# Patient Record
Sex: FEMALE | Race: BLACK OR AFRICAN AMERICAN | Employment: UNEMPLOYED | URBAN - METROPOLITAN AREA
[De-identification: names, ages, dates, MRNs, and addresses within clinical notes are randomized per-mention and may not be internally consistent; named-entity substitution may affect disease eponyms.]

---

## 2020-01-06 LAB
ALBUMIN SERPL-MCNC: 4.2 G/DL
ALP BLD-CCNC: 66 U/L
ALT SERPL-CCNC: 10 U/L
ANION GAP SERPL CALCULATED.3IONS-SCNC: NORMAL MMOL/L
AST SERPL-CCNC: 12 U/L
BASOPHILS ABSOLUTE: 58 /ΜL
BASOPHILS RELATIVE PERCENT: 0.8 %
BILIRUB SERPL-MCNC: 0.4 MG/DL (ref 0.1–1.4)
BUN BLDV-MCNC: 10 MG/DL
CALCIUM SERPL-MCNC: 9.2 MG/DL
CHLORIDE BLD-SCNC: 102 MMOL/L
CHOLESTEROL, TOTAL: 162 MG/DL
CHOLESTEROL/HDL RATIO: 3.5
CO2: 18 MMOL/L
CREAT SERPL-MCNC: 0.79 MG/DL
EOSINOPHILS ABSOLUTE: 146 /ΜL
EOSINOPHILS RELATIVE PERCENT: 2 %
GFR CALCULATED: 92
GLUCOSE BLD-MCNC: 224 MG/DL
HCT VFR BLD CALC: 38 % (ref 36–46)
HDLC SERPL-MCNC: 46 MG/DL (ref 35–70)
HEMOGLOBIN: 12.5 G/DL (ref 12–16)
HIV AG/AB: NON REACTIVE
LDL CHOLESTEROL CALCULATED: 99 MG/DL (ref 0–160)
LYMPHOCYTES ABSOLUTE: 2314 /ΜL
LYMPHOCYTES RELATIVE PERCENT: 31.7 %
MCH RBC QN AUTO: 29.1 PG
MCHC RBC AUTO-ENTMCNC: 32.9 G/DL
MCV RBC AUTO: 88.4 FL
MONOCYTES ABSOLUTE: 482 /ΜL
MONOCYTES RELATIVE PERCENT: 6.6 %
NEUTROPHILS ABSOLUTE: 4300 /ΜL
NEUTROPHILS RELATIVE PERCENT: 58.9 %
PDW BLD-RTO: 12.1 %
PLATELET # BLD: 261 K/ΜL
PMV BLD AUTO: 11 FL
POTASSIUM SERPL-SCNC: 4.5 MMOL/L
RBC # BLD: 4.3 10^6/ΜL
SODIUM BLD-SCNC: 139 MMOL/L
T3 TOTAL: 88
TOTAL PROTEIN: 6.7
TRIGL SERPL-MCNC: 78 MG/DL
TSH SERPL DL<=0.05 MIU/L-ACNC: 3.16 UIU/ML
VLDLC SERPL CALC-MCNC: NORMAL MG/DL
WBC # BLD: 7.3 10^3/ML

## 2020-02-07 ENCOUNTER — APPOINTMENT (OUTPATIENT)
Dept: GENERAL RADIOLOGY | Age: 65
End: 2020-02-07
Payer: COMMERCIAL

## 2020-02-07 ENCOUNTER — HOSPITAL ENCOUNTER (EMERGENCY)
Age: 65
Discharge: HOME | End: 2020-02-07
Payer: COMMERCIAL

## 2020-02-07 VITALS
OXYGEN SATURATION: 99 % | SYSTOLIC BLOOD PRESSURE: 161 MMHG | TEMPERATURE: 98 F | DIASTOLIC BLOOD PRESSURE: 93 MMHG | HEART RATE: 70 BPM | WEIGHT: 190 LBS | BODY MASS INDEX: 31.65 KG/M2 | HEIGHT: 65 IN | RESPIRATION RATE: 14 BRPM

## 2020-02-07 LAB
A/G RATIO: 1.3 (ref 1.1–2.2)
ALBUMIN SERPL-MCNC: 4 G/DL (ref 3.4–5)
ALP BLD-CCNC: 54 U/L (ref 40–129)
ALT SERPL-CCNC: 15 U/L (ref 10–40)
ANION GAP SERPL CALCULATED.3IONS-SCNC: 13 MMOL/L (ref 3–16)
AST SERPL-CCNC: 27 U/L (ref 15–37)
BASOPHILS ABSOLUTE: 0.1 K/UL (ref 0–0.2)
BASOPHILS RELATIVE PERCENT: 0.9 %
BILIRUB SERPL-MCNC: 0.3 MG/DL (ref 0–1)
BILIRUBIN URINE: NEGATIVE
BLOOD, URINE: NEGATIVE
BUN BLDV-MCNC: 7 MG/DL (ref 7–20)
CALCIUM SERPL-MCNC: 9.1 MG/DL (ref 8.3–10.6)
CHLORIDE BLD-SCNC: 98 MMOL/L (ref 99–110)
CLARITY: CLEAR
CO2: 23 MMOL/L (ref 21–32)
COLOR: YELLOW
CREAT SERPL-MCNC: <0.5 MG/DL (ref 0.6–1.2)
EOSINOPHILS ABSOLUTE: 0.1 K/UL (ref 0–0.6)
EOSINOPHILS RELATIVE PERCENT: 2.2 %
GFR AFRICAN AMERICAN: >60
GFR NON-AFRICAN AMERICAN: >60
GLOBULIN: 3.2 G/DL
GLUCOSE BLD-MCNC: 252 MG/DL (ref 70–99)
GLUCOSE URINE: 250 MG/DL
HCT VFR BLD CALC: 37.1 % (ref 36–48)
HEMOGLOBIN: 12.4 G/DL (ref 12–16)
KETONES, URINE: NEGATIVE MG/DL
LEUKOCYTE ESTERASE, URINE: NEGATIVE
LYMPHOCYTES ABSOLUTE: 2 K/UL (ref 1–5.1)
LYMPHOCYTES RELATIVE PERCENT: 32 %
MCH RBC QN AUTO: 29.4 PG (ref 26–34)
MCHC RBC AUTO-ENTMCNC: 33.3 G/DL (ref 31–36)
MCV RBC AUTO: 88.5 FL (ref 80–100)
MICROSCOPIC EXAMINATION: ABNORMAL
MONOCYTES ABSOLUTE: 0.4 K/UL (ref 0–1.3)
MONOCYTES RELATIVE PERCENT: 6.8 %
NEUTROPHILS ABSOLUTE: 3.6 K/UL (ref 1.7–7.7)
NEUTROPHILS RELATIVE PERCENT: 58.1 %
NITRITE, URINE: NEGATIVE
PDW BLD-RTO: 13.3 % (ref 12.4–15.4)
PH UA: 5.5 (ref 5–8)
PLATELET # BLD: 232 K/UL (ref 135–450)
PMV BLD AUTO: 8.6 FL (ref 5–10.5)
POTASSIUM SERPL-SCNC: 4.2 MMOL/L (ref 3.5–5.1)
POTASSIUM SERPL-SCNC: 5.8 MMOL/L (ref 3.5–5.1)
PROTEIN UA: NEGATIVE MG/DL
RBC # BLD: 4.2 M/UL (ref 4–5.2)
REASON FOR REJECTION: NORMAL
REJECTED TEST: NORMAL
SODIUM BLD-SCNC: 134 MMOL/L (ref 136–145)
SPECIFIC GRAVITY UA: >=1.03 (ref 1–1.03)
TOTAL PROTEIN: 7.2 G/DL (ref 6.4–8.2)
URINE REFLEX TO CULTURE: ABNORMAL
URINE TYPE: ABNORMAL
UROBILINOGEN, URINE: 0.2 E.U./DL
WBC # BLD: 6.2 K/UL (ref 4–11)

## 2020-02-07 PROCEDURE — 80053 COMPREHEN METABOLIC PANEL: CPT

## 2020-02-07 PROCEDURE — 71101 X-RAY EXAM UNILAT RIBS/CHEST: CPT

## 2020-02-07 PROCEDURE — 84132 ASSAY OF SERUM POTASSIUM: CPT

## 2020-02-07 PROCEDURE — 36415 COLL VENOUS BLD VENIPUNCTURE: CPT

## 2020-02-07 PROCEDURE — 99284 EMERGENCY DEPT VISIT MOD MDM: CPT

## 2020-02-07 PROCEDURE — 81003 URINALYSIS AUTO W/O SCOPE: CPT

## 2020-02-07 PROCEDURE — 85025 COMPLETE CBC W/AUTO DIFF WBC: CPT

## 2020-02-07 PROCEDURE — 6370000000 HC RX 637 (ALT 250 FOR IP): Performed by: NURSE PRACTITIONER

## 2020-02-07 RX ORDER — HYDROCODONE BITARTRATE AND ACETAMINOPHEN 5; 325 MG/1; MG/1
1 TABLET ORAL EVERY 6 HOURS PRN
Qty: 15 TABLET | Refills: 0 | Status: SHIPPED | OUTPATIENT
Start: 2020-02-07 | End: 2020-02-10

## 2020-02-07 RX ORDER — HYDROCODONE BITARTRATE AND ACETAMINOPHEN 5; 325 MG/1; MG/1
1 TABLET ORAL ONCE
Status: COMPLETED | OUTPATIENT
Start: 2020-02-07 | End: 2020-02-07

## 2020-02-07 RX ADMIN — HYDROCODONE BITARTRATE AND ACETAMINOPHEN 1 TABLET: 5; 325 TABLET ORAL at 10:42

## 2020-02-07 SDOH — HEALTH STABILITY: MENTAL HEALTH: HOW OFTEN DO YOU HAVE A DRINK CONTAINING ALCOHOL?: NEVER

## 2020-02-07 ASSESSMENT — PAIN DESCRIPTION - ORIENTATION
ORIENTATION: RIGHT
ORIENTATION: RIGHT

## 2020-02-07 ASSESSMENT — PAIN DESCRIPTION - LOCATION
LOCATION: FLANK
LOCATION: FLANK

## 2020-02-07 ASSESSMENT — PAIN SCALES - GENERAL
PAINLEVEL_OUTOF10: 10
PAINLEVEL_OUTOF10: 3
PAINLEVEL_OUTOF10: 10
PAINLEVEL_OUTOF10: 3

## 2020-02-10 NOTE — ED PROVIDER NOTES
[address] [place] [address]  ED  EMERGENCY DEPARTMENT ENCOUNTER      This patient was not seen and evaluated by the attending physician. Pt Name: Adela Abarca  MRN: [id number]  [place] 1955  Date of evaluation: 2/7/2020  Provider: MARK Duncan - CNP-C  PCP: No primary care provider on file. History provided by the patient. CHIEFCOMPLAINT:     Chief Complaint   Patient presents with    Flank Pain     Right sided flank pain       HISTORY OF PRESENT ILLNESS:      Adela Abarca is a 62 y.o. female who presents to [address] [place] [address]  ED with complaints of side pain. Patient states that she is actually had right side pain for several months. Patient states that the pain is worse when she pushes on it or moves a certain way, she states that specifically rolling over in bed makes it hurt. She denies any nausea or vomiting with it. She denies dysuria. Denies any gross hematuria. Denies trauma. Denies any chest pain or difficulty breathing. She is here for further evaluation. Patient states that she just moved up here from [place] and does not have a primary care physician at this time. LOCATION: Right flank/side  QUALITY: Ache  SEVERITY: 10 out of 10  DURATION: Several months  MODIFYING FACTORS: Worse with palpation or movement    Nursing Notes were reviewed     REVIEW OF SYSTEMS:     Review of Systems  All systems, a total of 10, are reviewed and negative except for those that were just noted in history present illness.         PAST MEDICAL HISTORY:     Past Medical History:   Diagnosis Date    Diabetes mellitus ([address]     Hyperlipidemia     Hypertension     Thyroid disease          SURGICAL HISTORY:      Past Surgical History:   Procedure Laterality Date    HYSTERECTOMY           CURRENT MEDICATIONS:       Discharge Medication List as of 2/7/2020 12:07 PM      CONTINUE these medications which have NOT CHANGED    Details   metFORMIN (GLUCOPHAGE) 500 MG tablet Take 500 mg by mouth 2 times daily (with meals)Historical Med      atorvastatin (LIPITOR) 20 MG tablet Take 20 mg by mouth dailyHistorical Med      lisinopril (PRINIVIL;ZESTRIL) 40 MG tablet Take 40 mg by mouth dailyHistorical Med      SITagliptin (JANUVIA) 100 MG tablet Take 100 mg by mouth dailyHistorical Med      repaglinide (PRANDIN) 1 MG tablet Take 1 mg by mouth 3 times daily (before meals)Historical Med      levothyroxine (SYNTHROID) 50 MCG tablet Take 50 mcg by mouth DailyHistorical Med      glimepiride (AMARYL) 4 MG tablet Take 4 mg by mouth every morning (before breakfast)Historical Med      Vaginal Lubricant (REPLENS) GEL Place 1 Dose vaginally daily, Disp-35 g, R-1Print               ALLERGIES:    Patient has no known allergies. FAMILY HISTORY:     History reviewed. No pertinent family history. SOCIAL HISTORY:     Social History     Socioeconomic History    Marital status:       Spouse name: None    Number of children: None    Years of education: None    Highest education level: None   Occupational History    None   Social Needs    Financial resource strain: None    Food insecurity:     Worry: None     Inability: None    Transportation needs:     Medical: None     Non-medical: None   Tobacco Use    Smoking status: Former Smoker    Smokeless tobacco: Never Used   Substance and Sexual Activity    Alcohol use: Never     Frequency: Never    Drug use: None    Sexual activity: None   Lifestyle    Physical activity:     Days per week: None     Minutes per session: None    Stress: None   Relationships    Social connections:     Talks on phone: None     Gets together: None     Attends Bahai service: None     Active member of club or organization: None     Attends meetings of clubs or organizations: None     Relationship status: None    Intimate partner violence:     Fear of current or ex partner: None     Emotionally abused: None     Physically abused: None     Forced sexual activity: None all extremities and sensation is intact. PSYCHIATRIC: Normal affect, normal insight and judgement. Alert and oriented x 3.         DIAGNOSTIC RESULTS:     LABS:    Results for orders placed or performed during the hospital encounter of 02/07/20   Comprehensive Metabolic Panel   Result Value Ref Range    Sodium 134 (L) 136 - 145 mmol/L    Potassium 5.8 (H) 3.5 - 5.1 mmol/L    Chloride 98 (L) 99 - 110 mmol/L    CO2 23 21 - 32 mmol/L    Anion Gap 13 3 - 16    Glucose 252 (H) 70 - 99 mg/dL    BUN 7 7 - 20 mg/dL    CREATININE <0.5 (L) 0.6 - 1.2 mg/dL    GFR Non-African American >60 >60    GFR African American >60 >60    Calcium 9.1 8.3 - 10.6 mg/dL    Total Protein 7.2 6.4 - 8.2 g/dL    Alb 4.0 3.4 - 5.0 g/dL    Albumin/Globulin Ratio 1.3 1.1 - 2.2    Total Bilirubin 0.3 0.0 - 1.0 mg/dL    Alkaline Phosphatase 54 40 - 129 U/L    ALT 15 10 - 40 U/L    AST 27 15 - 37 U/L    Globulin 3.2 g/dL   Urinalysis Reflex to Culture   Result Value Ref Range    Color, UA Yellow Straw/Yellow    Clarity, UA Clear Clear    Glucose, Ur 250 (A) Negative mg/dL    Bilirubin Urine Negative Negative    Ketones, Urine Negative Negative mg/dL    Specific Gravity, UA >=1.030 1.005 - 1.030    Blood, Urine Negative Negative    pH, UA 5.5 5.0 - 8.0    Protein, UA Negative Negative mg/dL    Urobilinogen, Urine 0.2 <2.0 E.U./dL    Nitrite, Urine Negative Negative    Leukocyte Esterase, Urine Negative Negative    Microscopic Examination Not Indicated     Urine Type NotGiven     Urine Reflex to Culture Not Indicated    SPECIMEN REJECTION   Result Value Ref Range    Rejected Test cbc     Reason for Rejection see below    CBC Auto Differential   Result Value Ref Range    WBC 6.2 4.0 - 11.0 K/uL    RBC 4.20 4.00 - 5.20 M/uL    Hemoglobin 12.4 12.0 - 16.0 g/dL    Hematocrit 37.1 36.0 - 48.0 %    MCV 88.5 80.0 - 100.0 fL    MCH 29.4 26.0 - 34.0 pg    MCHC 33.3 31.0 - 36.0 g/dL    RDW 13.3 12.4 - 15.4 %    Platelets 861 554 - 762 K/uL    MPV 8.6 5.0 - 10.5 fL    Neutrophils % 58.1 %    Lymphocytes % 32.0 %    Monocytes % 6.8 %    Eosinophils % 2.2 %    Basophils % 0.9 %    Neutrophils Absolute 3.6 1.7 - 7.7 K/uL    Lymphocytes Absolute 2.0 1.0 - 5.1 K/uL    Monocytes Absolute 0.4 0.0 - 1.3 K/uL    Eosinophils Absolute 0.1 0.0 - 0.6 K/uL    Basophils Absolute 0.1 0.0 - 0.2 K/uL   Potassium   Result Value Ref Range    Potassium 4.2 3.5 - 5.1 mmol/L         RADIOLOGY:  All x-ray studies are viewed/reviewed by me. Formal interpretations per the radiologist are as follows:      XR RIBS RIGHT INCLUDE CHEST (MIN 3 VIEWS)   Final Result   No rib fracture or acute process in the chest.                 EKG:  See EKG interpretation by an attending physician. PROCEDURES:   N/A    CRITICAL CARE TIME:   N/A    CONSULTS:  None      EMERGENCY DEPARTMENT COURSE andDIFFERENTIAL DIAGNOSIS/MDM:   Vitals:    Vitals:    02/07/20 0939 02/07/20 0948 02/07/20 1205 02/07/20 1233   BP:  (!) 161/93  (!) 161/93   Pulse:  72 70 70   Resp:  16 14 14   Temp:  97.9 °F (36.6 °C) 97.9 °F (36.6 °C) 98 °F (36.7 °C)   TempSrc: Oral Oral Oral    SpO2:  99% 99% 99%   Weight:  190 lb (86.2 kg)     Height:  5' 5\" (1.651 m)         Patient wasgiven the following medications:  Medications   HYDROcodone-acetaminophen (NORCO) 5-325 MG per tablet 1 tablet (1 tablet Oral Given 2/7/20 1042)         Patient was evaluated independently by myself with the attending physician available for consultation. Patient presented to the emergency room today with complaints of right side pain is been ongoing for several months. Patient did have some tenderness on exam noted to the right inferior lateral rib cage without crepitus. She denies trauma in that area. I did do an x-ray of the area which showed no acute fracture, no pneumothorax. Patient was given a dose of pain medicine here in the ED and she noted improvement of her pain from a 10 to a 3. Patient however told me that the pain medicine \"did nothing\". Patient was upset that I was not giving her stronger pain medicine for this. I explained her that her symptoms have been ongoing for several months and I saw no indications of an acute emergent medical condition. I did get her a referral to see a primary care physician. I did do laboratory studies on the patient which showed no leukocytosis, no significant electrolyte abnormality. Patient does have history of diabetes and was hyperglycemic with a glucose of 252 however CO2 and anion gap were normal so no evidence of DKA. Patient discharged home in good condition, she can return the ED for any worsening symptoms. Patient laboratory studies, radiographic imaging, and assessment were all discussed with the patient and/orpatient family. There was shared decision-making between myself as well as the patient and/or their surrogate and we are all in agreement with discharge home. There was an opportunity for questions and all questions were answered tothe best of my ability and to the satisfaction of the patient and/or patient family. I estimate there is LOW risk for PULMONARY EMBOLISM, ACUTE CORONARY SYNDROME, OR THORACIC AORTIC DISSECTION, thus I consider the discharge disposition reasonable. Alisson Garcia and I have discussed the diagnosis and risks, and we agree with discharging home to follow-up with their primary doctor. We also discussed returning to the Emergency Department immediately if new or worsening symptoms occur. We have discussed the symptoms which are most concerning (e.g., bloody sputum, fever, worsening pain or shortness of breath, vomiting) that necessitate immediate return.     FINAL IMPRESSION:      1. Rib pain on right side          DISPOSITION/PLAN:   DISPOSITION Decision To Discharge      PATIENT REFERRED TO:  [facility]  ED  [address]  Go to   If symptoms worsen    [facility]) Pre-Services  [phone]  Call   For follow

## 2020-02-23 ENCOUNTER — HOSPITAL ENCOUNTER (EMERGENCY)
Age: 65
Discharge: HOME | End: 2020-02-23
Payer: COMMERCIAL

## 2020-02-23 ENCOUNTER — APPOINTMENT (OUTPATIENT)
Dept: CT IMAGING | Age: 65
End: 2020-02-23
Payer: COMMERCIAL

## 2020-02-23 VITALS
WEIGHT: 200 LBS | BODY MASS INDEX: 33.32 KG/M2 | HEART RATE: 62 BPM | OXYGEN SATURATION: 97 % | TEMPERATURE: 98.4 F | SYSTOLIC BLOOD PRESSURE: 136 MMHG | RESPIRATION RATE: 16 BRPM | DIASTOLIC BLOOD PRESSURE: 68 MMHG | HEIGHT: 65 IN

## 2020-02-23 LAB
A/G RATIO: 1.4 (ref 1.1–2.2)
ALBUMIN SERPL-MCNC: 4.3 G/DL (ref 3.4–5)
ALP BLD-CCNC: 66 U/L (ref 40–129)
ALT SERPL-CCNC: 12 U/L (ref 10–40)
ANION GAP SERPL CALCULATED.3IONS-SCNC: 14 MMOL/L (ref 3–16)
AST SERPL-CCNC: 17 U/L (ref 15–37)
BASOPHILS ABSOLUTE: 0 K/UL (ref 0–0.2)
BASOPHILS RELATIVE PERCENT: 0.6 %
BILIRUB SERPL-MCNC: <0.2 MG/DL (ref 0–1)
BILIRUBIN URINE: NEGATIVE
BLOOD, URINE: NEGATIVE
BUN BLDV-MCNC: 9 MG/DL (ref 7–20)
CALCIUM SERPL-MCNC: 9.2 MG/DL (ref 8.3–10.6)
CHLORIDE BLD-SCNC: 101 MMOL/L (ref 99–110)
CLARITY: CLEAR
CO2: 24 MMOL/L (ref 21–32)
COLOR: YELLOW
CREAT SERPL-MCNC: 0.6 MG/DL (ref 0.6–1.2)
EOSINOPHILS ABSOLUTE: 0.2 K/UL (ref 0–0.6)
EOSINOPHILS RELATIVE PERCENT: 2.3 %
GFR AFRICAN AMERICAN: >60
GFR NON-AFRICAN AMERICAN: >60
GLOBULIN: 3 G/DL
GLUCOSE BLD-MCNC: 185 MG/DL (ref 70–99)
GLUCOSE URINE: 250 MG/DL
HCT VFR BLD CALC: 38.9 % (ref 36–48)
HEMOGLOBIN: 13.2 G/DL (ref 12–16)
KETONES, URINE: ABNORMAL MG/DL
LEUKOCYTE ESTERASE, URINE: NEGATIVE
LIPASE: 40 U/L (ref 13–60)
LYMPHOCYTES ABSOLUTE: 2.5 K/UL (ref 1–5.1)
LYMPHOCYTES RELATIVE PERCENT: 30.8 %
MCH RBC QN AUTO: 30.1 PG (ref 26–34)
MCHC RBC AUTO-ENTMCNC: 33.9 G/DL (ref 31–36)
MCV RBC AUTO: 88.7 FL (ref 80–100)
MICROSCOPIC EXAMINATION: ABNORMAL
MONOCYTES ABSOLUTE: 0.5 K/UL (ref 0–1.3)
MONOCYTES RELATIVE PERCENT: 6.2 %
NEUTROPHILS ABSOLUTE: 4.8 K/UL (ref 1.7–7.7)
NEUTROPHILS RELATIVE PERCENT: 60.1 %
NITRITE, URINE: NEGATIVE
PDW BLD-RTO: 13.5 % (ref 12.4–15.4)
PH UA: 5.5 (ref 5–8)
PLATELET # BLD: 265 K/UL (ref 135–450)
PMV BLD AUTO: 8.4 FL (ref 5–10.5)
POTASSIUM SERPL-SCNC: 4 MMOL/L (ref 3.5–5.1)
PRO-BNP: 41 PG/ML (ref 0–124)
PROTEIN UA: NEGATIVE MG/DL
RBC # BLD: 4.39 M/UL (ref 4–5.2)
SODIUM BLD-SCNC: 139 MMOL/L (ref 136–145)
SPECIFIC GRAVITY UA: >=1.03 (ref 1–1.03)
TOTAL PROTEIN: 7.3 G/DL (ref 6.4–8.2)
URINE REFLEX TO CULTURE: ABNORMAL
URINE TYPE: ABNORMAL
UROBILINOGEN, URINE: 0.2 E.U./DL
WBC # BLD: 8 K/UL (ref 4–11)

## 2020-02-23 PROCEDURE — 83880 ASSAY OF NATRIURETIC PEPTIDE: CPT

## 2020-02-23 PROCEDURE — 83690 ASSAY OF LIPASE: CPT

## 2020-02-23 PROCEDURE — 81003 URINALYSIS AUTO W/O SCOPE: CPT

## 2020-02-23 PROCEDURE — 99284 EMERGENCY DEPT VISIT MOD MDM: CPT

## 2020-02-23 PROCEDURE — 85025 COMPLETE CBC W/AUTO DIFF WBC: CPT

## 2020-02-23 PROCEDURE — 80053 COMPREHEN METABOLIC PANEL: CPT

## 2020-02-23 PROCEDURE — 96375 TX/PRO/DX INJ NEW DRUG ADDON: CPT

## 2020-02-23 PROCEDURE — 93005 ELECTROCARDIOGRAM TRACING: CPT | Performed by: PHYSICIAN ASSISTANT

## 2020-02-23 PROCEDURE — 6370000000 HC RX 637 (ALT 250 FOR IP): Performed by: PHYSICIAN ASSISTANT

## 2020-02-23 PROCEDURE — 96374 THER/PROPH/DIAG INJ IV PUSH: CPT

## 2020-02-23 PROCEDURE — 6360000004 HC RX CONTRAST MEDICATION: Performed by: PHYSICIAN ASSISTANT

## 2020-02-23 PROCEDURE — 74177 CT ABD & PELVIS W/CONTRAST: CPT

## 2020-02-23 PROCEDURE — 6360000002 HC RX W HCPCS: Performed by: PHYSICIAN ASSISTANT

## 2020-02-23 PROCEDURE — 2580000003 HC RX 258: Performed by: PHYSICIAN ASSISTANT

## 2020-02-23 RX ORDER — 0.9 % SODIUM CHLORIDE 0.9 %
1000 INTRAVENOUS SOLUTION INTRAVENOUS ONCE
Status: COMPLETED | OUTPATIENT
Start: 2020-02-23 | End: 2020-02-23

## 2020-02-23 RX ORDER — ONDANSETRON 2 MG/ML
4 INJECTION INTRAMUSCULAR; INTRAVENOUS ONCE
Status: COMPLETED | OUTPATIENT
Start: 2020-02-23 | End: 2020-02-23

## 2020-02-23 RX ORDER — METHOCARBAMOL 750 MG/1
750 TABLET, FILM COATED ORAL 4 TIMES DAILY
Qty: 40 TABLET | Refills: 0 | Status: SHIPPED | OUTPATIENT
Start: 2020-02-23 | End: 2020-03-04

## 2020-02-23 RX ORDER — OXYCODONE HYDROCHLORIDE AND ACETAMINOPHEN 5; 325 MG/1; MG/1
1 TABLET ORAL ONCE
Status: COMPLETED | OUTPATIENT
Start: 2020-02-23 | End: 2020-02-23

## 2020-02-23 RX ORDER — OXYCODONE HYDROCHLORIDE AND ACETAMINOPHEN 5; 325 MG/1; MG/1
1 TABLET ORAL EVERY 6 HOURS PRN
Qty: 12 TABLET | Refills: 0 | Status: SHIPPED | OUTPATIENT
Start: 2020-02-23 | End: 2020-02-26

## 2020-02-23 RX ORDER — MORPHINE SULFATE 4 MG/ML
4 INJECTION, SOLUTION INTRAMUSCULAR; INTRAVENOUS ONCE
Status: COMPLETED | OUTPATIENT
Start: 2020-02-23 | End: 2020-02-23

## 2020-02-23 RX ADMIN — IOPAMIDOL 75 ML: 755 INJECTION, SOLUTION INTRAVENOUS at 19:29

## 2020-02-23 RX ADMIN — SODIUM CHLORIDE 1000 ML: 9 INJECTION, SOLUTION INTRAVENOUS at 19:14

## 2020-02-23 RX ADMIN — OXYCODONE HYDROCHLORIDE AND ACETAMINOPHEN 1 TABLET: 5; 325 TABLET ORAL at 20:59

## 2020-02-23 RX ADMIN — ONDANSETRON 4 MG: 2 INJECTION INTRAMUSCULAR; INTRAVENOUS at 19:14

## 2020-02-23 RX ADMIN — MORPHINE SULFATE 4 MG: 4 INJECTION, SOLUTION INTRAMUSCULAR; INTRAVENOUS at 19:14

## 2020-02-23 ASSESSMENT — PAIN DESCRIPTION - PROGRESSION: CLINICAL_PROGRESSION: GRADUALLY WORSENING

## 2020-02-23 ASSESSMENT — PAIN DESCRIPTION - ORIENTATION
ORIENTATION: RIGHT

## 2020-02-23 ASSESSMENT — PAIN SCALES - GENERAL
PAINLEVEL_OUTOF10: 9
PAINLEVEL_OUTOF10: 10
PAINLEVEL_OUTOF10: 6
PAINLEVEL_OUTOF10: 9
PAINLEVEL_OUTOF10: 10

## 2020-02-23 ASSESSMENT — PAIN DESCRIPTION - FREQUENCY
FREQUENCY: CONTINUOUS
FREQUENCY: CONTINUOUS

## 2020-02-23 ASSESSMENT — PAIN DESCRIPTION - PAIN TYPE
TYPE: ACUTE PAIN
TYPE: ACUTE PAIN

## 2020-02-23 ASSESSMENT — PAIN DESCRIPTION - DESCRIPTORS
DESCRIPTORS: STABBING;SHARP
DESCRIPTORS: CONSTANT;STABBING

## 2020-02-23 ASSESSMENT — PAIN DESCRIPTION - LOCATION
LOCATION: ABDOMEN;BACK
LOCATION: FLANK
LOCATION: FLANK

## 2020-02-23 ASSESSMENT — PAIN DESCRIPTION - ONSET: ONSET: ON-GOING

## 2020-02-23 NOTE — ED TRIAGE NOTES
Patient reports that has had this pain to the lower right abdomin for a month. Patient reports that she has been seen previously and was told it was a strained muscle.  Patient reports that having painful urination, patient reports that pain is in flank, last time they focused on ribs and pain is lower and radiates to her back

## 2020-02-24 LAB
EKG ATRIAL RATE: 75 BPM
EKG DIAGNOSIS: NORMAL
EKG P AXIS: 41 DEGREES
EKG P-R INTERVAL: 164 MS
EKG Q-T INTERVAL: 406 MS
EKG QRS DURATION: 80 MS
EKG QTC CALCULATION (BAZETT): 453 MS
EKG R AXIS: 25 DEGREES
EKG T AXIS: 37 DEGREES
EKG VENTRICULAR RATE: 75 BPM

## 2020-02-24 PROCEDURE — 93010 ELECTROCARDIOGRAM REPORT: CPT | Performed by: INTERNAL MEDICINE

## 2020-02-24 NOTE — ED PROVIDER NOTES
Frequency: Never    Drug use: Never    Sexual activity: Not on file   Lifestyle    Physical activity:     Days per week: Not on file     Minutes per session: Not on file    Stress: Not on file   Relationships    Social connections:     Talks on phone: Not on file     Gets together: Not on file     Attends Mormon service: Not on file     Active member of club or organization: Not on file     Attends meetings of clubs or organizations: Not on file     Relationship status: Not on file    Intimate partner violence:     Fear of current or ex partner: Not on file     Emotionally abused: Not on file     Physically abused: Not on file     Forced sexual activity: Not on file   Other Topics Concern    Not on file   Social History Narrative    Not on file     Current Facility-Administered Medications   Medication Dose Route Frequency Provider Last Rate Last Dose    0.9 % sodium chloride bolus  1,000 mL Intravenous Once TACOS Armas 1,000 mL/hr at 02/23/20 1914 1,000 mL at 02/23/20 1914    iopamidol (ISOVUE-370) 76 % injection 75 mL  75 mL Intravenous ONCE PRN TACOS Armas         Current Outpatient Medications   Medication Sig Dispense Refill    metFORMIN (GLUCOPHAGE) 500 MG tablet Take 500 mg by mouth 2 times daily (with meals)      atorvastatin (LIPITOR) 20 MG tablet Take 20 mg by mouth daily      lisinopril (PRINIVIL;ZESTRIL) 40 MG tablet Take 40 mg by mouth daily      SITagliptin (JANUVIA) 100 MG tablet Take 100 mg by mouth daily      repaglinide (PRANDIN) 1 MG tablet Take 1 mg by mouth 3 times daily (before meals)      levothyroxine (SYNTHROID) 50 MCG tablet Take 50 mcg by mouth Daily      glimepiride (AMARYL) 4 MG tablet Take 4 mg by mouth every morning (before breakfast)      Vaginal Lubricant (REPLENS) GEL Place 1 Dose vaginally daily 35 g 1     No Known Allergies    REVIEW OF SYSTEMS  10 systems reviewed, pertinent positives per HPI otherwise noted to be negative    PHYSICAL EXAM  BP (!) 143/75   Pulse 92   Temp 98.2 °F (36.8 °C)   Resp 16   Ht 5' 5\" (1.651 m)   Wt 200 lb (90.7 kg)   SpO2 98%   BMI 33.28 kg/m²   GENERAL APPEARANCE: Awake and alert. Cooperative. No acute distress. HEAD: Normocephalic. Atraumatic. EYES: PERRL. EOM's grossly intact. ENT: Mucous membranes are moist no chest wall tenderness. NECK: Supple. HEART: RRR. No murmurs. No chest wall tenderness. LUNGS: Respirations unlabored. CTAB. Good air exchange. Speaking comfortably in full sentences. ABDOMEN: Soft. Non-distended. Right-sided abdominal tenderness without rigidity,. guarding or rebound. mildly positive Chavez's. Negative McBurney's and Rovsing's. No fluids or ascites. No hernias or masses. Bowel sounds normal in all quadrants. No CVA tenderness. EXTREMITIES: No peripheral edema. Moves all extremities equally. All extremities neurovascularly intact. SKIN: Warm and dry. No acute rashes. NEUROLOGICAL: Alert and oriented. CN's 2-12 intact. No gross facial drooping. Strength 5/5, sensation intact. PSYCHIATRIC: Normal mood and affect. RADIOLOGY  Xr Ribs Right Include Chest (min 3 Views)    Result Date: 2/7/2020  EXAMINATION: XRAY VIEWS OF THE RIGHT RIBS WITH FRONTAL XRAY VIEW OF THE CHEST 2/7/2020 10:31 am COMPARISON: None. HISTORY: ORDERING SYSTEM PROVIDED HISTORY: right rib pain TECHNOLOGIST PROVIDED HISTORY: Reason for exam:->right rib pain Reason for Exam: Flank Pain (Right sided flank pain) - no known injury - pain for the past few weeks - right lower rib pain Acuity: Acute Type of Exam: Initial FINDINGS: The lungs are without acute process. No pneumothorax or pleural effusion identified. Cardiac and mediastinal contours are without acute process. No acute osseous abnormality identified. Specifically, no rib fracture identified on dedicated right rib radiographs.      No rib fracture or acute process in the chest.     Ct Abdomen Pelvis W Iv Contrast Additional Contrast? None    Result U/L    ALT 12 10 - 40 U/L    AST 17 15 - 37 U/L    Globulin 3.0 g/dL   Urinalysis Reflex to Culture   Result Value Ref Range    Color, UA Yellow Straw/Yellow    Clarity, UA Clear Clear    Glucose, Ur 250 (A) Negative mg/dL    Bilirubin Urine Negative Negative    Ketones, Urine TRACE (A) Negative mg/dL    Specific Gravity, UA >=1.030 1.005 - 1.030    Blood, Urine Negative Negative    pH, UA 5.5 5.0 - 8.0    Protein, UA Negative Negative mg/dL    Urobilinogen, Urine 0.2 <2.0 E.U./dL    Nitrite, Urine Negative Negative    Leukocyte Esterase, Urine Negative Negative    Microscopic Examination Not Indicated     Urine Type NotGiven     Urine Reflex to Culture Not Indicated    Lipase   Result Value Ref Range    Lipase 40.0 13.0 - 60.0 U/L   Brain Natriuretic Peptide   Result Value Ref Range    Pro-BNP 41 0 - 124 pg/mL   EKG 12 Lead   Result Value Ref Range    Ventricular Rate 75 BPM    Atrial Rate 75 BPM    P-R Interval 164 ms    QRS Duration 80 ms    Q-T Interval 406 ms    QTc Calculation (Bazett) 453 ms    P Axis 41 degrees    R Axis 25 degrees    T Axis 37 degrees    Diagnosis       Normal sinus rhythmNormal ECGNo previous ECGs available     I estimate there is LOW risk for ACUTE APPENDICITIS, BOWEL OBSTRUCTION, CHOLECYSTITIS, DIVERTICULITIS, INCARCERATED HERNIA, PANCREATITIS, or PERFORATED BOWEL or ULCER, thus I consider the discharge disposition reasonable. Also, there is no evidence or peritonitis, sepsis, or toxicity. Layman Felix and I have discussed the diagnosis and risks, and we agree with discharging home to follow-up with their primary doctor. We also discussed returning to the Emergency Department immediately if new or worsening symptoms occur. We have discussed the symptoms which are most concerning (e.g., bloody stool, fever, changing or worsening pain, vomiting) that necessitate immediate return. FINAL Impression  1.  Right flank pain      Blood pressure (!) 144/80, pulse 72, temperature 97.4 °F (36.3

## 2020-02-26 ENCOUNTER — OFFICE VISIT (OUTPATIENT)
Dept: INTERNAL MEDICINE CLINIC | Age: 65
End: 2020-02-26
Payer: COMMERCIAL

## 2020-02-26 ENCOUNTER — HOSPITAL ENCOUNTER (OUTPATIENT)
Dept: WOMENS IMAGING | Age: 65
Discharge: HOME | End: 2020-02-26
Payer: COMMERCIAL

## 2020-02-26 VITALS
BODY MASS INDEX: 32.82 KG/M2 | WEIGHT: 197 LBS | OXYGEN SATURATION: 98 % | HEIGHT: 65 IN | SYSTOLIC BLOOD PRESSURE: 138 MMHG | HEART RATE: 67 BPM | DIASTOLIC BLOOD PRESSURE: 86 MMHG

## 2020-02-26 PROBLEM — I10 ESSENTIAL HYPERTENSION: Status: ACTIVE | Noted: 2020-02-26

## 2020-02-26 PROBLEM — E78.2 MIXED HYPERLIPIDEMIA: Status: ACTIVE | Noted: 2020-02-26

## 2020-02-26 PROCEDURE — G8427 DOCREV CUR MEDS BY ELIG CLIN: HCPCS | Performed by: INTERNAL MEDICINE

## 2020-02-26 PROCEDURE — 2022F DILAT RTA XM EVC RTNOPTHY: CPT | Performed by: INTERNAL MEDICINE

## 2020-02-26 PROCEDURE — 99386 PREV VISIT NEW AGE 40-64: CPT | Performed by: INTERNAL MEDICINE

## 2020-02-26 PROCEDURE — 1036F TOBACCO NON-USER: CPT | Performed by: INTERNAL MEDICINE

## 2020-02-26 PROCEDURE — 3017F COLORECTAL CA SCREEN DOC REV: CPT | Performed by: INTERNAL MEDICINE

## 2020-02-26 PROCEDURE — 77067 SCR MAMMO BI INCL CAD: CPT

## 2020-02-26 PROCEDURE — G8417 CALC BMI ABV UP PARAM F/U: HCPCS | Performed by: INTERNAL MEDICINE

## 2020-02-26 PROCEDURE — 3046F HEMOGLOBIN A1C LEVEL >9.0%: CPT | Performed by: INTERNAL MEDICINE

## 2020-02-26 PROCEDURE — G8484 FLU IMMUNIZE NO ADMIN: HCPCS | Performed by: INTERNAL MEDICINE

## 2020-02-26 PROCEDURE — 99214 OFFICE O/P EST MOD 30 MIN: CPT | Performed by: INTERNAL MEDICINE

## 2020-02-26 RX ORDER — PREDNISONE 20 MG/1
TABLET ORAL
Qty: 18 TABLET | Refills: 0 | Status: SHIPPED | OUTPATIENT
Start: 2020-02-26 | End: 2020-03-07

## 2020-02-26 RX ORDER — ATORVASTATIN CALCIUM 20 MG/1
20 TABLET, FILM COATED ORAL DAILY
Qty: 90 TABLET | Refills: 3 | Status: SHIPPED | OUTPATIENT
Start: 2020-02-26 | End: 2021-02-24 | Stop reason: SDUPTHER

## 2020-02-26 RX ORDER — MELOXICAM 15 MG/1
15 TABLET ORAL DAILY
Qty: 30 TABLET | Refills: 0 | Status: SHIPPED | OUTPATIENT
Start: 2020-02-26 | End: 2020-08-10

## 2020-02-26 RX ORDER — LISINOPRIL 40 MG/1
40 TABLET ORAL DAILY
Qty: 90 TABLET | Refills: 3 | Status: SHIPPED | OUTPATIENT
Start: 2020-02-26 | End: 2021-02-24 | Stop reason: SDUPTHER

## 2020-02-26 SDOH — HEALTH STABILITY: MENTAL HEALTH: HOW OFTEN DO YOU HAVE A DRINK CONTAINING ALCOHOL?: NOT ASKED

## 2020-02-26 ASSESSMENT — PATIENT HEALTH QUESTIONNAIRE - PHQ9
SUM OF ALL RESPONSES TO PHQ QUESTIONS 1-9: 1
SUM OF ALL RESPONSES TO PHQ QUESTIONS 1-9: 1
1. LITTLE INTEREST OR PLEASURE IN DOING THINGS: 0
2. FEELING DOWN, DEPRESSED OR HOPELESS: 1
SUM OF ALL RESPONSES TO PHQ9 QUESTIONS 1 & 2: 1

## 2020-02-26 NOTE — PROGRESS NOTES
[facility]  History and Physical  Kasia Aviles M.D. Aelxis Washington  YOB: 1955    Date of Service:  2/26/2020    Chief Complaint:   Alexis [place] is a 62 y.o. female who presents for   Chief Complaint   Patient presents with    Establish Care    Flank Pain     right side     Back Pain    Nausea     oxycodone prescribed from ER is making her nauseous        HPI: Here for Annual Physical and Follow up. She complain of a slight right side pain about a month ago while she was in [place] and then progressively got worse where she end up in the ER twice this past week with pain 10/10 in her right low back shooting down right leg. No bowel or bladder dysfunction. Pain is better when she's bend over and worse with standing straight. Treatment Adherence:   Medication compliance:  compliant most of the time  Diet compliance:  compliant most of the time  Weight trend: decreasing  Current exercise: walks 5 time(s) per week  Barriers: none    Diabetes Mellitus Type 2: Current symptoms/problems include neuropathy. Stable Metformin 500 mg bid, Januvia 100 mg qd, prandin 1 mg qd, glimepiride 4 mg bid    Home blood sugar records: fasting range: 160 AM and 150 PM  Any episodes of hypoglycemia? no  Eye exam current (within one year): yes  Tobacco history: She  reports that she quit smoking about 10 years ago. Her smoking use included cigarettes. She has a 10.00 pack-year smoking history. She has never used smokeless tobacco.   Daily Aspirin? No: will start  Known diabetic complications: none    Hypertension:  Home blood pressure monitoring: Yes 120/60-70. Stable on Lisinopril 40 mg qd. She is adherent to a low sodium diet. Patient denies chest pain, shortness of breath, headache, lightheadedness, blurred vision, peripheral edema, palpitations, dry cough and fatigue. Antihypertensive medication side effects: no medication side effects noted.   Use of agents associated with hypertension: for Pain for up to 3 days. 12 tablet 0    methocarbamol (ROBAXIN-750) 750 MG tablet Take 1 tablet by mouth 4 times daily for 10 days 40 tablet 0    metFORMIN (GLUCOPHAGE) 500 MG tablet Take 500 mg by mouth 2 times daily (with meals)      atorvastatin (LIPITOR) 20 MG tablet Take 20 mg by mouth daily      lisinopril (PRINIVIL;ZESTRIL) 40 MG tablet Take 40 mg by mouth daily      SITagliptin (JANUVIA) 100 MG tablet Take 100 mg by mouth daily      levothyroxine (SYNTHROID) 50 MCG tablet Take 50 mcg by mouth Daily      glimepiride (AMARYL) 4 MG tablet Take 4 mg by mouth 2 times daily          Past Medical History:   Diagnosis Date    Chronic back pain     Diabetes mellitus ([facility] [address]     Hyperlipidemia     Hypothyroidism     Thyroid disease      Past Surgical History:   Procedure Laterality Date    APPENDECTOMY  1974    BREAST SURGERY Right 2013    drainage    CHOLECYSTECTOMY  1974    HYSTERECTOMY, VAGINAL  1996    TUBAL LIGATION  1981     Family History   Problem Relation Age of Onset    Dementia Mother     Breast Cancer Sister     Heart Disease Sister     Diabetes Sister      Social History     Socioeconomic History    Marital status:       Spouse name: Not on file    Number of children: Not on file    Years of education: Not on file    Highest education level: Not on file   Occupational History    Not on file   Social Needs    Financial resource strain: Not on file    Food insecurity:     Worry: Not on file     Inability: Not on file    Transportation needs:     Medical: Not on file     Non-medical: Not on file   Tobacco Use    Smoking status: Former Smoker     Packs/day: 0.50     Years: 20.00     Pack years: 10.00     Types: Cigarettes     Last attempt to quit: 2/26/2010     Years since quitting: 10.0    Smokeless tobacco: Never Used   Substance and Sexual Activity    Alcohol use: Never    Drug use: Never    Sexual activity: Not on file   Lifestyle    Physical activity:     Days per Musculoskeletal: Normal range of motion, no synovitis. She exhibits no edema. Neurological: She is alert and oriented to person, place, and time. She has normal reflexes. No cranial nerve deficit. Coordination normal.   Skin: Skin is warm and dry. There is no rash or erythema. No suspicious lesions noted. Psychiatric: She has a normal mood and affect. Her speech is normal and behavior is normal. Judgment, cognition and memory are normal.       Preventive Care:  Health Maintenance   Topic Date Due    Hepatitis C screen  1955    Lipid screen  05/24/1966    DTaP/Tdap/Td vaccine (1 - Tdap) 05/24/1967    HIV screen  05/24/1971    Cervical cancer screen  05/24/1977    Breast cancer screen  05/24/1996    Diabetes screen  05/24/1996    Shingles Vaccine (1 of 2) 05/24/2006    Colon cancer screen colonoscopy  05/24/2006    Flu vaccine (1) 09/01/2019    Potassium monitoring  02/23/2021    Creatinine monitoring  02/23/2021    Hepatitis A vaccine  Aged Out    Hepatitis B vaccine  Aged Out    Hib vaccine  Aged Out    Meningococcal (ACWY) vaccine  Aged Out    Pneumococcal 0-64 years Vaccine  Aged Out      Hx abnormal PAP: no  Sexual activity: none   Last eye exam: 2019, normal  Exercise: walks 5 time(s) per week       Preventive plan of care for Lance Amezcua        2/26/2020           Preventive Measures Status       Recommendations for screening                   Diabetes Screen  Glucose (mg/dL)   Date Value   02/23/2020 185 (H)    Repeat yearly   Cholesterol Screen  Lab Results   Component Value Date    CHOL 162 01/06/2020    TRIG 78 01/06/2020    HDL 46 01/06/2020    LDLCALC 99 01/06/2020    Repeat yearly       Weight: Body mass index is 32.78 kg/m². 5' 5\" (1.651 m)197 lb (89.4 kg)    Your BMI is 25 or greater, which indicates that you are overweight        Recommended Immunizations      There is no immunization history on file for this patient.      Influenza vaccine:  recommended every fall Other Recommendations ·   See a dentist every 6 months  · Try to get at least 30 minutes of exercise 3-5 days per week  · Always wear a seat belt when traveling in a car  · Always wear a helmet when riding a bicycle or motorcycle  · When exposed to the sun, use a sunscreen that protects against both UVA and UVB radiation with an SPF of 30 or greater- reapply every 2 to 3 hours or after sweating, drying off with a towel, or swimming  · You need 0509-0443 mg of calcium and 9917-9185 IU of vitamin D per day- it is possible to meet your calcium requirement with diet alone, but a vitamin D supplement is usually necessary                 Assessment/Plan:    Christie Whitaker was seen today for establish care, flank pain, back pain and nausea. Diagnoses and all orders for this visit:    Annual physical exam    Screening for breast cancer  -     [facility] DIGITAL SCREENING AUGMENTED BILATERAL; Future    Uncontrolled type 2 diabetes mellitus without complication, without long-term current use of insulin (HCC)  Start empagliflozin (JARDIANCE) 25 MG tablet; Take 1 tablet by mouth daily  Increase metFORMIN (GLUCOPHAGE) 850 MG tablet; Take 1 tablet by mouth 2 times daily (with meals)  Start Exenatide ER (BYDUREON BCISE) 2 MG/0.85ML AUIJ; Inject 2 mg into the skin every 7 days and off Januvia  -     [facility]FL - Amairani Valencia MD, Ophthalmology, [facility]    Mixed hyperlipidemia  -     atorvastatin (LIPITOR) 20 MG tablet; Take 1 tablet by mouth daily    Essential hypertension  -     lisinopril (PRINIVIL;ZESTRIL) 40 MG tablet; Take 1 tablet by mouth daily    Acute right-sided low back pain with right-sided sciatica  -     [address]  Start  predniSONE (DELTASONE) 20 MG tablet; Take 3 pills in the AM for 2 day, 2 pills in AM for 5 days, then 1 pill in AM for 2 days  Start  meloxicam (MOBIC) 15 MG tablet;  Take 1 tablet by mouth daily Start when done with prednisone        Return after 10 days on pain and diabetes.

## 2020-02-27 ENCOUNTER — TELEPHONE (OUTPATIENT)
Dept: INTERNAL MEDICINE CLINIC | Age: 65
End: 2020-02-27

## 2020-02-27 NOTE — TELEPHONE ENCOUNTER
Received fax stating that Renny Winnie was rejected by insurance. PA is in process via cover my meds. Awaiting response.

## 2020-02-28 ENCOUNTER — NURSE TRIAGE (OUTPATIENT)
Dept: OTHER | Facility: CLINIC | Age: 65
End: 2020-02-28

## 2020-02-28 NOTE — TELEPHONE ENCOUNTER
Reason for Disposition   Weakness of a leg or foot (e.g., unable to bear weight, dragging foot)    Protocols used: BACK PAIN-ADULT-[place]    Received call from [facility]. Pt calling c/o severe lower back pain that has been ongoing since the beginning of the month. Has been seen in the ED and by PCP. Yesterday felt fine and then the pain woke her up in the middle of the night. Rates pain a 10/10. Pain is radiating down her right leg causing her to not be able to bear weight. She is not able to get comfortable. No abdominal pain, no urinary issues. Call soft transferred to Dr. Gerson Rivas, office is closed. Unable to speak with provider. Spoke with pt, informed office closed and to go to ED for further evaluation. Please do not respond to the triage nurse through this encounter. Any subsequent communication should be directly with the patient.

## 2020-02-29 ENCOUNTER — HOSPITAL ENCOUNTER (EMERGENCY)
Age: 65
Discharge: HOME | End: 2020-02-29
Attending: EMERGENCY MEDICINE
Payer: COMMERCIAL

## 2020-02-29 VITALS
SYSTOLIC BLOOD PRESSURE: 168 MMHG | DIASTOLIC BLOOD PRESSURE: 92 MMHG | HEIGHT: 65 IN | HEART RATE: 63 BPM | WEIGHT: 197 LBS | RESPIRATION RATE: 17 BRPM | BODY MASS INDEX: 32.82 KG/M2 | OXYGEN SATURATION: 97 % | TEMPERATURE: 97.9 F

## 2020-02-29 LAB
BILIRUBIN URINE: NEGATIVE
BLOOD, URINE: NEGATIVE
CLARITY: CLEAR
COLOR: YELLOW
GLUCOSE URINE: 250 MG/DL
KETONES, URINE: NEGATIVE MG/DL
LEUKOCYTE ESTERASE, URINE: NEGATIVE
MICROSCOPIC EXAMINATION: ABNORMAL
NITRITE, URINE: NEGATIVE
PH UA: 6 (ref 5–8)
PROTEIN UA: NEGATIVE MG/DL
SPECIFIC GRAVITY UA: 1.02 (ref 1–1.03)
URINE REFLEX TO CULTURE: ABNORMAL
URINE TYPE: ABNORMAL
UROBILINOGEN, URINE: 1 E.U./DL

## 2020-02-29 PROCEDURE — 96372 THER/PROPH/DIAG INJ SC/IM: CPT

## 2020-02-29 PROCEDURE — 6370000000 HC RX 637 (ALT 250 FOR IP): Performed by: EMERGENCY MEDICINE

## 2020-02-29 PROCEDURE — 6360000002 HC RX W HCPCS: Performed by: EMERGENCY MEDICINE

## 2020-02-29 PROCEDURE — 81003 URINALYSIS AUTO W/O SCOPE: CPT

## 2020-02-29 PROCEDURE — 99283 EMERGENCY DEPT VISIT LOW MDM: CPT

## 2020-02-29 RX ORDER — KETOROLAC TROMETHAMINE 30 MG/ML
30 INJECTION, SOLUTION INTRAMUSCULAR; INTRAVENOUS ONCE
Status: COMPLETED | OUTPATIENT
Start: 2020-02-29 | End: 2020-02-29

## 2020-02-29 RX ORDER — HYDROCODONE BITARTRATE AND ACETAMINOPHEN 5; 325 MG/1; MG/1
1 TABLET ORAL ONCE
Status: COMPLETED | OUTPATIENT
Start: 2020-02-29 | End: 2020-02-29

## 2020-02-29 RX ORDER — IBUPROFEN 800 MG/1
800 TABLET ORAL EVERY 8 HOURS PRN
Qty: 15 TABLET | Refills: 0 | Status: SHIPPED | OUTPATIENT
Start: 2020-02-29 | End: 2020-05-19

## 2020-02-29 RX ORDER — METHOCARBAMOL 500 MG/1
1000 TABLET, FILM COATED ORAL ONCE
Status: COMPLETED | OUTPATIENT
Start: 2020-02-29 | End: 2020-02-29

## 2020-02-29 RX ADMIN — KETOROLAC TROMETHAMINE 30 MG: 30 INJECTION, SOLUTION INTRAMUSCULAR at 09:32

## 2020-02-29 RX ADMIN — HYDROCODONE BITARTRATE AND ACETAMINOPHEN 1 TABLET: 5; 325 TABLET ORAL at 09:32

## 2020-02-29 RX ADMIN — METHOCARBAMOL TABLETS 1000 MG: 500 TABLET, COATED ORAL at 09:32

## 2020-02-29 ASSESSMENT — PAIN DESCRIPTION - ORIENTATION
ORIENTATION: RIGHT;UPPER;LOWER
ORIENTATION: RIGHT;UPPER;LOWER

## 2020-02-29 ASSESSMENT — ENCOUNTER SYMPTOMS
STRIDOR: 0
VOMITING: 0
ABDOMINAL PAIN: 0
WHEEZING: 0
VOICE CHANGE: 0
TROUBLE SWALLOWING: 0
SHORTNESS OF BREATH: 0
COLOR CHANGE: 0
NAUSEA: 0
BACK PAIN: 1
FACIAL SWELLING: 0

## 2020-02-29 ASSESSMENT — PAIN SCALES - GENERAL
PAINLEVEL_OUTOF10: 10

## 2020-02-29 ASSESSMENT — PAIN DESCRIPTION - LOCATION
LOCATION: LEG
LOCATION: LEG

## 2020-02-29 ASSESSMENT — PAIN DESCRIPTION - FREQUENCY
FREQUENCY: CONTINUOUS
FREQUENCY: CONTINUOUS

## 2020-02-29 ASSESSMENT — PAIN DESCRIPTION - PAIN TYPE
TYPE: ACUTE PAIN
TYPE: ACUTE PAIN

## 2020-02-29 ASSESSMENT — PAIN DESCRIPTION - PROGRESSION: CLINICAL_PROGRESSION: NOT CHANGED

## 2020-02-29 ASSESSMENT — PAIN DESCRIPTION - ONSET
ONSET: ON-GOING
ONSET: ON-GOING

## 2020-02-29 NOTE — ED PROVIDER NOTES
mg by mouth dailyHistorical Med      levothyroxine (SYNTHROID) 50 MCG tablet Take 50 mcg by mouth DailyHistorical Med      glimepiride (AMARYL) 4 MG tablet Take 4 mg by mouth 2 times daily Historical Med      Vaginal Lubricant (REPLENS) GEL Place 1 Dose vaginally daily, Disp-35 g, R-1Print             ALLERGIES     Patient has no known allergies. FAMILY HISTORY       Family History   Problem Relation Age of Onset    Dementia Mother     Breast Cancer Sister     Atrial Fibrillation Sister     High Blood Pressure Maternal Grandmother     Diabetes Sister           SOCIAL HISTORY       Social History     Socioeconomic History    Marital status:       Spouse name: None    Number of children: None    Years of education: None    Highest education level: None   Occupational History    None   Social Needs    Financial resource strain: None    Food insecurity:     Worry: None     Inability: None    Transportation needs:     Medical: None     Non-medical: None   Tobacco Use    Smoking status: Former Smoker     Packs/day: 0.50     Years: 20.00     Pack years: 10.00     Types: Cigarettes     Last attempt to quit: 2/26/2010     Years since quitting: 10.0    Smokeless tobacco: Never Used   Substance and Sexual Activity    Alcohol use: Never    Drug use: Never    Sexual activity: None   Lifestyle    Physical activity:     Days per week: None     Minutes per session: None    Stress: None   Relationships    Social connections:     Talks on phone: None     Gets together: None     Attends Mandaeism service: None     Active member of club or organization: None     Attends meetings of clubs or organizations: None     Relationship status: None    Intimate partner violence:     Fear of current or ex partner: None     Emotionally abused: None     Physically abused: None     Forced sexual activity: None   Other Topics Concern    None   Social History Narrative    None         PHYSICAL EXAM    (up to 7 for numbness, urinary or bowel incontinence, or other concerns. The patient expresses understanding and agreement with this plan and is discharged home. I estimate there is low risk for Abdominal aortic aneurysm, cauda equina syndrome, epidural mass lesion, thus I consider the discharge disposition reasonable. We have discussed the diagnosis and risks, and we agree with discharging home to follow-up with their primary doctor. We also discussed returning to the Emergency Department immediately if new or worsening symptoms occur. We have discussed the symptoms which are most concerning (e.g., saddle anesthesia, urinary or bowel incontinence or retention, changing or worsening pain) that necessitate immediate return. Procedures    FINAL IMPRESSION      1. Chronic right-sided low back pain with right-sided sciatica          DISPOSITION/PLAN   DISPOSITION Decision To Discharge 02/29/2020 10:44:07 AM      PATIENT REFERRED TO:  Kerwin Freeman [facility] [address] [facility]  [address]    In 2 days      [facility]  ED  [address] [facility] [address] [phone]    If symptoms worsen      DISCHARGE MEDICATIONS:  Discharge Medication List as of 2/29/2020 10:45 AM      START taking these medications    Details   ibuprofen (ADVIL;MOTRIN) 800 MG tablet Take 1 tablet by mouth every 8 hours as needed for Pain, Disp-15 tablet, R-0Print                (Please note that portions of this note were completed with a voice recognition program.  Efforts were made to edit the dictations but occasionally words aremis-transcribed. )    Elpidio Torres MD (electronically signed)  Attending Emergency Physician           Elpidio Torres MD  02/29/20 [id number]

## 2020-03-09 ENCOUNTER — OFFICE VISIT (OUTPATIENT)
Dept: INTERNAL MEDICINE CLINIC | Age: 65
End: 2020-03-09
Payer: COMMERCIAL

## 2020-03-09 ENCOUNTER — TELEPHONE (OUTPATIENT)
Dept: INTERNAL MEDICINE CLINIC | Age: 65
End: 2020-03-09

## 2020-03-09 ENCOUNTER — OFFICE VISIT (OUTPATIENT)
Dept: ORTHOPEDIC SURGERY | Age: 65
End: 2020-03-09
Payer: COMMERCIAL

## 2020-03-09 VITALS
SYSTOLIC BLOOD PRESSURE: 132 MMHG | WEIGHT: 194 LBS | HEART RATE: 101 BPM | HEIGHT: 65 IN | DIASTOLIC BLOOD PRESSURE: 74 MMHG | OXYGEN SATURATION: 99 % | BODY MASS INDEX: 32.32 KG/M2

## 2020-03-09 VITALS
BODY MASS INDEX: 32.84 KG/M2 | DIASTOLIC BLOOD PRESSURE: 78 MMHG | HEIGHT: 65 IN | WEIGHT: 197.09 LBS | HEART RATE: 78 BPM | SYSTOLIC BLOOD PRESSURE: 141 MMHG

## 2020-03-09 PROBLEM — E03.9 ACQUIRED HYPOTHYROIDISM: Status: ACTIVE | Noted: 2020-03-09

## 2020-03-09 LAB
CREATININE URINE: 183.7 MG/DL (ref 28–259)
MICROALBUMIN UR-MCNC: 2.2 MG/DL
MICROALBUMIN/CREAT UR-RTO: 12 MG/G (ref 0–30)

## 2020-03-09 PROCEDURE — G8427 DOCREV CUR MEDS BY ELIG CLIN: HCPCS | Performed by: PHYSICIAN ASSISTANT

## 2020-03-09 PROCEDURE — G8484 FLU IMMUNIZE NO ADMIN: HCPCS | Performed by: PHYSICIAN ASSISTANT

## 2020-03-09 PROCEDURE — 1036F TOBACCO NON-USER: CPT | Performed by: PHYSICIAN ASSISTANT

## 2020-03-09 PROCEDURE — 3046F HEMOGLOBIN A1C LEVEL >9.0%: CPT | Performed by: INTERNAL MEDICINE

## 2020-03-09 PROCEDURE — 2022F DILAT RTA XM EVC RTNOPTHY: CPT | Performed by: INTERNAL MEDICINE

## 2020-03-09 PROCEDURE — G8417 CALC BMI ABV UP PARAM F/U: HCPCS | Performed by: INTERNAL MEDICINE

## 2020-03-09 PROCEDURE — G8417 CALC BMI ABV UP PARAM F/U: HCPCS | Performed by: PHYSICIAN ASSISTANT

## 2020-03-09 PROCEDURE — 90471 IMMUNIZATION ADMIN: CPT | Performed by: INTERNAL MEDICINE

## 2020-03-09 PROCEDURE — 3017F COLORECTAL CA SCREEN DOC REV: CPT | Performed by: PHYSICIAN ASSISTANT

## 2020-03-09 PROCEDURE — 99214 OFFICE O/P EST MOD 30 MIN: CPT | Performed by: INTERNAL MEDICINE

## 2020-03-09 PROCEDURE — G8484 FLU IMMUNIZE NO ADMIN: HCPCS | Performed by: INTERNAL MEDICINE

## 2020-03-09 PROCEDURE — 99203 OFFICE O/P NEW LOW 30 MIN: CPT | Performed by: PHYSICIAN ASSISTANT

## 2020-03-09 PROCEDURE — 90732 PPSV23 VACC 2 YRS+ SUBQ/IM: CPT | Performed by: INTERNAL MEDICINE

## 2020-03-09 PROCEDURE — G8427 DOCREV CUR MEDS BY ELIG CLIN: HCPCS | Performed by: INTERNAL MEDICINE

## 2020-03-09 PROCEDURE — 1036F TOBACCO NON-USER: CPT | Performed by: INTERNAL MEDICINE

## 2020-03-09 PROCEDURE — 3017F COLORECTAL CA SCREEN DOC REV: CPT | Performed by: INTERNAL MEDICINE

## 2020-03-09 RX ORDER — LEVOTHYROXINE SODIUM 0.07 MG/1
75 TABLET ORAL DAILY
Qty: 90 TABLET | Refills: 0 | Status: SHIPPED | OUTPATIENT
Start: 2020-03-09 | End: 2020-05-20 | Stop reason: SDUPTHER

## 2020-03-09 RX ORDER — GLIMEPIRIDE 4 MG/1
4 TABLET ORAL 2 TIMES DAILY
Qty: 180 TABLET | Refills: 3 | Status: SHIPPED | OUTPATIENT
Start: 2020-03-09 | End: 2021-02-24

## 2020-03-09 NOTE — PROGRESS NOTES
Family History   Problem Relation Age of Onset    Dementia Mother     Breast Cancer Sister     Atrial Fibrillation Sister     High Blood Pressure Maternal Grandmother     Diabetes Sister        REVIEW OF SYSTEMS: Full ROS noted & scanned   CONSTITUTIONAL: Denies unexplained weight loss, fevers, chills or fatigue  NEUROLOGICAL: Denies unsteady gait or progressive weakness  MUSCULOSKELETAL: Denies joint swelling or redness  PSYCHOLOGICAL: Denies anxiety, depression   SKIN: Denies skin changes, delayed healing, rash, itching   HEMATOLOGIC: Denies easy bleeding or bruising  ENDOCRINE: Denies excessive thirst, urination, heat/cold  RESPIRATORY: Denies current dyspnea, cough  GI: Denies nausea, vomiting, diarrhea   : Denies bowel or bladder issues       PHYSICAL EXAM:    Vitals: Blood pressure (!) 141/78, pulse 78, height 5' 5\" (1.651 m), weight 197 lb 1.5 oz (89.4 kg), not currently breastfeeding. GENERAL EXAM:  · General Apparence: Patient is adequately groomed with no evidence of malnutrition. · Orientation: The patient is oriented to time, place and person. · Mood & Affect:The patient's mood and affect are appropriate   · Lymphatic: The lymphatic examination bilaterally reveals all areas to be without enlargement or induration  · Sensation: Sensation is intact without deficit  · Coordination/Balance: Good coordination     LUMBAR/SACRAL EXAMINATION:  · Inspection: Local inspection shows no step-off or bruising. Lumbar alignment is normal.  Sagittal and Coronal balance is neutral.      · Palpation:   No evidence of tenderness at the midline. No tenderness bilaterally at the paraspinal or trochanters. There is no step-off or paraspinal spasm. · Range of Motion: Intact flexion, 15 degrees extension  · Strength:   Strength testing is 5/5 in all muscle groups tested. · Special Tests:   Straight leg raise and crossed SLR negative. Leg length and pelvis level.  0 out of 5 Jarrell's signs.         · Skin: There are no rashes, ulcerations or lesions. · Reflexes: Reflexes are symmetrically 2+ at the patellar and ankle tendons. Clonus absent bilaterally at the feet. · Gait & station: Normal unassisted  · Additional Examinations:   · RIGHT LOWER EXTREMITY: Inspection/examination of the right lower extremity does not show any tenderness, deformity or injury. Range of motion is full. There is no gross instability. There are no rashes, ulcerations or lesions. Strength and tone are normal.  ·   · LEFT LOWER EXTREMITY:  Inspection/examination of the left lower extremity does not show any tenderness, deformity or injury. Range of motion is full. There is no gross instability. There are no rashes, ulcerations or lesions. Strength and tone are normal.    Diagnostic Testin views lumbar spine 3/9/2020 mild multilevel spondylosis with anterior spurring and facet arthropathy. No significant DDD or malalignment    CT abdomen and pelvis 2020: Bones/Soft Tissues: Degenerative changes involve the thoracolumbar spine and   bilateral hips.            Impression:  1) 1mo right sciatica, chronic LBP  2) H/o DM      Plan:   1) PT for above  2) Cont IBU PRN  3) F/u 1mo, L MRI WO if no improvement         [facility]

## 2020-03-09 NOTE — PROGRESS NOTES
Patient is  taking her medication consistently on an empty stomach.     Lab Results   Component Value Date    LABMICR Not Indicated 02/29/2020     Lab Results   Component Value Date     02/23/2020    K 4.0 02/23/2020     02/23/2020    CO2 24 02/23/2020    BUN 9 02/23/2020    CREATININE 0.6 02/23/2020    GLUCOSE 185 (H) 02/23/2020    CALCIUM 9.2 02/23/2020     Lab Results   Component Value Date    CHOL 162 01/06/2020    TRIG 78 01/06/2020    HDL 46 01/06/2020    LDLCALC 99 01/06/2020     Lab Results   Component Value Date    ALT 12 02/23/2020    AST 17 02/23/2020     Lab Results   Component Value Date    TSH 3.16 01/06/2020     Lab Results   Component Value Date    WBC 8.0 02/23/2020    HGB 13.2 02/23/2020    HCT 38.9 02/23/2020    MCV 88.7 02/23/2020     02/23/2020     No results found for: INR   No results found for: PSA   No results found for: [address]     Patient Active Problem List   Diagnosis    Uncontrolled type 2 diabetes mellitus without complication, without long-term current use of insulin (HCC)    Mixed hyperlipidemia    Essential hypertension       No Known Allergies  Outpatient Medications Marked as Taking for the 3/9/20 encounter (Office Visit) with Logan Santos MD   Medication Sig Dispense Refill    empagliflozin (JARDIANCE) 25 MG tablet Take 1 tablet by mouth daily 30 tablet 0    metFORMIN (GLUCOPHAGE) 850 MG tablet Take 1 tablet by mouth 2 times daily (with meals) 60 tablet 0    atorvastatin (LIPITOR) 20 MG tablet Take 1 tablet by mouth daily 90 tablet 3    lisinopril (PRINIVIL;ZESTRIL) 40 MG tablet Take 1 tablet by mouth daily 90 tablet 3    Exenatide ER (BYDUREON BCISE) 2 MG/0.85ML AUIJ Inject 2 mg into the skin every 7 days 4 pen 0    meloxicam (MOBIC) 15 MG tablet Take 1 tablet by mouth daily Start when done with prednisone 30 tablet 0    SITagliptin (JANUVIA) 100 MG tablet Take 100 mg by mouth daily      levothyroxine (SYNTHROID) 50 MCG tablet Take 50 mcg by mouth Daily      glimepiride (AMARYL) 4 MG tablet Take 4 mg by mouth 2 times daily       Vaginal Lubricant (REPLENS) GEL Place 1 Dose vaginally daily 35 g 1         Review of Systems: 14 systems were negative except of what was stated on HPI    Nursing note and vitals reviewed. Vitals:    03/09/20 1059   BP: 132/74   Pulse: 101   SpO2: 99%   Weight: 194 lb (88 kg)   Height: 5' 5\" (1.651 m)     Wt Readings from Last 3 Encounters:   03/09/20 194 lb (88 kg)   03/09/20 197 lb 1.5 oz (89.4 kg)   02/29/20 197 lb (89.4 kg)     BP Readings from Last 3 Encounters:   03/09/20 132/74   03/09/20 (!) 141/78   02/29/20 (!) 168/92     Body mass index is 32.28 kg/m². Constitutional: Patient appears well-developed and well-nourished. No distress. Head: Normocephalic and atraumatic. Neck: Normal range of motion. Neck supple. No thyroidmegaly. Cardiovascular: Normal rate, regular rhythm, normal heart sounds and intact distal pulses. Pulmonary/Chest: Effort normal and breath sounds normal. No stridor. No respiratory distress. No wheezes and no rales. Abdominal: Soft. Bowel sounds are normal. No distension and no mass. No tenderness. No rebound and no guarding. Musculoskeletal: No edema and no tenderness. Skin: No rash or erythema. Psychiatric: Normal mood and affect. Behavior is normal.     Diabetic Foot exam: Normal pedal pulses and bilateral sensation with 10 gm filament pin prick testing. No lesions, sores/ulcerations, toe nail fungus or deformities. Assessment/Plan:  Meseret Barreto was seen today for leg pain and diabetes. Diagnoses and all orders for this visit:    Uncontrolled type 2 diabetes mellitus without complication, without long-term current use of insulin (HCC)  -     glimepiride (AMARYL) 4 MG tablet;  Take 1 tablet by mouth 2 times daily  -     Diabetic Foot Exam  -     Microalbumin / Creatinine Urine Ratio  Start Jardiance and Byduriuan instead of Januvia    Acquired hypothyroidism  increase levothyroxine (SYNTHROID) 75 MCG tablet; Take 1 tablet by mouth daily    Mixed hyperlipidemia    Essential hypertension    Need for Streptococcus pneumoniae vaccination  -     Pneumococcal polysaccharide vaccine 23-valent greater than or equal to 1yo subcutaneous/IM        Return < 1 month on diabetes.

## 2020-03-09 NOTE — TELEPHONE ENCOUNTER
Pt. Called and said when she went to get the prescription they told her no it still wasnt covered by her insurance and Dr. Alycia Naqvi said call back and she will give her a substitute.  Please advise

## 2020-03-11 ENCOUNTER — HOSPITAL ENCOUNTER (OUTPATIENT)
Dept: PHYSICAL THERAPY | Age: 65
Setting detail: THERAPIES SERIES
Discharge: HOME | End: 2020-03-11
Payer: COMMERCIAL

## 2020-03-11 ENCOUNTER — TELEPHONE (OUTPATIENT)
Dept: ADMINISTRATIVE | Age: 65
End: 2020-03-11

## 2020-03-11 PROCEDURE — 97161 PT EVAL LOW COMPLEX 20 MIN: CPT

## 2020-03-11 PROCEDURE — 97140 MANUAL THERAPY 1/> REGIONS: CPT

## 2020-03-11 PROCEDURE — 97110 THERAPEUTIC EXERCISES: CPT

## 2020-03-11 NOTE — FLOWSHEET NOTE
[facility],  [address]  Phone: [phone]  Fax [phone]    Physical Therapy Treatment Note/ Progress Report:           Date:  3/11/2020    Patient Name:  Alisson Garcia    :  1955  MRN: [id number]  Restrictions/Precautions:    Medical/Treatment Diagnosis Information:  Diagnosis: M54.5 (ICD-10-CM) - Low back pain, unspecified back pain laterality, unspecified chronicity, unspecified whether sciatica present; M54.16 (ICD-10-CM) - Lumbar radiculitis  Treatment Diagnosis: M54.5 - Lumbar pain; M54.41 - LBP w/ sciatica  Insurance/Certification information:  PT Insurance Information: [employer]  Physician Information:  Referring Practitioner: Dr. Mg Cassidy  Has the plan of care been signed (Y/N):        []  Yes  [x]  No     Date of Patient follow up with Physician: PRN      Is this a Progress Report:     []  Yes  [x]  No        If Yes:  Date Range for reporting period:  Beginning 3/11/2020  Ending     Progress report will be due (10 Rx or 30 days whichever is less):        Recertification will be due (POC Duration  / 90 days whichever is less): 2020         Visit # Insurance Allowable Auth Required   [address] [place] []  Yes []  No        Functional Scale: Modified Oswestry score 23, 46% disability  Date assessed:  3/11/2020      Therapy Diagnosis/Practice Pattern: M54.5 - Lumbar pain; M54.41 - LBP w/ sciatica      Number of Comorbidities:  []0     [x]1-2    []3+    Latex Allergy:  [x]NO      []YES  Preferred Language for Healthcare:   [x]English       []other:      Pain level:  4/10 beginning of session; 1/10 at end of session    SUBJECTIVE:  See eval    OBJECTIVE: See eval   Observation:    Test measurements:      RESTRICTIONS/PRECAUTIONS: none    Exercises/Interventions:     Therapeutic Ex (24437) Sets/sec Notes/CUES   Seated HS stretch 3x30\"    Seated sciatic nerve glide 20x    Seated piriformis stretch 3x30\"                                       Patient Education: eval, prognosis, anatomy, HEP, therapy progression, activity modification, biking at the gym, sleep positions 15 min         Manual Intervention (29157) 10 min    STM R lumbar paraspinals  Side lying   R lumbar gapping mobs grades 2-3                              NMR re-education (51586)  CUES NEEDED                                                Therapeutic Activity (49104)                                       Therapeutic Exercise and NMR EXR  [x] (21364) Provided verbal/tactile cueing for activities related to strengthening, flexibility, endurance, ROM  for improvements in proximal hip and core control with self care, mobility, lifting and ambulation.  [] (57573) Provided verbal/tactile cueing for activities related to improving balance, coordination, kinesthetic sense, posture, motor skill, proprioception  to assist with core control in self care, mobility, lifting, and ambulation.      Therapeutic Activities:    [] (78984 or 44518) Provided verbal/tactile cueing for activities related to improving balance, coordination, kinesthetic sense, posture, motor skill, proprioception and motor activation to allow for proper function  with self care and ADLs  [] (05981) Provided training and instruction to the patient for proper core and proximal hip recruitment and positioning with ambulation re-education     Home Exercise Program:    [x] (61024) Reviewed/Progressed HEP activities related to strengthening, flexibility, endurance, ROM of core, proximal hip and LE for functional self-care, mobility, lifting and ambulation   [] (67411) Reviewed/Progressed HEP activities related to improving balance, coordination, kinesthetic sense, posture, motor skill, proprioception of core, proximal hip and LE for self care, mobility, lifting, and ambulation      Manual Treatments:  PROM / STM / Oscillations-Mobs:  G-I, II, III, IV (PA's, Inf., Post.)  [x] (67634) Provided manual therapy to mobilize proximal hip and LS spine soft tissue/joints for the purpose of modulating pain, promoting relaxation,  increasing ROM, reducing/eliminating soft tissue swelling/inflammation/restriction, improving soft tissue extensibility and allowing for proper ROM for normal function with self care, mobility, lifting and ambulation. Modalities:       Charges:  Timed Code Treatment Minutes: 40   Total Treatment Minutes: 60     BWC time in/time out:   (will also require time and out per CPT code below)    [x] EVAL (LOW) 00407 (typically 20 minutes face-to-face)  [] EVAL (MOD) 71548 (typically 30 minutes face-to-face)  [] EVAL (HIGH) 60658 (typically 45 minutes face-to-face)  [] RE-EVAL     [x] KX(29606) x 2    [] IONTO  [] NMR (22171) x     [] VASO  [x] Manual (21154) x 1    [] Other:  [] TA x      [] Mech Traction (33791)  [] ES(attended) (75249)     [] ES (un) (24519):     Goals: Patient stated goal: to get rid of pain; to get a good night's sleep again; to be able to return to work  [] Progressing: [] Met: [] Not Met: [] Adjusted    Therapist goals for Patient:   Short Term Goals: To be achieved in: 2 weeks  1. Independent in HEP and progression per patient tolerance, in order to prevent re-injury. [] Progressing: [] Met: [] Not Met: [] Adjusted  2. Patient will have a decrease in pain to facilitate improvement in movement, function, and ADLs as indicated by Functional Deficits. [] Progressing: [] Met: [] Not Met: [] Adjusted      Long Term Goals: To be achieved in: 12 weeks  1. Disability index score of 23% or less for the Modified Oswestry  to assist with reaching prior level of function. [] Progressing: [] Met: [] Not Met: [] Adjusted  2. Patient will demonstrate increased AROM to WNL, good LS mobility, good hip ROM to allow for proper joint functioning as indicated by patients Functional Deficits. [] Progressing: [] Met: [] Not Met: [] Adjusted  3.  Patient will demonstrate an increase in Strength to good proximal hip and core activation to allow for proper functional mobility as indicated by patients Functional Deficits. [] Progressing: [] Met: [] Not Met: [] Adjusted  4. Patient will return to sitting and standing functional activities without increased symptoms or restriction. [] Progressing: [] Met: [] Not Met: [] Adjusted  5. Patient will return to full LE ROM and strength to allow for decrease in lumbar and R leg pain, to allow for return to ADLs, functional tasks, and recreational activities including gym workouts, without pain or restriction. (patient specific functional goal)    [] Progressing: [] Met: [] Not Met: [] Adjusted    Progression Towards Functional goals:  [] Patient is progressing as expected towards functional goals listed. [] Progression is slowed due to complexities listed. [] Progression has been slowed due to co-morbidities. [x] Plan just implemented, too soon to assess goals progression  [] Other:     Overall Progression Towards Functional goals/ Treatment Progress Update:  [] Patient is progressing as expected towards functional goals listed. [] Progression is slowed due to complexities/Impairments listed. [] Progression has been slowed due to co-morbidities.   [x] Plan just implemented, too soon to assess goals progression <30days   [] Goals require adjustment due to lack of progress  [] Patient is not progressing as expected and requires additional follow up with physician  [] Other    Prognosis for POC: [x] Good [] Fair  [] Poor      Patient requires continued skilled intervention: [x] Yes  [] No    Treatment/Activity Tolerance:  [x] Patient able to complete treatment  [] Patient limited by fatigue  [] Patient limited by pain    [] Patient limited by other medical complications  [] Other:     ASSESSMENT: see igor    Return to Play: (if applicable)   []  Stage 1: Intro to Strength   []  Stage 2: Return to Run and Strength   []  Stage 3: Return to Jump and Strength   []  Stage 4: Dynamic Strength and Agility   []  Stage 5: Sport Specific Training     []  Ready to Return to Play, Meets All Above Stages   []  Not Ready for Return to Sports   Comments:                               PLAN: 2 days a week for 12 weeks (3/11/2020-6/3/2020)  [] Continue per plan of care [] Tyrae Go current plan (see comments above)  [x] Plan of care initiated [] Hold pending MD visit [] Discharge      Electronically signed by:  Alcon Jerry PT, DPT    Note: If patient does not return for scheduled/ recommended follow up visits, this note will serve as a discharge from care along with most recent update on progress.

## 2020-03-11 NOTE — TELEPHONE ENCOUNTER
Received fax from [employer] stating that after further case review bydureon is not medically necessary. I spoke with patient and she states that you told her she could just go back to [address] [facility]. She is wanting to know if you could just send that in or if you want to try something else? Please advise. Fas scanned to encounter.

## 2020-03-11 NOTE — PLAN OF CARE
[facility], [address]  Phone: [phone]  Fax [phone]    Physical Therapy Certification    Dear Referring Practitioner: Dr. Jewel Terry,    We had the pleasure of evaluating the following patient for physical therapy services at [address] [place] [address]. A summary of our findings can be found in the initial assessment below. This includes our plan of care. If you have any questions or concerns regarding these findings, please do not hesitate to contact me at the office phone number checked above. Thank you for the referral.       Physician Signature:_______________________________Date:__________________  By signing above (or electronic signature), therapists plan is approved by physician    Patient: Jimbo Higgins   : 1955   MRN: [id number]  Referring Physician: Referring Practitioner: Dr. Jewel Terry      Evaluation Date: 3/11/2020      Medical Diagnosis Information:  Diagnosis: M54.5 (ICD-10-CM) - Low back pain, unspecified back pain laterality, unspecified chronicity, unspecified whether sciatica present; M54.16 (ICD-10-CM) - Lumbar radiculitis   Treatment Diagnosis: M54.5 - Lumbar pain; M54.41 - LBP w/ sciatica                                         Insurance information: PT Insurance Information: [place]       Precautions/ Contra-indications: none  Latex Allergy:  [x]NO      []YES  Preferred Language for Healthcare:   [x]English       []other:    SUBJECTIVE: Patient stated complaint:low back pain and pain down the R leg. Pain started about a month ago and gradually got worse. Pain has limited her ability to function. Went to the ER 3 times due to pain. Difficulty with sleeping and falling asleep. Does not want to keep taking medication.  Reports leg sometimes gives out on her when walking    Relevant Medical History: Diabetes  Functional Disability Index/G-Codes:  3/11/2020: Modified Oswestry score 23, 46% disability      Pain Scale: 4/10  Easing factors: pain meds, but they are starting to not work, ice/heat  Provocative factors: prolonged sitting/standing/walking     Type: [x]Constant   []Intermittent  []Radiating []Localized []other:     Numbness/Tingling: none noted    Occupation/School: Unemployed    Living Status/Prior Level of Function: Independent with ADLs and IADLs, walking, sitting, standing for longer than 5 minutes     OBJECTIVE:     ROM     LUMBAR FLEX Limited due to pain    LUMBAR EXT Limited due to pain    Sidebend Limited bilaterally due to pain    Rotation Limited due to pain     LEFT RIGHT   HIP Flex     HIP Abd     Knee Flex WNL Limited due to pain   Knee ext WNL Limited due to pain   Hamstring FLEX WNL Painful SLR   Piriformis  WNL Limited motion available due to tightness and pain             Strength  LEFT RIGHT   MfA     TrA     HIP Flexors 4 4   HIP Abductors 4 3   Knee EXT (quad) 5 3   Knee Flex (HS) 5 3     Reflexes/Sensation: Intact to light touch   [x]Dermatomes/Myotomes intact    []UE Reflexes     []Normal []Hypo      []Hyper   []LE Reflexes     []Normal []Hypo      []Hyper   []Babinski/Clonus/Hoffmans:    []Other:    Joint mobility: Lumbar   []Normal    [x]Hypo   []Hyper    Palpation: TTP over lateral R thigh and glutes    Functional Mobility/Transfers: Independent but slow and cautious due to pain    Posture: weight shifted to L side,     Gait: (include devices/WB status) Antalgic gait on R side, decreased R knee flexion    Orthopedic Special Tests: Slump test - positive for reproduction of pain on R side; SLR - positive for reproduction of symptoms on R side                       [x] Patient history, allergies, meds reviewed. Medical chart reviewed. See intake form. Review Of Systems (ROS):  [x]Performed Review of systems (Integumentary, CardioPulmonary, Neurological) by intake and observation. Intake form has been scanned into medical record.  Patient has and down R leg. Patient has decreased lumbar and R hip ROM due to pain and also has decreased R knee flexion during ambulation. Patient has an antalgic gait on the R side due to pain in her lumbar spine and down R leg. Patient requires skilled physical therapy intervention to address pain level, deficits in lumbar ROM and R LE ROM, and gait impairments, to allow for return to ADLs, functional tasks, and recreational activities including going to the gym, without pain or restriction. Functional Impairments:     [x]Noted lumbar/proximal hip hypomobility   []Noted lumbosacral and/or generalized hypermobility   [x]Decreased Lumbosacral/hip/LE functional ROM   [x]Decreased core/proximal hip strength and neuromuscular control    [x]Decreased LE functional strength    []Abnormal reflexes/sensation/myotomal/dermatomal deficits  [x]Reduced balance/proprioceptive control    []other:      Functional Activity Limitations (from functional questionnaire and intake)   [x]Reduced ability to tolerate prolonged functional positions   [x]Reduced ability or difficulty with changes of positions or transfers between positions   [x]Reduced ability to maintain good posture and demonstrate good body mechanics with sitting, bending, and lifting   [x]Reduced ability to sleep   [x] Reduced ability or tolerance with driving and/or computer work   [x]Reduced ability to perform lifting, reaching, carrying tasks   [x]Reduced ability to squat   [x]Reduced ability to forward bend   [x]Reduced ability to ambulate prolonged functional periods/distances/surfaces   [x]Reduced ability to ascend/descend stairs   []other:       Participation Restrictions   [x]Reduced participation in self care activities   [x]Reduced participation in home management activities   [x]Reduced participation in work activities   [x]Reduced participation in social activities. [x]Reduced participation in sport/recreation activities.     Classification:   []Signs/symptoms and/or measurable assessment of functional outcome. [x] EVAL (LOW) 99553 (typically 20 minutes face-to-face)  [] EVAL (MOD) 28474 (typically 30 minutes face-to-face)  [] EVAL (HIGH) 35264 (typically 45 minutes face-to-face)  [] RE-EVAL         PLAN: Begin PT focusing on: proximal hip mobilizations, LB mobs, LB core activation, proximal hip activation, and HEP    Frequency/Duration:  2 days per week for 12 Weeks:  Interventions:  [x]  Therapeutic exercise including: strength training, ROM, for LE, Glutes and core   [x]  NMR activation and proprioception for glutes , LE and Core   [x]  Manual therapy as indicated for Hip complex, LE and spine to include: Dry Needling/IASTM, STM, PROM, Gr I-IV mobilizations, manipulation. [x]  Modalities as needed that may include: thermal agents, E-stim, Biofeedback, US, iontophoresis as indicated  [x]  Patient education on joint protection, postural re-education, activity modification, progression of HEP. HEP instruction: (see scanned forms)    GOALS:  Patient stated goal: to get rid of pain; to get a good night's sleep again; to be able to return to work  [] Progressing: [] Met: [] Not Met: [] Adjusted    Therapist goals for Patient:   Short Term Goals: To be achieved in: 2 weeks  1. Independent in HEP and progression per patient tolerance, in order to prevent re-injury. [] Progressing: [] Met: [] Not Met: [] Adjusted  2. Patient will have a decrease in pain to facilitate improvement in movement, function, and ADLs as indicated by Functional Deficits. [] Progressing: [] Met: [] Not Met: [] Adjusted      Long Term Goals: To be achieved in: 12 weeks  1. Disability index score of 23% or less for the Modified Oswestry  to assist with reaching prior level of function. [] Progressing: [] Met: [] Not Met: [] Adjusted  2.  Patient will demonstrate increased AROM to WNL, good LS mobility, good hip ROM to allow for proper joint functioning as indicated by patients Functional Deficits. [] Progressing: [] Met: [] Not Met: [] Adjusted  3. Patient will demonstrate an increase in Strength to good proximal hip and core activation to allow for proper functional mobility as indicated by patients Functional Deficits. [] Progressing: [] Met: [] Not Met: [] Adjusted  4. Patient will return to sitting and standing functional activities without increased symptoms or restriction. [] Progressing: [] Met: [] Not Met: [] Adjusted  5.  Patient will return to full LE ROM and strength to allow for decrease in lumbar and R leg pain, to allow for return to ADLs, functional tasks, and recreational activities including gym workouts, without pain or restriction. (patient specific functional goal)    [] Progressing: [] Met: [] Not Met: [] Adjusted       Electronically signed by:  Gladys Bain PT, DPT

## 2020-03-16 ENCOUNTER — HOSPITAL ENCOUNTER (OUTPATIENT)
Dept: PHYSICAL THERAPY | Age: 65
Setting detail: THERAPIES SERIES
Discharge: HOME | End: 2020-03-16
Payer: COMMERCIAL

## 2020-03-16 PROCEDURE — 97140 MANUAL THERAPY 1/> REGIONS: CPT

## 2020-03-16 PROCEDURE — 97110 THERAPEUTIC EXERCISES: CPT

## 2020-03-16 NOTE — FLOWSHEET NOTE
[facility],  [address] [place]  Phone: [phone]  Fax [phone]    Physical Therapy Treatment Note/ Progress Report:           Date:  3/16/2020    Patient Name:  Lance Amezcua    :  1955  MRN: [id number]  Restrictions/Precautions:    Medical/Treatment Diagnosis Information:  Diagnosis: M54.5 (ICD-10-CM) - Low back pain, unspecified back pain laterality, unspecified chronicity, unspecified whether sciatica present; M54.16 (ICD-10-CM) - Lumbar radiculitis  Treatment Diagnosis: M54.5 - Lumbar pain; M54.41 - LBP w/ sciatica  Insurance/Certification information:  PT Insurance Information: [place]  Physician Information:  Referring Practitioner: Dr. Nicolas Cain  Has the plan of care been signed (Y/N):        []  Yes  [x]  No     Date of Patient follow up with Physician: PRN      Is this a Progress Report:     []  Yes  [x]  No        If Yes:  Date Range for reporting period:  Beginning 3/11/2020  Ending     Progress report will be due (10 Rx or 30 days whichever is less): 3/72/6423       Recertification will be due (POC Duration  / 90 days whichever is less): 2020         Visit # Insurance Allowable Auth Required   [address] [place] []  Yes [x]  No        Functional Scale: Modified Oswestry score 23, 46% disability  Date assessed:  3/11/2020      Therapy Diagnosis/Practice Pattern: M54.5 - Lumbar pain; M54.41 - LBP w/ sciatica      Number of Comorbidities:  []0     [x]1-2    []3+    Latex Allergy:  [x]NO      []YES  Preferred Language for Healthcare:   [x]English       []other:      Pain level:  1/10 beginning of session; 0/10 at end of session    SUBJECTIVE:  Patient states that she has minimal pain today, and still has soreness but her R leg is still bothering her. Reports that her HEP caused her pain over the weekend, and thinks that she was doing them wrong.      OBJECTIVE: See eval   Observation:    Test measurements:      RESTRICTIONS/PRECAUTIONS: none    Exercises/Interventions:     Therapeutic Ex (63922) Sets/sec Notes/CUES   TA Iso 3 min    Bridges 2x10    Seated HS stretch 3x30\"    Seated sciatic nerve glide 20x    Standing March 2x12                                  Patient Education: eval, prognosis, anatomy, HEP, therapy progression, activity modification, biking at the gym, sleep positions 15 min         Manual Intervention (63137) 25 min    STM R lumbar paraspinals  Side lying   R lumbar gapping mobs grades 2-3     Hooklying belt manual traction                         NMR re-education (22262)  CUES NEEDED                                                Therapeutic Activity (16715)                                       Therapeutic Exercise and NMR EXR  [x] (04596) Provided verbal/tactile cueing for activities related to strengthening, flexibility, endurance, ROM  for improvements in proximal hip and core control with self care, mobility, lifting and ambulation.  [] (58934) Provided verbal/tactile cueing for activities related to improving balance, coordination, kinesthetic sense, posture, motor skill, proprioception  to assist with core control in self care, mobility, lifting, and ambulation.      Therapeutic Activities:    [] (54922 or 69909) Provided verbal/tactile cueing for activities related to improving balance, coordination, kinesthetic sense, posture, motor skill, proprioception and motor activation to allow for proper function  with self care and ADLs  [] (26554) Provided training and instruction to the patient for proper core and proximal hip recruitment and positioning with ambulation re-education     Home Exercise Program:    [x] (76958) Reviewed/Progressed HEP activities related to strengthening, flexibility, endurance, ROM of core, proximal hip and LE for functional self-care, mobility, lifting and ambulation   [] (28051) Reviewed/Progressed HEP activities related to improving balance, coordination, kinesthetic sense, posture, motor skill, proprioception of core, proximal hip and LE for self care, mobility, lifting, and ambulation      Manual Treatments:  PROM / STM / Oscillations-Mobs:  G-I, II, III, IV (PA's, Inf., Post.)  [x] (44537) Provided manual therapy to mobilize proximal hip and LS spine soft tissue/joints for the purpose of modulating pain, promoting relaxation,  increasing ROM, reducing/eliminating soft tissue swelling/inflammation/restriction, improving soft tissue extensibility and allowing for proper ROM for normal function with self care, mobility, lifting and ambulation. Modalities:       Charges:  Timed Code Treatment Minutes: 55   Total Treatment Minutes: 54     [facility] time in/time out:   (will also require time and out per CPT code below)    [] EVAL (LOW) 455 1011 (typically 20 minutes face-to-face)  [] EVAL (MOD) 16828 (typically 30 minutes face-to-face)  [] EVAL (HIGH) 43211 (typically 45 minutes face-to-face)  [] RE-EVAL     [x] CE(75219) x 2    [] IONTO  [] NMR (97196) x     [] VASO  [x] Manual (60789) x 2    [] Other:  [] TA x      [] Mech Traction (93565)  [] ES(attended) (27761)     [] ES (un) (24708):     Goals: Patient stated goal: to get rid of pain; to get a good night's sleep again; to be able to return to work  [] Progressing: [] Met: [] Not Met: [] Adjusted    Therapist goals for Patient:   Short Term Goals: To be achieved in: 2 weeks  1. Independent in HEP and progression per patient tolerance, in order to prevent re-injury. [] Progressing: [] Met: [] Not Met: [] Adjusted  2. Patient will have a decrease in pain to facilitate improvement in movement, function, and ADLs as indicated by Functional Deficits. [] Progressing: [] Met: [] Not Met: [] Adjusted      Long Term Goals: To be achieved in: 12 weeks  1. Disability index score of 23% or less for the Modified Oswestry  to assist with reaching prior level of function.    [] Progressing: [] Met: [] Not Met: [] Adjusted  2. Patient will demonstrate increased AROM to WNL, good LS mobility, good hip ROM to allow for proper joint functioning as indicated by patients Functional Deficits. [] Progressing: [] Met: [] Not Met: [] Adjusted  3. Patient will demonstrate an increase in Strength to good proximal hip and core activation to allow for proper functional mobility as indicated by patients Functional Deficits. [] Progressing: [] Met: [] Not Met: [] Adjusted  4. Patient will return to sitting and standing functional activities without increased symptoms or restriction. [] Progressing: [] Met: [] Not Met: [] Adjusted  5. Patient will return to full LE ROM and strength to allow for decrease in lumbar and R leg pain, to allow for return to ADLs, functional tasks, and recreational activities including gym workouts, without pain or restriction. (patient specific functional goal)    [] Progressing: [] Met: [] Not Met: [] Adjusted    Progression Towards Functional goals:  [] Patient is progressing as expected towards functional goals listed. [] Progression is slowed due to complexities listed. [] Progression has been slowed due to co-morbidities. [x] Plan just implemented, too soon to assess goals progression  [] Other:     Overall Progression Towards Functional goals/ Treatment Progress Update:  [] Patient is progressing as expected towards functional goals listed. [] Progression is slowed due to complexities/Impairments listed. [] Progression has been slowed due to co-morbidities.   [x] Plan just implemented, too soon to assess goals progression <30days   [] Goals require adjustment due to lack of progress  [] Patient is not progressing as expected and requires additional follow up with physician  [] Other    Prognosis for POC: [x] Good [] Fair  [] Poor      Patient requires continued skilled intervention: [x] Yes  [] No    Treatment/Activity Tolerance:  [x] Patient able to complete treatment  [] Patient limited by

## 2020-03-19 ENCOUNTER — HOSPITAL ENCOUNTER (OUTPATIENT)
Dept: PHYSICAL THERAPY | Age: 65
Setting detail: THERAPIES SERIES
Discharge: HOME | End: 2020-03-19
Payer: COMMERCIAL

## 2020-03-19 PROCEDURE — 97140 MANUAL THERAPY 1/> REGIONS: CPT

## 2020-03-19 PROCEDURE — 97110 THERAPEUTIC EXERCISES: CPT

## 2020-03-19 NOTE — FLOWSHEET NOTE
[facility],  [address]  Phone: [phone]  Fax [phone]    Physical Therapy Treatment Note/ Progress Report:           Date:  3/19/2020    Patient Name:  Estelle Crigler    :  1955  MRN: [id number]  Restrictions/Precautions:    Medical/Treatment Diagnosis Information:  Diagnosis: M54.5 (ICD-10-CM) - Low back pain, unspecified back pain laterality, unspecified chronicity, unspecified whether sciatica present; M54.16 (ICD-10-CM) - Lumbar radiculitis  Treatment Diagnosis: M54.5 - Lumbar pain; M54.41 - LBP w/ sciatica  Insurance/Certification information:  PT Insurance Information: [place]  Physician Information:  Referring Practitioner: Dr. Payal Guthrie  Has the plan of care been signed (Y/N):        []  Yes  [x]  No     Date of Patient follow up with Physician: PRN      Is this a Progress Report:     []  Yes  [x]  No        If Yes:  Date Range for reporting period:  Beginning 3/11/2020  Ending     Progress report will be due (10 Rx or 30 days whichever is less):        Recertification will be due (POC Duration  / 90 days whichever is less): 2020         Visit # Insurance Allowable Auth Required   [address] [place] []  Yes [x]  No        Functional Scale: Modified Oswestry score 23, 46% disability  Date assessed:  3/11/2020      Therapy Diagnosis/Practice Pattern: M54.5 - Lumbar pain; M54.41 - LBP w/ sciatica      Number of Comorbidities:  []0     [x]1-2    []3+    Latex Allergy:  [x]NO      []YES  Preferred Language for Healthcare:   [x]English       []other:      Pain level:  /10 beginning of session; 10 at end of session    SUBJECTIVE:  Patient states that she is having low back and knee pain today. States that she is having a hard time sitting down for long periods of time. Reports that she is taking a 10 hour car ride this weekend.       OBJECTIVE: See eval   Observation:    Test measurements: RESTRICTIONS/PRECAUTIONS: none    Exercises/Interventions:     Therapeutic Ex (56055) Sets/sec Notes/CUES   TA Iso 3 min    Bridges 2x10    Clamshells 2x15    Seated HS stretch 3x30\"    Seated sciatic nerve glide 20x    Standing March 2x12                                  Patient Education: car rides, prolonged sitting relief, use of tennis ball and pillow for positioning and pain relief during car ride  7 min         Manual Intervention (70554) 25 min    STM R lumbar paraspinals  Side lying   R lumbar gapping mobs grades 2-3     Hooklying belt manual traction                         NMR re-education (74558)  CUES NEEDED                                                Therapeutic Activity (83536)                                       Therapeutic Exercise and NMR EXR  [x] (69045) Provided verbal/tactile cueing for activities related to strengthening, flexibility, endurance, ROM  for improvements in proximal hip and core control with self care, mobility, lifting and ambulation.  [] (94901) Provided verbal/tactile cueing for activities related to improving balance, coordination, kinesthetic sense, posture, motor skill, proprioception  to assist with core control in self care, mobility, lifting, and ambulation.      Therapeutic Activities:    [] (98168 or 89527) Provided verbal/tactile cueing for activities related to improving balance, coordination, kinesthetic sense, posture, motor skill, proprioception and motor activation to allow for proper function  with self care and ADLs  [] (13956) Provided training and instruction to the patient for proper core and proximal hip recruitment and positioning with ambulation re-education     Home Exercise Program:    [x] (13962) Reviewed/Progressed HEP activities related to strengthening, flexibility, endurance, ROM of core, proximal hip and LE for functional self-care, mobility, lifting and ambulation   [] (16162) Reviewed/Progressed HEP activities related to improving balance, Adjusted  2. Patient will demonstrate increased AROM to WNL, good LS mobility, good hip ROM to allow for proper joint functioning as indicated by patients Functional Deficits. [] Progressing: [] Met: [] Not Met: [] Adjusted  3. Patient will demonstrate an increase in Strength to good proximal hip and core activation to allow for proper functional mobility as indicated by patients Functional Deficits. [] Progressing: [] Met: [] Not Met: [] Adjusted  4. Patient will return to sitting and standing functional activities without increased symptoms or restriction. [] Progressing: [] Met: [] Not Met: [] Adjusted  5. Patient will return to full LE ROM and strength to allow for decrease in lumbar and R leg pain, to allow for return to ADLs, functional tasks, and recreational activities including gym workouts, without pain or restriction. (patient specific functional goal)    [] Progressing: [] Met: [] Not Met: [] Adjusted    Progression Towards Functional goals:  [] Patient is progressing as expected towards functional goals listed. [] Progression is slowed due to complexities listed. [] Progression has been slowed due to co-morbidities. [x] Plan just implemented, too soon to assess goals progression  [] Other:     Overall Progression Towards Functional goals/ Treatment Progress Update:  [] Patient is progressing as expected towards functional goals listed. [] Progression is slowed due to complexities/Impairments listed. [] Progression has been slowed due to co-morbidities.   [x] Plan just implemented, too soon to assess goals progression <30days   [] Goals require adjustment due to lack of progress  [] Patient is not progressing as expected and requires additional follow up with physician  [] Other    Prognosis for POC: [x] Good [] Fair  [] Poor      Patient requires continued skilled intervention: [x] Yes  [] No    Treatment/Activity Tolerance:  [x] Patient able to complete treatment  [] Patient limited by fatigue  [] Patient limited by pain    [] Patient limited by other medical complications  [] Other:     ASSESSMENT: Patient arrived with moderate pain in R LE and low back. Patient able to tolerate all manual treatment without issue, and felt better afterwards. Patient able to perform all exercises without pain in her R LE. PT discussed with patient options to help decrease pain during her 10 hour car ride this weekend. Patient requires skilled physical therapy intervention to address pain level, deficits in lumbar ROM and R LE ROM, and gait impairments, to allow for return to ADLs, functional tasks, and recreational activities including going to the gym, without pain or restriction. Return to Play: (if applicable)   []  Stage 1: Intro to Strength   []  Stage 2: Return to Run and Strength   []  Stage 3: Return to Jump and Strength   []  Stage 4: Dynamic Strength and Agility   []  Stage 5: Sport Specific Training     []  Ready to Return to Play, Meets All Above Stages   []  Not Ready for Return to Sports   Comments:                               PLAN: 2 days a week for 12 weeks (3/11/2020-6/3/2020)  [x] Continue per plan of care [] Alter current plan (see comments above)  [] Plan of care initiated [] Hold pending MD visit [] Discharge      Electronically signed by:  Andrew Marroquin, PT, DPT    Note: If patient does not return for scheduled/ recommended follow up visits, this note will serve as a discharge from care along with most recent update on progress.

## 2020-03-23 ENCOUNTER — TELEPHONE (OUTPATIENT)
Dept: PHYSICAL THERAPY | Age: 65
End: 2020-03-23

## 2020-03-23 ENCOUNTER — APPOINTMENT (OUTPATIENT)
Dept: PHYSICAL THERAPY | Age: 65
End: 2020-03-23
Payer: COMMERCIAL

## 2020-03-23 NOTE — TELEPHONE ENCOUNTER
PT called patient to inform her of new clinic policies and procedures about seeing patients amid COVID-19. Patient was deemed an essential patient and will continue coming in once a week. Patient agreed to this plan. PT also discussed with patient her using Tianna Edithalexis at home for the days that she is not in the clinic to continue with her HEP. Patient also agreed to this. PT told patient that she would also get a phone call once a week for follow-up.

## 2020-03-25 ENCOUNTER — APPOINTMENT (OUTPATIENT)
Dept: PHYSICAL THERAPY | Age: 65
End: 2020-03-25
Payer: COMMERCIAL

## 2020-03-27 ENCOUNTER — APPOINTMENT (OUTPATIENT)
Dept: PHYSICAL THERAPY | Age: 65
End: 2020-03-27
Payer: COMMERCIAL

## 2020-03-27 ENCOUNTER — HOSPITAL ENCOUNTER (OUTPATIENT)
Dept: PHYSICAL THERAPY | Age: 65
Setting detail: THERAPIES SERIES
Discharge: HOME | End: 2020-03-27
Payer: COMMERCIAL

## 2020-03-27 PROCEDURE — 97110 THERAPEUTIC EXERCISES: CPT

## 2020-03-27 PROCEDURE — 97140 MANUAL THERAPY 1/> REGIONS: CPT

## 2020-03-27 NOTE — FLOWSHEET NOTE
and LE for functional self-care, mobility, lifting and ambulation   [] (85555) Reviewed/Progressed HEP activities related to improving balance, coordination, kinesthetic sense, posture, motor skill, proprioception of core, proximal hip and LE for self care, mobility, lifting, and ambulation      Manual Treatments:  PROM / STM / Oscillations-Mobs:  G-I, II, III, IV (PA's, Inf., Post.)  [x] (51496) Provided manual therapy to mobilize proximal hip and LS spine soft tissue/joints for the purpose of modulating pain, promoting relaxation,  increasing ROM, reducing/eliminating soft tissue swelling/inflammation/restriction, improving soft tissue extensibility and allowing for proper ROM for normal function with self care, mobility, lifting and ambulation. Modalities:       Charges:  Timed Code Treatment Minutes: 50   Total Treatment Minutes: 48     BWC time in/time out:   (will also require time and out per CPT code below)    [] EVAL (LOW) 455 1011 (typically 20 minutes face-to-face)  [] EVAL (MOD) 67588 (typically 30 minutes face-to-face)  [] EVAL (HIGH) 04892 (typically 45 minutes face-to-face)  [] RE-EVAL     [x] TC(91697) x 2    [] IONTO  [] NMR (55125) x     [] VASO  [x] Manual (23094) x 1    [] Other:  [] TA x      [] Mech Traction (47299)  [] ES(attended) (19113)     [] ES (un) (06368):     Goals: Patient stated goal: to get rid of pain; to get a good night's sleep again; to be able to return to work  [x] Progressing: [] Met: [] Not Met: [] Adjusted    Therapist goals for Patient:   Short Term Goals: To be achieved in: 2 weeks  1. Independent in HEP and progression per patient tolerance, in order to prevent re-injury. [x] Progressing: [] Met: [] Not Met: [] Adjusted  2. Patient will have a decrease in pain to facilitate improvement in movement, function, and ADLs as indicated by Functional Deficits. [x] Progressing: [] Met: [] Not Met: [] Adjusted      Long Term Goals: To be achieved in: 12 weeks  1.  Disability requires continued skilled intervention: [x] Yes  [] No    Treatment/Activity Tolerance:  [x] Patient able to complete treatment  [] Patient limited by fatigue  [x] Patient limited by pain    [] Patient limited by other medical complications  [] Other:     ASSESSMENT: Patient arrived to therapy today with 5/10 pain in her R low back and down her R leg. Patient able to tolerate most treatment exercises without issue, but had pain and difficulty with  Seated leg extensions and clamshells. Patient responds well to manual treatment and standing exercises more than any other treatment. Patient had 1/10 pain when leaving therapy and also had a decreased antalgic gait on R side. Patient requires skilled physical therapy intervention to address pain level, deficits in lumbar ROM and R LE ROM, and gait impairments, to allow for return to ADLs, functional tasks, and recreational activities including going to the gym, without pain or restriction. Return to Play: (if applicable)   []  Stage 1: Intro to Strength   []  Stage 2: Return to Run and Strength   []  Stage 3: Return to Jump and Strength   []  Stage 4: Dynamic Strength and Agility   []  Stage 5: Sport Specific Training     []  Ready to Return to Play, Meets All Above Stages   []  Not Ready for Return to Sports   Comments:                               PLAN: 2 days a week for 12 weeks (3/11/2020-6/3/2020)  [x] Continue per plan of care [] Alter current plan (see comments above)  [] Plan of care initiated [] Hold pending MD visit [] Discharge      Electronically signed by:  Mary Jo Ziegler, PT, DPT    Note: If patient does not return for scheduled/ recommended follow up visits, this note will serve as a discharge from care along with most recent update on progress.

## 2020-03-31 ENCOUNTER — HOSPITAL ENCOUNTER (OUTPATIENT)
Dept: PHYSICAL THERAPY | Age: 65
Setting detail: THERAPIES SERIES
Discharge: HOME | End: 2020-03-31
Payer: COMMERCIAL

## 2020-03-31 PROCEDURE — 97140 MANUAL THERAPY 1/> REGIONS: CPT

## 2020-03-31 PROCEDURE — 97110 THERAPEUTIC EXERCISES: CPT

## 2020-03-31 NOTE — FLOWSHEET NOTE
[facility],  [address]  Phone: [phone]  Fax [phone]    Physical Therapy Treatment Note/ Progress Report:           Date:  3/31/2020    Patient Name:  Washington Gardner    :  1955  MRN: [id number]  Restrictions/Precautions:    Medical/Treatment Diagnosis Information:  Diagnosis: M54.5 (ICD-10-CM) - Low back pain, unspecified back pain laterality, unspecified chronicity, unspecified whether sciatica present; M54.16 (ICD-10-CM) - Lumbar radiculitis  Treatment Diagnosis: M54.5 - Lumbar pain; M54.41 - LBP w/ sciatica  Insurance/Certification information:  PT Insurance Information: [address] [place] [address] [facility]  Physician Information:  Referring Practitioner: Dr. Mejia  Has the plan of care been signed (Y/N):        []  Yes  [x]  No     Date of Patient follow up with Physician: PRN      Is this a Progress Report:     []  Yes  [x]  No        If Yes:  Date Range for reporting period:  Beginning 3/11/2020  Ending     Progress report will be due (10 Rx or 30 days whichever is less):        Recertification will be due (POC Duration  / 90 days whichever is less): 2020         Visit # Insurance Allowable Auth Required   [address] []  Yes [x]  No        Functional Scale: Modified Oswestry score 23, 46% disability  Date assessed:  3/11/2020      Therapy Diagnosis/Practice Pattern: M54.5 - Lumbar pain; M54.41 - LBP w/ sciatica      Number of Comorbidities:  []0     [x]1-2    []3+    Latex Allergy:  [x]NO      []YES  Preferred Language for Healthcare:   [x]English       []other:      Pain level:  3/10 beginning of session; 2/10 at end of session    SUBJECTIVE:  Patient states that her R LE does not hurt as much today. Reports that Friday, 3/27/2020, was the last day that she took medication. States that the updated HEP helps decrease her pain. Reports that she has been sleeping on her side instead of her stomach.  Reports that walking helps decrease her pain. OBJECTIVE: See eval   Observation:    Test measurements:      RESTRICTIONS/PRECAUTIONS: none    Exercises/Interventions:     Therapeutic Ex (07204) Sets/sec Notes/CUES   Prone press up hold on elbows 3 min    Bridges 2x10    SB DKTC  1x10    SB LTRs 1x10    Self gapping mob w/ pillow under L hip 3x30\"    Seated sciatic nerve glide 20x    Standing March 2x12    Seated 3D Child's pose 30\" each                             Patient Education: car rides, prolonged sitting relief, use of tennis ball and pillow for positioning and pain relief during car ride  5 min         Manual Intervention (94648) 15 min    STM R lumbar paraspinals  Side lying   R lumbar gapping mobs grades 2-3                         NMR re-education (55176)  CUES NEEDED                                                Therapeutic Activity (03967)                                       Therapeutic Exercise and NMR EXR  [x] (81751) Provided verbal/tactile cueing for activities related to strengthening, flexibility, endurance, ROM  for improvements in proximal hip and core control with self care, mobility, lifting and ambulation.  [] (15533) Provided verbal/tactile cueing for activities related to improving balance, coordination, kinesthetic sense, posture, motor skill, proprioception  to assist with core control in self care, mobility, lifting, and ambulation.      Therapeutic Activities:    [] (27023 or 49510) Provided verbal/tactile cueing for activities related to improving balance, coordination, kinesthetic sense, posture, motor skill, proprioception and motor activation to allow for proper function  with self care and ADLs  [] (51855) Provided training and instruction to the patient for proper core and proximal hip recruitment and positioning with ambulation re-education     Home Exercise Program:    [x] (63043) Reviewed/Progressed HEP activities related to strengthening, flexibility, endurance, ROM of core,

## 2020-04-02 ENCOUNTER — TELEPHONE (OUTPATIENT)
Dept: INTERNAL MEDICINE CLINIC | Age: 65
End: 2020-04-02

## 2020-04-02 ENCOUNTER — APPOINTMENT (OUTPATIENT)
Dept: PHYSICAL THERAPY | Age: 65
End: 2020-04-02
Payer: COMMERCIAL

## 2020-04-02 NOTE — TELEPHONE ENCOUNTER
[email or web address]    John Jennings (Kari Mix) - [id number]  Status: PA Response - Denied    DrugBydureon BCise 2MG/0.85ML auto-injectors    Unable to approve Bydureon BCise 2 mg/0.85 mL due to unmet criteria 4 as outlined in the plan guideline below.  Member must try drug alternatives for a specific timeframe per criteria number 4; metformin If you are looking for additional alternati

## 2020-04-03 ENCOUNTER — TELEPHONE (OUTPATIENT)
Dept: INTERNAL MEDICINE CLINIC | Age: 65
End: 2020-04-03

## 2020-04-07 ENCOUNTER — HOSPITAL ENCOUNTER (OUTPATIENT)
Dept: PHYSICAL THERAPY | Age: 65
Setting detail: THERAPIES SERIES
Discharge: HOME | End: 2020-04-07
Payer: COMMERCIAL

## 2020-04-07 PROCEDURE — 97140 MANUAL THERAPY 1/> REGIONS: CPT

## 2020-04-07 PROCEDURE — 97110 THERAPEUTIC EXERCISES: CPT

## 2020-04-07 NOTE — FLOWSHEET NOTE
periods of time are bothersome. OBJECTIVE: See eval   Observation:    Test measurements:      RESTRICTIONS/PRECAUTIONS: none    Exercises/Interventions:     Therapeutic Ex (19103) Sets/sec Notes/CUES   Prone press up hold on elbows 3 min    Bridges 2x10    LTRs 1x10    SB DKTC  1x10    Self trigger point release: piriformis 2x1 min Standing, ball on wall   SB anterior pelvic tilts 3 min    SB marches w/ TA Iso 2x10 bilaterally    Seated AP tilts on wedge 2 min              Patient Education: use of tennis ball for self trigger point release, pillow for positioning and pain relief  5 min         Manual Intervention (98406) 15 min    STM R lumbar paraspinals  Side lying   Trigger point release piriformis     R lumbar gapping mobs grades 2-3     R hip long axis distraction                    NMR re-education (62634)  CUES NEEDED                                                Therapeutic Activity (71552)                                       Therapeutic Exercise and NMR EXR  [x] (72879) Provided verbal/tactile cueing for activities related to strengthening, flexibility, endurance, ROM  for improvements in proximal hip and core control with self care, mobility, lifting and ambulation.  [] (25286) Provided verbal/tactile cueing for activities related to improving balance, coordination, kinesthetic sense, posture, motor skill, proprioception  to assist with core control in self care, mobility, lifting, and ambulation.      Therapeutic Activities:    [] (87045 or 14115) Provided verbal/tactile cueing for activities related to improving balance, coordination, kinesthetic sense, posture, motor skill, proprioception and motor activation to allow for proper function  with self care and ADLs  [] (74326) Provided training and instruction to the patient for proper core and proximal hip recruitment and positioning with ambulation re-education     Home Exercise Program:    [x] (31395) Reviewed/Progressed HEP activities related to strengthening, flexibility, endurance, ROM of core, proximal hip and LE for functional self-care, mobility, lifting and ambulation   [] (77233) Reviewed/Progressed HEP activities related to improving balance, coordination, kinesthetic sense, posture, motor skill, proprioception of core, proximal hip and LE for self care, mobility, lifting, and ambulation      Manual Treatments:  PROM / STM / Oscillations-Mobs:  G-I, II, III, IV (PA's, Inf., Post.)  [x] (70518) Provided manual therapy to mobilize proximal hip and LS spine soft tissue/joints for the purpose of modulating pain, promoting relaxation,  increasing ROM, reducing/eliminating soft tissue swelling/inflammation/restriction, improving soft tissue extensibility and allowing for proper ROM for normal function with self care, mobility, lifting and ambulation. Modalities:       Charges:  Timed Code Treatment Minutes: 50   Total Treatment Minutes: 48     BWC time in/time out:   (will also require time and out per CPT code below)    [] EVAL (LOW) [id number] (typically 20 minutes face-to-face)  [] EVAL (MOD) 07840 (typically 30 minutes face-to-face)  [] EVAL (HIGH) 63916 (typically 45 minutes face-to-face)  [] RE-EVAL     [x] FG(54157) x 2    [] IONTO  [] NMR (94001) x     [] VASO  [x] Manual (20863) x 1    [] Other:  [] TA x      [] Mech Traction (17846)  [] ES(attended) (04592)     [] ES (un) (41093):     Goals: Patient stated goal: to get rid of pain; to get a good night's sleep again; to be able to return to work  [x] Progressing: [] Met: [] Not Met: [] Adjusted    Therapist goals for Patient:   Short Term Goals: To be achieved in: 2 weeks  1. Independent in HEP and progression per patient tolerance, in order to prevent re-injury. [x] Progressing: [] Met: [] Not Met: [] Adjusted  2. Patient will have a decrease in pain to facilitate improvement in movement, function, and ADLs as indicated by Functional Deficits.   [x] Progressing: [] Met: [] Not Met: [] physician  [] Other    Prognosis for POC: [x] Good [] Fair  [] Poor      Patient requires continued skilled intervention: [x] Yes  [] No    Treatment/Activity Tolerance:  [x] Patient able to complete treatment  [] Patient limited by fatigue  [x] Patient limited by pain    [] Patient limited by other medical complications  [] Other:     ASSESSMENT: Patient arrived to therapy with mild R posterior thigh pain and knee pain. Patient stated that her low back pain and radiculopathy have decreased significantly, but her R knee is what is now bothering her. Patient tolerated increased manual therapy without any issues and felt a decrease in her pain afterwards. Patient able to tolerate new swiss ball exercises without any issues. PT discussed with patient sitting posture and using her prone on elbow positions while at home to help alleviate some pressure on her sciatic nerve and piriformis. PT discussed with patient using a tennis ball at home on the wall and doing a self trigger point release on her R piriformis to help decrease her pain that goes down her R LE. Patient requires skilled physical therapy intervention to address pain level, deficits in lumbar ROM and R LE ROM, and gait impairments, to allow for return to ADLs, functional tasks, and recreational activities including going to the gym, without pain or restriction.      Return to Play: (if applicable)   []  Stage 1: Intro to Strength   []  Stage 2: Return to Run and Strength   []  Stage 3: Return to Jump and Strength   []  Stage 4: Dynamic Strength and Agility   []  Stage 5: Sport Specific Training     []  Ready to Return to Play, Meets All Above Stages   []  Not Ready for Return to Sports   Comments:                               PLAN: 2 days a week for 12 weeks (3/11/2020-6/3/2020)  [x] Continue per plan of care [] Alter current plan (see comments above)  [] Plan of care initiated [] Hold pending MD visit [] Discharge      Electronically signed by:  Diane Nava

## 2020-04-09 ENCOUNTER — TELEMEDICINE (OUTPATIENT)
Dept: INTERNAL MEDICINE CLINIC | Age: 65
End: 2020-04-09

## 2020-04-09 VITALS — HEIGHT: 65 IN | BODY MASS INDEX: 32.32 KG/M2 | WEIGHT: 194 LBS

## 2020-04-09 PROCEDURE — 2022F DILAT RTA XM EVC RTNOPTHY: CPT | Performed by: INTERNAL MEDICINE

## 2020-04-09 PROCEDURE — 99214 OFFICE O/P EST MOD 30 MIN: CPT | Performed by: INTERNAL MEDICINE

## 2020-04-09 PROCEDURE — G8427 DOCREV CUR MEDS BY ELIG CLIN: HCPCS | Performed by: INTERNAL MEDICINE

## 2020-04-09 PROCEDURE — 3017F COLORECTAL CA SCREEN DOC REV: CPT | Performed by: INTERNAL MEDICINE

## 2020-04-09 PROCEDURE — 3046F HEMOGLOBIN A1C LEVEL >9.0%: CPT | Performed by: INTERNAL MEDICINE

## 2020-04-09 RX ORDER — FLUCONAZOLE 150 MG/1
TABLET ORAL
Qty: 3 TABLET | Refills: 0 | Status: SHIPPED | OUTPATIENT
Start: 2020-04-09 | End: 2020-08-10

## 2020-04-09 RX ORDER — GLUCOSAMINE HCL/CHONDROITIN SU 500-400 MG
CAPSULE ORAL
Qty: 100 STRIP | Refills: 3 | Status: SHIPPED | OUTPATIENT
Start: 2020-04-09 | End: 2020-05-27 | Stop reason: SDUPTHER

## 2020-04-09 NOTE — PROGRESS NOTES
vaginally daily 35 g 1         Review of Systems: 14 systems were negative except of what was stated on HPI    Nursing note and vitals reviewed. Vitals:    04/09/20 1015   Weight: 194 lb (88 kg)   Height: 5' 5\" (1.651 m)     Wt Readings from Last 3 Encounters:   04/09/20 194 lb (88 kg)   03/09/20 194 lb (88 kg)   03/09/20 197 lb 1.5 oz (89.4 kg)     BP Readings from Last 3 Encounters:   03/09/20 132/74   03/09/20 (!) 141/78   02/29/20 (!) 168/92     Body mass index is 32.28 kg/m². Constitutional: Patient appears well-developed and well-nourished. No distress. Head: Normocephalic and atraumatic. Skin: No rash or erythema. Psychiatric: Normal mood and affect. Behavior is normal.       Assessment/Plan:  Bailee was seen today for diabetes. Diagnoses and all orders for this visit:    Uncontrolled type 2 diabetes mellitus without complication, without long-term current use of insulin (HCC)  -     metFORMIN (GLUCOPHAGE) 850 MG tablet; Take 1 tablet by mouth 2 times daily (with meals)  -     empagliflozin (JARDIANCE) 25 MG tablet; Take 1 tablet by mouth daily  -     blood glucose monitor strips; Test bid  Start SITagliptin (JANUVIA) 100 MG tablet; Take 1 tablet by mouth daily  Increase walking to 3 times a day    Vaginal yeast infection  -     fluconazole (DIFLUCAN) 150 MG tablet; 1 dose and if persistent symptoms, repeat every 3-4 days if persistent yeast infection    Acquired hypothyroidism    Mixed hyperlipidemia    Essential hypertension        Return 1 month on VV diabetes.

## 2020-04-10 ENCOUNTER — TELEPHONE (OUTPATIENT)
Dept: ORTHOPEDIC SURGERY | Age: 65
End: 2020-04-10

## 2020-04-14 ENCOUNTER — HOSPITAL ENCOUNTER (OUTPATIENT)
Dept: PHYSICAL THERAPY | Age: 65
Setting detail: THERAPIES SERIES
End: 2020-04-14
Payer: COMMERCIAL

## 2020-04-15 ENCOUNTER — HOSPITAL ENCOUNTER (OUTPATIENT)
Dept: PHYSICAL THERAPY | Age: 65
Setting detail: THERAPIES SERIES
Discharge: HOME | End: 2020-04-15
Payer: COMMERCIAL

## 2020-04-15 PROCEDURE — 97110 THERAPEUTIC EXERCISES: CPT

## 2020-04-15 PROCEDURE — 97140 MANUAL THERAPY 1/> REGIONS: CPT

## 2020-04-15 NOTE — FLOWSHEET NOTE
endurance, ROM of core, proximal hip and LE for functional self-care, mobility, lifting and ambulation   [] (53836) Reviewed/Progressed HEP activities related to improving balance, coordination, kinesthetic sense, posture, motor skill, proprioception of core, proximal hip and LE for self care, mobility, lifting, and ambulation      Manual Treatments:  PROM / STM / Oscillations-Mobs:  G-I, II, III, IV (PA's, Inf., Post.)  [x] (36737) Provided manual therapy to mobilize proximal hip and LS spine soft tissue/joints for the purpose of modulating pain, promoting relaxation,  increasing ROM, reducing/eliminating soft tissue swelling/inflammation/restriction, improving soft tissue extensibility and allowing for proper ROM for normal function with self care, mobility, lifting and ambulation. Modalities:       Charges:  Timed Code Treatment Minutes: 50   Total Treatment Minutes: 48     BWC time in/time out:   (will also require time and out per CPT code below)    [] EVAL (LOW) 455 1011 (typically 20 minutes face-to-face)  [] EVAL (MOD) 70612 (typically 30 minutes face-to-face)  [] EVAL (HIGH) 21726 (typically 45 minutes face-to-face)  [] RE-EVAL     [x] GR(04916) x 2    [] IONTO  [] NMR (24594) x     [] VASO  [x] Manual (69141) x 1    [] Other:  [] TA x      [] Mech Traction (04012)  [] ES(attended) (30607)     [] ES (un) (85861):     Goals: Patient stated goal: to get rid of pain; to get a good night's sleep again; to be able to return to work  [x] Progressing: [] Met: [] Not Met: [] Adjusted    Therapist goals for Patient:   Short Term Goals: To be achieved in: 2 weeks  1. Independent in HEP and progression per patient tolerance, in order to prevent re-injury. [x] Progressing: [] Met: [] Not Met: [] Adjusted  2. Patient will have a decrease in pain to facilitate improvement in movement, function, and ADLs as indicated by Functional Deficits. [x] Progressing: [] Met: [] Not Met: [] Adjusted      Long Term Goals:  To be

## 2020-04-21 ENCOUNTER — HOSPITAL ENCOUNTER (OUTPATIENT)
Dept: PHYSICAL THERAPY | Age: 65
Setting detail: THERAPIES SERIES
Discharge: HOME | End: 2020-04-21
Payer: COMMERCIAL

## 2020-04-21 PROCEDURE — 97140 MANUAL THERAPY 1/> REGIONS: CPT

## 2020-04-21 PROCEDURE — 97110 THERAPEUTIC EXERCISES: CPT

## 2020-04-21 NOTE — FLOWSHEET NOTE
[facility],  [address] [place]  Phone: [phone]  Fax [phone]    Physical Therapy Treatment Note/ Progress Report:           Date:  2020    Patient Name:  Martita Guzman    :  1955  MRN: [id number]  Restrictions/Precautions:    Medical/Treatment Diagnosis Information:  Diagnosis: M54.5 (ICD-10-CM) - Low back pain, unspecified back pain laterality, unspecified chronicity, unspecified whether sciatica present; M54.16 (ICD-10-CM) - Lumbar radiculitis  Treatment Diagnosis: M54.5 - Lumbar pain; M54.41 - LBP w/ sciatica  Insurance/Certification information:  PT Insurance Information: [address] [place] [address]  Physician Information:  Referring Practitioner: Dr. Cris Esteban  Has the plan of care been signed (Y/N):        [x]  Yes  []  No     Date of Patient follow up with Physician: PRN      Is this a Progress Report:     [x]  Yes  []  No        If Yes:  Date Range for reporting period:  Beginning 3/11/2020  Ending 4/15/2020    Progress report will be due (10 Rx or 30 days whichever is less):        Recertification will be due (POC Duration  / 90 days whichever is less): 2020         Visit # Insurance Allowable Auth Required   [address] [place] []  Yes [x]  No        Functional Scale: Modified Oswestry score 23, 46% disability  Date assessed:  3/11/2020           Modified Oswestry score 18, 36% disability  Date assessed:  4/15/2020      Therapy Diagnosis/Practice Pattern: M54.5 - Lumbar pain; M54.41 - LBP w/ sciatica      Number of Comorbidities:  []0     [x]1-2    []3+    Latex Allergy:  [x]NO      []YES  Preferred Language for Healthcare:   [x]English       []other:      Pain level:  0/10 beginning of session; 0/10 at end of session    SUBJECTIVE:  Patient states that her low back and R LE have not been bothering her at all. States that she has not been dong her HEP consistently, but has been dong a lot of walking.  Reports recruitment and positioning with ambulation re-education     Home Exercise Program:    [x] (79121) Reviewed/Progressed HEP activities related to strengthening, flexibility, endurance, ROM of core, proximal hip and LE for functional self-care, mobility, lifting and ambulation   [] (85085) Reviewed/Progressed HEP activities related to improving balance, coordination, kinesthetic sense, posture, motor skill, proprioception of core, proximal hip and LE for self care, mobility, lifting, and ambulation      Manual Treatments:  PROM / STM / Oscillations-Mobs:  G-I, II, III, IV (PA's, Inf., Post.)  [x] (73252) Provided manual therapy to mobilize proximal hip and LS spine soft tissue/joints for the purpose of modulating pain, promoting relaxation,  increasing ROM, reducing/eliminating soft tissue swelling/inflammation/restriction, improving soft tissue extensibility and allowing for proper ROM for normal function with self care, mobility, lifting and ambulation. Modalities:       Charges:  Timed Code Treatment Minutes: 45   Total Treatment Minutes: 39     [facility] time in/time out:   (will also require time and out per CPT code below)    [] EVAL (LOW) 455 1011 (typically 20 minutes face-to-face)  [] EVAL (MOD) 47842 (typically 30 minutes face-to-face)  [] EVAL (HIGH) 26061 (typically 45 minutes face-to-face)  [] RE-EVAL     [x] VE(82364) x 2    [] IONTO  [] NMR (81122) x     [] VASO  [x] Manual (34854) x 1    [] Other:  [] TA x      [] Mech Traction (39092)  [] ES(attended) (71611)     [] ES (un) (83425):     Goals: Patient stated goal: to get rid of pain; to get a good night's sleep again; to be able to return to work  [x] Progressing: [] Met: [] Not Met: [] Adjusted    Therapist goals for Patient:   Short Term Goals: To be achieved in: 2 weeks  1. Independent in HEP and progression per patient tolerance, in order to prevent re-injury. [x] Progressing: [] Met: [] Not Met: [] Adjusted  2.  Patient will have a decrease in pain require adjustment due to lack of progress  [] Patient is not progressing as expected and requires additional follow up with physician  [] Other    Prognosis for POC: [x] Good [] Fair  [] Poor      Patient requires continued skilled intervention: [x] Yes  [] No    Treatment/Activity Tolerance:  [x] Patient able to complete treatment  [] Patient limited by fatigue  [] Patient limited by pain    [] Patient limited by other medical complications  [] Other:     ASSESSMENT: Patient arrived to therapy with no pain in her low back or R LE. Patient reported that she has been trying to walk an hour every day. Patient able to tolerate leg press exercise without any issue. Patient able to tolerate addition of weight bearing exercises without any adverse effects. Patient has not had pain in her R LE or low back since her last visit on 4/15/2020. Patient's antalgic gait on the right side is gone. Patient requires skilled physical therapy intervention to address pain level, deficits in lumbar ROM and R LE ROM, and gait impairments, to allow for return to ADLs, functional tasks, and recreational activities including going to the gym, without pain or restriction.      Return to Play: (if applicable)   []  Stage 1: Intro to Strength   []  Stage 2: Return to Run and Strength   []  Stage 3: Return to Jump and Strength   []  Stage 4: Dynamic Strength and Agility   []  Stage 5: Sport Specific Training     []  Ready to Return to Play, Meets All Above Stages   []  Not Ready for Return to Sports   Comments:                               PLAN: 2 days a week for 12 weeks (3/11/2020-6/3/2020)  [x] Continue per plan of care [] Alter current plan (see comments above)  [] Plan of care initiated [] Hold pending MD visit [] Discharge      Electronically signed by:  Rosey Dominguez, PT, DPT    Note: If patient does not return for scheduled/ recommended follow up visits, this note will serve as a discharge from care along with most recent update on progress.

## 2020-05-01 ENCOUNTER — HOSPITAL ENCOUNTER (OUTPATIENT)
Dept: PHYSICAL THERAPY | Age: 65
Setting detail: THERAPIES SERIES
Discharge: HOME | End: 2020-05-01
Payer: COMMERCIAL

## 2020-05-01 PROCEDURE — 97110 THERAPEUTIC EXERCISES: CPT

## 2020-05-01 PROCEDURE — 97140 MANUAL THERAPY 1/> REGIONS: CPT

## 2020-05-04 ENCOUNTER — TELEMEDICINE (OUTPATIENT)
Dept: INTERNAL MEDICINE CLINIC | Age: 65
End: 2020-05-04
Payer: COMMERCIAL

## 2020-05-04 VITALS — BODY MASS INDEX: 32.32 KG/M2 | WEIGHT: 194 LBS | HEIGHT: 65 IN

## 2020-05-04 PROBLEM — E11.9 CONTROLLED TYPE 2 DIABETES MELLITUS WITHOUT COMPLICATION, WITHOUT LONG-TERM CURRENT USE OF INSULIN (HCC): Status: ACTIVE | Noted: 2020-02-26

## 2020-05-04 PROCEDURE — 3017F COLORECTAL CA SCREEN DOC REV: CPT | Performed by: INTERNAL MEDICINE

## 2020-05-04 PROCEDURE — 99214 OFFICE O/P EST MOD 30 MIN: CPT | Performed by: INTERNAL MEDICINE

## 2020-05-04 PROCEDURE — 3046F HEMOGLOBIN A1C LEVEL >9.0%: CPT | Performed by: INTERNAL MEDICINE

## 2020-05-04 PROCEDURE — G8427 DOCREV CUR MEDS BY ELIG CLIN: HCPCS | Performed by: INTERNAL MEDICINE

## 2020-05-04 PROCEDURE — 2022F DILAT RTA XM EVC RTNOPTHY: CPT | Performed by: INTERNAL MEDICINE

## 2020-05-04 NOTE — PROGRESS NOTES
by mouth 2 times daily 180 tablet 3    levothyroxine (SYNTHROID) 75 MCG tablet Take 1 tablet by mouth daily 90 tablet 0    atorvastatin (LIPITOR) 20 MG tablet Take 1 tablet by mouth daily 90 tablet 3    lisinopril (PRINIVIL;ZESTRIL) 40 MG tablet Take 1 tablet by mouth daily 90 tablet 3    Vaginal Lubricant (REPLENS) GEL Place 1 Dose vaginally daily 35 g 1         Review of Systems: 14 systems were negative except of what was stated on HPI    Nursing note and vitals reviewed. Vitals:    05/04/20 0826   Weight: 194 lb (88 kg)   Height: 5' 5\" (1.651 m)     Wt Readings from Last 3 Encounters:   05/04/20 194 lb (88 kg)   04/09/20 194 lb (88 kg)   03/09/20 194 lb (88 kg)     BP Readings from Last 3 Encounters:   03/09/20 132/74   03/09/20 (!) 141/78   02/29/20 (!) 168/92     Body mass index is 32.28 kg/m². Constitutional: Patient appears well-developed and well-nourished. No distress. Head: Normocephalic and atraumatic. Skin: No rash or erythema. Psychiatric: Normal mood and affect. Behavior is normal.       Assessment/Plan:    Bailee was seen today for diabetes and abdominal pain. Diagnoses and all orders for this visit:    Controlled type 2 diabetes mellitus without complication, without long-term current use of insulin (HCC)  -     SITagliptin (JANUVIA) 100 MG tablet; Take 1 tablet by mouth daily    Right upper quadrant abdominal pain  stop metformin to see if pain resolves and if worse, pt to go to ER if unable to get a hold of office    Acquired hypothyroidism    Essential hypertension    Mixed hyperlipidemia          Return VV 3 weeks on DM and abd pain.

## 2020-05-07 ENCOUNTER — HOSPITAL ENCOUNTER (OUTPATIENT)
Dept: PHYSICAL THERAPY | Age: 65
Setting detail: THERAPIES SERIES
Discharge: HOME | End: 2020-05-07
Payer: COMMERCIAL

## 2020-05-07 PROCEDURE — 97110 THERAPEUTIC EXERCISES: CPT

## 2020-05-07 PROCEDURE — 97140 MANUAL THERAPY 1/> REGIONS: CPT

## 2020-05-07 NOTE — FLOWSHEET NOTE
HEP activities related to strengthening, flexibility, endurance, ROM of core, proximal hip and LE for functional self-care, mobility, lifting and ambulation   [] (34276) Reviewed/Progressed HEP activities related to improving balance, coordination, kinesthetic sense, posture, motor skill, proprioception of core, proximal hip and LE for self care, mobility, lifting, and ambulation      Manual Treatments:  PROM / STM / Oscillations-Mobs:  G-I, II, III, IV (PA's, Inf., Post.)  [x] (96871) Provided manual therapy to mobilize proximal hip and LS spine soft tissue/joints for the purpose of modulating pain, promoting relaxation,  increasing ROM, reducing/eliminating soft tissue swelling/inflammation/restriction, improving soft tissue extensibility and allowing for proper ROM for normal function with self care, mobility, lifting and ambulation. Modalities:       Charges:  Timed Code Treatment Minutes: 55   Total Treatment Minutes: 54     [facility] time in/time out:   (will also require time and out per CPT code below)    [] EVAL (LOW) 455 1011 (typically 20 minutes face-to-face)  [] EVAL (MOD) 46942 (typically 30 minutes face-to-face)  [] EVAL (HIGH) 29687 (typically 45 minutes face-to-face)  [] RE-EVAL     [x] DX(24000) x 2  [] IONTO  [] NMR (18421) x     [] VASO  [x] Manual (68168) x 2    [] Other:  [] TA x      [] Mech Traction (18806)  [] ES(attended) (37025)     [] ES (un) (81739):     Goals: Patient stated goal: to get rid of pain; to get a good night's sleep again; to be able to return to work  [x] Progressing: [] Met: [] Not Met: [] Adjusted    Therapist goals for Patient:   Short Term Goals: To be achieved in: 2 weeks  1. Independent in HEP and progression per patient tolerance, in order to prevent re-injury. [] Progressing: [x] Met: [] Not Met: [] Adjusted  2. Patient will have a decrease in pain to facilitate improvement in movement, function, and ADLs as indicated by Functional Deficits.   [x] Progressing: [] Met:

## 2020-05-14 ENCOUNTER — HOSPITAL ENCOUNTER (OUTPATIENT)
Dept: PHYSICAL THERAPY | Age: 65
Setting detail: THERAPIES SERIES
Discharge: HOME | End: 2020-05-14
Payer: COMMERCIAL

## 2020-05-19 ENCOUNTER — TELEMEDICINE (OUTPATIENT)
Dept: INTERNAL MEDICINE CLINIC | Age: 65
End: 2020-05-19
Payer: COMMERCIAL

## 2020-05-19 ENCOUNTER — HOSPITAL ENCOUNTER (OUTPATIENT)
Dept: PHYSICAL THERAPY | Age: 65
Setting detail: THERAPIES SERIES
Discharge: HOME | End: 2020-05-19
Payer: COMMERCIAL

## 2020-05-19 ENCOUNTER — HOSPITAL ENCOUNTER (OUTPATIENT)
Dept: GENERAL RADIOLOGY | Age: 65
Discharge: HOME | End: 2020-05-19
Payer: COMMERCIAL

## 2020-05-19 ENCOUNTER — HOSPITAL ENCOUNTER (OUTPATIENT)
Age: 65
Discharge: HOME | End: 2020-05-19
Payer: COMMERCIAL

## 2020-05-19 VITALS — HEIGHT: 65 IN | BODY MASS INDEX: 32.28 KG/M2

## 2020-05-19 LAB
T4 FREE: 1.6 NG/DL (ref 0.9–1.8)
TSH SERPL DL<=0.05 MIU/L-ACNC: 0.07 UIU/ML (ref 0.27–4.2)

## 2020-05-19 PROCEDURE — 84439 ASSAY OF FREE THYROXINE: CPT

## 2020-05-19 PROCEDURE — 84443 ASSAY THYROID STIM HORMONE: CPT

## 2020-05-19 PROCEDURE — 99214 OFFICE O/P EST MOD 30 MIN: CPT | Performed by: INTERNAL MEDICINE

## 2020-05-19 PROCEDURE — 36415 COLL VENOUS BLD VENIPUNCTURE: CPT

## 2020-05-19 PROCEDURE — 97110 THERAPEUTIC EXERCISES: CPT

## 2020-05-19 PROCEDURE — 97140 MANUAL THERAPY 1/> REGIONS: CPT

## 2020-05-19 PROCEDURE — G8427 DOCREV CUR MEDS BY ELIG CLIN: HCPCS | Performed by: INTERNAL MEDICINE

## 2020-05-19 PROCEDURE — 83036 HEMOGLOBIN GLYCOSYLATED A1C: CPT

## 2020-05-19 PROCEDURE — 3046F HEMOGLOBIN A1C LEVEL >9.0%: CPT | Performed by: INTERNAL MEDICINE

## 2020-05-19 PROCEDURE — 74019 RADEX ABDOMEN 2 VIEWS: CPT

## 2020-05-19 PROCEDURE — 2022F DILAT RTA XM EVC RTNOPTHY: CPT | Performed by: INTERNAL MEDICINE

## 2020-05-19 PROCEDURE — 3017F COLORECTAL CA SCREEN DOC REV: CPT | Performed by: INTERNAL MEDICINE

## 2020-05-19 RX ORDER — IBUPROFEN 800 MG/1
800 TABLET ORAL EVERY 8 HOURS PRN
Qty: 30 TABLET | Refills: 0 | Status: SHIPPED | OUTPATIENT
Start: 2020-05-19 | End: 2020-08-10

## 2020-05-19 NOTE — PROGRESS NOTES
gain, weight loss, cold intolerance, heat intolerance, hair loss, dry skin, constipation, diarrhea, edema, anxiety, tremor, palpitations and dysphagia.  Patient is  taking her medication consistently on an empty stomach.       Lab Results   Component Value Date    LABMICR 2.20 (H) 03/09/2020     Lab Results   Component Value Date     02/23/2020    K 4.0 02/23/2020     02/23/2020    CO2 24 02/23/2020    BUN 9 02/23/2020    CREATININE 0.6 02/23/2020    GLUCOSE 185 (H) 02/23/2020    CALCIUM 9.2 02/23/2020     Lab Results   Component Value Date    CHOL 162 01/06/2020    TRIG 78 01/06/2020    HDL 46 01/06/2020    LDLCALC 99 01/06/2020     Lab Results   Component Value Date    ALT 12 02/23/2020    AST 17 02/23/2020     Lab Results   Component Value Date    TSH 3.16 01/06/2020     Lab Results   Component Value Date    WBC 8.0 02/23/2020    HGB 13.2 02/23/2020    HCT 38.9 02/23/2020    MCV 88.7 02/23/2020     02/23/2020     No results found for: INR   No results found for: PSA   No results found for: [facility]     Patient Active Problem List   Diagnosis    Controlled type 2 diabetes mellitus without complication, without long-term current use of insulin (HCC)    Mixed hyperlipidemia    Essential hypertension    Acquired hypothyroidism       No Known Allergies  Outpatient Medications Marked as Taking for the 5/19/20 encounter (Telemedicine) with Javi Santos MD   Medication Sig Dispense Refill    SITagliptin (JANUVIA) 100 MG tablet Take 1 tablet by mouth daily 30 tablet 1    metFORMIN (GLUCOPHAGE) 850 MG tablet Take 1 tablet by mouth 2 times daily (with meals) (Patient taking differently: Take 850 mg by mouth daily (with breakfast) ) 180 tablet 0    empagliflozin (JARDIANCE) 25 MG tablet Take 1 tablet by mouth daily 90 tablet 0    fluconazole (DIFLUCAN) 150 MG tablet 1 dose and if persistent symptoms, repeat every 3-4 days if persistent yeast infection 3 tablet 0    blood glucose monitor strips Test

## 2020-05-19 NOTE — FLOWSHEET NOTE
lifting, and ambulation      Manual Treatments:  PROM / STM / Oscillations-Mobs:  G-I, II, III, IV (PA's, Inf., Post.)  [x] (39628) Provided manual therapy to mobilize proximal hip and LS spine soft tissue/joints for the purpose of modulating pain, promoting relaxation,  increasing ROM, reducing/eliminating soft tissue swelling/inflammation/restriction, improving soft tissue extensibility and allowing for proper ROM for normal function with self care, mobility, lifting and ambulation. Modalities:       Charges:  Timed Code Treatment Minutes: 45   Total Treatment Minutes: 39     [facility] time in/time out:   (will also require time and out per CPT code below)    [] EVAL (LOW) 455 1011 (typically 20 minutes face-to-face)  [] EVAL (MOD) 90040 (typically 30 minutes face-to-face)  [] EVAL (HIGH) 58572 (typically 45 minutes face-to-face)  [] RE-EVAL     [x] HO(56719) x 2  [] IONTO  [] NMR (36564) x     [] VASO  [x] Manual (09350) x 2    [] Other:  [] TA x      [] Mech Traction (13904)  [] ES(attended) (06381)     [] ES (un) (10061):     Goals: Patient stated goal: to get rid of pain; to get a good night's sleep again; to be able to return to work  [x] Progressing: [] Met: [] Not Met: [] Adjusted    Therapist goals for Patient:   Short Term Goals: To be achieved in: 2 weeks  1. Independent in HEP and progression per patient tolerance, in order to prevent re-injury. [] Progressing: [x] Met: [] Not Met: [] Adjusted  2. Patient will have a decrease in pain to facilitate improvement in movement, function, and ADLs as indicated by Functional Deficits. [x] Progressing: [] Met: [] Not Met: [] Adjusted      Long Term Goals: To be achieved in: 12 weeks  1. Disability index score of 23% or less for the Modified Oswestry  to assist with reaching prior level of function. [x] Progressing: [] Met: [] Not Met: [] Adjusted  2.  Patient will demonstrate increased AROM to WNL, good LS mobility, good hip ROM to allow for proper joint Patient with increased Right Upper quadrant pain this session. Held some manual therapy, therex progression and initiation of needling secondary to RUQ pain. Patient requires skilled physical therapy intervention to address pain level, deficits in lumbar ROM and R LE ROM, and gait impairments, to allow for return to ADLs, functional tasks, and recreational activities including going to the gym, without pain or restriction. PLAN: 2 days a week for 12 weeks (3/11/2020-6/3/2020)  [x] Continue per plan of care [] Alter current plan (see comments above)  [] Plan of care initiated [] Hold pending MD visit [] Discharge      Electronically signed by:  Emilia Engle, PT, DPT    Note: If patient does not return for scheduled/ recommended follow up visits, this note will serve as a discharge from care along with most recent update on progress.

## 2020-05-20 ENCOUNTER — VIRTUAL VISIT (OUTPATIENT)
Dept: PULMONOLOGY | Age: 65
End: 2020-05-20
Payer: COMMERCIAL

## 2020-05-20 ENCOUNTER — TELEPHONE (OUTPATIENT)
Dept: PULMONOLOGY | Age: 65
End: 2020-05-20

## 2020-05-20 LAB
ESTIMATED AVERAGE GLUCOSE: 165.7 MG/DL
HBA1C MFR BLD: 7.4 %

## 2020-05-20 PROCEDURE — G8427 DOCREV CUR MEDS BY ELIG CLIN: HCPCS | Performed by: INTERNAL MEDICINE

## 2020-05-20 PROCEDURE — 3017F COLORECTAL CA SCREEN DOC REV: CPT | Performed by: INTERNAL MEDICINE

## 2020-05-20 PROCEDURE — 99204 OFFICE O/P NEW MOD 45 MIN: CPT | Performed by: INTERNAL MEDICINE

## 2020-05-20 RX ORDER — LEVOTHYROXINE SODIUM 0.07 MG/1
75 TABLET ORAL DAILY
Qty: 90 TABLET | Refills: 2 | Status: SHIPPED | OUTPATIENT
Start: 2020-05-20 | End: 2021-02-24 | Stop reason: SDUPTHER

## 2020-05-20 ASSESSMENT — ENCOUNTER SYMPTOMS
DIARRHEA: 0
CHOKING: 0
CHEST TIGHTNESS: 0
EYE PAIN: 0
STRIDOR: 0
CONSTIPATION: 0
EYE ITCHING: 0
SORE THROAT: 0
ABDOMINAL PAIN: 1
EYE DISCHARGE: 0
VOICE CHANGE: 0

## 2020-05-20 NOTE — Clinical Note
CT chest Thoracentesis by radiology (not me).  Please ensure this is done AFTER CT chest Follow up after above

## 2020-05-20 NOTE — TELEPHONE ENCOUNTER
limited to the following:    Your Provider(s) may not able to provide medical treatment for your particular condition and you may be required to seek alternative healthcare or emergency care services.  The electronic systems or other security protocols or safeguards used in the Service could fail, causing a breach of privacy of your medical or other information.  Given regulatory requirements in certain jurisdictions, your Provider(s) diagnosis and/or treatment options, especially pertaining to certain prescriptions, may be limited. Acceptance   1. You understand that Services will be provided via Telehealth. This process involves the use of HIPAA compliant and secure, real-time audio-visual interfacing with a qualified and appropriately trained provider located at [facility]. 2. You understand that, under no circumstances, will this session be recorded. 3. You understand that the Provider(s) at [facility] and other clinical participants will be party to the information obtained during the Telehealth session in accordance with best medical practices. 4. You understand that the information obtained during the Telehealth session will be used to help determine the most appropriate treatment options. 5. You understand that You have the right to revoke this consent at any point in time. 6. You understand that Telehealth is voluntary, and that continued treatment is not dependent upon consent. 7. You understand that, in the event of non-consent to Telehealth services and/or technical difficulties, you will obtain services as typically provided in the absence of Telehealth technology. 8. You understand that this consent will be kept in Your medical record. 9. No potential benefits from the use of Telehealth or specific results can be guaranteed. Your condition may not be cured or improved and, in some cases, may get worse.    10. There are limitations in the provision of medical care and treatment via Telehealth and the Service and you may not be able to receive diagnosis and/or treatment through the Service for every condition for which you seek diagnosis and/or treatment. 11. There are potential risks to the use of Telehealth, including but not limited to the risks described in this Telehealth Consent. 12. Your Provider(s) have discussed the use of Telehealth and the Service with you, including the benefits and risks of such and you have provided oral consent to your Provider(s) for the use of Telehealth and the Service. 15. You understand that it is your duty to provide your Provider(s) truthful, accurate and complete information, including all relevant information regarding care that you may have received or may be receiving from other healthcare providers outside of the Service. 14. You understand that each of your Provider(s) may determine in his or sole discretion that your condition is not suitable for diagnosis and/or treatment using the Service, and that you may need to seek medical care and treatment a specialist or other healthcare provider, outside of the Service. 15. You understand that you are fully responsible for payment for all services provided by Provider(s) or through use of the Service and that you may not be able to use third-party insurance. 16. You represent that (a) you have read this Telehealth Consent carefully, (b) you understand the risks and benefits of the Service and the use of Telehealth in the medical care and treatment provided to you by Provider(s) using the Service, and (c) you have the legal capacity and authority to provide this consent for yourself and/or the minor for which you are consenting under applicable federal and state laws, including laws relating to the age of [de-identified] and/or parental/guardian consent.    17. You give your informed consent to the use of Telehealth by Provider(s) using the Service under the terms described in the Terms of Service and this Telehealth Consent. The patient was read the following statement and has consented to the visit as of 5/20/20. The patient has been scheduled for their first telehealth visit on 5/20/20 with  .

## 2020-05-21 ENCOUNTER — TELEPHONE (OUTPATIENT)
Dept: ADMINISTRATIVE | Age: 65
End: 2020-05-21

## 2020-05-26 ENCOUNTER — HOSPITAL ENCOUNTER (OUTPATIENT)
Dept: PHYSICAL THERAPY | Age: 65
Setting detail: THERAPIES SERIES
Discharge: HOME | End: 2020-05-26
Payer: COMMERCIAL

## 2020-05-26 PROCEDURE — 97110 THERAPEUTIC EXERCISES: CPT

## 2020-05-26 PROCEDURE — 97140 MANUAL THERAPY 1/> REGIONS: CPT

## 2020-05-26 NOTE — FLOWSHEET NOTE
adjustment due to lack of progress  [] Patient is not progressing as expected and requires additional follow up with physician  [] Other    Prognosis for POC: [x] Good [] Fair  [] Poor      Patient requires continued skilled intervention: [x] Yes  [] No    Treatment/Activity Tolerance:  [x] Patient able to complete treatment  [] Patient limited by fatigue  [] Patient limited by pain    [] Patient limited by other medical complications  [] Other:     ASSESSMENT: Patient arrived to therapy today with moderate R sided abdominal pain, R low back pain, and R LE pain. Patient tolerated being prone for manual treatment today, but was only able to tolerate STM and trigger point release to lumbar paraspinals. Patient able to tolerate table exercises well, with only mild discomfort in R LE at the end of bridges. Patient able to complete all standing exercises, but need frequent rest breaks to finish all reps. Patient requires skilled physical therapy intervention to address pain level, deficits in lumbar ROM and R LE ROM, and gait impairments, to allow for return to ADLs, functional tasks, and recreational activities including going to the gym, without pain or restriction. PLAN: 2 days a week for 12 weeks (3/11/2020-6/3/2020)  [x] Continue per plan of care [] Alter current plan (see comments above)  [] Plan of care initiated [] Hold pending MD visit [] Discharge      Electronically signed by:  Donavan House PT, DPT    Note: If patient does not return for scheduled/ recommended follow up visits, this note will serve as a discharge from care along with most recent update on progress. none

## 2020-05-27 ENCOUNTER — HOSPITAL ENCOUNTER (OUTPATIENT)
Dept: CT IMAGING | Age: 65
Discharge: HOME | End: 2020-05-27
Payer: COMMERCIAL

## 2020-05-27 ENCOUNTER — HOSPITAL ENCOUNTER (OUTPATIENT)
Dept: ULTRASOUND IMAGING | Age: 65
Discharge: HOME | End: 2020-05-27
Payer: COMMERCIAL

## 2020-05-27 PROCEDURE — 71250 CT THORAX DX C-: CPT

## 2020-05-27 PROCEDURE — 76604 US EXAM CHEST: CPT

## 2020-05-27 RX ORDER — GLUCOSAMINE HCL/CHONDROITIN SU 500-400 MG
CAPSULE ORAL
Qty: 100 STRIP | Refills: 3 | Status: SHIPPED | OUTPATIENT
Start: 2020-05-27 | End: 2021-06-23

## 2020-05-28 ENCOUNTER — TELEPHONE (OUTPATIENT)
Dept: PULMONOLOGY | Age: 65
End: 2020-05-28

## 2020-06-01 ENCOUNTER — OFFICE VISIT (OUTPATIENT)
Dept: INTERNAL MEDICINE CLINIC | Age: 65
End: 2020-06-01
Payer: COMMERCIAL

## 2020-06-01 VITALS
HEART RATE: 83 BPM | DIASTOLIC BLOOD PRESSURE: 78 MMHG | SYSTOLIC BLOOD PRESSURE: 128 MMHG | OXYGEN SATURATION: 99 % | HEIGHT: 65 IN | BODY MASS INDEX: 31.16 KG/M2 | TEMPERATURE: 98.3 F | WEIGHT: 187 LBS

## 2020-06-01 PROCEDURE — 2022F DILAT RTA XM EVC RTNOPTHY: CPT | Performed by: INTERNAL MEDICINE

## 2020-06-01 PROCEDURE — 3017F COLORECTAL CA SCREEN DOC REV: CPT | Performed by: INTERNAL MEDICINE

## 2020-06-01 PROCEDURE — G8427 DOCREV CUR MEDS BY ELIG CLIN: HCPCS | Performed by: INTERNAL MEDICINE

## 2020-06-01 PROCEDURE — 99213 OFFICE O/P EST LOW 20 MIN: CPT | Performed by: INTERNAL MEDICINE

## 2020-06-01 PROCEDURE — G8417 CALC BMI ABV UP PARAM F/U: HCPCS | Performed by: INTERNAL MEDICINE

## 2020-06-01 PROCEDURE — 1036F TOBACCO NON-USER: CPT | Performed by: INTERNAL MEDICINE

## 2020-06-01 PROCEDURE — 3051F HG A1C>EQUAL 7.0%<8.0%: CPT | Performed by: INTERNAL MEDICINE

## 2020-06-01 NOTE — PROGRESS NOTES
Anjum Hager  YOB: 1955    Date of Service:  6/1/2020    Chief Complaint:      Chief Complaint   Patient presents with    Chest Pain     pain in upper right side x 3 months        HPI:  Anjum Hager is a 60 y.o. She complains of RUQ pain daily for the past 3 months. Taking Tylenol helps with the pain but it's still there, pain is 6/10 and can go up to 10/10 when she was just sitting in front of a computer 8:30 PM.  She's been off Metformin without improvement in her abd pain. CT chest and abd without significant findings. Treatment Adherence:   Medication compliance:  compliant most of the time  Diet compliance:  compliant most of the time  Weight trend: decreasing  Current exercise: walks 5 time(s) per week  Barriers: none     Diabetes Mellitus Type 2: Current symptoms/problems include neuropathy.  Stable on Januvia 25 mg qd, Jardiance 25 mg qd, off Metformin 850 mg qhs and no more diarrhea,  glimepiride 4 mg bid.       Home blood sugar records: fasting range: 158, 85 103 131 130 99 104  AM and 141 160 77 93 106 120 141 PM  Any episodes of hypoglycemia? no  Eye exam current (within one year): yes  Tobacco history: She  reports that she quit smoking about 10 years ago. Her smoking use included cigarettes. She has a 10.00 pack-year smoking history. She has never used smokeless tobacco.   Daily Aspirin? No: will start  Known diabetic complications: none  Hypertension:  Home blood pressure monitoring: Yes 120/60-70.  Stable on Lisinopril 40 mg qd.   She is adherent to a low sodium diet. Patient denies chest pain, shortness of breath, headache, lightheadedness, blurred vision, peripheral edema, palpitations, dry cough and fatigue.  Antihypertensive medication side effects: no medication side effects noted.  Use of agents associated with hypertension: thyroid hormones. Hyperlipidemia:  No new myalgias or GI upset on atorvastatin (Lipitor) 20 mg qd.      Hypothyroidism: Recent symptoms: stable with increase Levothyroxine 75 mcg qd. She denies fatigue, weight gain, weight loss, cold intolerance, heat intolerance, hair loss, dry skin, constipation, diarrhea, edema, anxiety, tremor, palpitations and dysphagia. Patient is  taking her medication consistently on an empty stomach.     Lab Results   Component Value Date    LABA1C 7.4 05/19/2020    LABMICR 2.20 (H) 03/09/2020     Lab Results   Component Value Date     02/23/2020    K 4.0 02/23/2020     02/23/2020    CO2 24 02/23/2020    BUN 9 02/23/2020    CREATININE 0.6 02/23/2020    GLUCOSE 185 (H) 02/23/2020    CALCIUM 9.2 02/23/2020     Lab Results   Component Value Date    CHOL 162 01/06/2020    TRIG 78 01/06/2020    HDL 46 01/06/2020    LDLCALC 99 01/06/2020     Lab Results   Component Value Date    ALT 12 02/23/2020    AST 17 02/23/2020     Lab Results   Component Value Date    TSH 0.07 (L) 05/19/2020    TSH 3.16 01/06/2020    T4FREE 1.6 05/19/2020     Lab Results   Component Value Date    WBC 8.0 02/23/2020    HGB 13.2 02/23/2020    HCT 38.9 02/23/2020    MCV 88.7 02/23/2020     02/23/2020     No results found for: INR   No results found for: PSA   No results found for: [facility]     Patient Active Problem List   Diagnosis    Controlled type 2 diabetes mellitus without complication, without long-term current use of insulin (HCC)    Mixed hyperlipidemia    Essential hypertension    Acquired hypothyroidism       No Known Allergies  Outpatient Medications Marked as Taking for the 6/1/20 encounter (Office Visit) with Kirsty Santos MD   Medication Sig Dispense Refill    blood glucose monitor strips Test qd 100 strip 3    levothyroxine (SYNTHROID) 75 MCG tablet Take 1 tablet by mouth daily 90 tablet 2    SITagliptin (JANUVIA) 100 MG tablet Take 1 tablet by mouth daily 90 tablet 0    ibuprofen (ADVIL;MOTRIN) 800 MG tablet Take 1 tablet by mouth every 8 hours as needed for Pain (abd pain) 30 tablet 0    metFORMIN

## 2020-06-03 ENCOUNTER — VIRTUAL VISIT (OUTPATIENT)
Dept: PULMONOLOGY | Age: 65
End: 2020-06-03
Payer: COMMERCIAL

## 2020-06-03 LAB — DIABETIC RETINOPATHY: NEGATIVE

## 2020-06-03 PROCEDURE — 99214 OFFICE O/P EST MOD 30 MIN: CPT | Performed by: INTERNAL MEDICINE

## 2020-06-03 PROCEDURE — 3017F COLORECTAL CA SCREEN DOC REV: CPT | Performed by: INTERNAL MEDICINE

## 2020-06-03 PROCEDURE — G8427 DOCREV CUR MEDS BY ELIG CLIN: HCPCS | Performed by: INTERNAL MEDICINE

## 2020-06-03 RX ORDER — PREDNISONE 20 MG/1
20 TABLET ORAL DAILY
Qty: 7 TABLET | Refills: 0 | Status: SHIPPED | OUTPATIENT
Start: 2020-06-03 | End: 2020-06-10

## 2020-06-03 ASSESSMENT — ENCOUNTER SYMPTOMS
EYE ITCHING: 0
EYE PAIN: 0
CONSTIPATION: 0
CHEST TIGHTNESS: 0
CHOKING: 0
SORE THROAT: 0
STRIDOR: 0
ABDOMINAL PAIN: 0
DIARRHEA: 0
VOICE CHANGE: 0
EYE DISCHARGE: 0

## 2020-06-03 NOTE — PROGRESS NOTES
Pulmonary Outpatient Note   Tigre Roy MD       6/3/2020    Chief Complaint:  Follow-up and Results (Ct)     HPI:   60y.o. year old female here for follow up of RUQ pain. Virtual visit through [employer] [facility]    Had bilateral effusions, underwent CT chest I personally reviewed showing very small effusions, improving from prior study in February. She was referred for thoracentesis but radiology refused to perform because the effusions were too small. She remains with RUQ pain, anterior under her breast   Is going to see a GI specialist for further evaluation of this.    Denies other respiratory symptoms or pleuritic characteristic to the pain     Past Medical History:   Diagnosis Date    Chronic back pain     Diabetes mellitus ([address]     Essential hypertension 2/26/2020    Hyperlipidemia     Hypothyroidism     Thyroid disease        Past Surgical History:   Procedure Laterality Date    APPENDECTOMY  1974    BREAST SURGERY Right 2013    drainage    CHOLECYSTECTOMY  1974    HYSTERECTOMY, VAGINAL  1996    TUBAL LIGATION  1981       Social History     Tobacco Use    Smoking status: Former Smoker     Packs/day: 0.50     Years: 20.00     Pack years: 10.00     Types: Cigarettes     Start date: 5/24/1992     Last attempt to quit: 2/26/2010     Years since quitting: 10.2    Smokeless tobacco: Never Used   Substance Use Topics    Alcohol use: Never       Family History   Problem Relation Age of Onset    Dementia Mother     Breast Cancer Sister     Atrial Fibrillation Sister     High Blood Pressure Maternal Grandmother     Diabetes Sister          Current Outpatient Medications:     predniSONE (DELTASONE) 20 MG tablet, Take 1 tablet by mouth daily for 7 days, Disp: 7 tablet, Rfl: 0    levothyroxine (SYNTHROID) 75 MCG tablet, Take 1 tablet by mouth daily, Disp: 90 tablet, Rfl: 2    SITagliptin (JANUVIA) 100 MG tablet, Take 1 tablet by mouth daily, Disp: 90 tablet, Rfl: 0    ibuprofen (ADVIL;MOTRIN) 800 MG tablet, Take 1 tablet by mouth every 8 hours as needed for Pain (abd pain), Disp: 30 tablet, Rfl: 0    metFORMIN (GLUCOPHAGE) 850 MG tablet, Take 1 tablet by mouth 2 times daily (with meals) (Patient taking differently: Take 850 mg by mouth daily (with breakfast) ), Disp: 180 tablet, Rfl: 0    empagliflozin (JARDIANCE) 25 MG tablet, Take 1 tablet by mouth daily, Disp: 90 tablet, Rfl: 0    glimepiride (AMARYL) 4 MG tablet, Take 1 tablet by mouth 2 times daily, Disp: 180 tablet, Rfl: 3    atorvastatin (LIPITOR) 20 MG tablet, Take 1 tablet by mouth daily, Disp: 90 tablet, Rfl: 3    lisinopril (PRINIVIL;ZESTRIL) 40 MG tablet, Take 1 tablet by mouth daily, Disp: 90 tablet, Rfl: 3    blood glucose monitor strips, Test qd, Disp: 100 strip, Rfl: 3    fluconazole (DIFLUCAN) 150 MG tablet, 1 dose and if persistent symptoms, repeat every 3-4 days if persistent yeast infection (Patient not taking: Reported on 6/3/2020), Disp: 3 tablet, Rfl: 0    meloxicam (MOBIC) 15 MG tablet, Take 1 tablet by mouth daily Start when done with prednisone, Disp: 30 tablet, Rfl: 0    Vaginal Lubricant (REPLENS) GEL, Place 1 Dose vaginally daily (Patient not taking: Reported on 6/3/2020), Disp: 35 g, Rfl: 1    Patient has no known allergies. There were no vitals filed for this visit. Review of Systems   Constitutional: Negative for chills, fever and unexpected weight change. HENT: Negative for mouth sores, sore throat and voice change. Eyes: Negative for pain, discharge and itching. Respiratory: Negative for choking, chest tightness and stridor. Cardiovascular: Positive for chest pain. Negative for palpitations and leg swelling. Gastrointestinal: Negative for abdominal pain, constipation and diarrhea. Endocrine: Negative for cold intolerance, heat intolerance and polydipsia. Genitourinary: Negative for dysuria, frequency and hematuria. Musculoskeletal: Negative for gait problem, joint swelling and neck stiffness. Neurological: Negative for dizziness, numbness and headaches. Psychiatric/Behavioral: Negative for agitation, confusion and hallucinations. ASSESSMENT:    1. Pleuritis      PLAN:    -Possible pleurisy/pleuritis. Will trial on a short course of steroids. If they provide relief, continue. If they do not then stop and await further GI workup. If workup unremarkable then recommend sending to rheumatology and pursuing autoimmune workup  -Effusion small, no intervention needed. Can repeat CXR at a later time to follow if symptoms are not resolving  -Follow up after GI completes their workup         Gisella Wang MD    Richa Murray is a 60 y.o. female being evaluated by a Virtual Visit (video visit) encounter to address concerns as mentioned above. A caregiver was present when appropriate. Due to this being a TeleHealth encounter (During [facility]- public health emergency), evaluation of the following organ systems was limited: Vitals/Constitutional/EENT/Resp/CV/GI//MS/Neuro/Skin/Heme-Lymph-Imm. Pursuant to the emergency declaration under the [address] [place] [address] authority and the [email or web address] and [employer] Act, this Virtual Visit was conducted with patient's (and/or legal guardian's) consent, to reduce the patient's risk of exposure to COVID-19 and provide necessary medical care. The patient (and/or legal guardian) has also been advised to contact this office for worsening conditions or problems, and seek emergency medical treatment and/or call 911 if deemed necessary. Patient identification was verified at the start of the visit: Yes    Total time spent for this encounter: Not billed by time    Services were provided through a video synchronous discussion virtually to substitute for in-person clinic visit. Patient and provider were located at their individual homes.     --Marsha Crabtree MD on 6/3/2020 at 3:24 PM    An electronic

## 2020-06-05 ENCOUNTER — HOSPITAL ENCOUNTER (OUTPATIENT)
Dept: PHYSICAL THERAPY | Age: 65
Setting detail: THERAPIES SERIES
Discharge: HOME | End: 2020-06-05
Payer: COMMERCIAL

## 2020-06-05 NOTE — FLOWSHEET NOTE
[facility],  [address]        Physical Therapy  Cancellation/No-show Note  Patient Name:  Orlando Jackson  :  1955   Date:  2020  Cancelled visits to date: 2  No-shows to date: 0    For today's appointment patient:  X  Cancelled  ? Rescheduled appointment  ? No-show     Reason given by patient:  ?  Patient ill  ? Conflicting appointment  X  No transportation    ? Conflict with work  ? No reason given  ?   Other:     Comments:      Electronically signed by:  Paige Silverman, PT, DPT

## 2020-06-08 ENCOUNTER — INITIAL CONSULT (OUTPATIENT)
Dept: GASTROENTEROLOGY | Age: 65
End: 2020-06-08
Payer: COMMERCIAL

## 2020-06-08 VITALS
HEIGHT: 65 IN | TEMPERATURE: 96.6 F | WEIGHT: 186 LBS | SYSTOLIC BLOOD PRESSURE: 102 MMHG | DIASTOLIC BLOOD PRESSURE: 68 MMHG | BODY MASS INDEX: 30.99 KG/M2

## 2020-06-08 PROCEDURE — G8427 DOCREV CUR MEDS BY ELIG CLIN: HCPCS | Performed by: INTERNAL MEDICINE

## 2020-06-08 PROCEDURE — 99204 OFFICE O/P NEW MOD 45 MIN: CPT | Performed by: INTERNAL MEDICINE

## 2020-06-08 PROCEDURE — 1036F TOBACCO NON-USER: CPT | Performed by: INTERNAL MEDICINE

## 2020-06-08 PROCEDURE — 3017F COLORECTAL CA SCREEN DOC REV: CPT | Performed by: INTERNAL MEDICINE

## 2020-06-08 PROCEDURE — G8417 CALC BMI ABV UP PARAM F/U: HCPCS | Performed by: INTERNAL MEDICINE

## 2020-06-08 NOTE — LETTER
[facility]  [address] [facility] ,  [address] [facility]  Phone: [phone] [facility] [address],  [facility] [address], [address] [place] [address]  Phone: [phone]    06/08/20    Kash Gambino  MR Number:<[id number]>  YOB: 1955  Date of Visit:6/8/20    Dear Dr. Nomi Grajeda MD    Thank you for the request for consultation for Mercedes Steve to me for the evaluation of   Chief Complaint   Patient presents with    New Patient     epigastric pain and RUQ pain, burning sensation   . Below are the relevant portions of my assessment and plan of care. FINAL DIAGNOSIS/Assessment   Diagnosis Orders   1. RUQ pain  EGD   2. Preop testing  COVID-19 Ambulatory       VISIT ORDERS/Plan  Orders Placed This Encounter   Procedures    COVID-19 Ambulatory     Standing Status:   Future     Standing Expiration Date:   6/8/2021     Scheduling Instructions:      Saline media preferred given current shortage of viral transport media but both acceptable     Order Specific Question:   Status     Answer:   Asymptomatic/Surveillance (e.g. pre-op/pre-procedure, pre-delivery, transfer)     Order Specific Question:   Reason for Test     Answer:   Upcoming elective surgery/procedure/delivery, return to work, or discharge to another facility    EGD     Scheduling Instructions:      Please provide prep of choice instructions and prescription. General guidelines for holding blood thinners/anticoagulants around endoscopic procedure are but patients are encouraged to check with their prescribing physician. The patient may hold Plavix, Effient, Brilinta 5 days prior to the procedure unless:       A drug eluting stent has been placed within past 12 months. A nondrug eluting stent has been placed within past 1 month.       Coumadin may be held 4 days prior to the procedure unless:        Mechanical mitral valve replacement (requires heparin bridge while

## 2020-06-08 NOTE — PATIENT INSTRUCTIONS
[address] ,  [address] [facility]  Phone: [phone] Kay Moran,  [address] [facility], [address] [place] [address]  Phone: [phone]    Sedation  Three types of sedation are used for endoscopy and colonoscopy. The standard and most common is called conscious sedation. This is administered by the gastroenterologist and is part of the standard procedure. Common medications used for this are IV forms of a benzodiazepine (most commonly Versed) and a narcotic (most commonly fentanyl). Benadryl and nausea medicines may also be used. The effect of this is to make you comfortable. Most people will actually have amnesia with this and not recall the procedure. Some individuals will have other types of anesthesia provided by an anesthesiologist or nurse anesthetist.  The reason for this is a history of poor sedation, medication use that makes one more resistant to conscious sedation, a medical condition for which conscious sedation is contraindicated or other medical unstable conditions. This usually involves a separate fee from anesthesia. The most common of these is propofol (diprivan sedation) which is a deeper sedative the conscious sedation. In some instances, general anesthesia with intubation (breathing tube) is required. ENDOSCOPY OVERVIEW  An upper endoscopy, often referred to as endoscopy, EGD, or yfufsynh-fufhnz-ulpqexpvajlm, is a procedure that allows a physician to directly examine the upper part of the gastrointestinal (GI) tract, which includes the esophagus (swallowing tube), the stomach, and the duodenum (the first section of the small intestine)  The physician who performs the procedures, known as an endoscopist, has special training in using an endoscope to examine the upper GI system, looking for inflammation (redness, irritation), bleeding, ulcers, or tumors.     REASONS FOR UPPER ENDOSCOPY  The most common reasons for upper endoscopy include:  Unexplained discomfort in the upper abdomen   GERD or gastroesophageal reflux disease, (often called heartburn)   Persistent nausea and vomiting   Upper GI bleeding (vomiting blood or blood found in the stool that originated from the upper part of the gastrointestinal tract). Bleeding can be treated during the endoscopy. Difficulty swallowing; food/liquids getting stuck in the esophagus during swallowing. This may be caused by a narrowing (stricture) or tumor. The stricture may be dilated with special balloons or dilation tubes during the endoscopy. Abnormal or unclear findings on an upper GI x-ray, CT scan or MRI. Removal of a foreign body (a swallowed object). To check healing or progress on previously found polyps (growths), tumors, or ulcers. ENDOSCOPY PREPARATION  You will be given specific instructions regarding how to prepare for the examination before the procedure. These instructions are designed to maximize your safety during and after the examination and to minimize possible complications. It is important to read the instructions ahead of time and follow them carefully. Do not hesitate to call the physician's office or the endoscopy unit if there are questions. Nothing to eat after midnight the day before the test. You may be asked not to eat or drink anything for up to eight hours before the test. It is important for your stomach to be empty to allow the endoscopist to visualize the entire area and to decrease the possibility of food or fluid being vomited into the lungs while under sedation (called aspiration). You may be asked to adjust the dose of your medications or to stop specific medications (such as aspirin-like drugs) temporarily before the examination. You should discuss your medications with your physician before your appointment for the endoscopy. You should arrange for a friend or family member to escort you home after the examination. Gastrointestinal Endoscopy: (www.askasge. org)  Automatic Data of Diabetes and Digestive and Kidney Diseases (http://digestive. niddk.nih.gov/ddiseases/pubs/upperendoscopy/index. htm)

## 2020-06-08 NOTE — PROGRESS NOTES
Normal sensory function, No focal deficits noted. No asterixis. RADIOLOGY/PROCEDURES         FINAL IMPRESSION     Orders Placed This Encounter   Procedures    COVID-19 Ambulatory     Standing Status:   Future     Standing Expiration Date:   6/8/2021     Scheduling Instructions:      Saline media preferred given current shortage of viral transport media but both acceptable     Order Specific Question:   Status     Answer:   Asymptomatic/Surveillance (e.g. pre-op/pre-procedure, pre-delivery, transfer)     Order Specific Question:   Reason for Test     Answer:   Upcoming elective surgery/procedure/delivery, return to work, or discharge to another facility    EGD     Scheduling Instructions:      Please provide prep of choice instructions and prescription. General guidelines for holding blood thinners/anticoagulants around endoscopic procedure are but patients are encouraged to check with their prescribing physician. The patient may hold Plavix, Effient, Brilinta 5 days prior to the procedure unless:       A drug eluting stent has been placed within past 12 months. A nondrug eluting stent has been placed within past 1 month. Coumadin may be held 4 days prior to the procedure unless:        Mechanical mitral valve replacement (requires heparin bridge while Coumadin held and is managed by pharmacy)      Pradaxa, Xarelto, Eliquis may be held 2-3 days prior to procedure. According to pharmacokinetics of the drug, package insert, cardiology practice patterns, and T1/2 of theses drugs (12 hrs), Eliquis and Xarelto are held 48hrs prior to any procedure, including major surgical procedures w/o       increased bleeding.  That is usually the standard of care, as coagulation would/should be normalized at 48hrs. Every attempt should be made to maintain ASA 81mg per day throughout the heaven-operative period in patients with diagnosis of ASHD.        These recommendations may need to be

## 2020-06-10 ENCOUNTER — HOSPITAL ENCOUNTER (OUTPATIENT)
Dept: PHYSICAL THERAPY | Age: 65
Setting detail: THERAPIES SERIES
Discharge: HOME | End: 2020-06-10
Payer: COMMERCIAL

## 2020-06-10 PROCEDURE — 97110 THERAPEUTIC EXERCISES: CPT

## 2020-06-10 PROCEDURE — 97140 MANUAL THERAPY 1/> REGIONS: CPT

## 2020-06-10 NOTE — FLOWSHEET NOTE
OBJECTIVE:    Observation:    Test measurements:      RESTRICTIONS/PRECAUTIONS: none    Exercises/Interventions:     Therapeutic Ex (15411) Sets/sec Notes/CUES   Bike 8 min    TA BKFO  TA march  Bridges  Clamshells  x 15   x 15  2 x 10  2 x 10       OVL   Side stepping at wall 3 laps Cues to keep TA contraction   Multifidi walkouts 2x5, bilaterally  green    TB anti-rotation presses 2x12, green Cues to not let resistance turn her                  Patient Education: use of tennis ball for self trigger point release, pillow for positioning and pain relief, dry needling          Manual Intervention (86287)  Held some manual therapy secondary to increased R UQ pain today   STM and Trigger point release parspinal 15 min prone                  NMR re-education (82998)  CUES NEEDED                                                Therapeutic Activity (53886)                                       Therapeutic Exercise and NMR EXR  [x] (75360) Provided verbal/tactile cueing for activities related to strengthening, flexibility, endurance, ROM  for improvements in proximal hip and core control with self care, mobility, lifting and ambulation.  [] (93666) Provided verbal/tactile cueing for activities related to improving balance, coordination, kinesthetic sense, posture, motor skill, proprioception  to assist with core control in self care, mobility, lifting, and ambulation.      Therapeutic Activities:    [] (55835 or 13293) Provided verbal/tactile cueing for activities related to improving balance, coordination, kinesthetic sense, posture, motor skill, proprioception and motor activation to allow for proper function  with self care and ADLs  [] (71254) Provided training and instruction to the patient for proper core and proximal hip recruitment and positioning with ambulation re-education     Home Exercise Program:    [x] (23895) Reviewed/Progressed HEP activities related to strengthening, flexibility, endurance, ROM of
Detail Level: Detailed
Detail Level: Zone

## 2020-06-12 LAB
CATARACTS: POSITIVE
DIABETIC RETINOPATHY: NEGATIVE
GLAUCOMA: NEGATIVE
INTRAOCULAR PRESSURE EYE: NORMAL
VISUAL ACUITY DISTANCE LEFT EYE: NORMAL
VISUAL ACUITY DISTANCE RIGHT EYE: NORMAL

## 2020-06-16 ENCOUNTER — OFFICE VISIT (OUTPATIENT)
Dept: PRIMARY CARE CLINIC | Age: 65
End: 2020-06-16

## 2020-06-17 ENCOUNTER — HOSPITAL ENCOUNTER (OUTPATIENT)
Dept: PHYSICAL THERAPY | Age: 65
Setting detail: THERAPIES SERIES
Discharge: HOME | End: 2020-06-17
Payer: COMMERCIAL

## 2020-06-17 PROCEDURE — 97110 THERAPEUTIC EXERCISES: CPT

## 2020-06-17 PROCEDURE — 97140 MANUAL THERAPY 1/> REGIONS: CPT

## 2020-06-17 NOTE — PLAN OF CARE
side about 2 weeks ago, and has been seeing improvements in her pain levels. OBJECTIVE:    Observation:    Test measurements:       RESTRICTIONS/PRECAUTIONS: none    Exercises/Interventions:     Therapeutic Ex (79611) Sets/sec Notes/CUES   Bike 9 min    TA BKFO  TA march  Bridges  Clamshells 2 x 15  2 x 15  2 x 10  2 x 10       OVL   Side stepping at wall 3 laps Cues to keep TA contraction   Multifidi walkouts 2x5, bilaterally  green    TB anti-rotation presses 2x12, bilaterally  green Cues to not let resistance turn her                  Patient Education: use of tennis ball for self trigger point release, pillow for positioning and pain relief, dry needling          Manual Intervention (56631)     STM and Trigger point release parspinal 15 min prone                  NMR re-education (79742)  CUES NEEDED                                                Therapeutic Activity (33427)                                       Therapeutic Exercise and NMR EXR  [x] (57870) Provided verbal/tactile cueing for activities related to strengthening, flexibility, endurance, ROM  for improvements in proximal hip and core control with self care, mobility, lifting and ambulation.  [] (45307) Provided verbal/tactile cueing for activities related to improving balance, coordination, kinesthetic sense, posture, motor skill, proprioception  to assist with core control in self care, mobility, lifting, and ambulation.      Therapeutic Activities:    [] (62110 or 16617) Provided verbal/tactile cueing for activities related to improving balance, coordination, kinesthetic sense, posture, motor skill, proprioception and motor activation to allow for proper function  with self care and ADLs  [] (54174) Provided training and instruction to the patient for proper core and proximal hip recruitment and positioning with ambulation re-education     Home Exercise Program:    [x] (90096) Reviewed/Progressed HEP activities related to strengthening, flexibility, endurance, ROM of core, proximal hip and LE for functional self-care, mobility, lifting and ambulation   [] (92690) Reviewed/Progressed HEP activities related to improving balance, coordination, kinesthetic sense, posture, motor skill, proprioception of core, proximal hip and LE for self care, mobility, lifting, and ambulation      Manual Treatments:  PROM / STM / Oscillations-Mobs:  G-I, II, III, IV (PA's, Inf., Post.)  [x] (42563) Provided manual therapy to mobilize proximal hip and LS spine soft tissue/joints for the purpose of modulating pain, promoting relaxation,  increasing ROM, reducing/eliminating soft tissue swelling/inflammation/restriction, improving soft tissue extensibility and allowing for proper ROM for normal function with self care, mobility, lifting and ambulation. Modalities:       Charges:  Timed Code Treatment Minutes: 60   Total Treatment Minutes: 60     BWC time in/time out:   (will also require time and out per CPT code below)    [] EVAL (LOW) 455 [id number] (typically 20 minutes face-to-face)  [] EVAL (MOD) 28263 (typically 30 minutes face-to-face)  [] EVAL (HIGH) 04790 (typically 45 minutes face-to-face)  [] RE-EVAL     [x] NK(20143) x 3  [] IONTO  [] NMR (73782) x     [] VASO  [x] Manual (04549) x 1    [] Other:  [] TA x      [] Mech Traction (22982)  [] ES(attended) (76239)     [] ES (un) (79918):     Goals: Patient stated goal: to get rid of pain; to get a good night's sleep again; to be able to return to work  [x] Progressing: [] Met: [] Not Met: [] Adjusted    Therapist goals for Patient:   Short Term Goals: To be achieved in: 2 weeks  1. Independent in HEP and progression per patient tolerance, in order to prevent re-injury. [] Progressing: [x] Met: [] Not Met: [] Adjusted  2. Patient will have a decrease in pain to facilitate improvement in movement, function, and ADLs as indicated by Functional Deficits.   [x] Progressing: [] Met: [] Not Met: [] Adjusted      Long Term

## 2020-06-18 LAB
SARS-COV-2: NOT DETECTED
SOURCE: NORMAL

## 2020-06-22 ENCOUNTER — HOSPITAL ENCOUNTER (OUTPATIENT)
Age: 65
Setting detail: OUTPATIENT SURGERY
Discharge: HOME | End: 2020-06-22
Attending: INTERNAL MEDICINE | Admitting: INTERNAL MEDICINE
Payer: COMMERCIAL

## 2020-06-22 VITALS
HEIGHT: 65 IN | SYSTOLIC BLOOD PRESSURE: 117 MMHG | OXYGEN SATURATION: 95 % | DIASTOLIC BLOOD PRESSURE: 69 MMHG | WEIGHT: 186 LBS | HEART RATE: 68 BPM | BODY MASS INDEX: 30.99 KG/M2 | TEMPERATURE: 96.8 F | RESPIRATION RATE: 12 BRPM

## 2020-06-22 LAB
ALBUMIN SERPL-MCNC: 3.9 G/DL (ref 3.4–5)
ALP BLD-CCNC: 54 U/L (ref 40–129)
ALT SERPL-CCNC: 12 U/L (ref 10–40)
AST SERPL-CCNC: 18 U/L (ref 15–37)
BILIRUB SERPL-MCNC: 0.4 MG/DL (ref 0–1)
BILIRUBIN DIRECT: <0.2 MG/DL (ref 0–0.3)
BILIRUBIN, INDIRECT: NORMAL MG/DL (ref 0–1)
GLUCOSE BLD-MCNC: 127 MG/DL (ref 70–99)
GLUCOSE BLD-MCNC: 128 MG/DL (ref 70–99)
LIPASE: 32 U/L (ref 13–60)
PERFORMED ON: ABNORMAL
PERFORMED ON: ABNORMAL
TOTAL PROTEIN: 6.8 G/DL (ref 6.4–8.2)

## 2020-06-22 PROCEDURE — 7100000010 HC PHASE II RECOVERY - FIRST 15 MIN: Performed by: INTERNAL MEDICINE

## 2020-06-22 PROCEDURE — 83690 ASSAY OF LIPASE: CPT

## 2020-06-22 PROCEDURE — 2580000003 HC RX 258: Performed by: INTERNAL MEDICINE

## 2020-06-22 PROCEDURE — 2709999900 HC NON-CHARGEABLE SUPPLY: Performed by: INTERNAL MEDICINE

## 2020-06-22 PROCEDURE — 80076 HEPATIC FUNCTION PANEL: CPT

## 2020-06-22 PROCEDURE — 88305 TISSUE EXAM BY PATHOLOGIST: CPT

## 2020-06-22 PROCEDURE — 99152 MOD SED SAME PHYS/QHP 5/>YRS: CPT | Performed by: INTERNAL MEDICINE

## 2020-06-22 PROCEDURE — 6360000002 HC RX W HCPCS: Performed by: INTERNAL MEDICINE

## 2020-06-22 PROCEDURE — 43239 EGD BIOPSY SINGLE/MULTIPLE: CPT | Performed by: INTERNAL MEDICINE

## 2020-06-22 PROCEDURE — 3609012400 HC EGD TRANSORAL BIOPSY SINGLE/MULTIPLE: Performed by: INTERNAL MEDICINE

## 2020-06-22 PROCEDURE — 7100000011 HC PHASE II RECOVERY - ADDTL 15 MIN: Performed by: INTERNAL MEDICINE

## 2020-06-22 RX ORDER — FENTANYL CITRATE 50 UG/ML
INJECTION, SOLUTION INTRAMUSCULAR; INTRAVENOUS PRN
Status: DISCONTINUED | OUTPATIENT
Start: 2020-06-22 | End: 2020-06-22 | Stop reason: ALTCHOICE

## 2020-06-22 RX ORDER — OMEPRAZOLE 20 MG/1
20 CAPSULE, DELAYED RELEASE ORAL DAILY
Qty: 30 CAPSULE | Refills: 1 | Status: SHIPPED | OUTPATIENT
Start: 2020-06-22 | End: 2020-08-11

## 2020-06-22 RX ORDER — IBUPROFEN 400 MG/1
400 TABLET ORAL EVERY 6 HOURS PRN
COMMUNITY
End: 2020-08-10

## 2020-06-22 RX ORDER — OXYCODONE HYDROCHLORIDE AND ACETAMINOPHEN 5; 325 MG/1; MG/1
1 TABLET ORAL EVERY 4 HOURS PRN
COMMUNITY
End: 2020-08-10

## 2020-06-22 RX ORDER — SODIUM CHLORIDE, SODIUM LACTATE, POTASSIUM CHLORIDE, CALCIUM CHLORIDE 600; 310; 30; 20 MG/100ML; MG/100ML; MG/100ML; MG/100ML
INJECTION, SOLUTION INTRAVENOUS CONTINUOUS PRN
Status: DISCONTINUED | OUTPATIENT
Start: 2020-06-22 | End: 2020-06-22 | Stop reason: ALTCHOICE

## 2020-06-22 RX ORDER — MIDAZOLAM HYDROCHLORIDE 5 MG/ML
INJECTION INTRAMUSCULAR; INTRAVENOUS PRN
Status: DISCONTINUED | OUTPATIENT
Start: 2020-06-22 | End: 2020-06-22 | Stop reason: ALTCHOICE

## 2020-06-22 RX ORDER — SODIUM CHLORIDE, SODIUM LACTATE, POTASSIUM CHLORIDE, CALCIUM CHLORIDE 600; 310; 30; 20 MG/100ML; MG/100ML; MG/100ML; MG/100ML
INJECTION, SOLUTION INTRAVENOUS ONCE
Status: COMPLETED | OUTPATIENT
Start: 2020-06-22 | End: 2020-06-22

## 2020-06-22 RX ADMIN — SODIUM CHLORIDE, POTASSIUM CHLORIDE, SODIUM LACTATE AND CALCIUM CHLORIDE: 600; 310; 30; 20 INJECTION, SOLUTION INTRAVENOUS at 09:58

## 2020-06-22 ASSESSMENT — PAIN - FUNCTIONAL ASSESSMENT: PAIN_FUNCTIONAL_ASSESSMENT: 0-10

## 2020-06-22 ASSESSMENT — PAIN SCALES - GENERAL
PAINLEVEL_OUTOF10: 0
PAINLEVEL_OUTOF10: 0

## 2020-06-22 NOTE — PROGRESS NOTES
POCT      Blood Glucose:      (Normal Range 70-99)  127    PT:      (Normal Range 9.8 - 13)    INR:      (Normal Range 0.86-1.14)

## 2020-06-22 NOTE — H&P
[address] [facility],[address] Floor,  [address] [facility], [facility]  Phone: [phone]   Fax:[phone]     CHIEF COMPLAINT           Chief Complaint   Patient presents with    New Patient       epigastric pain and RUQ pain, burning sensation         HPI      Thank you ZACH JIMÉNEZ MD for asking me to see Kevin Bedolla in consultation. She is a  [5] Black [2] 60 y.o. [place] female seen independently who presents with the following GI complaints:     Bailee Hsieh  Complains of ruq pain of more then 3 months duration 6-10/10. Pain is unrelated to eating. No bloating. No heartburt, dysphagia, cough, chest pain, globus, regurgitation, nausea, or vomiting. She is diabetic. Indicates she was diagnosed with an ulcer or gerd 20+ years ago by endoscopy but she had more epigastric pain at that time. States medication, tylenol, percocet help the pain. CT chest with small pleural effusions to small to tap. Was just prescribed a week of prednisone. Has been on mobic and motrin with no help. Xray and CT scan indicate moderate to large stool load. She indicates the pain is unrelated to a BM.   She is from the [place] area for 3 years to take care of her mother who has dementia and now relocated to [facility] to live with a daughter,  Has a had a previous ccx.           Lab Results   Component Value Date     LABA1C 7.4 05/19/2020            Lab Results   Component Value Date     .7 05/19/2020            Lab Results   Component Value Date     ALT 12 02/23/2020     ALT 15 02/07/2020     ALT 10 01/06/2020     AST 17 02/23/2020     AST 27 02/07/2020     AST 12 01/06/2020     ALKPHOS 66 02/23/2020     ALKPHOS 54 02/07/2020     ALKPHOS 66 01/06/2020     BILITOT <0.2 02/23/2020     BILITOT 0.3 02/07/2020     BILITOT 0.4 01/06/2020     HGB 13.2 02/23/2020     HGB 12.4 02/07/2020     HGB 12.5 01/06/2020      02/23/2020      02/07/2020      01/06/2020            Lab no hernias, no HSM, soft NTND   Rectal:  Deferred. Skin: Warm, Dry, No erythema, No rash. No bruising. No spider hemangiomas. Back: No tenderness, No CVA tenderness. Lower Extremities: Intact distal pulses, No edema, No tenderness, No cyanosis, No clubbing. Neurologic: Alert & oriented x 3, Normal motor function, Normal sensory function, No focal deficits noted. No asterixis. RADIOLOGY/PROCEDURES            FINAL IMPRESSION             Orders Placed This Encounter   Procedures    COVID-19 Ambulatory       Standing Status:   Future       Standing Expiration Date:   6/8/2021       Scheduling Instructions:         [place] media preferred given current shortage of viral transport media but both acceptable       Order Specific Question:   Status       Answer:   Asymptomatic/Surveillance (e.g. pre-op/pre-procedure, pre-delivery, transfer)       Order Specific Question:   Reason for Test       Answer:   Upcoming elective surgery/procedure/delivery, return to work, or discharge to another facility    EGD       Scheduling Instructions:         Please provide prep of choice instructions and prescription.                     General guidelines for holding blood thinners/anticoagulants around endoscopic procedure are but patients are encouraged to check with their prescribing physician.                   The patient may hold Plavix, Effient, Brilinta 5 days prior to the procedure unless:          A drug eluting stent has been placed within past 12 months.         A nondrug eluting stent has been placed within past 1 month.         Coumadin may be held 4 days prior to the procedure unless:           Mechanical mitral valve replacement (requires heparin bridge while Coumadin held and is managed by pharmacy)         Pradaxa, Xarelto, Eliquis may be held 2-3 days prior to procedure.   According to pharmacokinetics of the drug, package insert, cardiology practice patterns, and T1/2 of theses drugs (12 hrs), Eliquis and ESTELLE Hernandez 21 6/22/20 10:10 AM EDT

## 2020-06-24 ENCOUNTER — HOSPITAL ENCOUNTER (OUTPATIENT)
Dept: PHYSICAL THERAPY | Age: 65
Setting detail: THERAPIES SERIES
Discharge: HOME | End: 2020-06-24
Payer: COMMERCIAL

## 2020-06-24 PROCEDURE — 97140 MANUAL THERAPY 1/> REGIONS: CPT

## 2020-06-24 PROCEDURE — 97110 THERAPEUTIC EXERCISES: CPT

## 2020-06-24 NOTE — FLOWSHEET NOTE
kinesthetic sense, posture, motor skill, proprioception and motor activation to allow for proper function  with self care and ADLs  [] (86155) Provided training and instruction to the patient for proper core and proximal hip recruitment and positioning with ambulation re-education     Home Exercise Program:    [x] (60194) Reviewed/Progressed HEP activities related to strengthening, flexibility, endurance, ROM of core, proximal hip and LE for functional self-care, mobility, lifting and ambulation   [] (56531) Reviewed/Progressed HEP activities related to improving balance, coordination, kinesthetic sense, posture, motor skill, proprioception of core, proximal hip and LE for self care, mobility, lifting, and ambulation      Manual Treatments:  PROM / STM / Oscillations-Mobs:  G-I, II, III, IV (PA's, Inf., Post.)  [x] (75713) Provided manual therapy to mobilize proximal hip and LS spine soft tissue/joints for the purpose of modulating pain, promoting relaxation,  increasing ROM, reducing/eliminating soft tissue swelling/inflammation/restriction, improving soft tissue extensibility and allowing for proper ROM for normal function with self care, mobility, lifting and ambulation.      Modalities:       Charges:  Timed Code Treatment Minutes: 60   Total Treatment Minutes: 60     BWC time in/time out:   (will also require time and out per CPT code below)    [] EVAL (LOW) 455 1011 (typically 20 minutes face-to-face)  [] EVAL (MOD) 62674 (typically 30 minutes face-to-face)  [] EVAL (HIGH) 48413 (typically 45 minutes face-to-face)  [] RE-EVAL     [x] SX(52832) x 3  [] IONTO  [] NMR (83456) x     [] VASO  [x] Manual (30207) x 1    [] Other:  [] TA x      [] Mech Traction (77375)  [] ES(attended) (62861)     [] ES (un) (85053):     Goals: Patient stated goal: to get rid of pain; to get a good night's sleep again; to be able to return to work  [x] Progressing: [] Met: [] Not Met: [] Adjusted    Therapist goals for Patient:   Short Term Goals: To be achieved in: 2 weeks  1. Independent in HEP and progression per patient tolerance, in order to prevent re-injury. [] Progressing: [x] Met: [] Not Met: [] Adjusted  2. Patient will have a decrease in pain to facilitate improvement in movement, function, and ADLs as indicated by Functional Deficits. [x] Progressing: [] Met: [] Not Met: [] Adjusted      Long Term Goals: To be achieved in: 12 weeks  1. Disability index score of 23% or less for the Modified Oswestry  to assist with reaching prior level of function. [x] Progressing: [] Met: [] Not Met: [] Adjusted  2. Patient will demonstrate increased AROM to WNL, good LS mobility, good hip ROM to allow for proper joint functioning as indicated by patients Functional Deficits. [x] Progressing: [] Met: [] Not Met: [] Adjusted  3. Patient will demonstrate an increase in Strength to good proximal hip and core activation to allow for proper functional mobility as indicated by patients Functional Deficits. [x] Progressing: [] Met: [] Not Met: [] Adjusted  4. Patient will return to sitting and standing functional activities without increased symptoms or restriction. [x] Progressing: [] Met: [] Not Met: [] Adjusted  5. Patient will return to full LE ROM and strength to allow for decrease in lumbar and R leg pain, to allow for return to ADLs, functional tasks, and recreational activities including gym workouts, without pain or restriction. (patient specific functional goal)    [x] Progressing: [] Met: [] Not Met: [] Adjusted    Progression Towards Functional goals:  [x] Patient is progressing as expected towards functional goals listed. [] Progression is slowed due to complexities listed. [] Progression has been slowed due to co-morbidities.   [] Plan just implemented, too soon to assess goals progression  [] Other:     Overall Progression Towards Functional goals/ Treatment Progress Update:  [x] Patient is progressing as expected towards

## 2020-07-01 ENCOUNTER — HOSPITAL ENCOUNTER (OUTPATIENT)
Dept: PHYSICAL THERAPY | Age: 65
Setting detail: THERAPIES SERIES
Discharge: HOME | End: 2020-07-01
Payer: COMMERCIAL

## 2020-07-01 PROCEDURE — 97110 THERAPEUTIC EXERCISES: CPT

## 2020-07-01 PROCEDURE — 97140 MANUAL THERAPY 1/> REGIONS: CPT

## 2020-07-01 PROCEDURE — G0283 ELEC STIM OTHER THAN WOUND: HCPCS

## 2020-07-01 NOTE — FLOWSHEET NOTE
[facility],  [address]  Phone: [phone]  Fax [phone]    Physical Therapy Treatment Note/ Progress Report:     Date:  2020    Patient Name:  Almetta Cranker    :  1955  MRN: [id number]  Restrictions/Precautions:    Medical/Treatment Diagnosis Information:  Diagnosis: M54.5 (ICD-10-CM) - Low back pain, unspecified back pain laterality, unspecified chronicity, unspecified whether sciatica present; M54.16 (ICD-10-CM) - Lumbar radiculitis  Treatment Diagnosis: M54.5 - Lumbar pain; M54.41 - LBP w/ sciatica  Insurance/Certification information:  PT Insurance Information: [employer]  Physician Information:  Referring Practitioner: Dr. Audrey Gardiner  Has the plan of care been signed (Y/N):        [x]  Yes  []  No     Date of Patient follow up with Physician: PRN      Is this a Progress Report:     [x]  Yes  []  No        If Yes:  Date Range for reporting period:  Beginning 2020  Ending     Progress report will be due (10 Rx or 30 days whichever is less): 2659       Recertification will be due (POC Duration  / 90 days whichever is less): 2020         Visit # Insurance Allowable Auth Required   [address] [place]  30 visits []  Yes [x]  No        Functional Scale: Modified Oswestry score 23, 46% disability  Date assessed:  3/11/2020           Modified Oswestry score 18, 36% disability  Date assessed:  4/15/2020           Modified Oswestry score 13, 26%           2020           Modified Oswestry score 12, 24%           2020    Therapy Diagnosis/Practice Pattern: M54.5 - Lumbar pain; M54.41 - LBP w/ sciatica      Number of Comorbidities:  []0     [x]1-2    []3+    Latex Allergy:  [x]NO      []YES  Preferred Language for Healthcare:   [x]English       []other:      Pain level:  6/10 beginning of session; 0/10 at end of session    SUBJECTIVE:  Patient reports that she did not have a good week.  She tried to clean her apartment last week, and afterwards was in so much pain in her low back, R leg, RUQ, and shoulder, that she stayed in her bed the rest of the day. Reports that she has moderately low back pain today. OBJECTIVE:    Observation:    Test measurements:       RESTRICTIONS/PRECAUTIONS: none    Exercises/Interventions:     Therapeutic Ex (82450) Sets/sec Notes/CUES   Bike 8 min    TA BKFO  TA march  Bridges  Hooklying Clamshells 2 x 15  2 x 15  1x 10  2 x 10     Too painful to do 2 sets  LVL   SB DKTC  SB LTRs 2x10  2x10   LBW Cues to keep TA contraction   Multifidi walkouts    TB anti-rotation presses Cues to not let resistance turn her   [place]                                  Patient Education: use of tennis ball for self trigger point release, pillow for positioning and pain relief, dry needling          Manual Intervention (86959)     STM and Trigger point release parspinal 10 min prone                  NMR re-education (02420)  CUES NEEDED                                                Therapeutic Activity (68943)                                       Therapeutic Exercise and NMR EXR  [x] (88103) Provided verbal/tactile cueing for activities related to strengthening, flexibility, endurance, ROM  for improvements in proximal hip and core control with self care, mobility, lifting and ambulation.  [] (75807) Provided verbal/tactile cueing for activities related to improving balance, coordination, kinesthetic sense, posture, motor skill, proprioception  to assist with core control in self care, mobility, lifting, and ambulation.      Therapeutic Activities:    [] (80238 or 13863) Provided verbal/tactile cueing for activities related to improving balance, coordination, kinesthetic sense, posture, motor skill, proprioception and motor activation to allow for proper function  with self care and ADLs  [] (70765) Provided training and instruction to the patient for proper core and proximal hip recruitment and positioning with ambulation re-education     Home Exercise Program:    [x] (18281) Reviewed/Progressed HEP activities related to strengthening, flexibility, endurance, ROM of core, proximal hip and LE for functional self-care, mobility, lifting and ambulation   [] (10847) Reviewed/Progressed HEP activities related to improving balance, coordination, kinesthetic sense, posture, motor skill, proprioception of core, proximal hip and LE for self care, mobility, lifting, and ambulation      Manual Treatments:  PROM / STM / Oscillations-Mobs:  G-I, II, III, IV (PA's, Inf., Post.)  [x] (74464) Provided manual therapy to mobilize proximal hip and LS spine soft tissue/joints for the purpose of modulating pain, promoting relaxation,  increasing ROM, reducing/eliminating soft tissue swelling/inflammation/restriction, improving soft tissue extensibility and allowing for proper ROM for normal function with self care, mobility, lifting and ambulation. Modalities:  Premod and HP to lumbar spine x 15 minutes     Charges:  Timed Code Treatment Minutes: 45   Total Treatment Minutes: 60     BWC time in/time out:   (will also require time and out per CPT code below)    [] EVAL (LOW) 85548 (typically 20 minutes face-to-face)  [] EVAL (MOD) 99628 (typically 30 minutes face-to-face)  [] EVAL (HIGH) 53268 (typically 45 minutes face-to-face)  [] RE-EVAL     [x] ZP(79187) x 2  [] IONTO  [] NMR (35314) x     [] VASO  [x] Manual (31087) x 1    [] Other:  [] TA x      [] Mech Traction (07988)  [] ES(attended) (50193)     [x] ES (un) (86059):     Goals: Patient stated goal: to get rid of pain; to get a good night's sleep again; to be able to return to work  [x] Progressing: [] Met: [] Not Met: [] Adjusted    Therapist goals for Patient:   Short Term Goals: To be achieved in: 2 weeks  1. Independent in HEP and progression per patient tolerance, in order to prevent re-injury.    [] Progressing: [x] Met: [] Not Met: [] Adjusted  2. Patient will have a decrease in pain to facilitate improvement in movement, function, and ADLs as indicated by Functional Deficits. [] Progressing: [x] Met: [] Not Met: [] Adjusted      Long Term Goals: To be achieved in: 12 weeks  1. Disability index score of 23% or less for the Modified Oswestry  to assist with reaching prior level of function. [x] Progressing: [] Met: [] Not Met: [] Adjusted  2. Patient will demonstrate increased AROM to WNL, good LS mobility, good hip ROM to allow for proper joint functioning as indicated by patients Functional Deficits. [x] Progressing: [] Met: [] Not Met: [] Adjusted  3. Patient will demonstrate an increase in Strength to good proximal hip and core activation to allow for proper functional mobility as indicated by patients Functional Deficits. [x] Progressing: [] Met: [] Not Met: [] Adjusted  4. Patient will return to sitting and standing functional activities without increased symptoms or restriction. [x] Progressing: [] Met: [] Not Met: [] Adjusted  5. Patient will return to full LE ROM and strength to allow for decrease in lumbar and R leg pain, to allow for return to ADLs, functional tasks, and recreational activities including gym workouts, without pain or restriction. (patient specific functional goal)    [x] Progressing: [] Met: [] Not Met: [] Adjusted    Progression Towards Functional goals:  [x] Patient is progressing as expected towards functional goals listed. [] Progression is slowed due to complexities listed. [] Progression has been slowed due to co-morbidities. [] Plan just implemented, too soon to assess goals progression  [] Other:     Overall Progression Towards Functional goals/ Treatment Progress Update:  [x] Patient is progressing as expected towards functional goals listed. [] Progression is slowed due to complexities/Impairments listed. [] Progression has been slowed due to co-morbidities.   [] Plan just implemented, too soon to assess goals progression <30days   [] Goals require adjustment due to lack of progress  [] Patient is not progressing as expected and requires additional follow up with physician  [] Other    Prognosis for POC: [x] Good [] Fair  [] Poor      Patient requires continued skilled intervention: [x] Yes  [] No    Treatment/Activity Tolerance:  [x] Patient able to complete treatment  [] Patient limited by fatigue  [] Patient limited by pain    [] Patient limited by other medical complications  [] Other:     ASSESSMENT: Patient arrived to therapy with moderate low back pain today. Patient had difficulty getting on the treatment table without pain. Patient had pain with movement today, so the session focused on low level table exercises. Patient received premod e-stim and HP to lumbar spine at the end of her session. Patient felt less pain in her low back upon leaving therapy today. Patient requires skilled physical therapy intervention to address pain level, deficits in lumbar ROM and R LE ROM, and gait impairments, to allow for return to ADLs, functional tasks, and recreational activities including going to the gym, without pain or restriction. PLAN: 1-2 days a week for 12 weeks (6/17/2020-9/17/2020)  [x] Continue per plan of care [] Alter current plan (see comments above)  [] Plan of care initiated [] Hold pending MD visit [] Discharge      Electronically signed by:  Triston Monique PT, DPT    Note: If patient does not return for scheduled/ recommended follow up visits, this note will serve as a discharge from care along with most recent update on progress.

## 2020-07-08 ENCOUNTER — HOSPITAL ENCOUNTER (OUTPATIENT)
Dept: PHYSICAL THERAPY | Age: 65
Setting detail: THERAPIES SERIES
Discharge: HOME | End: 2020-07-08
Payer: COMMERCIAL

## 2020-07-08 ENCOUNTER — HOSPITAL ENCOUNTER (OUTPATIENT)
Dept: PHYSICAL THERAPY | Age: 65
Setting detail: THERAPIES SERIES
End: 2020-07-08
Payer: COMMERCIAL

## 2020-07-08 PROCEDURE — 97140 MANUAL THERAPY 1/> REGIONS: CPT | Performed by: PHYSICAL THERAPIST

## 2020-07-08 PROCEDURE — G0283 ELEC STIM OTHER THAN WOUND: HCPCS | Performed by: PHYSICAL THERAPIST

## 2020-07-08 PROCEDURE — 97110 THERAPEUTIC EXERCISES: CPT | Performed by: PHYSICAL THERAPIST

## 2020-07-08 NOTE — FLOWSHEET NOTE
[facility],  [address]  Phone: [phone]  Fax [phone]    Physical Therapy Treatment Note/ Progress Report:     Date:  2020    Patient Name:  Arik Fam    :  1955  MRN: [id number]  Restrictions/Precautions:    Medical/Treatment Diagnosis Information:  Diagnosis: M54.5 (ICD-10-CM) - Low back pain, unspecified back pain laterality, unspecified chronicity, unspecified whether sciatica present; M54.16 (ICD-10-CM) - Lumbar radiculitis  Treatment Diagnosis: M54.5 - Lumbar pain; M54.41 - LBP w/ sciatica  Insurance/Certification information:  PT Insurance Information: [place]  Physician Information:  Referring Practitioner: Dr. Sujata John  Has the plan of care been signed (Y/N):        [x]  Yes  []  No     Date of Patient follow up with Physician: PRN      Is this a Progress Report:     [x]  Yes  []  No        If Yes:  Date Range for reporting period:  Beginning 2020  Ending     Progress report will be due (10 Rx or 30 days whichever is less):        Recertification will be due (POC Duration  / 90 days whichever is less): 2020         Visit # Insurance Allowable Auth Required   [facility]  30 visits []  Yes [x]  No        Functional Scale: Modified Oswestry score 23, 46% disability  Date assessed:  3/11/2020           Modified Oswestry score 18, 36% disability  Date assessed:  4/15/2020           Modified Oswestry score 13, 26%           2020           Modified Oswestry score 12, 24%           2020    Therapy Diagnosis/Practice Pattern: M54.5 - Lumbar pain; M54.41 - LBP w/ sciatica      Number of Comorbidities:  []0     [x]1-2    []3+    Latex Allergy:  [x]NO      []YES  Preferred Language for Healthcare:   [x]English       []other:      Pain level:  6/10 beginning of session; 0/10 at end of session    SUBJECTIVE:  Pt had to take Percacet and Tylenol last night.  She woke up with increased pain. Felt like jabbing pain on the right side. Pt is most uncomfortable when sleeping on her stomach. Pt has difficulty sitting and walking, but it is easier to deal with pain while walking. Pn is not radiating into the leg. Pt is able to don and doff shoes and socks by rotating leg up. OBJECTIVE:    Observation:    Test measurements:       RESTRICTIONS/PRECAUTIONS: none    Exercises/Interventions:     Therapeutic Ex (10825) Sets/sec Notes/CUES   Bike 5 min    TA BKFO  TA march  Bridges  Supine hip add iso  Hooklying Clamshells 2 x 15  2 x 15  1x 10  X 20 :05  2 x 10     Too painful to do 2 sets  LVL   SB DKTC  SB LTRs 2x10  2x10   LBW 3 laps  OVLCues to keep TA contraction   Multifidi walkouts    TB anti-rotation presses Cues to not let resistance turn her   Steamboats    Prayer s  Right hand on top of left :10  3x           TB row/   TB ext RD x 15/  RD x 15                   Patient Education: use of tennis ball for self trigger point release, pillow for positioning and pain relief, dry needling          Manual Intervention (13310)     STM and Trigger point release parspinal 10 min prone                  NMR re-education (94403)  CUES NEEDED                                                Therapeutic Activity (51149)                                       Therapeutic Exercise and NMR EXR  [x] (61713) Provided verbal/tactile cueing for activities related to strengthening, flexibility, endurance, ROM  for improvements in proximal hip and core control with self care, mobility, lifting and ambulation.  [] (37271) Provided verbal/tactile cueing for activities related to improving balance, coordination, kinesthetic sense, posture, motor skill, proprioception  to assist with core control in self care, mobility, lifting, and ambulation.      Therapeutic Activities:    [] (40128 or 08977) Provided verbal/tactile cueing for activities related to improving balance, coordination, kinesthetic sense, posture, motor skill, proprioception and motor activation to allow for proper function  with self care and ADLs  [] (85266) Provided training and instruction to the patient for proper core and proximal hip recruitment and positioning with ambulation re-education     Home Exercise Program:    [x] (53686) Reviewed/Progressed HEP activities related to strengthening, flexibility, endurance, ROM of core, proximal hip and LE for functional self-care, mobility, lifting and ambulation   [] (96867) Reviewed/Progressed HEP activities related to improving balance, coordination, kinesthetic sense, posture, motor skill, proprioception of core, proximal hip and LE for self care, mobility, lifting, and ambulation      Manual Treatments:  PROM / STM / Oscillations-Mobs:  G-I, II, III, IV (PA's, Inf., Post.)  [x] (60679) Provided manual therapy to mobilize proximal hip and LS spine soft tissue/joints for the purpose of modulating pain, promoting relaxation,  increasing ROM, reducing/eliminating soft tissue swelling/inflammation/restriction, improving soft tissue extensibility and allowing for proper ROM for normal function with self care, mobility, lifting and ambulation. Modalities:  Premod and HP to lumbar spine x 15 minutes     Charges:  Timed Code Treatment Minutes: 45   Total Treatment Minutes: 60       [] EVAL (LOW) 01208 (typically 20 minutes face-to-face)  [] EVAL (MOD) 01140 (typically 30 minutes face-to-face)  [] EVAL (HIGH) 31636 (typically 45 minutes face-to-face)  [] RE-EVAL     [x] SI(49138) x 2  [] IONTO  [] NMR (80454) x     [] VASO  [x] Manual (84580) x 1    [] Other:  [] TA x      [] Mech Traction (32722)  [] ES(attended) (99855)     [x] ES (un) (04286):     Goals: Patient stated goal: to get rid of pain; to get a good night's sleep again; to be able to return to work  [x] Progressing: [] Met: [] Not Met: [] Adjusted    Therapist goals for Patient:   Short Term Goals: To be achieved in: 2 weeks  1. Independent in HEP and progression per patient tolerance, in order to prevent re-injury. [] Progressing: [x] Met: [] Not Met: [] Adjusted  2. Patient will have a decrease in pain to facilitate improvement in movement, function, and ADLs as indicated by Functional Deficits. [] Progressing: [x] Met: [] Not Met: [] Adjusted      Long Term Goals: To be achieved in: 12 weeks  1. Disability index score of 23% or less for the Modified Oswestry  to assist with reaching prior level of function. [x] Progressing: [] Met: [] Not Met: [] Adjusted  2. Patient will demonstrate increased AROM to WNL, good LS mobility, good hip ROM to allow for proper joint functioning as indicated by patients Functional Deficits. [x] Progressing: [] Met: [] Not Met: [] Adjusted  3. Patient will demonstrate an increase in Strength to good proximal hip and core activation to allow for proper functional mobility as indicated by patients Functional Deficits. [x] Progressing: [] Met: [] Not Met: [] Adjusted  4. Patient will return to sitting and standing functional activities without increased symptoms or restriction. [x] Progressing: [] Met: [] Not Met: [] Adjusted  5. Patient will return to full LE ROM and strength to allow for decrease in lumbar and R leg pain, to allow for return to ADLs, functional tasks, and recreational activities including gym workouts, without pain or restriction. (patient specific functional goal)    [x] Progressing: [] Met: [] Not Met: [] Adjusted    Progression Towards Functional goals:  [x] Patient is progressing as expected towards functional goals listed. [] Progression is slowed due to complexities listed. [] Progression has been slowed due to co-morbidities. [] Plan just implemented, too soon to assess goals progression  [] Other:     Overall Progression Towards Functional goals/ Treatment Progress Update:  [x] Patient is progressing as expected towards functional goals listed.     [] Progression is slowed due to complexities/Impairments listed. [] Progression has been slowed due to co-morbidities. [] Plan just implemented, too soon to assess goals progression <30days   [] Goals require adjustment due to lack of progress  [] Patient is not progressing as expected and requires additional follow up with physician  [] Other    Prognosis for POC: [x] Good [] Fair  [] Poor      Patient requires continued skilled intervention: [x] Yes  [] No    Treatment/Activity Tolerance:  [x] Patient able to complete treatment  [] Patient limited by fatigue  [] Patient limited by pain    [] Patient limited by other medical complications  [] Other:     ASSESSMENT: Pt performs more reps than asked. Pt was able to perform all requested activities with good form. PLAN: 1-2 days a week for 12 weeks (6/17/2020-9/17/2020)  [x] Continue per plan of care [] Alter current plan (see comments above)  [] Plan of care initiated [] Hold pending MD visit [] Discharge      Electronically signed by:  Lou Esteban PT    Note: If patient does not return for scheduled/ recommended follow up visits, this note will serve as a discharge from care along with most recent update on progress.

## 2020-07-09 ENCOUNTER — HOSPITAL ENCOUNTER (OUTPATIENT)
Dept: ULTRASOUND IMAGING | Age: 65
Discharge: HOME | End: 2020-07-09
Payer: COMMERCIAL

## 2020-07-09 PROCEDURE — 76705 ECHO EXAM OF ABDOMEN: CPT

## 2020-07-09 NOTE — RESULT ENCOUNTER NOTE
Fatty liver can be a cause for RUQ pain. Like any other form of chronic liver disease, it can cause problems including HIDALGO cirrhosis over a course of 20-30 years. It is associated with other future problems like htn, hypercholesterolemia, and diabetes. Weight loss/Healthy lifestyle and vitamin E 400IU twice a day are recommended. Non invasive assessment of fibrosis can be performed with a fibroscan. Blood work will not show this. Please offer fibroscan. Additionally annual follow up and re-assessment of fibrosis every 2-3 years is recommended.

## 2020-07-14 ENCOUNTER — TELEPHONE (OUTPATIENT)
Dept: GASTROENTEROLOGY | Age: 65
End: 2020-07-14

## 2020-07-14 NOTE — LETTER
[address]  Gastroenterology  [facility] [address]                                       p [phone]  f [phone]    Markell Singh MD                          [facility] [address] 20    11:39 AM      Facility: [facility]     Fibroscan Date & Time: 20 @ 1:30PM                                                                                          Patient Name:  Tanvi Romero     :  1955 PCP:  Apollo Jones MD         Home Ph:    [phone] (home)           SSN:                                           FIBROSCAN                                        [id number]      DIAGNOSIS:  K76.0-Fatty Liver          Insurance: [employer]   ID # [id number]     Ph #     (secondary ?)         Date called _____Spoke to: ________ @ ______Precert Needed?  Yes  /  No    PreAuth # & Details _______________________________________________                              __x___ Viveca Clack to Pt / Spouse-NPO 3 hours prior

## 2020-07-15 ENCOUNTER — HOSPITAL ENCOUNTER (OUTPATIENT)
Dept: PHYSICAL THERAPY | Age: 65
Setting detail: THERAPIES SERIES
Discharge: HOME | End: 2020-07-15
Payer: COMMERCIAL

## 2020-07-15 NOTE — FLOWSHEET NOTE
[facility],  [address]        Physical Therapy  Cancellation/No-show Note  Patient Name:  Cara Cabrera  :  1955   Date:  7/15/2020  Cancelled visits to date: 3  No-shows to date: 0    For today's appointment patient:  X  Cancelled  ? Rescheduled appointment  ? No-show     Reason given by patient:  X  Patient ill  ? Conflicting appointment  ? No transportation    ? Conflict with work  ? No reason given  ?   Other:     Comments:      Electronically signed by:  Simone Han PT, DPT

## 2020-07-20 ENCOUNTER — HOSPITAL ENCOUNTER (OUTPATIENT)
Dept: ENDOSCOPY | Age: 65
Setting detail: OUTPATIENT SURGERY
Discharge: HOME | End: 2020-07-20
Payer: COMMERCIAL

## 2020-07-20 PROCEDURE — 91200 LIVER ELASTOGRAPHY: CPT

## 2020-07-20 PROCEDURE — 91200 LIVER ELASTOGRAPHY: CPT | Performed by: INTERNAL MEDICINE

## 2020-07-22 ENCOUNTER — HOSPITAL ENCOUNTER (OUTPATIENT)
Dept: PHYSICAL THERAPY | Age: 65
Setting detail: THERAPIES SERIES
Discharge: HOME | End: 2020-07-22
Payer: COMMERCIAL

## 2020-07-22 PROCEDURE — 97110 THERAPEUTIC EXERCISES: CPT

## 2020-07-22 PROCEDURE — 97140 MANUAL THERAPY 1/> REGIONS: CPT

## 2020-07-22 NOTE — FLOWSHEET NOTE
[facility],  [address]  Phone: [phone]  Fax [phone]    Physical Therapy Treatment Note/ Progress Report:     Date:  2020    Patient Name:  Latoya Calderón    :  1955  MRN: [id number]  Restrictions/Precautions:    Medical/Treatment Diagnosis Information:  Diagnosis: M54.5 (ICD-10-CM) - Low back pain, unspecified back pain laterality, unspecified chronicity, unspecified whether sciatica present; M54.16 (ICD-10-CM) - Lumbar radiculitis  Treatment Diagnosis: M54.5 - Lumbar pain; M54.41 - LBP w/ sciatica  Insurance/Certification information:  PT Insurance Information: [address] [place] [address]  Physician Information:  Referring Practitioner: Dr. Kim Rai  Has the plan of care been signed (Y/N):        [x]  Yes  []  No     Date of Patient follow up with Physician: PRN      Is this a Progress Report:     [x]  Yes  []  No        If Yes:  Date Range for reporting period:  Beginning 2020  Ending     Progress report will be due (10 Rx or 30 days whichever is less):        Recertification will be due (POC Duration  / 90 days whichever is less): 2020         Visit # Insurance Allowable Auth Required   [address]  30 visits []  Yes [x]  No        Functional Scale: Modified Oswestry score 23, 46% disability  Date assessed:  3/11/2020           Modified Oswestry score 18, 36% disability  Date assessed:  4/15/2020           Modified Oswestry score 13, 26%           2020           Modified Oswestry score 12, 24%           2020    Therapy Diagnosis/Practice Pattern: M54.5 - Lumbar pain; M54.41 - LBP w/ sciatica      Number of Comorbidities:  []0     [x]1-2    []3+    Latex Allergy:  [x]NO      []YES  Preferred Language for Healthcare:   [x]English       []other:      Pain level:  0/10 beginning of session; 0/10 at end of session    SUBJECTIVE:  Patient reports that she had a fibroscan on 2020 and was told that she has fat on her liver. Reports that she cleaned her apartment and mopped and swept without pain in her lower back and R leg. OBJECTIVE:    Observation:    Test measurements:       RESTRICTIONS/PRECAUTIONS: none    Exercises/Interventions:     Therapeutic Ex (28875) Sets/sec Notes/CUES   Bike 5 min    TA BKFO  TA march  Bridges 2 x 15  2 x 15  2x 10    SB DKTC  SB LTRs 2x10  2x10   LBW 3 laps  OVLCues to keep TA contraction   Multifidi walkouts 2x5, bilaterally  green   TB anti-rotation presses Cues to not let resistance turn her   Steamboats    Prayer s  Right hand on top of left :10  3x           TB row/   TB ext                         Manual Intervention (63011)     STM and Trigger point release parspinal 10 min prone                  NMR re-education (55262)  CUES NEEDED                                                Therapeutic Activity (09911)                                       Therapeutic Exercise and NMR EXR  [x] (22547) Provided verbal/tactile cueing for activities related to strengthening, flexibility, endurance, ROM  for improvements in proximal hip and core control with self care, mobility, lifting and ambulation.  [] (35988) Provided verbal/tactile cueing for activities related to improving balance, coordination, kinesthetic sense, posture, motor skill, proprioception  to assist with core control in self care, mobility, lifting, and ambulation.      Therapeutic Activities:    [] (66788 or 31319) Provided verbal/tactile cueing for activities related to improving balance, coordination, kinesthetic sense, posture, motor skill, proprioception and motor activation to allow for proper function  with self care and ADLs  [] (88133) Provided training and instruction to the patient for proper core and proximal hip recruitment and positioning with ambulation re-education     Home Exercise Program:    [x] (47804) Reviewed/Progressed HEP activities related to strengthening, flexibility, endurance, ROM of core, proximal hip and LE for functional self-care, mobility, lifting and ambulation   [] (10064) Reviewed/Progressed HEP activities related to improving balance, coordination, kinesthetic sense, posture, motor skill, proprioception of core, proximal hip and LE for self care, mobility, lifting, and ambulation      Manual Treatments:  PROM / STM / Oscillations-Mobs:  G-I, II, III, IV (PA's, Inf., Post.)  [x] (26024) Provided manual therapy to mobilize proximal hip and LS spine soft tissue/joints for the purpose of modulating pain, promoting relaxation,  increasing ROM, reducing/eliminating soft tissue swelling/inflammation/restriction, improving soft tissue extensibility and allowing for proper ROM for normal function with self care, mobility, lifting and ambulation. Modalities:  Premod and HP to lumbar spine x 15 minutes     Charges:  Timed Code Treatment Minutes: 48   Total Treatment Minutes: 48       [] EVAL (LOW) 71966 (typically 20 minutes face-to-face)  [] EVAL (MOD) 61529 (typically 30 minutes face-to-face)  [] EVAL (HIGH) 57938 (typically 45 minutes face-to-face)  [] RE-EVAL     [x] ZD(11137) x 2  [] IONTO  [] NMR (72313) x     [] VASO  [x] Manual (69894) x 1    [] Other:  [] TA x      [] Mech Traction (82404)  [] ES(attended) (09439)     [x] ES (un) (94197):     Goals: Patient stated goal: to get rid of pain; to get a good night's sleep again; to be able to return to work  [x] Progressing: [] Met: [] Not Met: [] Adjusted    Therapist goals for Patient:   Short Term Goals: To be achieved in: 2 weeks  1. Independent in HEP and progression per patient tolerance, in order to prevent re-injury. [] Progressing: [x] Met: [] Not Met: [] Adjusted  2. Patient will have a decrease in pain to facilitate improvement in movement, function, and ADLs as indicated by Functional Deficits. [] Progressing: [x] Met: [] Not Met: [] Adjusted      Long Term Goals: To be achieved in: 12 weeks  1.  Disability index score of 23% or less for the Modified Oswestry  to assist with reaching prior level of function. [x] Progressing: [] Met: [] Not Met: [] Adjusted  2. Patient will demonstrate increased AROM to WNL, good LS mobility, good hip ROM to allow for proper joint functioning as indicated by patients Functional Deficits. [x] Progressing: [] Met: [] Not Met: [] Adjusted  3. Patient will demonstrate an increase in Strength to good proximal hip and core activation to allow for proper functional mobility as indicated by patients Functional Deficits. [x] Progressing: [] Met: [] Not Met: [] Adjusted  4. Patient will return to sitting and standing functional activities without increased symptoms or restriction. [x] Progressing: [] Met: [] Not Met: [] Adjusted  5. Patient will return to full LE ROM and strength to allow for decrease in lumbar and R leg pain, to allow for return to ADLs, functional tasks, and recreational activities including gym workouts, without pain or restriction. (patient specific functional goal)    [x] Progressing: [] Met: [] Not Met: [] Adjusted    Progression Towards Functional goals:  [x] Patient is progressing as expected towards functional goals listed. [] Progression is slowed due to complexities listed. [] Progression has been slowed due to co-morbidities. [] Plan just implemented, too soon to assess goals progression  [] Other:     Overall Progression Towards Functional goals/ Treatment Progress Update:  [x] Patient is progressing as expected towards functional goals listed. [] Progression is slowed due to complexities/Impairments listed. [] Progression has been slowed due to co-morbidities.   [] Plan just implemented, too soon to assess goals progression <30days   [] Goals require adjustment due to lack of progress  [] Patient is not progressing as expected and requires additional follow up with physician  [] Other    Prognosis for POC: [x] Good [] Fair  [] Poor      Patient

## 2020-07-27 ENCOUNTER — TELEPHONE (OUTPATIENT)
Dept: GASTROENTEROLOGY | Age: 65
End: 2020-07-27

## 2020-07-29 ENCOUNTER — HOSPITAL ENCOUNTER (OUTPATIENT)
Dept: PHYSICAL THERAPY | Age: 65
Setting detail: THERAPIES SERIES
Discharge: HOME | End: 2020-07-29
Payer: COMMERCIAL

## 2020-07-29 PROCEDURE — 97110 THERAPEUTIC EXERCISES: CPT

## 2020-07-29 PROCEDURE — 97140 MANUAL THERAPY 1/> REGIONS: CPT

## 2020-07-29 NOTE — FLOWSHEET NOTE
[facility], [address]  Phone: [phone]  Fax [phone]    Physical Therapy Treatment Note/ Progress Report:     Date:  2020    Patient Name:  Bia Edwards    :  1955  MRN: [id number]  Restrictions/Precautions:    Medical/Treatment Diagnosis Information:  Diagnosis: M54.5 (ICD-10-CM) - Low back pain, unspecified back pain laterality, unspecified chronicity, unspecified whether sciatica present; M54.16 (ICD-10-CM) - Lumbar radiculitis  Treatment Diagnosis: M54.5 - Lumbar pain; M54.41 - LBP w/ sciatica  Insurance/Certification information:  PT Insurance Information: [address] [place] [address]  Physician Information:  Referring Practitioner: Dr. Nelly Mcgill  Has the plan of care been signed (Y/N):        [x]  Yes  []  No     Date of Patient follow up with Physician: PRN      Is this a Progress Report:     [x]  Yes  []  No        If Yes:  Date Range for reporting period:  Beginning 2020  Ending 2020    Progress report will be due (10 Rx or 30 days whichever is less):        Recertification will be due (POC Duration  / 90 days whichever is less): 2020         Visit # Insurance Allowable Auth Required   [address]  30 visits []  Yes [x]  No        Functional Scale: Modified Oswestry score 23, 46% disability  Date assessed:  3/11/2020           Modified Oswestry score 18, 36% disability  Date assessed:  4/15/2020           Modified Oswestry score 13, 26%           2020           Modified Oswestry score 12, 24%           2020           Modified Oswestry score 13, 26%           2020    Therapy Diagnosis/Practice Pattern: M54.5 - Lumbar pain; M54.41 - LBP w/ sciatica      Number of Comorbidities:  []0     [x]1-2    []3+    Latex Allergy:  [x]NO      []YES  Preferred Language for Healthcare:   [x]English       []other:      Pain level:  0/10 beginning of session; 0/10 at end of session    SUBJECTIVE:  Patient reports that she does not have any pain in her lower back or R leg today. Reports that she has been trying to walk everyday. Reports that her CBD cream is helping with her RUQ pain. OBJECTIVE:    Observation:    Test measurements:       RESTRICTIONS/PRECAUTIONS: none    Exercises/Interventions:     Therapeutic Ex (01060) Sets/sec Notes/CUES   Bike 7 min    TA BKFO  TA march  Bridges 2 x 15  2 x 15  2x 10    SB DKTC  SB LTRs      LBW 3 laps  OVLCues to keep TA contraction   Multifidi walkouts 2x5, bilaterally  green   TB anti-rotation presses 2x10, bilaterally  blueCues to not let resistance turn her   Prayer s  Right hand on top of left :10  3x                               Manual Intervention (87183)     STM and Trigger point release parspinal 10 min prone                  NMR re-education (42516)  CUES NEEDED                                                Therapeutic Activity (18638)                                       Therapeutic Exercise and NMR EXR  [x] (90374) Provided verbal/tactile cueing for activities related to strengthening, flexibility, endurance, ROM  for improvements in proximal hip and core control with self care, mobility, lifting and ambulation.  [] (05364) Provided verbal/tactile cueing for activities related to improving balance, coordination, kinesthetic sense, posture, motor skill, proprioception  to assist with core control in self care, mobility, lifting, and ambulation.      Therapeutic Activities:    [] (60542 or 21369) Provided verbal/tactile cueing for activities related to improving balance, coordination, kinesthetic sense, posture, motor skill, proprioception and motor activation to allow for proper function  with self care and ADLs  [] (84296) Provided training and instruction to the patient for proper core and proximal hip recruitment and positioning with ambulation re-education     Home Exercise Program:    [x] (56575) Reviewed/Progressed HEP activities related to strengthening, flexibility, endurance, ROM of core, proximal hip and LE for functional self-care, mobility, lifting and ambulation   [] (88625) Reviewed/Progressed HEP activities related to improving balance, coordination, kinesthetic sense, posture, motor skill, proprioception of core, proximal hip and LE for self care, mobility, lifting, and ambulation      Manual Treatments:  PROM / STM / Oscillations-Mobs:  G-I, II, III, IV (PA's, Inf., Post.)  [x] (23039) Provided manual therapy to mobilize proximal hip and LS spine soft tissue/joints for the purpose of modulating pain, promoting relaxation,  increasing ROM, reducing/eliminating soft tissue swelling/inflammation/restriction, improving soft tissue extensibility and allowing for proper ROM for normal function with self care, mobility, lifting and ambulation. Modalities:      Charges:  Timed Code Treatment Minutes: 45   Total Treatment Minutes: 45       [] EVAL (LOW) 07725 (typically 20 minutes face-to-face)  [] EVAL (MOD) 38345 (typically 30 minutes face-to-face)  [] EVAL (HIGH) 10657 (typically 45 minutes face-to-face)  [] RE-EVAL     [x] BI(81327) x 2  [] IONTO  [] NMR (27504) x     [] VASO  [x] Manual (70881) x 1    [] Other:  [] TA x      [] Mech Traction (31725)  [] ES(attended) (58804)     [] ES (un) (77871):     Goals: Patient stated goal: to get rid of pain; to get a good night's sleep again; to be able to return to work  [x] Progressing: [] Met: [] Not Met: [] Adjusted    Therapist goals for Patient:   Short Term Goals: To be achieved in: 2 weeks  1. Independent in HEP and progression per patient tolerance, in order to prevent re-injury. [] Progressing: [x] Met: [] Not Met: [] Adjusted  2. Patient will have a decrease in pain to facilitate improvement in movement, function, and ADLs as indicated by Functional Deficits. [] Progressing: [x] Met: [] Not Met: [] Adjusted      Long Term Goals: To be achieved in: 12 weeks  1. Disability index score of 23% or less for the Modified Oswestry  to assist with reaching prior level of function. [x] Progressing: [] Met: [] Not Met: [] Adjusted  2. Patient will demonstrate increased AROM to WNL, good LS mobility, good hip ROM to allow for proper joint functioning as indicated by patients Functional Deficits. [x] Progressing: [] Met: [] Not Met: [] Adjusted  3. Patient will demonstrate an increase in Strength to good proximal hip and core activation to allow for proper functional mobility as indicated by patients Functional Deficits. [x] Progressing: [] Met: [] Not Met: [] Adjusted  4. Patient will return to sitting and standing functional activities without increased symptoms or restriction. [x] Progressing: [] Met: [] Not Met: [] Adjusted  5. Patient will return to full LE ROM and strength to allow for decrease in lumbar and R leg pain, to allow for return to ADLs, functional tasks, and recreational activities including gym workouts, without pain or restriction. (patient specific functional goal)    [x] Progressing: [] Met: [] Not Met: [] Adjusted    Progression Towards Functional goals:  [x] Patient is progressing as expected towards functional goals listed. [] Progression is slowed due to complexities listed. [] Progression has been slowed due to co-morbidities. [] Plan just implemented, too soon to assess goals progression  [] Other:     Overall Progression Towards Functional goals/ Treatment Progress Update:  [x] Patient is progressing as expected towards functional goals listed. [] Progression is slowed due to complexities/Impairments listed. [] Progression has been slowed due to co-morbidities.   [] Plan just implemented, too soon to assess goals progression <30days   [] Goals require adjustment due to lack of progress  [] Patient is not progressing as expected and requires additional follow up with physician  [] Other    Prognosis for POC: [x] Good [] Fair  [] Poor      Patient requires continued skilled intervention: [x] Yes  [] No    Treatment/Activity Tolerance:  [x] Patient able to complete treatment  [] Patient limited by fatigue  [] Patient limited by pain    [] Patient limited by other medical complications  [] Other:     ASSESSMENT: Patient had no pain in her lower back or R leg today upon arrival. Patient able to perform all table exercises without any issues. Patient had difficulty with multifidi walkouts and anti-rotation presses today, due to pain in her R shoulder. Patient requires skilled physical therapy intervention to address pain level, deficits in lumbar ROM and R LE ROM, and gait impairments, to allow for return to ADLs, functional tasks, and recreational activities including going to the gym, without pain or restriction. PLAN: 1-2 days a week for 12 weeks (6/17/2020-9/17/2020)  [x] Continue per plan of care [] Alter current plan (see comments above)  [] Plan of care initiated [] Hold pending MD visit [] Discharge      Electronically signed by:  Kuldeep Pablo, PT, DPT    Note: If patient does not return for scheduled/ recommended follow up visits, this note will serve as a discharge from care along with most recent update on progress.

## 2020-08-03 ENCOUNTER — OFFICE VISIT (OUTPATIENT)
Dept: ORTHOPEDIC SURGERY | Age: 65
End: 2020-08-03
Payer: COMMERCIAL

## 2020-08-03 VITALS — BODY MASS INDEX: 31 KG/M2 | WEIGHT: 186.07 LBS | HEIGHT: 65 IN

## 2020-08-03 PROCEDURE — 3017F COLORECTAL CA SCREEN DOC REV: CPT | Performed by: PHYSICIAN ASSISTANT

## 2020-08-03 PROCEDURE — 1036F TOBACCO NON-USER: CPT | Performed by: PHYSICIAN ASSISTANT

## 2020-08-03 PROCEDURE — G8417 CALC BMI ABV UP PARAM F/U: HCPCS | Performed by: PHYSICIAN ASSISTANT

## 2020-08-03 PROCEDURE — G8427 DOCREV CUR MEDS BY ELIG CLIN: HCPCS | Performed by: PHYSICIAN ASSISTANT

## 2020-08-03 PROCEDURE — 99214 OFFICE O/P EST MOD 30 MIN: CPT | Performed by: PHYSICIAN ASSISTANT

## 2020-08-03 RX ORDER — MELOXICAM 15 MG/1
15 TABLET ORAL DAILY PRN
Qty: 30 TABLET | Refills: 0 | Status: SHIPPED | OUTPATIENT
Start: 2020-08-03 | End: 2020-09-28

## 2020-08-03 NOTE — PROGRESS NOTES
FOLLOW UP: SPINE    CHIEF COMPLAINT:    Chief Complaint   Patient presents with    Back Pain       HISTORY OF PRESENT ILLNESS:                The patient is a 60 y.o. female history of DM, chronic low back pain here for follow up. She was last seen in March and here to follow-up after physical therapy and pharmacologic care for low back and radiating right leg pain. She currently reports 100% improvement of her low back pain at this time. She states today she would like to discuss her right shoulder. She reports a 3-week history of atraumatic aching right shoulder pain. Symptoms are increased with shoulder range of motion. She reports difficulty and weakness with lifting. Conservative care includes ibuprofen. She denies any neck or upper extremity radiating symptoms. No numbness tingling or progressive weakness.       Past Medical History:   Diagnosis Date    Chronic back pain     Diabetes mellitus ([address]     Essential hypertension 2/26/2020    Hyperlipidemia     Hypothyroidism     Thyroid disease           Current/Past Treatment:   · Physical Therapy: Yes for lumbar spine with benefit  · Chiropractic:     · Injection:     Medications:            NSAIDS: Meloxicam, ibuprofen            Muscle relaxer:   Robaxin            Steriods:   Prednisone            Neuropathic medications:              Opioids:            Other:   · Surgery/Consult: No    Work Status/Functionality: Recently moved here from [place] currently not working    Past Medical History: Medical history form was reviewed today & scanned into the media tab  Past Medical History:   Diagnosis Date    Chronic back pain     Diabetes mellitus ([address]     Essential hypertension 2/26/2020    Hyperlipidemia     Hypothyroidism     Thyroid disease       Past Surgical History:     Past Surgical History:   Procedure Laterality Date    APPENDECTOMY  1974    BREAST SURGERY Right 2013    drainage    CHOLECYSTECTOMY  1974    HYSTERECTOMY, VAGINAL [address] [place] [address]    UPPER GASTROINTESTINAL ENDOSCOPY      UPPER GASTROINTESTINAL ENDOSCOPY  06/22/2020    bx    UPPER GASTROINTESTINAL ENDOSCOPY N/A 6/22/2020    EGD BIOPSY performed by Yao Sullivan MD at [address]     Current Medications:     Current Outpatient Medications:     JARDIANCE 25 MG tablet, TAKE 1 TABLET BY MOUTH EVERY DAY, Disp: 30 tablet, Rfl: 1    ibuprofen (ADVIL;MOTRIN) 400 MG tablet, Take 400 mg by mouth every 6 hours as needed for Pain, Disp: , Rfl:     oxyCODONE-acetaminophen (PERCOCET) 5-325 MG per tablet, Take 1 tablet by mouth every 4 hours as needed for Pain., Disp: , Rfl:     omeprazole (PRILOSEC) 20 MG delayed release capsule, Take 1 capsule by mouth daily, Disp: 30 capsule, Rfl: 1    blood glucose monitor strips, Test qd, Disp: 100 strip, Rfl: 3    levothyroxine (SYNTHROID) 75 MCG tablet, Take 1 tablet by mouth daily, Disp: 90 tablet, Rfl: 2    SITagliptin (JANUVIA) 100 MG tablet, Take 1 tablet by mouth daily, Disp: 90 tablet, Rfl: 0    metFORMIN (GLUCOPHAGE) 850 MG tablet, Take 1 tablet by mouth 2 times daily (with meals) (Patient taking differently: Take 850 mg by mouth daily (with breakfast) ), Disp: 180 tablet, Rfl: 0    fluconazole (DIFLUCAN) 150 MG tablet, 1 dose and if persistent symptoms, repeat every 3-4 days if persistent yeast infection, Disp: 3 tablet, Rfl: 0    glimepiride (AMARYL) 4 MG tablet, Take 1 tablet by mouth 2 times daily, Disp: 180 tablet, Rfl: 3    atorvastatin (LIPITOR) 20 MG tablet, Take 1 tablet by mouth daily, Disp: 90 tablet, Rfl: 3    lisinopril (PRINIVIL;ZESTRIL) 40 MG tablet, Take 1 tablet by mouth daily, Disp: 90 tablet, Rfl: 3    meloxicam (MOBIC) 15 MG tablet, Take 1 tablet by mouth daily Start when done with prednisone, Disp: 30 tablet, Rfl: 0    Vaginal Lubricant (REPLENS) GEL, Place 1 Dose vaginally daily, Disp: 35 g, Rfl: 1    ibuprofen (ADVIL;MOTRIN) 800 MG tablet, Take 1 tablet by mouth every 8 hours as needed for Pain (abd pain), Disp: 30 tablet, Rfl: 0  Allergies:  Patient has no known allergies. Social History:    reports that she quit smoking about 10 years ago. Her smoking use included cigarettes. She started smoking about 28 years ago. She has a 10.00 pack-year smoking history. She has never used smokeless tobacco. She reports that she does not drink alcohol or use drugs. Family History:   Family History   Problem Relation Age of Onset    Dementia Mother     Breast Cancer Sister     Atrial Fibrillation Sister     High Blood Pressure Maternal Grandmother     Diabetes Sister        REVIEW OF SYSTEMS: Full ROS noted & scanned   CONSTITUTIONAL: Denies unexplained weight loss, fevers, chills or fatigue  NEUROLOGICAL: Denies unsteady gait or progressive weakness      PHYSICAL EXAM:    Vitals: Height 5' 5\" (1.651 m), weight 186 lb 1.1 oz (84.4 kg), not currently breastfeeding. GENERAL EXAM:  · General Apparence: Patient is adequately groomed with no evidence of malnutrition. · Orientation: The patient is oriented to time, place and person. · Mood & Affect:The patient's mood and affect are appropriate   · Lymphatic: The lymphatic examination bilaterally reveals all areas to be without enlargement or induration  · Sensation: Sensation is intact without deficit  · Coordination/Balance: Good coordination     LUMBAR/SACRAL EXAMINATION:  · Inspection: Local inspection shows no step-off or bruising. Lumbar alignment is normal.  Sagittal and Coronal balance is neutral.      · Palpation:   No evidence of tenderness at the midline. No tenderness bilaterally at the paraspinal or trochanters. There is no step-off or paraspinal spasm. · Range of Motion: Able to sit forward flex without pain  · Strength:   Strength testing is 5/5 in all muscle groups tested. · Special Tests:   Straight leg raise and crossed SLR negative. Leg length and pelvis level.  0 out of 5 Jarrell's signs.         · Skin: There are no

## 2020-08-05 ENCOUNTER — HOSPITAL ENCOUNTER (OUTPATIENT)
Dept: PHYSICAL THERAPY | Age: 65
Setting detail: THERAPIES SERIES
Discharge: HOME | End: 2020-08-05
Payer: COMMERCIAL

## 2020-08-05 NOTE — FLOWSHEET NOTE
[facility],  [address]        Physical Therapy  Cancellation/No-show Note  Patient Name:  Agatha Peralta  :  1955   Date:  2020  Cancelled visits to date: 4  No-shows to date: 0    For today's appointment patient:  X  Cancelled  ? Rescheduled appointment  ? No-show     Reason given by patient:    Patient ill  ? Conflicting appointment  ? X  No transportation    ? Conflict with work  ? No reason given  ?   Other:     Comments:      Electronically signed by:  Jacquelyn Hodges, PT, DPT

## 2020-08-10 ENCOUNTER — TELEMEDICINE (OUTPATIENT)
Dept: INTERNAL MEDICINE CLINIC | Age: 65
End: 2020-08-10
Payer: COMMERCIAL

## 2020-08-10 PROCEDURE — G8427 DOCREV CUR MEDS BY ELIG CLIN: HCPCS | Performed by: INTERNAL MEDICINE

## 2020-08-10 PROCEDURE — 99214 OFFICE O/P EST MOD 30 MIN: CPT | Performed by: INTERNAL MEDICINE

## 2020-08-10 PROCEDURE — 3017F COLORECTAL CA SCREEN DOC REV: CPT | Performed by: INTERNAL MEDICINE

## 2020-08-10 PROCEDURE — 3051F HG A1C>EQUAL 7.0%<8.0%: CPT | Performed by: INTERNAL MEDICINE

## 2020-08-10 PROCEDURE — 2022F DILAT RTA XM EVC RTNOPTHY: CPT | Performed by: INTERNAL MEDICINE

## 2020-08-10 NOTE — PROGRESS NOTES
Date of Service:  8/10/2020    Chief Complaint:      Chief Complaint   Patient presents with    Diabetes       HPI:  Shahzad Sullivan is a 60 y.o. Pursuant to the emergency declaration under the [facility] [place] [address] [facility] authority and the [employer] Act, this Virtual  Video Visit was conducted, with patient's consent, to reduce the patient's risk of exposure to COVID-19 and provide continuity of care. Service is  provided through a video synchronous discussion virtually to substitute for in-person clinic visit with the patient being at home and Dr. Salina Gaona being at home. Treatment Adherence:   Medication compliance:  compliant most of the time  Diet compliance:  compliant most of the time  Weight trend: decreasing  Current exercise: walks 5 time(s) per week  Barriers: none     Diabetes Mellitus Type 2: Current symptoms/problems include neuropathy.  Stable on Januvia 25 mg qd, Jardiance 25 mg qd, off Metformin 850 mg qhs and no more diarrhea,  glimepiride 4 mg bid.       Home blood sugar records: fasting range: 83 125 115 AM and 123 100 83 PM  Any episodes of hypoglycemia? no  Eye exam current (within one year): yes  Tobacco history: She  reports that she quit smoking about 10 years ago. Her smoking use included cigarettes. She has a 10.00 pack-year smoking history. She has never used smokeless tobacco.   Daily Aspirin? No: will start  Known diabetic complications: none  Hypertension:  Home blood pressure monitoring: Yes 120/60-70.  Stable on Lisinopril 40 mg qd.   She is adherent to a low sodium diet. Patient denies chest pain, shortness of breath, headache, lightheadedness, blurred vision, peripheral edema, palpitations, dry cough and fatigue.  Antihypertensive medication side effects: no medication side effects noted.  Use of agents associated with hypertension: thyroid hormones.    Hyperlipidemia:  No new myalgias or GI upset on atorvastatin (Lipitor) 20 mg qd.     Hypothyroidism: Recent symptoms: stable with increase Levothyroxine 75 mcg qd. She denies fatigue, weight gain, weight loss, cold intolerance, heat intolerance, hair loss, dry skin, constipation, diarrhea, edema, anxiety, tremor, palpitations and dysphagia. Patient is  taking her medication consistently on an empty stomach.     Lab Results   Component Value Date    LABA1C 7.4 05/19/2020    LABMICR 2.20 (H) 03/09/2020     Lab Results   Component Value Date     02/23/2020    K 4.0 02/23/2020     02/23/2020    CO2 24 02/23/2020    BUN 9 02/23/2020    CREATININE 0.6 02/23/2020    GLUCOSE 185 (H) 02/23/2020    CALCIUM 9.2 02/23/2020     Lab Results   Component Value Date    CHOL 162 01/06/2020    TRIG 78 01/06/2020    HDL 46 01/06/2020    LDLCALC 99 01/06/2020     Lab Results   Component Value Date    ALT 12 06/22/2020    AST 18 06/22/2020     Lab Results   Component Value Date    TSH 0.07 (L) 05/19/2020    TSH 3.16 01/06/2020    T4FREE 1.6 05/19/2020     Lab Results   Component Value Date    WBC 8.0 02/23/2020    HGB 13.2 02/23/2020    HCT 38.9 02/23/2020    MCV 88.7 02/23/2020     02/23/2020     No results found for: INR   No results found for: PSA   No results found for: LABURIC     Patient Active Problem List   Diagnosis    Controlled type 2 diabetes mellitus without complication, without long-term current use of insulin (HCC)    Mixed hyperlipidemia    Essential hypertension    Acquired hypothyroidism    Erosive gastritis    Esophagitis, [place] grade A    RUQ pain    Fatty liver       No Known Allergies  Outpatient Medications Marked as Taking for the 8/10/20 encounter (Telemedicine) with Denae Santos MD   Medication Sig Dispense Refill    meloxicam (MOBIC) 15 MG tablet Take 1 tablet by mouth daily as needed for Pain 30 tablet 0    JARDIANCE 25 MG tablet TAKE 1 TABLET BY MOUTH EVERY DAY 30 tablet 1    ibuprofen (ADVIL;MOTRIN) 400 MG tablet Take 400 mg by mouth every 6 hours as needed for Pain      oxyCODONE-acetaminophen (PERCOCET) 5-325 MG per tablet Take 1 tablet by mouth every 4 hours as needed for Pain.  omeprazole (PRILOSEC) 20 MG delayed release capsule Take 1 capsule by mouth daily 30 capsule 1    blood glucose monitor strips Test qd 100 strip 3    levothyroxine (SYNTHROID) 75 MCG tablet Take 1 tablet by mouth daily 90 tablet 2    SITagliptin (JANUVIA) 100 MG tablet Take 1 tablet by mouth daily 90 tablet 0    metFORMIN (GLUCOPHAGE) 850 MG tablet Take 1 tablet by mouth 2 times daily (with meals) (Patient taking differently: Take 850 mg by mouth daily (with breakfast) ) 180 tablet 0    glimepiride (AMARYL) 4 MG tablet Take 1 tablet by mouth 2 times daily 180 tablet 3    atorvastatin (LIPITOR) 20 MG tablet Take 1 tablet by mouth daily 90 tablet 3    lisinopril (PRINIVIL;ZESTRIL) 40 MG tablet Take 1 tablet by mouth daily 90 tablet 3    meloxicam (MOBIC) 15 MG tablet Take 1 tablet by mouth daily Start when done with prednisone 30 tablet 0    Vaginal Lubricant (REPLENS) GEL Place 1 Dose vaginally daily 35 g 1         Review of Systems: 14 systems were negative except of what was stated on HPI    Nursing note and vitals reviewed. There were no vitals filed for this visit. Wt Readings from Last 3 Encounters:   08/03/20 186 lb 1.1 oz (84.4 kg)   06/22/20 186 lb (84.4 kg)   06/08/20 186 lb (84.4 kg)     BP Readings from Last 3 Encounters:   06/22/20 117/69   06/08/20 102/68   06/01/20 128/78     There is no height or weight on file to calculate BMI. Constitutional: Patient appears well-developed and well-nourished. No distress. Head: Normocephalic and atraumatic. Skin: No rash or erythema. Psychiatric: Normal mood and affect. Behavior is normal.       Assessment/Plan:  Bailee was seen today for diabetes.     Diagnoses and all orders for this visit:    Controlled type 2 diabetes mellitus without complication, without long-term current use of insulin (HCC)  -     Hemoglobin A1C; Future  -     SITagliptin (JANUVIA) 100 MG tablet; Take 1 tablet by mouth daily  -     metFORMIN (GLUCOPHAGE) 850 MG tablet; Take 1 tablet by mouth daily    Acquired hypothyroidism    Mixed hyperlipidemia    Essential hypertension    Fatty liver        Return Nov 10 at 9 (20 mins) VV 3 months DM and side pain.

## 2020-08-12 ENCOUNTER — HOSPITAL ENCOUNTER (OUTPATIENT)
Dept: PHYSICAL THERAPY | Age: 65
Setting detail: THERAPIES SERIES
Discharge: HOME | End: 2020-08-12
Payer: COMMERCIAL

## 2020-08-12 ENCOUNTER — OFFICE VISIT (OUTPATIENT)
Dept: ORTHOPEDIC SURGERY | Age: 65
End: 2020-08-12
Payer: COMMERCIAL

## 2020-08-12 VITALS — WEIGHT: 186 LBS | BODY MASS INDEX: 30.99 KG/M2 | HEIGHT: 65 IN

## 2020-08-12 PROCEDURE — 97140 MANUAL THERAPY 1/> REGIONS: CPT

## 2020-08-12 PROCEDURE — 99214 OFFICE O/P EST MOD 30 MIN: CPT | Performed by: PHYSICIAN ASSISTANT

## 2020-08-12 PROCEDURE — 97110 THERAPEUTIC EXERCISES: CPT

## 2020-08-12 PROCEDURE — G8427 DOCREV CUR MEDS BY ELIG CLIN: HCPCS | Performed by: PHYSICIAN ASSISTANT

## 2020-08-12 PROCEDURE — 3017F COLORECTAL CA SCREEN DOC REV: CPT | Performed by: PHYSICIAN ASSISTANT

## 2020-08-12 PROCEDURE — G8417 CALC BMI ABV UP PARAM F/U: HCPCS | Performed by: PHYSICIAN ASSISTANT

## 2020-08-12 PROCEDURE — 1036F TOBACCO NON-USER: CPT | Performed by: PHYSICIAN ASSISTANT

## 2020-08-12 RX ORDER — LIDOCAINE HYDROCHLORIDE 10 MG/ML
20 INJECTION, SOLUTION INFILTRATION; PERINEURAL ONCE
Status: COMPLETED | OUTPATIENT
Start: 2020-08-12 | End: 2020-08-12

## 2020-08-12 RX ORDER — BUPIVACAINE HYDROCHLORIDE 2.5 MG/ML
30 INJECTION, SOLUTION INFILTRATION; PERINEURAL ONCE
Status: COMPLETED | OUTPATIENT
Start: 2020-08-12 | End: 2020-08-12

## 2020-08-12 RX ORDER — METHYLPREDNISOLONE ACETATE 40 MG/ML
80 INJECTION, SUSPENSION INTRA-ARTICULAR; INTRALESIONAL; INTRAMUSCULAR; SOFT TISSUE ONCE
Status: COMPLETED | OUTPATIENT
Start: 2020-08-12 | End: 2020-08-12

## 2020-08-12 RX ADMIN — METHYLPREDNISOLONE ACETATE 80 MG: 40 INJECTION, SUSPENSION INTRA-ARTICULAR; INTRALESIONAL; INTRAMUSCULAR; SOFT TISSUE at 15:13

## 2020-08-12 RX ADMIN — BUPIVACAINE HYDROCHLORIDE 75 MG: 2.5 INJECTION, SOLUTION INFILTRATION; PERINEURAL at 15:13

## 2020-08-12 RX ADMIN — LIDOCAINE HYDROCHLORIDE 20 ML: 10 INJECTION, SOLUTION INFILTRATION; PERINEURAL at 15:13

## 2020-08-12 NOTE — PROGRESS NOTES
Disp: 30 capsule, Rfl: 9    SITagliptin (JANUVIA) 100 MG tablet, Take 1 tablet by mouth daily, Disp: 90 tablet, Rfl: 3    metFORMIN (GLUCOPHAGE) 850 MG tablet, Take 1 tablet by mouth daily, Disp: 90 tablet, Rfl: 3    meloxicam (MOBIC) 15 MG tablet, Take 1 tablet by mouth daily as needed for Pain, Disp: 30 tablet, Rfl: 0    JARDIANCE 25 MG tablet, TAKE 1 TABLET BY MOUTH EVERY DAY, Disp: 30 tablet, Rfl: 1    blood glucose monitor strips, Test qd, Disp: 100 strip, Rfl: 3    levothyroxine (SYNTHROID) 75 MCG tablet, Take 1 tablet by mouth daily, Disp: 90 tablet, Rfl: 2    glimepiride (AMARYL) 4 MG tablet, Take 1 tablet by mouth 2 times daily, Disp: 180 tablet, Rfl: 3    atorvastatin (LIPITOR) 20 MG tablet, Take 1 tablet by mouth daily, Disp: 90 tablet, Rfl: 3    lisinopril (PRINIVIL;ZESTRIL) 40 MG tablet, Take 1 tablet by mouth daily, Disp: 90 tablet, Rfl: 3    Vaginal Lubricant (REPLENS) GEL, Place 1 Dose vaginally daily, Disp: 35 g, Rfl: 1  Allergies:  Patient has no known allergies. Social History:    reports that she quit smoking about 10 years ago. Her smoking use included cigarettes. She started smoking about 28 years ago. She has a 10.00 pack-year smoking history. She has never used smokeless tobacco. She reports that she does not drink alcohol or use drugs. Family History:   Family History   Problem Relation Age of Onset    Dementia Mother     Breast Cancer Sister     Atrial Fibrillation Sister     High Blood Pressure Maternal Grandmother     Diabetes Sister        REVIEW OF SYSTEMS:   For new problems, a full review of systems will be found scanned in the patient's chart. CONSTITUTIONAL: Denies unexplained weight loss, fevers, chills   NEUROLOGICAL: Denies unsteady gait or progressive weakness  SKIN: Denies skin changes, delayed healing, rash, itching       PHYSICAL EXAM:    Vitals: Height 5' 5\" (1.651 m), weight 186 lb (84.4 kg), not currently breastfeeding.     GENERAL EXAM:  · General Apparence: Patient is adequately groomed with no evidence of malnutrition. · Orientation: The patient is oriented to time, place and person. · Mood & Affect:The patient's mood and affect are appropriate       Right shoulder PHYSICAL EXAMINATION:  · Inspection: No visible deformity. No significant edema, erythema or ecchymosis. · Palpation: Tenderness to palpation at the bicipital groove and subacromial space    · Range of Motion: Range of motion is painful overhead and with internal rotation. Motion is not significantly limited    · Strength: Isolated supraspinatus strength testing reproduces pain but no weakness    · Special Tests: Pain with Caraballo testing. Pain with speeds testing. Negative crossarm testing. · Skin:  There are no rashes, ulcerations or lesions. · There are no dysvascular changes     Gait & station: Normal      Additional Examinations:        Left Upper Extremity: Examination of the left upper extremity does not show any tenderness, deformity or injury. Range of motion is unremarkable. There is no gross instability. There are no rashes, ulcerations or lesions. Strength and tone are normal.      Diagnostic Testing: The following x rays were read and interpreted by myself      1.  3 x-rays of the right shoulder were taken today and reveal normal anatomy with no acute bony abnormalities.     Orders     Orders Placed This Encounter   Procedures    XR SHOULDER RIGHT (MIN 2 VIEWS)     Standing Status:   Future     Number of Occurrences:   1     Standing Expiration Date:   8/12/2021     Order Specific Question:   Reason for exam:     Answer:   PAIN    US ARTHR/ASP/INJ MAJOR JNT/BURSA RIGHT     Order Specific Question:   Reason for exam:     Answer:   PAIN    [facility] Physical Therapy     Referral Priority:   Routine     Referral Type:   Eval and Treat     Referral Reason:   Specialty Services Required     Requested Specialty:   Physical Therapy     Number of Visits Requested:   1         Assessment / Treatment Plan:     1. Right shoulder rotator cuff tendinitis    2. Right shoulder proximal biceps tendinitis    We discussed treatment options and she is interested in a cortisone injection and physical therapy. She is already working with physical therapy for her back. She states she has had cortisone injections in the past with no adverse reactions. She will return to the clinic if her symptoms are not improving with physical therapy and we may consider an isolated bicep cortisone injection. I did attempt to put some medication in the intra-articular space with today's injection to try to help with her bicep pain. I discussed in detail the risk, benefits, and complications of an injection which included but are not limited to infection, skin reactions, hot swollen joints, and anaphylaxis with the patient. The patient verbalized good understanding and gave informed consent for the injection. The skin was prepped using sterile alcohol solution. A sterile 22-gauge needle was inserted into the subacromial space and a mixture of 4 mL of 2% Carbocaine, 4 mL of 0.25% Marcaine, and 80 mg of Depo-Medrol was injected under sterile technique. The needle was withdrawn and the puncture site sealed with a Band-Aid. Technique: Under sterile conditions a [employer] ultrasound unit with a variable frequency (6.0-15.0 MHz) linear transducer was used to localize the placement of a 22-gauge needle into the right subacromial space. Findings: Successful needle placement for  subacromial space injection. Final images were taken and saved for permanent record. The patient tolerated the injection well. The patient was instructed to call the office immediately if there is any pain, redness, warmth, fever, or chills.     I spent 25 minutes face-to-face with the patient and greater than 50% of that time was spent counseling/coordinating care for the above-stated

## 2020-08-12 NOTE — FLOWSHEET NOTE
session    SUBJECTIVE:  Patient reports not having much back pain at all, sometimes at bra line. Feels it when she is doing things for a long period of time, bending over gives her relief. Left leg bothering her today. Seeing TACOS Barker for shoulder today. OBJECTIVE:    Observation:    Test measurements:       RESTRICTIONS/PRECAUTIONS: none    Exercises/Interventions:     Therapeutic Ex (87548) Sets/sec Notes/CUES   Bike 7 min    TA BKFO  TA march  Bridges  Hip ABD isometric  Hip ADD isometric  x 15   x 15  2 x 10  15 x   15 x    SB DKTC  SB LTRs 2x15  2x10   LBW Cues to keep TA contraction   Multifidi walkouts    TB anti-rotation presses 2x10, bilaterally  blueCues to not let resistance turn her   Prayer s  Right hand on top of left                              Manual Intervention (44272)     STM and Trigger point release parspinal 10 min prone                  NMR re-education (72920)  CUES NEEDED                                                Therapeutic Activity (32972)                                       Therapeutic Exercise and NMR EXR  [x] (21191) Provided verbal/tactile cueing for activities related to strengthening, flexibility, endurance, ROM  for improvements in proximal hip and core control with self care, mobility, lifting and ambulation.  [] (16283) Provided verbal/tactile cueing for activities related to improving balance, coordination, kinesthetic sense, posture, motor skill, proprioception  to assist with core control in self care, mobility, lifting, and ambulation.      Therapeutic Activities:    [] (57051 or 83457) Provided verbal/tactile cueing for activities related to improving balance, coordination, kinesthetic sense, posture, motor skill, proprioception and motor activation to allow for proper function  with self care and ADLs  [] (37558) Provided training and instruction to the patient for proper core and proximal hip recruitment and positioning with ambulation re-education Home Exercise Program:    [x] (25325) Reviewed/Progressed HEP activities related to strengthening, flexibility, endurance, ROM of core, proximal hip and LE for functional self-care, mobility, lifting and ambulation   [] (82846) Reviewed/Progressed HEP activities related to improving balance, coordination, kinesthetic sense, posture, motor skill, proprioception of core, proximal hip and LE for self care, mobility, lifting, and ambulation      Manual Treatments:  PROM / STM / Oscillations-Mobs:  G-I, II, III, IV (PA's, Inf., Post.)  [x] (76937) Provided manual therapy to mobilize proximal hip and LS spine soft tissue/joints for the purpose of modulating pain, promoting relaxation,  increasing ROM, reducing/eliminating soft tissue swelling/inflammation/restriction, improving soft tissue extensibility and allowing for proper ROM for normal function with self care, mobility, lifting and ambulation. Modalities:      Charges:  Timed Code Treatment Minutes: 45   Total Treatment Minutes: 45       [] EVAL (LOW) 03532 (typically 20 minutes face-to-face)  [] EVAL (MOD) 06125 (typically 30 minutes face-to-face)  [] EVAL (HIGH) 36890 (typically 45 minutes face-to-face)  [] RE-EVAL     [x] ZC(14848) x 2  [] IONTO  [] NMR (40661) x     [] VASO  [x] Manual (08692) x 1    [] Other:  [] TA x      [] Mech Traction (58627)  [] ES(attended) (64165)     [] ES (un) (33191):     Goals: Patient stated goal: to get rid of pain; to get a good night's sleep again; to be able to return to work  [x] Progressing: [] Met: [] Not Met: [] Adjusted    Therapist goals for Patient:   Short Term Goals: To be achieved in: 2 weeks  1. Independent in HEP and progression per patient tolerance, in order to prevent re-injury. [] Progressing: [x] Met: [] Not Met: [] Adjusted  2. Patient will have a decrease in pain to facilitate improvement in movement, function, and ADLs as indicated by Functional Deficits.   [] Progressing: [x] Met: [] Not Met: [] Adjusted      Long Term Goals: To be achieved in: 12 weeks  1. Disability index score of 23% or less for the Modified Oswestry  to assist with reaching prior level of function. [x] Progressing: [] Met: [] Not Met: [] Adjusted  2. Patient will demonstrate increased AROM to WNL, good LS mobility, good hip ROM to allow for proper joint functioning as indicated by patients Functional Deficits. [x] Progressing: [] Met: [] Not Met: [] Adjusted  3. Patient will demonstrate an increase in Strength to good proximal hip and core activation to allow for proper functional mobility as indicated by patients Functional Deficits. [x] Progressing: [] Met: [] Not Met: [] Adjusted  4. Patient will return to sitting and standing functional activities without increased symptoms or restriction. [x] Progressing: [] Met: [] Not Met: [] Adjusted  5. Patient will return to full LE ROM and strength to allow for decrease in lumbar and R leg pain, to allow for return to ADLs, functional tasks, and recreational activities including gym workouts, without pain or restriction. (patient specific functional goal)    [x] Progressing: [] Met: [] Not Met: [] Adjusted    Progression Towards Functional goals:  [x] Patient is progressing as expected towards functional goals listed. [] Progression is slowed due to complexities listed. [] Progression has been slowed due to co-morbidities. [] Plan just implemented, too soon to assess goals progression  [] Other:     Overall Progression Towards Functional goals/ Treatment Progress Update:  [x] Patient is progressing as expected towards functional goals listed. [] Progression is slowed due to complexities/Impairments listed. [] Progression has been slowed due to co-morbidities.   [] Plan just implemented, too soon to assess goals progression <30days   [] Goals require adjustment due to lack of progress  [] Patient is not progressing as expected and requires additional follow up with physician  [] Other    Prognosis for POC: [x] Good [] Fair  [] Poor      Patient requires continued skilled intervention: [x] Yes  [] No    Treatment/Activity Tolerance:  [x] Patient able to complete treatment  [] Patient limited by fatigue  [] Patient limited by pain    [] Patient limited by other medical complications  [] Other:     ASSESSMENT: Patient had no pain in her lower back or R leg today upon arrival. Patient able to perform all table exercises without any issues. Patient had difficulty with multifidi walkouts and anti-rotation presses today, due to pain in her R shoulder. Patient requires skilled physical therapy intervention to address pain level, deficits in lumbar ROM and R LE ROM, and gait impairments, to allow for return to ADLs, functional tasks, and recreational activities including going to the gym, without pain or restriction. PLAN: 1-2 days a week for 12 weeks (6/17/2020-9/17/2020)  [x] Continue per plan of care [] Alter current plan (see comments above)  [] Plan of care initiated [] Hold pending MD visit [] Discharge      Electronically signed by:  Eliza Nuñez, PT, DPT    Note: If patient does not return for scheduled/ recommended follow up visits, this note will serve as a discharge from care along with most recent update on progress.

## 2020-08-13 ENCOUNTER — HOSPITAL ENCOUNTER (OUTPATIENT)
Age: 65
Discharge: HOME | End: 2020-08-13
Payer: COMMERCIAL

## 2020-08-13 PROCEDURE — 36415 COLL VENOUS BLD VENIPUNCTURE: CPT

## 2020-08-13 PROCEDURE — 83036 HEMOGLOBIN GLYCOSYLATED A1C: CPT

## 2020-08-14 LAB
ESTIMATED AVERAGE GLUCOSE: 157.1 MG/DL
HBA1C MFR BLD: 7.1 %

## 2020-08-17 ENCOUNTER — TELEMEDICINE (OUTPATIENT)
Dept: INTERNAL MEDICINE CLINIC | Age: 65
End: 2020-08-17
Payer: COMMERCIAL

## 2020-08-17 PROCEDURE — 99213 OFFICE O/P EST LOW 20 MIN: CPT | Performed by: INTERNAL MEDICINE

## 2020-08-17 PROCEDURE — G8427 DOCREV CUR MEDS BY ELIG CLIN: HCPCS | Performed by: INTERNAL MEDICINE

## 2020-08-17 PROCEDURE — 3017F COLORECTAL CA SCREEN DOC REV: CPT | Performed by: INTERNAL MEDICINE

## 2020-08-17 PROCEDURE — 3051F HG A1C>EQUAL 7.0%<8.0%: CPT | Performed by: INTERNAL MEDICINE

## 2020-08-17 PROCEDURE — 2022F DILAT RTA XM EVC RTNOPTHY: CPT | Performed by: INTERNAL MEDICINE

## 2020-08-17 RX ORDER — GABAPENTIN 300 MG/1
300 CAPSULE ORAL 3 TIMES DAILY
Qty: 90 CAPSULE | Refills: 0 | Status: SHIPPED | OUTPATIENT
Start: 2020-08-17 | End: 2020-09-28

## 2020-08-17 NOTE — PROGRESS NOTES
Date of Service:  8/17/2020    Chief Complaint:      Chief Complaint   Patient presents with    Diabetes    Abdominal Pain     left side x  2 weeks        HPI:  Evette Moy is a 60 y.o. Pursuant to the emergency declaration under the [facility] [place]note 2[place] [address] [facility] authority and the nextsocial and [employer] Act, this Virtual  Video Visit was conducted, with patient's consent, to reduce the patient's risk of exposure to COVID-19 and provide continuity of care. Service is  provided through a video synchronous discussion virtually to substitute for in-person clinic visit with the patient being at home and Dr. Alex Marino being at home. She now complain of left side abd pain for about 3 weeks. No rash. Burning sensation on left side that's superficial.  She complains of left foot gets numb after walking for an hour. Treatment Adherence:   Medication compliance:  compliant most of the time  Diet compliance:  compliant most of the time  Weight trend: decreasing  Current exercise: walks 5 time(s) per week  Barriers: none     Diabetes Mellitus Type 2: Current symptoms/problems include neuropathy.  Stable on Januvia 25 mg qd, Jardiance 25 mg qd, off Metformin 850 mg qhs and no more diarrhea,  glimepiride 4 mg bid.       Home blood sugar records: fasting range: 83 125 115 AM and 123 100 83 PM  Any episodes of hypoglycemia? no  Eye exam current (within one year): yes  Tobacco history: She  reports that she quit smoking about 10 years ago. Her smoking use included cigarettes. She has a 10.00 pack-year smoking history. She has never used smokeless tobacco.   Daily Aspirin? No: will start  Known diabetic complications: none  Hypertension:  Home blood pressure monitoring: Yes 120/60-70.  Stable on Lisinopril 40 mg qd.   She is adherent to a low sodium diet.  Patient denies chest pain, shortness of breath, headache, lightheadedness, blurred vision, peripheral edema, palpitations, dry cough and fatigue.  Antihypertensive medication side effects: no medication side effects noted.  Use of agents associated with hypertension: thyroid hormones. Hyperlipidemia:  No new myalgias or GI upset on atorvastatin (Lipitor) 20 mg qd.     Hypothyroidism: Recent symptoms: stable with increase Levothyroxine 75 mcg qd. She denies fatigue, weight gain, weight loss, cold intolerance, heat intolerance, hair loss, dry skin, constipation, diarrhea, edema, anxiety, tremor, palpitations and dysphagia. Patient is  taking her medication consistently on an empty stomach.   Lab Results   Component Value Date    LABA1C 7.1 08/13/2020    LABA1C 7.4 05/19/2020    LABMICR 2.20 (H) 03/09/2020     Lab Results   Component Value Date     02/23/2020    K 4.0 02/23/2020     02/23/2020    CO2 24 02/23/2020    BUN 9 02/23/2020    CREATININE 0.6 02/23/2020    GLUCOSE 185 (H) 02/23/2020    CALCIUM 9.2 02/23/2020     Lab Results   Component Value Date    CHOL 162 01/06/2020    TRIG 78 01/06/2020    HDL 46 01/06/2020    LDLCALC 99 01/06/2020     Lab Results   Component Value Date    ALT 12 06/22/2020    AST 18 06/22/2020     Lab Results   Component Value Date    TSH 0.07 (L) 05/19/2020    TSH 3.16 01/06/2020    T4FREE 1.6 05/19/2020     Lab Results   Component Value Date    WBC 8.0 02/23/2020    HGB 13.2 02/23/2020    HCT 38.9 02/23/2020    MCV 88.7 02/23/2020     02/23/2020     No results found for: INR   No results found for: PSA   No results found for: LABURIC     Patient Active Problem List   Diagnosis    Controlled type 2 diabetes mellitus without complication, without long-term current use of insulin (HCC)    Mixed hyperlipidemia    Essential hypertension    Acquired hypothyroidism    Erosive gastritis    Esophagitis, [place] grade A    RUQ pain    Fatty liver       No Known Allergies  Outpatient Medications Marked as Taking for the 8/17/20 encounter (Telemedicine) with Alexa Santos MD   Medication Sig Dispense Refill    omeprazole (PRILOSEC) 20 MG delayed release capsule TAKE 1 CAPSULE BY MOUTH EVERY DAY 30 capsule 9    SITagliptin (JANUVIA) 100 MG tablet Take 1 tablet by mouth daily 90 tablet 3    metFORMIN (GLUCOPHAGE) 850 MG tablet Take 1 tablet by mouth daily 90 tablet 3    meloxicam (MOBIC) 15 MG tablet Take 1 tablet by mouth daily as needed for Pain 30 tablet 0    JARDIANCE 25 MG tablet TAKE 1 TABLET BY MOUTH EVERY DAY 30 tablet 1    blood glucose monitor strips Test qd 100 strip 3    levothyroxine (SYNTHROID) 75 MCG tablet Take 1 tablet by mouth daily 90 tablet 2    glimepiride (AMARYL) 4 MG tablet Take 1 tablet by mouth 2 times daily 180 tablet 3    atorvastatin (LIPITOR) 20 MG tablet Take 1 tablet by mouth daily 90 tablet 3    lisinopril (PRINIVIL;ZESTRIL) 40 MG tablet Take 1 tablet by mouth daily 90 tablet 3    Vaginal Lubricant (REPLENS) GEL Place 1 Dose vaginally daily 35 g 1         Review of Systems: 14 systems were negative except of what was stated on HPI    Nursing note and vitals reviewed. There were no vitals filed for this visit. Wt Readings from Last 3 Encounters:   08/12/20 186 lb (84.4 kg)   08/03/20 186 lb 1.1 oz (84.4 kg)   06/22/20 186 lb (84.4 kg)     BP Readings from Last 3 Encounters:   06/22/20 117/69   06/08/20 102/68   06/01/20 128/78     There is no height or weight on file to calculate BMI. Constitutional: Patient appears well-developed and well-nourished. No distress. Head: Normocephalic and atraumatic. Skin: No rash or erythema. Psychiatric: Normal mood and affect. Behavior is normal.       Assessment/Plan:  Bailee was seen today for diabetes and abdominal pain. Diagnoses and all orders for this visit:    Neuralgia  Start gabapentin (NEURONTIN) 300 MG capsule; Take 1 capsule by mouth 3 times daily for 30 days.     Type 2 diabetes mellitus without complication, without long-term current use of insulin ([address]    Return Sept 15 at 10:40 AM left abd neuralgia.

## 2020-08-19 ENCOUNTER — HOSPITAL ENCOUNTER (OUTPATIENT)
Dept: PHYSICAL THERAPY | Age: 65
Setting detail: THERAPIES SERIES
Discharge: HOME | End: 2020-08-19
Payer: COMMERCIAL

## 2020-08-19 PROCEDURE — 97140 MANUAL THERAPY 1/> REGIONS: CPT

## 2020-08-19 PROCEDURE — 97110 THERAPEUTIC EXERCISES: CPT

## 2020-08-19 PROCEDURE — 97161 PT EVAL LOW COMPLEX 20 MIN: CPT

## 2020-08-19 NOTE — PLAN OF CARE
neuropathy   Functional Disability Index: Quick DASH 11 - 40% (28/55)    Pain Scale: 3 currently 6 at worst/10  Easing factors: rest, cortisone injection  Provocative factors: reaching behind head/back,      Type: []Constant   []Intermittent  []Radiating []Localized []other:     Numbness/Tingling: denies in shoulder    Occupation/School: unemployed     Living Status/Prior Level of Function: Independent with ADLs and IADLs, walking,     OBJECTIVE:     CERV ROM     Cervical Flexion     Cervical Extension     Cervical SB     Cervical rotation          ROM Left Right   Shoulder Flex  130 P! Shoulder Abd     Shoulder ER T2 Occiput P! Shoulder IR T8 T12 P! Strength  Left Right P! all   Shoulder Flex  4-   Shoulder Scap  4-   Shoulder ER  3+   Shoulder IR  3+          Reflexes/Sensation: NT no neural complaints related to shoulder/neck   []Dermatomes/Myotomes intact    []Reflexes equal and normal bilaterally   []Other:    Joint mobility:    []Normal    [x]Hypo   []Hyper    Palpation: TTP with increased tissue tension - supraspinatus and bicep and respective tendons and insertion points    Functional Mobility/Transfers: increased winging on R, shrug sign    Posture: forward head, rounded shoulders     Bandages/Dressings/Incisions: na    Gait: (include devices/WB status): WNL    Orthopedic Special Tests: slight + with henry posey hawkins                       [x] Patient history, allergies, meds reviewed. Medical chart reviewed. See intake form. Review Of Systems (ROS):  [x]Performed Review of systems (Integumentary, CardioPulmonary, Neurological) by intake and observation. Intake form has been scanned into medical record. Patient has been instructed to contact their primary care physician regarding ROS issues if not already being addressed at this time.       Co-morbidities/Complexities (which will affect course of rehabilitation):   []None           Arthritic conditions   []Rheumatoid arthritis (M05.9)  [x]Osteoarthritis (M19.91)   Cardiovascular conditions   []Hypertension (I10)  []Hyperlipidemia (E78.5)  []Angina pectoris (I20)  []Atherosclerosis (I70)   Musculoskeletal conditions   []Disc pathology   []Congenital spine pathologies   []Prior surgical intervention  []Osteoporosis (M81.8)  []Osteopenia (M85.8)   Endocrine conditions   []Hypothyroid (E03.9)  []Hyperthyroid Gastrointestinal conditions   []Constipation (R77.90)   Metabolic conditions   [x]Morbid obesity (E66.01)  [x]Diabetes type 1(E10.65) or 2 (E11.65)   [x]Neuropathy (G60.9)     Pulmonary conditions   []Asthma (J45)  []Coughing   []COPD (J44.9)   Psychological Disorders  []Anxiety (F41.9)  []Depression (F32.9)   []Other:   []Other:          Barriers to/and or personal factors that will affect rehab potential:              [x]Age  [x]Sex              []Motivation/Lack of Motivation                        [x]Co-Morbidities              []Cognitive Function, education/learning barriers              []Environmental, home barriers              []profession/work barriers  []past PT/medical experience  []other:  Justification:      Falls Risk Assessment (30 days):   [x] Falls Risk assessed and no intervention required.   [] Falls Risk assessed and Patient requires intervention due to being higher risk   TUG score (>12s at risk):     [] Falls education provided, including       ASSESSMENT:   Functional Impairments   [x]Noted spinal or UE joint hypomobility   []Noted spinal or UE joint hypermobility   [x]Decreased UE functional ROM   []Decreased UE functional strength   []Abnormal reflexes/sensation/myotomal/dermatomal deficits   [x]Decreased RC/scapular/core strength and neuromuscular control   []other:      Functional Activity Limitations (from functional questionnaire and intake)   [x]Reduced ability to tolerate prolonged functional positions   [x]Reduced ability or difficulty with changes of positions or transfers between positions   [x]Reduced ability to maintain good posture and demonstrate good body mechanics with sitting, bending, and lifting   [x] Reduced ability or tolerance with driving and/or computer work   [x]Reduced ability to sleep   [x]Reduced ability to perform lifting, reaching, carrying tasks   [x]Reduced ability to tolerate impact through UE   [x]Reduced ability to reach behind back   [x]Reduced ability to  or hold objects   [x]Reduced ability to throw or toss an object   []other:    Participation Restrictions   [x]Reduced participation in self care activities   [x]Reduced participation in home management activities   [x]Reduced participation in work activities   [x]Reduced participation in social activities. [x]Reduced participation in sport/recreation activities. Classification:   []Signs/symptoms consistent with post-surgical status including decreased ROM, strength and function.   []Signs/symptoms consistent with joint sprain/strain   [x]Signs/symptoms consistent with shoulder impingement   [x]Signs/symptoms consistent with shoulder/elbow/wrist tendinopathy   []Signs/symptoms consistent with Rotator cuff tear   []Signs/symptoms consistent with labral tear   []Signs/symptoms consistent with postural dysfunction    []Signs/symptoms consistent with Glenohumeral IR Deficit - <45 degrees   []Signs/symptoms consistent with facet dysfunction of cervical/thoracic spine    []Signs/symptoms consistent with pathology which may benefit from Dry needling     []other:     Prognosis/Rehab Potential:      []Excellent   []Good    [x]Fair   []Poor    Tolerance of evaluation/treatment:    []Excellent   [x]Good    []Fair   []Poor  Physical Therapy Evaluation Complexity Justification  [x] A history of present problem with:  [] no personal factors and/or comorbidities that impact the plan of care;  []1-2 personal factors and/or comorbidities that impact the plan of care  [x]3 personal factors and/or comorbidities that impact the plan of care  [x] An examination of body systems using standardized tests and measures addressing any of the following: body structures and functions (impairments), activity limitations, and/or participation restrictions;:  [] a total of 1-2 or more elements   [] a total of 3 or more elements   [x] a total of 4 or more elements   [x] A clinical presentation with:  [x] stable and/or uncomplicated characteristics   [] evolving clinical presentation with changing characteristics  [] unstable and unpredictable characteristics;   [x] Clinical decision making of [x] low, [] moderate, [] high complexity using standardized patient assessment instrument and/or measurable assessment of functional outcome. [x] EVAL (LOW) 38020 (typically 20 minutes face-to-face)  [] EVAL (MOD) 33490 (typically 30 minutes face-to-face)  [] EVAL (HIGH) 44191 (typically 45 minutes face-to-face)  [] RE-EVAL     PLAN:  Frequency/Duration:  1-2 days per week for up to 12 Weeks:  INTERVENTIONS:  [x] Therapeutic exercise including: strength training, ROM, for Upper extremity and core   [x]  NMR activation and proprioception for UE, scap and Core   [x] Manual therapy as indicated for shoulder, scapula and spine to include: Dry Needling/IASTM, STM, PROM, Gr I-IV mobilizations, manipulation. [x] Modalities as needed that may include: thermal agents, E-stim, Biofeedback, US, iontophoresis as indicated  [x] Patient education on joint protection, postural re-education, activity modification, progression of HEP. HEP instruction: (see scanned forms)    GOALS:  Patient stated goal: Pain free and use of arm  [] Progressing: [] Met: [] Not Met: [] Adjusted    Therapist goals for Patient:   Short Term Goals: To be achieved in: 2 weeks  1. Independent in HEP and progression per patient tolerance, in order to prevent re-injury. [] Progressing: [] Met: [] Not Met: [] Adjusted  2.  Patient will have a decrease in pain to facilitate improvement in movement, function, and ADLs as

## 2020-08-19 NOTE — FLOWSHEET NOTE
[facility],  [address]  Phone: [phone]  Fax [phone]    Physical Therapy Daily Treatment Note  Date:  2020    Patient Name:  Evette Moy    :  1955  MRN: [id number]  Restrictions/Precautions:    Physician Information:  Referring Practitioner: Julianne BALDERRAMA  Medical/Treatment Diagnosis Information:  · Diagnosis: M75.81 (ICD-10-CM) - Rotator cuff tendinitis, right;  M75.21 (ICD-10-CM) - Biceps tendinitis on right  · Treatment Diagnosis: M75.81 Right shoulder rotator cuff tendinitis M75.21 Right bicep tendinitis      [x] Conservative / [] Surgical - DOS:  Therapy Diagnosis/Practice Pattern:  Practice Pattern C: Impaired Muscle Performance  Insurance/Certification information:  PT Insurance Information: [address] 30 PT then [employer] of care signed: [x] YES  [] NO  Number of Comorbidities:  []0     []1-2    [x]3+  Date of Patient follow up with Physician:     Is this a Progress Report:     []  Yes  [x]  No        If Yes:  Date Range for reporting period:  Beginning 2020  Ending     Progress report will be due (10 Rx or 30 days whichever is less): 5185       Recertification will be due (POC Duration  / 90 days whichever is less): 2020     Progress Note: [x]  Yes  []  No  Next due by: Visit #10        Latex Allergy:  [x]NO      []YES  Preferred Language for Healthcare:   [x]English       []other:    Visit # Insurance Allowable Reporting Period    30 (20 used on back) Begin Date: 2020               End Date:      SUBJECTIVE:  See eval    OBJECTIVE: See eval   Observation:   Palpation:     Test used Initial score Current Score   Pain Summary VAS 3-6    Functional questionnaire QDash 40%    ROM flexion 130 P! 140    ER Occiput P!     ABD 80     IR T 12 P!     Strength all painful on eval flexion 4-     ER 3+     ABD 4-     IR 3+       RESTRICTIONS/PRECAUTIONS: none    Exercises/Interventions:   Therapeutic Ex Sets/reps Notes HEP   Scap slide at wall  No $ 2 x 10  2 x 10 YTB  YTB X   TB row/ext 2 x 10 RTB cues for UT compensation X      X      X                                                                                       Pt ed: anatomy, frozen shoulder, PT progression, RICE 8 min     Manual Intervention      PROM, grade 1-2 joint mobs 10 min                                         NMR re-education                                                          Therapeutic Exercise and NMR EXR  [x] (72113) Provided verbal/tactile cueing for activities related to strengthening, flexibility, endurance, ROM  for improvements in scapular, scapulothoracic and UE control with self care, reaching, carrying, lifting, house/yardwork, driving/computer work.    [] (69897) Provided verbal/tactile cueing for activities related to improving balance, coordination, kinesthetic sense, posture, motor skill, proprioception  to assist with  scapular, scapulothoracic and UE control with self care, reaching, carrying, lifting, house/yardwork, driving/computer work. Therapeutic Activities:    [] (43303 or 77899) Provided verbal/tactile cueing for activities related to improving balance, coordination, kinesthetic sense, posture, motor skill, proprioception and motor activation to allow for proper function of scapular, scapulothoracic and UE control with self care, carrying, lifting, driving/computer work.      Home Exercise Program:    [x] (17152) Reviewed/Progressed HEP activities related to strengthening, flexibility, endurance, ROM of scapular, scapulothoracic and UE control with self care, reaching, carrying, lifting, house/yardwork, driving/computer work  [] (65920) Reviewed/Progressed HEP activities related to improving balance, coordination, kinesthetic sense, posture, motor skill, proprioception of scapular, scapulothoracic and UE control with self care, reaching, carrying, lifting, house/yardwork, driving/computer work      Manual Treatments:  PROM / STM / Oscillations-Mobs:  G-I, II, III, IV (PA's, Inf., Post.)  [x] (17477) Provided manual therapy to mobilize soft tissue/joints of cervical/CT, scapular GHJ and UE for the purpose of modulating pain, promoting relaxation,  increasing ROM, reducing/eliminating soft tissue swelling/inflammation/restriction, improving soft tissue extensibility and allowing for proper ROM for normal function with self care, reaching, carrying, lifting, house/yardwork, driving/computer work    Modalities:      [] GR/ESU 15 min    [] GR 15 min   [] ESU     [] CP    [] MHP    [x] declined    Charges:  Timed Code Treatment Minutes: 40   Total Treatment Minutes: 55     [x] EVAL (LOW) 95180 (typically 20 minutes face-to-face)  [] EVAL (MOD) 76692 (typically 30 minutes face-to-face)  [] EVAL (HIGH) 64801 (typically 45 minutes face-to-face)  [] RE-EVAL     [x] CC(17447) x  2   [] IONTO  [] NMR (82146) x     [] VASO  [x] Manual (67041) x   1   [] Other:  [] TA x      [] Mech Traction (70917)  [] ES(attended) (50756)      [] ES (un) (42171):     Walt Pichardo stated goal: Pain free and use of arm  [] Progressing: [] Met: [] Not Met: [] Adjusted    Therapist goals for Patient:   Short Term Goals: To be achieved in: 2 weeks  1. Independent in HEP and progression per patient tolerance, in order to prevent re-injury. [] Progressing: [] Met: [] Not Met: [] Adjusted  2. Patient will have a decrease in pain to facilitate improvement in movement, function, and ADLs as indicated by Functional Deficits. [] Progressing: [] Met: [] Not Met: [] Adjusted    Long Term Goals: To be achieved in: 12 weeks  1. Disability index score of 20% or less for the [facility] to assist with reaching prior level of function. [] Progressing: [] Met: [] Not Met: [] Adjusted  2.  Patient will demonstrate increased AROM to 150 pain free flex, within 2 levels functional ER/IR to allow for proper joint

## 2020-08-26 ENCOUNTER — HOSPITAL ENCOUNTER (OUTPATIENT)
Dept: PHYSICAL THERAPY | Age: 65
Setting detail: THERAPIES SERIES
Discharge: HOME | End: 2020-08-26
Payer: COMMERCIAL

## 2020-08-26 PROCEDURE — 97140 MANUAL THERAPY 1/> REGIONS: CPT

## 2020-08-26 PROCEDURE — 97110 THERAPEUTIC EXERCISES: CPT

## 2020-08-26 NOTE — FLOWSHEET NOTE
[facility], [address]  Phone: [phone]  Fax [phone]    Physical Therapy Daily Treatment Note  Date:  2020    Patient Name:  Mani Wilde    :  1955  MRN: [id number]  Restrictions/Precautions:    Physician Information:  Referring Practitioner: Phi BALDERRAMA  Medical/Treatment Diagnosis Information:  · Diagnosis: M75.81 (ICD-10-CM) - Rotator cuff tendinitis, right;  M75.21 (ICD-10-CM) - Biceps tendinitis on right  · Treatment Diagnosis: M75.81 Right shoulder rotator cuff tendinitis M75.21 Right bicep tendinitis      [x] Conservative / [] Surgical - DOS:  Therapy Diagnosis/Practice Pattern:  Practice Pattern C: Impaired Muscle Performance  Insurance/Certification information:  PT Insurance Information: [address] PT then [employer] of care signed: [x] YES  [] NO  Number of Comorbidities:  []0     []1-2    [x]3+  Date of Patient follow up with Physician:     Is this a Progress Report:     []  Yes  [x]  No        If Yes:  Date Range for reporting period:  Beginning 2020  Ending     Progress report will be due (10 Rx or 30 days whichever is less):        Recertification will be due (POC Duration  / 90 days whichever is less): 2020     Progress Note: [x]  Yes  []  No  Next due by: Visit #10        Latex Allergy:  [x]NO      []YES  Preferred Language for Healthcare:   [x]English       []other:    Visit # Insurance Allowable Reporting Period   2 30 (20 used on back) Begin Date: 2020               End Date:      SUBJECTIVE:  Pt reports shoulder doing okay today, was really bothering her past two days and had to take pain meds last night. Back flared up today.     OBJECTIVE: See eval   Observation:   Palpation:     Test used Initial score Current Score   Pain Summary VAS 3-6    Functional questionnaire QDash 40%    ROM flexion 130 P! 140    ER Occiput P!     ABD 80 Reviewed/Progressed HEP activities related to improving balance, coordination, kinesthetic sense, posture, motor skill, proprioception of scapular, scapulothoracic and UE control with self care, reaching, carrying, lifting, house/yardwork, driving/computer work      Manual Treatments:  PROM / STM / Oscillations-Mobs:  G-I, II, III, IV (PA's, Inf., Post.)  [x] (05984) Provided manual therapy to mobilize soft tissue/joints of cervical/CT, scapular GHJ and UE for the purpose of modulating pain, promoting relaxation,  increasing ROM, reducing/eliminating soft tissue swelling/inflammation/restriction, improving soft tissue extensibility and allowing for proper ROM for normal function with self care, reaching, carrying, lifting, house/yardwork, driving/computer work    Modalities:      [] GR/ESU 15 min    [] GR 15 min   [] ESU     [] CP    [] MHP    [x] declined    Charges:  Timed Code Treatment Minutes: 40   Total Treatment Minutes: 40     [] EVAL (LOW) 23209 (typically 20 minutes face-to-face)  [] EVAL (MOD) 99217 (typically 30 minutes face-to-face)  [] EVAL (HIGH) 89176 (typically 45 minutes face-to-face)  [] RE-EVAL     [x] TV(73611) x  1   [] IONTO  [] NMR (59576) x     [] VASO  [x] Manual (69873) x   2   [] Other:  [] TA x      [] Mech Traction (15072)  [] ES(attended) (61603)      [] ES (un) (75710):     Rahel Leblanc stated goal: Pain free and use of arm  [] Progressing: [] Met: [] Not Met: [] Adjusted    Therapist goals for Patient:   Short Term Goals: To be achieved in: 2 weeks  1. Independent in HEP and progression per patient tolerance, in order to prevent re-injury. [] Progressing: [] Met: [] Not Met: [] Adjusted  2. Patient will have a decrease in pain to facilitate improvement in movement, function, and ADLs as indicated by Functional Deficits. [] Progressing: [] Met: [] Not Met: [] Adjusted    Long Term Goals: To be achieved in: 12 weeks  1.  Disability index score of 20% or less for the [facility] to

## 2020-09-02 ENCOUNTER — HOSPITAL ENCOUNTER (OUTPATIENT)
Dept: PHYSICAL THERAPY | Age: 65
Setting detail: THERAPIES SERIES
Discharge: HOME | End: 2020-09-02
Payer: COMMERCIAL

## 2020-09-02 PROCEDURE — 97140 MANUAL THERAPY 1/> REGIONS: CPT

## 2020-09-02 PROCEDURE — 97110 THERAPEUTIC EXERCISES: CPT

## 2020-09-02 NOTE — FLOWSHEET NOTE
[facility], [address]  Phone: [phone]  Fax [phone]    Physical Therapy Daily Treatment Note  Date:  2020    Patient Name:  Arik Fam    :  1955  MRN: [id number]  Restrictions/Precautions:    Physician Information:  Referring Practitioner: Shaquille BALDERRAMA  Medical/Treatment Diagnosis Information:  · Diagnosis: M75.81 (ICD-10-CM) - Rotator cuff tendinitis, right;  M75.21 (ICD-10-CM) - Biceps tendinitis on right  · Treatment Diagnosis: M75.81 Right shoulder rotator cuff tendinitis M75.21 Right bicep tendinitis      [x] Conservative / [] Surgical - DOS:  Therapy Diagnosis/Practice Pattern:  Practice Pattern C: Impaired Muscle Performance  Insurance/Certification information:  PT Insurance Information: [address] PT then [employer] of care signed: [x] YES  [] NO  Number of Comorbidities:  []0     []1-2    [x]3+  Date of Patient follow up with Physician:     Is this a Progress Report:     []  Yes  [x]  No        If Yes:  Date Range for reporting period:  Beginning 2020  Ending     Progress report will be due (10 Rx or 30 days whichever is less): 3/05/7996       Recertification will be due (POC Duration  / 90 days whichever is less): 2020     Progress Note: [x]  Yes  []  No  Next due by: Visit #10        Latex Allergy:  [x]NO      []YES  Preferred Language for Healthcare:   [x]English       []other:    Visit # Insurance Allowable Reporting Period   3 30 (20 used on back) Begin Date: 2020               End Date:      SUBJECTIVE:  Pt reports sleeping on air mattress most nights, wonders if this makes shoulder and back pain worse, thinking of getting bed. Patient states shoulder hurting today from sleeping on it funny, mattress was leaking air by the time she woke up.     OBJECTIVE: See eval   Observation:   Palpation:     Test used Initial score Current Score   Pain Summary VAS 3-6    Functional questionnaire QDash 40%    ROM flexion 130 P! 140    ER Occiput P!     ABD 80     IR T 12 P! Strength all painful on eval flexion 4-     ER 3+     ABD 4-     IR 3+       RESTRICTIONS/PRECAUTIONS: none    Exercises/Interventions:   Therapeutic Ex Sets/reps Notes HEP   Scap slide at wall  No $    x 10 YTB  YTB X   TB row/ext 2 x 10 RTB cues for UT compensation X    punch 30 x YTB X   DB curls neutral supination 2 x 10 3# X                                       Leg press 7 min 70#                                               Pt ed: anatomy, frozen shoulder, PT progression, RICE 8 min     Manual Intervention      PROM, grade 1-2 joint mobs, STM RTC supra bicep tendon 12 min shoulder          Thoaracic Pas Gr 2 5 min     Paraspinal STM 5 min Thoracic/lumbar                      NMR re-education                                                          Therapeutic Exercise and NMR EXR  [x] (55502) Provided verbal/tactile cueing for activities related to strengthening, flexibility, endurance, ROM  for improvements in scapular, scapulothoracic and UE control with self care, reaching, carrying, lifting, house/yardwork, driving/computer work.    [] (68012) Provided verbal/tactile cueing for activities related to improving balance, coordination, kinesthetic sense, posture, motor skill, proprioception  to assist with  scapular, scapulothoracic and UE control with self care, reaching, carrying, lifting, house/yardwork, driving/computer work. Therapeutic Activities:    [] (50522 or 12085) Provided verbal/tactile cueing for activities related to improving balance, coordination, kinesthetic sense, posture, motor skill, proprioception and motor activation to allow for proper function of scapular, scapulothoracic and UE control with self care, carrying, lifting, driving/computer work.      Home Exercise Program:    [x] (91891) Reviewed/Progressed HEP activities related to strengthening, flexibility, endurance, ROM of scapular, scapulothoracic and UE control with self care, reaching, carrying, lifting, house/yardwork, driving/computer work  [] (77081) Reviewed/Progressed HEP activities related to improving balance, coordination, kinesthetic sense, posture, motor skill, proprioception of scapular, scapulothoracic and UE control with self care, reaching, carrying, lifting, house/yardwork, driving/computer work      Manual Treatments:  PROM / STM / Oscillations-Mobs:  G-I, II, III, IV (PA's, Inf., Post.)  [x] (96865) Provided manual therapy to mobilize soft tissue/joints of cervical/CT, scapular GHJ and UE for the purpose of modulating pain, promoting relaxation,  increasing ROM, reducing/eliminating soft tissue swelling/inflammation/restriction, improving soft tissue extensibility and allowing for proper ROM for normal function with self care, reaching, carrying, lifting, house/yardwork, driving/computer work    Modalities:      [] GR/ESU 15 min    [] GR 15 min   [] ESU     [] CP    [] MHP    [x] declined    Charges:  Timed Code Treatment Minutes: 40   Total Treatment Minutes: 40     [] EVAL (LOW) 87042 (typically 20 minutes face-to-face)  [] EVAL (MOD) 47052 (typically 30 minutes face-to-face)  [] EVAL (HIGH) 45430 (typically 45 minutes face-to-face)  [] RE-EVAL     [x] IE(83521) x  1   [] IONTO  [] NMR (29040) x     [] VASO  [x] Manual (00160) x   2   [] Other:  [] TA x      [] Mech Traction (37490)  [] ES(attended) (86746)      [] ES (un) (09425):     Beaulah Stacks stated goal: Pain free and use of arm  [] Progressing: [] Met: [] Not Met: [] Adjusted    Therapist goals for Patient:   Short Term Goals: To be achieved in: 2 weeks  1. Independent in HEP and progression per patient tolerance, in order to prevent re-injury. [] Progressing: [] Met: [] Not Met: [] Adjusted  2. Patient will have a decrease in pain to facilitate improvement in movement, function, and ADLs as indicated by Functional Deficits.   [] treatment well [] Patient limited by fatique  [] Patient limited by pain  [] Patient limited by other medical complications  [] Other:     Patient Requires Follow-up: [x] Yes  [] No    PLAN: See eval  [] Continue per plan of care [] Alter current plan (see comments above)  [x] Plan of care initiated [] Hold pending MD visit [] Discharge      Electronically signed by:  Natalia Little, PT , DPT [id number]    Note: If patient does not return for scheduled/ recommended follow up visits, this note will serve as a discharge from care along with most recent update on progress.

## 2020-09-03 ENCOUNTER — TELEPHONE (OUTPATIENT)
Dept: INTERNAL MEDICINE CLINIC | Age: 65
End: 2020-09-03

## 2020-09-03 NOTE — TELEPHONE ENCOUNTER
Inform pt she's already seeing GI Dr. Nori Alba and he would be the one to do colonoscopy so referral is not needed

## 2020-09-08 RX ORDER — POLYETHYLENE GLYCOL 3350 17 G/17G
POWDER, FOR SOLUTION ORAL
Qty: 238 G | Refills: 0 | Status: SHIPPED | OUTPATIENT
Start: 2020-09-08 | End: 2021-03-09

## 2020-09-08 RX ORDER — BISACODYL 5 MG
TABLET, DELAYED RELEASE (ENTERIC COATED) ORAL
Qty: 4 TABLET | Refills: 0 | Status: SHIPPED | OUTPATIENT
Start: 2020-09-08 | End: 2021-03-09

## 2020-09-09 ENCOUNTER — HOSPITAL ENCOUNTER (OUTPATIENT)
Dept: PHYSICAL THERAPY | Age: 65
Setting detail: THERAPIES SERIES
Discharge: HOME | End: 2020-09-09
Payer: COMMERCIAL

## 2020-09-09 PROCEDURE — 97110 THERAPEUTIC EXERCISES: CPT

## 2020-09-09 PROCEDURE — 97140 MANUAL THERAPY 1/> REGIONS: CPT

## 2020-09-09 NOTE — FLOWSHEET NOTE
[place] [facility] [facility] [facility],  [address]  Phone: [phone]  Fax [phone]    Physical Therapy Daily Treatment Note  Date:  2020    Patient Name:  Peterson Dubon    :  1955  MRN: [id number]  Restrictions/Precautions:    Physician Information:  Referring Practitioner: [place]  Medical/Treatment Diagnosis Information:  · Diagnosis: M75.81 (ICD-10-CM) - Rotator cuff tendinitis, right;  M75.21 (ICD-10-CM) - Biceps tendinitis on right  · Treatment Diagnosis: M75.81 Right shoulder rotator cuff tendinitis M75.21 Right bicep tendinitis      [x] Conservative / [] Surgical - DOS:  Therapy Diagnosis/Practice Pattern:  Practice Pattern C: Impaired Muscle Performance  Insurance/Certification information:  PT Insurance Information: [address] PT then [employer] of care signed: [x] YES  [] NO  Number of Comorbidities:  []0     []1-2    [x]3+  Date of Patient follow up with Physician:     Is this a Progress Report:     []  Yes  [x]  No        If Yes:  Date Range for reporting period:  Beginning 2020  Ending     Progress report will be due (10 Rx or 30 days whichever is less):        Recertification will be due (POC Duration  / 90 days whichever is less): 2020     Progress Note: [x]  Yes  []  No  Next due by: Visit #10        Latex Allergy:  [x]NO      []YES  Preferred Language for Healthcare:   [x]English       []other:    Visit # Insurance Allowable Reporting Period    (20 used on back) Begin Date: 2020               End Date:      SUBJECTIVE:  Pt reports shoulder is still hurting a lot, not noticing much change at all. OBJECTIVE: See eval   Observation:   Palpation:     Test used Initial score Current Score   Pain Summary VAS 3-6    Functional questionnaire QDash 40%    ROM flexion 130 P! 140    ER Occiput P!     ABD 80     IR T 12 P!     Strength all painful on eval flexion 4- ER 3+     ABD 4-     IR 3+       RESTRICTIONS/PRECAUTIONS: none    Exercises/Interventions:   Therapeutic Ex Sets/reps Notes HEP   Scap slide at wall  No $  Prone row    x 10  X 10 YTB  YTB X   TB row/ext 2 x 10 RTB cues for UT compensation X    punch x YTB X   DB curls neutral supination 2 x 10 3# X   Finisher 20 x 2#  Fwd/ladder                                  Leg press 5 min 60#                                               Pt ed: encouraged patient to follow up with MD for possible shoulder MRI to rule out RTC tear 8 min     Manual Intervention      PROM, grade 1-2 joint mobs, STM RTC supra bicep tendon 12 min shoulder          Thoaracic Pas Gr 2      Paraspinal STM 5 min Thoracic/lumbar                      NMR re-education                                                          Therapeutic Exercise and NMR EXR  [x] (71863) Provided verbal/tactile cueing for activities related to strengthening, flexibility, endurance, ROM  for improvements in scapular, scapulothoracic and UE control with self care, reaching, carrying, lifting, house/yardwork, driving/computer work.    [] (79377) Provided verbal/tactile cueing for activities related to improving balance, coordination, kinesthetic sense, posture, motor skill, proprioception  to assist with  scapular, scapulothoracic and UE control with self care, reaching, carrying, lifting, house/yardwork, driving/computer work. Therapeutic Activities:    [] (46803 or 40956) Provided verbal/tactile cueing for activities related to improving balance, coordination, kinesthetic sense, posture, motor skill, proprioception and motor activation to allow for proper function of scapular, scapulothoracic and UE control with self care, carrying, lifting, driving/computer work.      Home Exercise Program:    [x] (14220) Reviewed/Progressed HEP activities related to strengthening, flexibility, endurance, ROM of scapular, scapulothoracic and UE control with self care, reaching, carrying, lifting, house/yardwork, driving/computer work  [] (71759) Reviewed/Progressed HEP activities related to improving balance, coordination, kinesthetic sense, posture, motor skill, proprioception of scapular, scapulothoracic and UE control with self care, reaching, carrying, lifting, house/yardwork, driving/computer work      Manual Treatments:  PROM / STM / Oscillations-Mobs:  G-I, II, III, IV (PA's, Inf., Post.)  [x] (63154) Provided manual therapy to mobilize soft tissue/joints of cervical/CT, scapular GHJ and UE for the purpose of modulating pain, promoting relaxation,  increasing ROM, reducing/eliminating soft tissue swelling/inflammation/restriction, improving soft tissue extensibility and allowing for proper ROM for normal function with self care, reaching, carrying, lifting, house/yardwork, driving/computer work    Modalities:      [] GR/ESU 15 min    [] GR 15 min   [] ESU     [] CP    [] MHP    [x] declined    Charges:  Timed Code Treatment Minutes: 40   Total Treatment Minutes: 45     [] EVAL (LOW) 78048 (typically 20 minutes face-to-face)  [] EVAL (MOD) 23584 (typically 30 minutes face-to-face)  [] EVAL (HIGH) 423 [id number] (typically 45 minutes face-to-face)  [] RE-EVAL     [x] YJ(31134) x  2   [] IONTO  [] NMR (89912) x     [] VASO  [x] Manual (41914) x   1   [] Other:  [] TA x      [] Mech Traction (11809)  [] ES(attended) (48709)      [] ES (un) (61886):     Zuleika Standard stated goal: Pain free and use of arm  [] Progressing: [] Met: [] Not Met: [] Adjusted    Therapist goals for Patient:   Short Term Goals: To be achieved in: 2 weeks  1. Independent in HEP and progression per patient tolerance, in order to prevent re-injury. [] Progressing: [] Met: [] Not Met: [] Adjusted  2. Patient will have a decrease in pain to facilitate improvement in movement, function, and ADLs as indicated by Functional Deficits. [] Progressing: [] Met: [] Not Met: [] Adjusted    Long Term Goals:  To be achieved in: 12

## 2020-09-15 ENCOUNTER — OFFICE VISIT (OUTPATIENT)
Dept: ORTHOPEDIC SURGERY | Age: 65
End: 2020-09-15
Payer: COMMERCIAL

## 2020-09-15 ENCOUNTER — TELEMEDICINE (OUTPATIENT)
Dept: INTERNAL MEDICINE CLINIC | Age: 65
End: 2020-09-15
Payer: COMMERCIAL

## 2020-09-15 VITALS — HEIGHT: 65 IN | BODY MASS INDEX: 30.99 KG/M2 | WEIGHT: 186 LBS

## 2020-09-15 PROCEDURE — 99214 OFFICE O/P EST MOD 30 MIN: CPT | Performed by: INTERNAL MEDICINE

## 2020-09-15 PROCEDURE — 99214 OFFICE O/P EST MOD 30 MIN: CPT | Performed by: PHYSICIAN ASSISTANT

## 2020-09-15 PROCEDURE — 1036F TOBACCO NON-USER: CPT | Performed by: PHYSICIAN ASSISTANT

## 2020-09-15 PROCEDURE — 2022F DILAT RTA XM EVC RTNOPTHY: CPT | Performed by: INTERNAL MEDICINE

## 2020-09-15 PROCEDURE — G8427 DOCREV CUR MEDS BY ELIG CLIN: HCPCS | Performed by: PHYSICIAN ASSISTANT

## 2020-09-15 PROCEDURE — G8417 CALC BMI ABV UP PARAM F/U: HCPCS | Performed by: PHYSICIAN ASSISTANT

## 2020-09-15 PROCEDURE — 3051F HG A1C>EQUAL 7.0%<8.0%: CPT | Performed by: INTERNAL MEDICINE

## 2020-09-15 PROCEDURE — 3017F COLORECTAL CA SCREEN DOC REV: CPT | Performed by: INTERNAL MEDICINE

## 2020-09-15 PROCEDURE — G8427 DOCREV CUR MEDS BY ELIG CLIN: HCPCS | Performed by: INTERNAL MEDICINE

## 2020-09-15 PROCEDURE — 3017F COLORECTAL CA SCREEN DOC REV: CPT | Performed by: PHYSICIAN ASSISTANT

## 2020-09-15 RX ORDER — FLUCONAZOLE 150 MG/1
150 TABLET ORAL ONCE
Qty: 1 TABLET | Refills: 0 | Status: SHIPPED | OUTPATIENT
Start: 2020-09-15 | End: 2020-09-15

## 2020-09-15 RX ORDER — METHYLPREDNISOLONE 4 MG/1
TABLET ORAL
Qty: 1 KIT | Refills: 0 | Status: SHIPPED | OUTPATIENT
Start: 2020-09-15 | End: 2020-09-21

## 2020-09-15 RX ORDER — EMPAGLIFLOZIN 25 MG/1
TABLET, FILM COATED ORAL
Qty: 90 TABLET | Refills: 0 | Status: SHIPPED | OUTPATIENT
Start: 2020-09-15 | End: 2020-11-15 | Stop reason: SDUPTHER

## 2020-09-15 NOTE — PROGRESS NOTES
CHIEF COMPLAINT:    Chief Complaint   Patient presents with    Shoulder Pain     CK RIGHT SHOULDER-BRITTNEE 8/12/2020       HISTORY OF PRESENT ILLNESS:                The patient is a 60 y.o. female who presents to the clinic with continued right shoulder pain. She received a cortisone injection to the shoulder on 8/12/2020 and has been attending physical therapy since that time. She states she really has not been making any progress with physical therapy and her pain is not improving. She states that since last visit, she did recall an injury that she had when this pain for started. She said she was playing basketball and when she went to shoot she felt a sharp pain in the shoulder. She was unable to keep playing after that incident. She continues to complain of pain with any overhead use of the arm or any lifting. Past Medical History:   Diagnosis Date    Chronic back pain     [facility] ([facility] [address]     Essential hypertension 2/26/2020    Hyperlipidemia     Hypothyroidism     Thyroid disease         Work Status: Not currently working    The pain assessment was noted & is as follows:  Pain Assessment  Location of Pain: Shoulder  Location Modifiers: Right  Severity of Pain: 5  Quality of Pain: Dull, Aching, Sharp  Duration of Pain: A few hours  Frequency of Pain: Several times daily  Aggravating Factors:  Other (Comment), Bending(CERTAIN ROM)  Limiting Behavior: Some  Relieving Factors: Rest  Result of Injury: No  Work-Related Injury: No  Are there other pain locations you wish to document?: No]      Work Status/Functionality:     Past Medical History: Medical history form was reviewed today & can be found in the media tab  Past Medical History:   Diagnosis Date    Chronic back pain     Diabetes mellitus ([address]     Essential hypertension 2/26/2020    Hyperlipidemia     Hypothyroidism     Thyroid disease       Past Surgical History:     Past Surgical History:   Procedure Laterality Date    APPENDECTOMY  1974    BREAST SURGERY Right 2013    drainage    CHOLECYSTECTOMY  1974    HYSTERECTOMY, VAGINAL  1996    TUBAL LIGATION  1981    UPPER GASTROINTESTINAL ENDOSCOPY      UPPER GASTROINTESTINAL ENDOSCOPY  06/22/2020    bx    UPPER GASTROINTESTINAL ENDOSCOPY N/A 6/22/2020    EGD BIOPSY performed by Zulay Butler MD at [address]     Current Medications:     Current Outpatient Medications:     methylPREDNISolone (MEDROL, PETRA,) 4 MG tablet, Take by mouth as directed on package insert., Disp: 1 kit, Rfl: 0    fluconazole (DIFLUCAN) 150 MG tablet, Take 1 tablet by mouth once for 1 dose, Disp: 1 tablet, Rfl: 0    empagliflozin (JARDIANCE) 25 MG tablet, TAKE 1 TABLET BY MOUTH EVERY DAY, Disp: 90 tablet, Rfl: 0    bisacodyl (DULCOLAX) 5 MG EC tablet, Use as directed for colonoscopy prep, Disp: 4 tablet, Rfl: 0    polyethylene glycol (MIRALAX) 17 GM/SCOOP powder, Use as directed for colonoscopy prep, Disp: 238 g, Rfl: 0    gabapentin (NEURONTIN) 300 MG capsule, Take 1 capsule by mouth 3 times daily for 30 days.  (Patient not taking: Reported on 9/15/2020), Disp: 90 capsule, Rfl: 0    omeprazole (PRILOSEC) 20 MG delayed release capsule, TAKE 1 CAPSULE BY MOUTH EVERY DAY, Disp: 30 capsule, Rfl: 9    SITagliptin (JANUVIA) 100 MG tablet, Take 1 tablet by mouth daily, Disp: 90 tablet, Rfl: 3    metFORMIN (GLUCOPHAGE) 850 MG tablet, Take 1 tablet by mouth daily, Disp: 90 tablet, Rfl: 3    meloxicam (MOBIC) 15 MG tablet, Take 1 tablet by mouth daily as needed for Pain, Disp: 30 tablet, Rfl: 0    blood glucose monitor strips, Test qd, Disp: 100 strip, Rfl: 3    levothyroxine (SYNTHROID) 75 MCG tablet, Take 1 tablet by mouth daily, Disp: 90 tablet, Rfl: 2    glimepiride (AMARYL) 4 MG tablet, Take 1 tablet by mouth 2 times daily, Disp: 180 tablet, Rfl: 3    atorvastatin (LIPITOR) 20 MG tablet, Take 1 tablet by mouth daily, Disp: 90 tablet, Rfl: 3    lisinopril (PRINIVIL;ZESTRIL) 40 MG tablet, Take 1 tablet by mouth daily, Disp: 90 tablet, Rfl: 3    Vaginal Lubricant (REPLENS) GEL, Place 1 Dose vaginally daily, Disp: 35 g, Rfl: 1  Allergies:  Patient has no known allergies. Social History:    reports that she quit smoking about 10 years ago. Her smoking use included cigarettes. She started smoking about 28 years ago. She has a 10.00 pack-year smoking history. She has never used smokeless tobacco. She reports that she does not drink alcohol or use drugs. Family History:   Family History   Problem Relation Age of Onset    Dementia Mother     Breast Cancer Sister     Atrial Fibrillation Sister     High Blood Pressure Maternal Grandmother     Diabetes Sister        REVIEW OF SYSTEMS:   For new problems, a full review of systems will be found scanned in the patient's chart. CONSTITUTIONAL: Denies unexplained weight loss, fevers, chills   NEUROLOGICAL: Denies unsteady gait or progressive weakness  SKIN: Denies skin changes, delayed healing, rash, itching       PHYSICAL EXAM:    Vitals: Height 5' 5\" (1.651 m), weight 186 lb (84.4 kg), not currently breastfeeding. GENERAL EXAM:  · General Apparence: Patient is adequately groomed with no evidence of malnutrition. · Orientation: The patient is oriented to time, place and person. · Mood & Affect:The patient's mood and affect are appropriate       Right shoulder PHYSICAL EXAMINATION:  · Inspection: No visible deformity. No significant edema, erythema or ecchymosis. · Palpation: Tenderness to palpation primarily at the subacromial space    · Range of Motion: Range of motion of the shoulder is painful overhead however, not significantly limited    · Strength: Supraspinatus strength testing reproduces pain but no gross weakness    · Special Tests: Pain with Caraballo testing. Negative empty can testing. Negative Spurling's testing. · Skin:  There are no rashes, ulcerations or lesions.   · There are no dysvascular changes     Gait & station: Normal      Additional Examinations:        Left Upper Extremity: Examination of the left upper extremity does not show any tenderness, deformity or injury. Range of motion is unremarkable. There is no gross instability. There are no rashes, ulcerations or lesions. Strength and tone are normal.    X-rays:    Previous x-rays of the right shoulder were reviewed and within normal limits    Orders     Orders Placed This Encounter   Procedures    MRI SHOULDER RIGHT WO CONTRAST     Standing Status:   Future     Standing Expiration Date:   9/15/2021     Scheduling Instructions:      DEONNA Rao @ [address] [place] [address] ONCE APPROVED     Order Specific Question:   Reason for exam:     Answer:   RIGHT SHOULDER PAIN. R.O RCT         Assessment / Treatment Plan:     1. Right shoulder rotator cuff tendinitis, not improving    2. Right sided cervical radiculitis    Given her distinct injury to the shoulder and failure to improve with conservative measures, we are going to obtain a MRI of the right shoulder to rule out rotator cuff tear. I discussed with the patient that I think she has a radicular component to her symptoms. She was also prescribed a Medrol Dosepak to help with this. She has tolerated this medication in the past.  We will see her back to review the results of her MRI. If her radicular symptoms persist, she may need a further work-up of her cervical spine. I spent 25 minutes face-to-face with the patient and greater than 50% of that time was spent counseling/coordinating care for the above-stated diagnosis       Claudette Salvador, [facility]    This dictation was performed with a verbal recognition program (DRAGON) and it was checked for errors. It is possible that there are still dictated errors within this office note. If so, please bring any errors to my attention for an addendum. All efforts were made to ensure that this office note is accurate.

## 2020-09-15 NOTE — PROGRESS NOTES
Date of Service:  9/15/2020    Chief Complaint:    No chief complaint on file. HPI:  Bia Edwards is a 60 y.o. Pursuant to the emergency declaration under the [facility] [place] [address] [facility] authority and the [employer] Act, this Virtual  Video Visit was conducted, with patient's consent, to reduce the patient's risk of exposure to COVID-19 and provide continuity of care. Service is  provided through a video synchronous discussion virtually to substitute for in-person clinic visit with the patient being at home and Dr. Kumar Saavedra being at home. She complain of vaginal white discharge with itching for couple of days. She's been getting a lot of yeast infection lately, last one 2 months ago. Left side abd pain and burning sensation on left side that's superficial.  She complains of left foot gets numb after walking for an hour. She didn't want to try neurontin and using otc CBD oil instead. Treatment Adherence:   Medication compliance:  compliant most of the time  Diet compliance:  compliant most of the time  Weight trend: decreasing  Current exercise: walks 5 time(s) per week  Barriers: none     Diabetes Mellitus Type 2: Current symptoms/problems include neuropathy.  Stable on Januvia 100 mg qd, Jardiance 25 mg qd, Metformin 850 mg qhs and no more diarrhea,  glimepiride 4 mg bid.       Home blood sugar records: fasting range: 83 125 115 AM and 123 100 83 PM  Any episodes of hypoglycemia? no  Eye exam current (within one year): yes  Tobacco history: She  reports that she quit smoking about 10 years ago. Her smoking use included cigarettes. She has a 10.00 pack-year smoking history. She has never used smokeless tobacco.   Daily Aspirin? No: will start  Known diabetic complications: none  Hypertension:  Home blood pressure monitoring: Yes 120/60-70.  Stable on Lisinopril 40 mg qd.   She is adherent to a low sodium diet. Patient denies chest pain, shortness of breath, headache, lightheadedness, blurred vision, peripheral edema, palpitations, dry cough and fatigue.  Antihypertensive medication side effects: no medication side effects noted.  Use of agents associated with hypertension: thyroid hormones. Hyperlipidemia:  No new myalgias or GI upset on atorvastatin (Lipitor) 20 mg qd.     Hypothyroidism: Recent symptoms: stable with increase Levothyroxine 75 mcg qd. She denies fatigue, weight gain, weight loss, cold intolerance, heat intolerance, hair loss, dry skin, constipation, diarrhea, edema, anxiety, tremor, palpitations and dysphagia. Patient is  taking her medication consistently on an empty stomach.     Lab Results   Component Value Date    LABA1C 7.1 08/13/2020    LABA1C 7.4 05/19/2020    LABMICR 2.20 (H) 03/09/2020     Lab Results   Component Value Date     02/23/2020    K 4.0 02/23/2020     02/23/2020    CO2 24 02/23/2020    BUN 9 02/23/2020    CREATININE 0.6 02/23/2020    GLUCOSE 185 (H) 02/23/2020    CALCIUM 9.2 02/23/2020     Lab Results   Component Value Date    CHOL 162 01/06/2020    TRIG 78 01/06/2020    HDL 46 01/06/2020    LDLCALC 99 01/06/2020     Lab Results   Component Value Date    ALT 12 06/22/2020    AST 18 06/22/2020     Lab Results   Component Value Date    TSH 0.07 (L) 05/19/2020    TSH 3.16 01/06/2020    T4FREE 1.6 05/19/2020     Lab Results   Component Value Date    WBC 8.0 02/23/2020    HGB 13.2 02/23/2020    HCT 38.9 02/23/2020    MCV 88.7 02/23/2020     02/23/2020     No results found for: INR   No results found for: PSA   No results found for: LABURIC     Patient Active Problem List   Diagnosis    Type 2 diabetes mellitus without complication, without long-term current use of insulin (HCC)    Mixed hyperlipidemia    Essential hypertension    Acquired hypothyroidism    Erosive gastritis    Esophagitis, Logan grade A    RUQ pain    Fatty liver       No Known Allergies  Outpatient Medications Marked as Taking for the 9/15/20 encounter (Telemedicine) with Se Santos MD   Medication Sig Dispense Refill    bisacodyl (DULCOLAX) 5 MG EC tablet Use as directed for colonoscopy prep 4 tablet 0    polyethylene glycol (MIRALAX) 17 GM/SCOOP powder Use as directed for colonoscopy prep 238 g 0    omeprazole (PRILOSEC) 20 MG delayed release capsule TAKE 1 CAPSULE BY MOUTH EVERY DAY 30 capsule 9    SITagliptin (JANUVIA) 100 MG tablet Take 1 tablet by mouth daily 90 tablet 3    metFORMIN (GLUCOPHAGE) 850 MG tablet Take 1 tablet by mouth daily 90 tablet 3    JARDIANCE 25 MG tablet TAKE 1 TABLET BY MOUTH EVERY DAY 30 tablet 1    blood glucose monitor strips Test qd 100 strip 3    levothyroxine (SYNTHROID) 75 MCG tablet Take 1 tablet by mouth daily 90 tablet 2    glimepiride (AMARYL) 4 MG tablet Take 1 tablet by mouth 2 times daily 180 tablet 3    atorvastatin (LIPITOR) 20 MG tablet Take 1 tablet by mouth daily 90 tablet 3    lisinopril (PRINIVIL;ZESTRIL) 40 MG tablet Take 1 tablet by mouth daily 90 tablet 3    Vaginal Lubricant (REPLENS) GEL Place 1 Dose vaginally daily 35 g 1         Review of Systems: 14 systems were negative except of what was stated on HPI    Nursing note and vitals reviewed. There were no vitals filed for this visit. Wt Readings from Last 3 Encounters:   08/12/20 186 lb (84.4 kg)   08/03/20 186 lb 1.1 oz (84.4 kg)   06/22/20 186 lb (84.4 kg)     BP Readings from Last 3 Encounters:   06/22/20 117/69   06/08/20 102/68   06/01/20 128/78     There is no height or weight on file to calculate BMI. Constitutional: Patient appears well-developed and well-nourished. No distress. Head: Normocephalic and atraumatic. Skin: No rash or erythema. Psychiatric: Normal mood and affect. Behavior is normal.       Assessment/Plan:  Bailee was seen today for abdominal pain.     Diagnoses and all orders for this visit:    Vaginal yeast infection  -     fluconazole (DIFLUCAN) 150 MG tablet; Take 1 tablet by mouth once for 1 dose    Type 2 diabetes mellitus without complication, without long-term current use of insulin (HCC)  Reduce empagliflozin (JARDIANCE) 25 MG tablet; TAKE 1/2 TABLET BY MOUTH EVERY DAY to see if less yeast infection  Increase metformin 850 mg 1/2 dinner and 1 bedtime    Acquired hypothyroidism  Stable and continue on current medications. Mixed hyperlipidemia  Stable and continue on current medications. Essential hypertension  Stable and continue on current medications. RUQ abdominal pain  Pt decline med    Neuralgia  Pt decline med      Return Oct 13 at 11:40 AM [facility].

## 2020-09-16 ENCOUNTER — HOSPITAL ENCOUNTER (OUTPATIENT)
Dept: PHYSICAL THERAPY | Age: 65
Setting detail: THERAPIES SERIES
Discharge: HOME | End: 2020-09-16
Payer: COMMERCIAL

## 2020-09-16 PROCEDURE — 97140 MANUAL THERAPY 1/> REGIONS: CPT

## 2020-09-16 PROCEDURE — 97110 THERAPEUTIC EXERCISES: CPT

## 2020-09-16 NOTE — FLOWSHEET NOTE
[facility], [address]  Phone: [phone]  Fax [phone]    Physical Therapy Treatment Note/ Progress Report:     Date:  2020    Patient Name:  Latoya Calderón    :  1955  MRN: [id number]  Restrictions/Precautions:    Medical/Treatment Diagnosis Information:  Diagnosis: M54.5 (ICD-10-CM) - Low back pain, unspecified back pain laterality, unspecified chronicity, unspecified whether sciatica present; M54.16 (ICD-10-CM) - Lumbar radiculitis  Treatment Diagnosis: M54.5 - Lumbar pain; M54.41 - LBP w/ sciatica  Insurance/Certification information:  PT Insurance Information: [employer]  Physician Information:  Referring Practitioner: Dr. Kim Rai  Has the plan of care been signed (Y/N):        [x]  Yes  []  No     Date of Patient follow up with Physician: PRN      Is this a Progress Report:     [x]  Yes  []  No        If Yes:  Date Range for reporting period:  Beginning 2020  Ending 2020    Progress report will be due (10 Rx or 30 days whichever is less):        Recertification will be due (POC Duration  / 90 days whichever is less): 2020         Visit # Insurance Allowable Auth Required   [address]  30 visits []  Yes [x]  No        Functional Scale: Modified Oswestry score 23, 46% disability  Date assessed:  3/11/2020           Modified Oswestry score 18, 36% disability  Date assessed:  4/15/2020           Modified Oswestry score 13, 26%           2020           Modified Oswestry score 12, 24%           2020           Modified Oswestry score 13, 26%           2020    Therapy Diagnosis/Practice Pattern: M54.5 - Lumbar pain; M54.41 - LBP w/ sciatica      Number of Comorbidities:  []0     [x]1-2    []3+    Latex Allergy:  [x]NO      []YES  Preferred Language for Healthcare:   [x]English       []other:      Pain level:  0/10 beginning of session; 0/10 at end of session    SUBJECTIVE:  Patient reports she is getting MRI on shoulder. Back bothering her a little more today. OBJECTIVE:    Observation:    Test measurements:       RESTRICTIONS/PRECAUTIONS: none    Exercises/Interventions:     Therapeutic Ex (94757) Sets/sec Notes/CUES   Bike 7 min    TA BKFO  TA march  Bridges  Hip ABD isometric  Hip ADD isometric  Clamshells  x 15   x 15  2 x 10  15 x   15 x  15 x OVL    SB DKTC  SB LTRs 2x15  2x10   LBW Cues to keep TA contraction   Multifidi walkouts 2x5, bilaterally  green   TB anti-rotation presses Cues to not let resistance turn her   Prayer s  Right hand on top of left                              Manual Intervention (09370)     STM and Trigger point release parspinal 15 min prone                  NMR re-education (98411)  CUES NEEDED                                                Therapeutic Activity (60640)                                       Therapeutic Exercise and NMR EXR  [x] (30456) Provided verbal/tactile cueing for activities related to strengthening, flexibility, endurance, ROM  for improvements in proximal hip and core control with self care, mobility, lifting and ambulation.  [] (28017) Provided verbal/tactile cueing for activities related to improving balance, coordination, kinesthetic sense, posture, motor skill, proprioception  to assist with core control in self care, mobility, lifting, and ambulation.      Therapeutic Activities:    [] (31476 or 42745) Provided verbal/tactile cueing for activities related to improving balance, coordination, kinesthetic sense, posture, motor skill, proprioception and motor activation to allow for proper function  with self care and ADLs  [] (02286) Provided training and instruction to the patient for proper core and proximal hip recruitment and positioning with ambulation re-education     Home Exercise Program:    [x] (97622) Reviewed/Progressed HEP activities related to strengthening, flexibility, endurance, ROM of core, proximal hip and LE for functional self-care, mobility, lifting and ambulation   [] (60493) Reviewed/Progressed HEP activities related to improving balance, coordination, kinesthetic sense, posture, motor skill, proprioception of core, proximal hip and LE for self care, mobility, lifting, and ambulation      Manual Treatments:  PROM / STM / Oscillations-Mobs:  G-I, II, III, IV (PA's, Inf., Post.)  [x] (37255) Provided manual therapy to mobilize proximal hip and LS spine soft tissue/joints for the purpose of modulating pain, promoting relaxation,  increasing ROM, reducing/eliminating soft tissue swelling/inflammation/restriction, improving soft tissue extensibility and allowing for proper ROM for normal function with self care, mobility, lifting and ambulation. Modalities:      Charges:  Timed Code Treatment Minutes: 40   Total Treatment Minutes: 40       [] EVAL (LOW) 59064 (typically 20 minutes face-to-face)  [] EVAL (MOD) 14322 (typically 30 minutes face-to-face)  [] EVAL (HIGH) 21956 (typically 45 minutes face-to-face)  [] RE-EVAL     [x] XL(99473) x 2  [] IONTO  [] NMR (61321) x     [] VASO  [x] Manual (59291) x 1    [] Other:  [] TA x      [] Mech Traction (66501)  [] ES(attended) (25242)     [] ES (un) (00672):     Goals: Patient stated goal: to get rid of pain; to get a good night's sleep again; to be able to return to work  [x] Progressing: [] Met: [] Not Met: [] Adjusted    Therapist goals for Patient:   Short Term Goals: To be achieved in: 2 weeks  1. Independent in HEP and progression per patient tolerance, in order to prevent re-injury. [] Progressing: [x] Met: [] Not Met: [] Adjusted  2. Patient will have a decrease in pain to facilitate improvement in movement, function, and ADLs as indicated by Functional Deficits. [] Progressing: [x] Met: [] Not Met: [] Adjusted      Long Term Goals: To be achieved in: 12 weeks  1.  Disability index score of 23% or less for the Modified Oswestry  to assist with reaching prior level of function. [x] Progressing: [] Met: [] Not Met: [] Adjusted  2. Patient will demonstrate increased AROM to WNL, good LS mobility, good hip ROM to allow for proper joint functioning as indicated by patients Functional Deficits. [x] Progressing: [] Met: [] Not Met: [] Adjusted  3. Patient will demonstrate an increase in Strength to good proximal hip and core activation to allow for proper functional mobility as indicated by patients Functional Deficits. [x] Progressing: [] Met: [] Not Met: [] Adjusted  4. Patient will return to sitting and standing functional activities without increased symptoms or restriction. [x] Progressing: [] Met: [] Not Met: [] Adjusted  5. Patient will return to full LE ROM and strength to allow for decrease in lumbar and R leg pain, to allow for return to ADLs, functional tasks, and recreational activities including gym workouts, without pain or restriction. (patient specific functional goal)    [x] Progressing: [] Met: [] Not Met: [] Adjusted    Progression Towards Functional goals:  [x] Patient is progressing as expected towards functional goals listed. [] Progression is slowed due to complexities listed. [] Progression has been slowed due to co-morbidities. [] Plan just implemented, too soon to assess goals progression  [] Other:     Overall Progression Towards Functional goals/ Treatment Progress Update:  [x] Patient is progressing as expected towards functional goals listed. [] Progression is slowed due to complexities/Impairments listed. [] Progression has been slowed due to co-morbidities.   [] Plan just implemented, too soon to assess goals progression <30days   [] Goals require adjustment due to lack of progress  [] Patient is not progressing as expected and requires additional follow up with physician  [] Other    Prognosis for POC: [x] Good [] Fair  [] Poor      Patient requires continued skilled intervention: [x] Yes  [] No    Treatment/Activity Tolerance:  [x] Patient able to complete treatment  [] Patient limited by fatigue  [] Patient limited by pain    [] Patient limited by other medical complications  [] Other:     ASSESSMENT: Patient had no pain in her lower back or R leg today upon arrival. Patient able to perform all table exercises without any issues. Patient had difficulty with multifidi walkouts and anti-rotation presses today, due to pain in her R shoulder. Patient requires skilled physical therapy intervention to address pain level, deficits in lumbar ROM and R LE ROM, and gait impairments, to allow for return to ADLs, functional tasks, and recreational activities including going to the gym, without pain or restriction. PLAN: 1-2 days a week for 12 weeks (6/17/2020-9/17/2020)  [x] Continue per plan of care [] Alter current plan (see comments above)  [] Plan of care initiated [] Hold pending MD visit [] Discharge      Electronically signed by:  Natalia Little, PT, DPT    Note: If patient does not return for scheduled/ recommended follow up visits, this note will serve as a discharge from care along with most recent update on progress.

## 2020-09-23 ENCOUNTER — HOSPITAL ENCOUNTER (OUTPATIENT)
Dept: PHYSICAL THERAPY | Age: 65
Setting detail: THERAPIES SERIES
Discharge: HOME | End: 2020-09-23
Payer: COMMERCIAL

## 2020-09-23 ENCOUNTER — TELEPHONE (OUTPATIENT)
Dept: INTERNAL MEDICINE CLINIC | Age: 65
End: 2020-09-23

## 2020-09-23 ENCOUNTER — TELEPHONE (OUTPATIENT)
Dept: ORTHOPEDIC SURGERY | Age: 65
End: 2020-09-23

## 2020-09-23 PROCEDURE — 97140 MANUAL THERAPY 1/> REGIONS: CPT

## 2020-09-23 PROCEDURE — 97110 THERAPEUTIC EXERCISES: CPT

## 2020-09-23 RX ORDER — BLOOD-GLUCOSE METER
1 KIT MISCELLANEOUS DAILY
Qty: 1 KIT | Refills: 0 | Status: SHIPPED | OUTPATIENT
Start: 2020-09-23

## 2020-09-23 NOTE — TELEPHONE ENCOUNTER
CALLED LVM FOR PATIENT TODAY, STATING MRI IS APPROVED FOR [facility], & LEFT # FOR THEM TO CALL & SCHEDULE THAT. & TO MAKE AN FOLLOW UP APPT W/ DR. DRUMMOND AFTER MRI.  [facility] [facility]

## 2020-09-23 NOTE — TELEPHONE ENCOUNTER
----- Message from Tinasandra Clemons sent at 9/23/2020 10:34 AM EDT -----  Subject: Message to Provider    QUESTIONS  Information for Provider? Patient needs a new prescription for a new   glucose monitor. She has lost the one she had. Please send to [facility] on   [facility].  ---------------------------------------------------------------------------  --------------  [address]  What is the best way for the office to contact you? OK to leave message on   voicemail  Preferred Call Back Phone Number? [phone]  ---------------------------------------------------------------------------  --------------  SCRIPT ANSWERS  Relationship to Patient?  Self

## 2020-09-23 NOTE — FLOWSHEET NOTE
[facility], [address]  Phone: [phone]  Fax [phone]    Physical Therapy Treatment Note/ Progress Report:     Date:  2020    Patient Name:  Bia Edwards    :  1955  MRN: [id number]  Restrictions/Precautions:    Medical/Treatment Diagnosis Information:  Diagnosis: M54.5 (ICD-10-CM) - Low back pain, unspecified back pain laterality, unspecified chronicity, unspecified whether sciatica present; M54.16 (ICD-10-CM) - Lumbar radiculitis  Treatment Diagnosis: M54.5 - Lumbar pain; M54.41 - LBP w/ sciatica  Insurance/Certification information:  PT Insurance Information: [employer] Ball  Physician Information:  Referring Practitioner: Dr. Nelly Mcgill  Has the plan of care been signed (Y/N):        [x]  Yes  []  No     Date of Patient follow up with Physician: PRN      Is this a Progress Report:     [x]  Yes  []  No        If Yes:  Date Range for reporting period:  Beginning 2020  Ending 2020    Progress report will be due (10 Rx or 30 days whichever is less):        Recertification will be due (POC Duration  / 90 days whichever is less): 2020         Visit # Insurance Allowable Auth Required   [address]  30 visits []  Yes [x]  No        Functional Scale: Modified Oswestry score 23, 46% disability  Date assessed:  3/11/2020           Modified Oswestry score 18, 36% disability  Date assessed:  4/15/2020           Modified Oswestry score 13, 26%           2020           Modified Oswestry score 12, 24%           2020           Modified Oswestry score 13, 26%           2020    Therapy Diagnosis/Practice Pattern: M54.5 - Lumbar pain; M54.41 - LBP w/ sciatica      Number of Comorbidities:  []0     [x]1-2    []3+    Latex Allergy:  [x]NO      []YES  Preferred Language for Healthcare:   [x]English       []other:      Pain level:  0/10 beginning of session; 0/10 at end of session    SUBJECTIVE:  Patient right shoulder is really bothering her today, feels it is limiting her progress with everything including doing her back HEP. Back has good and bad days, cant sleep right because of shoulder which then makes back hurt worse. Still has not heard about MRI for shoulder yet. OBJECTIVE:    Observation:    Test measurements:       RESTRICTIONS/PRECAUTIONS: none    Exercises/Interventions:     Therapeutic Ex (41126) Sets/sec Notes/CUES   Bike 7 min    TA BKFO  TA march  Bridges  Hip ABD isometric  Hip ADD isometric  Clamshells  x 15   x 15  2 x 10  15 x   15 x  15 x OVL    SB DKTC  SB LTRs 2x15  2x10   LBW Cues to keep TA contraction   Multifidi walkouts 2x5, bilaterally  green   TB anti-rotation presses Cues to not let resistance turn her   Prayer s  Right hand on top of left     Seated march  Standing hip ABD/ext 20 x  20 x                        Manual Intervention (51920)     STM and Trigger point release parspinal 15 min prone                  NMR re-education (01606)  CUES NEEDED                                                Therapeutic Activity (85759)                                       Therapeutic Exercise and NMR EXR  [x] (92030) Provided verbal/tactile cueing for activities related to strengthening, flexibility, endurance, ROM  for improvements in proximal hip and core control with self care, mobility, lifting and ambulation.  [] (68100) Provided verbal/tactile cueing for activities related to improving balance, coordination, kinesthetic sense, posture, motor skill, proprioception  to assist with core control in self care, mobility, lifting, and ambulation.      Therapeutic Activities:    [] (14683 or 35823) Provided verbal/tactile cueing for activities related to improving balance, coordination, kinesthetic sense, posture, motor skill, proprioception and motor activation to allow for proper function  with self care and ADLs  [] (05201) Provided training and instruction to the patient for proper core and proximal hip recruitment and positioning with ambulation re-education     Home Exercise Program:    [x] (76539) Reviewed/Progressed HEP activities related to strengthening, flexibility, endurance, ROM of core, proximal hip and LE for functional self-care, mobility, lifting and ambulation   [] (43542) Reviewed/Progressed HEP activities related to improving balance, coordination, kinesthetic sense, posture, motor skill, proprioception of core, proximal hip and LE for self care, mobility, lifting, and ambulation      Manual Treatments:  PROM / STM / Oscillations-Mobs:  G-I, II, III, IV (PA's, Inf., Post.)  [x] (04236) Provided manual therapy to mobilize proximal hip and LS spine soft tissue/joints for the purpose of modulating pain, promoting relaxation,  increasing ROM, reducing/eliminating soft tissue swelling/inflammation/restriction, improving soft tissue extensibility and allowing for proper ROM for normal function with self care, mobility, lifting and ambulation. Modalities: Ice to R shoulder    Charges:  Timed Code Treatment Minutes: 40   Total Treatment Minutes: 55       [] EVAL (LOW) 36534 (typically 20 minutes face-to-face)  [] EVAL (MOD) 17245 (typically 30 minutes face-to-face)  [] EVAL (HIGH) 89087 (typically 45 minutes face-to-face)  [] RE-EVAL     [x] OU(99698) x 2  [] IONTO  [] NMR (80825) x     [] VASO  [x] Manual (17834) x 1    [] Other:  [] TA x      [] Mech Traction (39449)  [] ES(attended) (94831)     [] ES (un) (62850):     Goals: Patient stated goal: to get rid of pain; to get a good night's sleep again; to be able to return to work  [x] Progressing: [] Met: [] Not Met: [] Adjusted    Therapist goals for Patient:   Short Term Goals: To be achieved in: 2 weeks  1. Independent in HEP and progression per patient tolerance, in order to prevent re-injury. [] Progressing: [x] Met: [] Not Met: [] Adjusted  2.  Patient will have a decrease in pain to facilitate adjustment due to lack of progress  [] Patient is not progressing as expected and requires additional follow up with physician  [] Other    Prognosis for POC: [x] Good [] Fair  [] Poor      Patient requires continued skilled intervention: [x] Yes  [] No    Treatment/Activity Tolerance:  [x] Patient able to complete treatment  [] Patient limited by fatigue  [] Patient limited by pain    [] Patient limited by other medical complications  [] Other:     ASSESSMENT:Patient limited in today's session due to R shoulder, in tears trying to move around on plinth. Going to check about MRI authorization after PT today. Patient requires skilled physical therapy intervention to address pain level, deficits in lumbar ROM and R LE ROM, and gait impairments, to allow for return to ADLs, functional tasks, and recreational activities including going to the gym, without pain or restriction. PLAN: 1-2 days a week for 12 weeks (6/17/2020-9/17/2020)  [x] Continue per plan of care [] Alter current plan (see comments above)  [] Plan of care initiated [] Hold pending MD visit [] Discharge      Electronically signed by:  Prescilla Lombard, PT, DPT    Note: If patient does not return for scheduled/ recommended follow up visits, this note will serve as a discharge from care along with most recent update on progress.

## 2020-09-24 ENCOUNTER — NURSE ONLY (OUTPATIENT)
Dept: INTERNAL MEDICINE CLINIC | Age: 65
End: 2020-09-24
Payer: COMMERCIAL

## 2020-09-24 ENCOUNTER — TELEPHONE (OUTPATIENT)
Dept: INTERNAL MEDICINE CLINIC | Age: 65
End: 2020-09-24

## 2020-09-24 PROCEDURE — 90688 IIV4 VACCINE SPLT 0.5 ML IM: CPT | Performed by: INTERNAL MEDICINE

## 2020-09-24 PROCEDURE — 90472 IMMUNIZATION ADMIN EACH ADD: CPT | Performed by: INTERNAL MEDICINE

## 2020-09-24 PROCEDURE — 90750 HZV VACC RECOMBINANT IM: CPT | Performed by: INTERNAL MEDICINE

## 2020-09-24 PROCEDURE — 90471 IMMUNIZATION ADMIN: CPT | Performed by: INTERNAL MEDICINE

## 2020-09-24 NOTE — PROGRESS NOTES
Vaccine Information Sheet, \"Influenza - Inactivated\"  given to Nicolasa Garcia, or parent/legal guardian of  Nicolasa Garcia and verbalized understanding. Patient responses:    Have you ever had a reaction to a flu vaccine? No  Are you able to eat eggs without adverse effects? Yes  Do you have any current illness? No  Have you ever had Guillian Prospect Syndrome? No    Flu vaccine given per order. Please see immunization tab.

## 2020-09-24 NOTE — TELEPHONE ENCOUNTER
I called pharmacy & Truemetrix meter (the cheapest brand carried) is not covered by insurance but the strips were covered. Tech said the out of pocket cost is $15.     Called patient's insurance [employer] [phone] to see if PA required or if covered alternative. ID: [id number]  BIN: [id number]  PCN: [id number]  AXXKCMZ: [id number]    If still rejecting call [phone]    Insurance has a contract with [employer] that should get Meter free as well as the strips.     Pharmacy ran above BIN# etc. And patient is able to get a refund for $15.

## 2020-09-25 ENCOUNTER — OFFICE VISIT (OUTPATIENT)
Dept: PRIMARY CARE CLINIC | Age: 65
End: 2020-09-25
Payer: COMMERCIAL

## 2020-09-25 PROCEDURE — 99211 OFF/OP EST MAY X REQ PHY/QHP: CPT | Performed by: NURSE PRACTITIONER

## 2020-09-25 PROCEDURE — G8417 CALC BMI ABV UP PARAM F/U: HCPCS | Performed by: NURSE PRACTITIONER

## 2020-09-25 PROCEDURE — G8428 CUR MEDS NOT DOCUMENT: HCPCS | Performed by: NURSE PRACTITIONER

## 2020-09-25 NOTE — PATIENT INSTRUCTIONS

## 2020-09-25 NOTE — PROGRESS NOTES
Kady Severe received a viral test for COVID-19. They were educated on isolation and quarantine as appropriate. For any symptoms, they were directed to seek care from their PCP, given contact information to establish with a doctor, directed to an urgent care or the emergency room.

## 2020-09-26 LAB — SARS-COV-2, NAA: NOT DETECTED

## 2020-09-30 ENCOUNTER — HOSPITAL ENCOUNTER (OUTPATIENT)
Dept: PHYSICAL THERAPY | Age: 65
Setting detail: THERAPIES SERIES
Discharge: HOME | End: 2020-09-30
Payer: COMMERCIAL

## 2020-09-30 ENCOUNTER — ANESTHESIA EVENT (OUTPATIENT)
Dept: ENDOSCOPY | Age: 65
End: 2020-09-30
Payer: COMMERCIAL

## 2020-09-30 PROCEDURE — 97110 THERAPEUTIC EXERCISES: CPT

## 2020-09-30 PROCEDURE — 97140 MANUAL THERAPY 1/> REGIONS: CPT

## 2020-09-30 NOTE — FLOWSHEET NOTE
[place] [address] [facility],  [address]   [facility]  Phone: [phone]  Fax [phone]    Physical Therapy Treatment Note/ Progress Report:     Date:  2020    Patient Name:  Yady Torres    :  1955  MRN: [id number]  Restrictions/Precautions:    Medical/Treatment Diagnosis Information:  Diagnosis: M54.5 (ICD-10-CM) - Low back pain, unspecified back pain laterality, unspecified chronicity, unspecified whether sciatica present; M54.16 (ICD-10-CM) - Lumbar radiculitis  Treatment Diagnosis: M54.5 - Lumbar pain; M54.41 - LBP w/ sciatica  Insurance/Certification information:  PT Insurance Information: [address] [place] [address] [facility]  Physician Information:  Referring Practitioner: Dr. Alejandra Rosas  Has the plan of care been signed (Y/N):        [x]  Yes  []  No     Date of Patient follow up with Physician: PRN      Is this a Progress Report:     [x]  Yes  []  No        If Yes:  Date Range for reporting period:  Beginning 2020  Ending 2020    Progress report will be due (10 Rx or 30 days whichever is less):        Recertification will be due (POC Duration  / 90 days whichever is less): 2020         Visit # Insurance Allowable Auth Required   23 (+ 4 [place]  30 visits []  Yes [x]  No        Functional Scale: Modified Oswestry score 23, 46% disability  Date assessed:  3/11/2020           Modified Oswestry score 18, 36% disability  Date assessed:  4/15/2020           Modified Oswestry score 13, 26%           2020           Modified Oswestry score 12, 24%           2020           Modified Oswestry score 13, 26%           2020    Therapy Diagnosis/Practice Pattern: M54.5 - Lumbar pain; M54.41 - LBP w/ sciatica      Number of Comorbidities:  []0     [x]1-2    []3+    Latex Allergy:  [x]NO      []YES  Preferred Language for Healthcare:   [x]English       []other:      Pain level:  0/10 beginning of session; 0/10 at end of session    SUBJECTIVE:  Patient getting MRI Friday, colonoscopy tomorrow. Shoulder feeling a little better as she has been icing more at home. .      OBJECTIVE:    Observation:    Test measurements:       RESTRICTIONS/PRECAUTIONS: none    Exercises/Interventions:     Therapeutic Ex (37277) Sets/sec Notes/CUES   Bike 7 min    TA BKFO  TA march  Bridges  Hip ABD isometric  Hip ADD isometric  Clamshells  x 15   x 15  2 x 10  15 x   15 x  15 x OVL    SB DKTC  SB LTRs 2x15  2x10   LBW Cues to keep TA contraction   Multifidi walkouts 2x5, bilaterally  green   TB anti-rotation presses Cues to not let resistance turn her   Prayer s  Right hand on top of left     Seated march  Standing hip ABD/ext 20 x  20 x                        Manual Intervention (32749)     STM and Trigger point release parspinal 15 min prone                  NMR re-education (81328)  CUES NEEDED                                                Therapeutic Activity (78607)                                       Therapeutic Exercise and NMR EXR  [x] (89320) Provided verbal/tactile cueing for activities related to strengthening, flexibility, endurance, ROM  for improvements in proximal hip and core control with self care, mobility, lifting and ambulation.  [] (34216) Provided verbal/tactile cueing for activities related to improving balance, coordination, kinesthetic sense, posture, motor skill, proprioception  to assist with core control in self care, mobility, lifting, and ambulation.      Therapeutic Activities:    [] (36036 or 45588) Provided verbal/tactile cueing for activities related to improving balance, coordination, kinesthetic sense, posture, motor skill, proprioception and motor activation to allow for proper function  with self care and ADLs  [] (95224) Provided training and instruction to the patient for proper core and proximal hip recruitment and positioning with ambulation re-education     Home Exercise Program:    [x] (29987) Reviewed/Progressed HEP activities related to strengthening, flexibility, endurance, ROM of core, proximal hip and LE for functional self-care, mobility, lifting and ambulation   [] (52800) Reviewed/Progressed HEP activities related to improving balance, coordination, kinesthetic sense, posture, motor skill, proprioception of core, proximal hip and LE for self care, mobility, lifting, and ambulation      Manual Treatments:  PROM / STM / Oscillations-Mobs:  G-I, II, III, IV (PA's, Inf., Post.)  [x] (27512) Provided manual therapy to mobilize proximal hip and LS spine soft tissue/joints for the purpose of modulating pain, promoting relaxation,  increasing ROM, reducing/eliminating soft tissue swelling/inflammation/restriction, improving soft tissue extensibility and allowing for proper ROM for normal function with self care, mobility, lifting and ambulation. Modalities: Ice to R shoulder    Charges:  Timed Code Treatment Minutes: 40   Total Treatment Minutes: 55       [] EVAL (LOW) 45646 (typically 20 minutes face-to-face)  [] EVAL (MOD) 80653 (typically 30 minutes face-to-face)  [] EVAL (HIGH) 80949 (typically 45 minutes face-to-face)  [] RE-EVAL     [x] OG(94934) x 2  [] IONTO  [] NMR (52017) x     [] VASO  [x] Manual (86745) x 1    [] Other:  [] TA x      [] Mech Traction (68253)  [] ES(attended) (49100)     [] ES (un) (09818):     Goals: Patient stated goal: to get rid of pain; to get a good night's sleep again; to be able to return to work  [x] Progressing: [] Met: [] Not Met: [] Adjusted    Therapist goals for Patient:   Short Term Goals: To be achieved in: 2 weeks  1. Independent in HEP and progression per patient tolerance, in order to prevent re-injury. [] Progressing: [x] Met: [] Not Met: [] Adjusted  2. Patient will have a decrease in pain to facilitate improvement in movement, function, and ADLs as indicated by Functional Deficits.   [] Progressing: [x] Met: [] Not Met: [] Adjusted      Long Term POC: [x] Good [] Fair  [] Poor      Patient requires continued skilled intervention: [x] Yes  [] No    Treatment/Activity Tolerance:  [x] Patient able to complete treatment  [] Patient limited by fatigue  [] Patient limited by pain    [] Patient limited by other medical complications  [] Other:     ASSESSMENT:Patient limited in today's session due to R shoulder, in tears trying to move around on plinth. Going to check about MRI authorization after PT today. Patient requires skilled physical therapy intervention to address pain level, deficits in lumbar ROM and R LE ROM, and gait impairments, to allow for return to ADLs, functional tasks, and recreational activities including going to the gym, without pain or restriction. PLAN: 1-2 days a week for 12 weeks (6/17/2020-9/17/2020)  [x] Continue per plan of care [] Alter current plan (see comments above)  [] Plan of care initiated [] Hold pending MD visit [] Discharge      Electronically signed by:  Chelsey Rothman PT, DPT    Note: If patient does not return for scheduled/ recommended follow up visits, this note will serve as a discharge from care along with most recent update on progress.

## 2020-09-30 NOTE — ANESTHESIA PRE PROCEDURE
Department of Anesthesiology  Preprocedure Note       Name:  Milagro Castillo   Age:  60 y.o.  :  1955                                          MRN:  [id number]         Date:  2020      Surgeon: Lizandro Lovell):  Frankie Baumgarten, MD    Procedure: Procedure(s):  COLONOSCOPY DIAGNOSTIC    Medications prior to admission:   Prior to Admission medications    Medication Sig Start Date End Date Taking? Authorizing Provider   glucose monitoring kit (FREESTYLE) monitoring kit 1 kit by Does not apply route daily 20   Samantha Santos MD   empagliflozin (JARDIANCE) 25 MG tablet TAKE 1 TABLET BY MOUTH EVERY DAY 9/15/20   Samantha Santos MD   bisacodyl (DULCOLAX) 5 MG EC tablet Use as directed for colonoscopy prep 20   Frankie Baumgarten, MD   polyethylene glycol [facility]) 17 GM/SCOOP powder Use as directed for colonoscopy prep 20   Frankie Baumgarten, MD   omeprazole (PRILOSEC) 20 MG delayed release capsule TAKE 1 CAPSULE BY MOUTH EVERY DAY 20   Frankie Baumgarten, MD   SITagliptin (JANUVIA) 100 MG tablet Take 1 tablet by mouth daily 8/10/20   Samantha Santos MD   metFORMIN (GLUCOPHAGE) 850 MG tablet Take 1 tablet by mouth daily 8/10/20   Samantha Santos MD   blood glucose monitor strips Test qd 20   Samantha Santos MD   levothyroxine (SYNTHROID) 75 MCG tablet Take 1 tablet by mouth daily 20   Samantha Santos MD   glimepiride (AMARYL) 4 MG tablet Take 1 tablet by mouth 2 times daily 3/9/20   Samantha Santos MD   atorvastatin (LIPITOR) 20 MG tablet Take 1 tablet by mouth daily 20   Samantha Santos MD   lisinopril (PRINIVIL;ZESTRIL) 40 MG tablet Take 1 tablet by mouth daily 20   Samantha Santos MD   Vaginal Lubricant (REPLENS) GEL Place 1 Dose vaginally daily 2/3/18   Farhad Nascimento, DO       Current medications:    No current facility-administered medications for this encounter.       Current Outpatient Medications   Medication Sig Dispense Refill    glucose monitoring kit (FREESTYLE) monitoring kit 1 kit by Does not apply route daily 1 kit 0    empagliflozin (JARDIANCE) 25 MG tablet TAKE 1 TABLET BY MOUTH EVERY DAY 90 tablet 0    bisacodyl (DULCOLAX) 5 MG EC tablet Use as directed for colonoscopy prep 4 tablet 0    polyethylene glycol (MIRALAX) 17 GM/SCOOP powder Use as directed for colonoscopy prep 238 g 0    omeprazole (PRILOSEC) 20 MG delayed release capsule TAKE 1 CAPSULE BY MOUTH EVERY DAY 30 capsule 9    SITagliptin (JANUVIA) 100 MG tablet Take 1 tablet by mouth daily 90 tablet 3    metFORMIN (GLUCOPHAGE) 850 MG tablet Take 1 tablet by mouth daily 90 tablet 3    blood glucose monitor strips Test qd 100 strip 3    levothyroxine (SYNTHROID) 75 MCG tablet Take 1 tablet by mouth daily 90 tablet 2    glimepiride (AMARYL) 4 MG tablet Take 1 tablet by mouth 2 times daily 180 tablet 3    atorvastatin (LIPITOR) 20 MG tablet Take 1 tablet by mouth daily 90 tablet 3    lisinopril (PRINIVIL;ZESTRIL) 40 MG tablet Take 1 tablet by mouth daily 90 tablet 3    Vaginal Lubricant (REPLENS) GEL Place 1 Dose vaginally daily 35 g 1       Allergies:  No Known Allergies    Problem List:    Patient Active Problem List   Diagnosis Code    Type 2 diabetes mellitus without complication, without long-term current use of insulin (HCC) E11.9    Mixed hyperlipidemia E78.2    Essential hypertension I10    Acquired hypothyroidism E03.9    Erosive gastritis K29.60    Esophagitis, [place] grade A K20.8    RUQ pain R10.11    Fatty liver K76.0       Past Medical History:        Diagnosis Date    Chronic back pain     Diabetes mellitus ([facility] [address]     Essential hypertension 2/26/2020    Hyperlipidemia     Hypothyroidism     Thyroid disease        Past Surgical History:        Procedure Laterality Date    APPENDECTOMY  1974    BREAST SURGERY Right 2013    drainage    CHOLECYSTECTOMY  1974    HYSTERECTOMY, VAGINAL  1996    TUBAL LIGATION  1981    UPPER GASTROINTESTINAL ENDOSCOPY      UPPER GASTROINTESTINAL ENDOSCOPY results found for: PHART, PO2ART, KQY2NZS, CUE4UPV, BEART, F7NEIZZF     Type & Screen (If Applicable):  No results found for: LABABO, LABRH    Drug/Infectious Status (If Applicable):  No results found for: HIV, HEPCAB    COVID-19 Screening (If Applicable):   Lab Results   Component Value Date    COVID19 NOT DETECTED 09/25/2020         Anesthesia Evaluation  Patient summary reviewed and Nursing notes reviewed no history of anesthetic complications:   Airway: Mallampati: II  TM distance: >3 FB   Neck ROM: full  Mouth opening: > = 3 FB Dental:    (+) partials      Pulmonary:Negative Pulmonary ROS                              Cardiovascular:    (+) hypertension:,                   Neuro/Psych:   Negative Neuro/Psych ROS              GI/Hepatic/Renal:   (+) PUD, liver disease:,      (-) GERD and no renal disease       Endo/Other:    (+) DiabetesType II DM, , hypothyroidism::., .                 Abdominal:           Vascular: negative vascular ROS. Anesthesia Plan      TIVA     ASA 2       Induction: intravenous. Anesthetic plan and risks discussed with patient. Plan discussed with CRNA. All questions answered and agrees with plan.         Estella Chandra MD   9/30/2020

## 2020-10-01 ENCOUNTER — ANESTHESIA (OUTPATIENT)
Dept: ENDOSCOPY | Age: 65
End: 2020-10-01
Payer: COMMERCIAL

## 2020-10-01 ENCOUNTER — HOSPITAL ENCOUNTER (OUTPATIENT)
Age: 65
Setting detail: OUTPATIENT SURGERY
Discharge: HOME | End: 2020-10-01
Attending: INTERNAL MEDICINE | Admitting: INTERNAL MEDICINE
Payer: COMMERCIAL

## 2020-10-01 VITALS
HEART RATE: 60 BPM | TEMPERATURE: 96.9 F | HEIGHT: 66 IN | WEIGHT: 186 LBS | DIASTOLIC BLOOD PRESSURE: 84 MMHG | OXYGEN SATURATION: 100 % | RESPIRATION RATE: 15 BRPM | BODY MASS INDEX: 29.89 KG/M2 | SYSTOLIC BLOOD PRESSURE: 122 MMHG

## 2020-10-01 VITALS — SYSTOLIC BLOOD PRESSURE: 138 MMHG | OXYGEN SATURATION: 97 % | DIASTOLIC BLOOD PRESSURE: 83 MMHG

## 2020-10-01 LAB
GLUCOSE BLD-MCNC: 175 MG/DL (ref 70–99)
PERFORMED ON: ABNORMAL

## 2020-10-01 PROCEDURE — 2709999900 HC NON-CHARGEABLE SUPPLY: Performed by: INTERNAL MEDICINE

## 2020-10-01 PROCEDURE — 6360000002 HC RX W HCPCS: Performed by: NURSE ANESTHETIST, CERTIFIED REGISTERED

## 2020-10-01 PROCEDURE — 2580000003 HC RX 258: Performed by: ANESTHESIOLOGY

## 2020-10-01 PROCEDURE — 3700000001 HC ADD 15 MINUTES (ANESTHESIA): Performed by: INTERNAL MEDICINE

## 2020-10-01 PROCEDURE — 3609027000 HC COLONOSCOPY: Performed by: INTERNAL MEDICINE

## 2020-10-01 PROCEDURE — 2500000003 HC RX 250 WO HCPCS: Performed by: NURSE ANESTHETIST, CERTIFIED REGISTERED

## 2020-10-01 PROCEDURE — 7100000011 HC PHASE II RECOVERY - ADDTL 15 MIN: Performed by: INTERNAL MEDICINE

## 2020-10-01 PROCEDURE — 3700000000 HC ANESTHESIA ATTENDED CARE: Performed by: INTERNAL MEDICINE

## 2020-10-01 PROCEDURE — 7100000010 HC PHASE II RECOVERY - FIRST 15 MIN: Performed by: INTERNAL MEDICINE

## 2020-10-01 PROCEDURE — 45378 DIAGNOSTIC COLONOSCOPY: CPT | Performed by: INTERNAL MEDICINE

## 2020-10-01 PROCEDURE — 2580000003 HC RX 258: Performed by: INTERNAL MEDICINE

## 2020-10-01 RX ORDER — SODIUM CHLORIDE, SODIUM LACTATE, POTASSIUM CHLORIDE, CALCIUM CHLORIDE 600; 310; 30; 20 MG/100ML; MG/100ML; MG/100ML; MG/100ML
INJECTION, SOLUTION INTRAVENOUS CONTINUOUS
Status: DISCONTINUED | OUTPATIENT
Start: 2020-10-01 | End: 2020-10-01 | Stop reason: HOSPADM

## 2020-10-01 RX ORDER — LIDOCAINE HYDROCHLORIDE 10 MG/ML
0.3 INJECTION, SOLUTION EPIDURAL; INFILTRATION; INTRACAUDAL; PERINEURAL
Status: DISCONTINUED | OUTPATIENT
Start: 2020-10-01 | End: 2020-10-01 | Stop reason: HOSPADM

## 2020-10-01 RX ORDER — PROPOFOL 10 MG/ML
INJECTION, EMULSION INTRAVENOUS PRN
Status: DISCONTINUED | OUTPATIENT
Start: 2020-10-01 | End: 2020-10-01 | Stop reason: SDUPTHER

## 2020-10-01 RX ORDER — SODIUM CHLORIDE 0.9 % (FLUSH) 0.9 %
10 SYRINGE (ML) INJECTION EVERY 12 HOURS SCHEDULED
Status: DISCONTINUED | OUTPATIENT
Start: 2020-10-01 | End: 2020-10-01 | Stop reason: HOSPADM

## 2020-10-01 RX ORDER — LIDOCAINE HYDROCHLORIDE 20 MG/ML
INJECTION, SOLUTION INFILTRATION; PERINEURAL PRN
Status: DISCONTINUED | OUTPATIENT
Start: 2020-10-01 | End: 2020-10-01 | Stop reason: SDUPTHER

## 2020-10-01 RX ORDER — SODIUM CHLORIDE 0.9 % (FLUSH) 0.9 %
10 SYRINGE (ML) INJECTION PRN
Status: DISCONTINUED | OUTPATIENT
Start: 2020-10-01 | End: 2020-10-01 | Stop reason: HOSPADM

## 2020-10-01 RX ADMIN — PROPOFOL 20 MG: 10 INJECTION, EMULSION INTRAVENOUS at 11:04

## 2020-10-01 RX ADMIN — PROPOFOL 20 MG: 10 INJECTION, EMULSION INTRAVENOUS at 11:08

## 2020-10-01 RX ADMIN — SODIUM CHLORIDE, SODIUM LACTATE, POTASSIUM CHLORIDE, AND CALCIUM CHLORIDE: .6; .31; .03; .02 INJECTION, SOLUTION INTRAVENOUS at 10:37

## 2020-10-01 RX ADMIN — PROPOFOL 20 MG: 10 INJECTION, EMULSION INTRAVENOUS at 11:06

## 2020-10-01 RX ADMIN — PROPOFOL 20 MG: 10 INJECTION, EMULSION INTRAVENOUS at 11:02

## 2020-10-01 RX ADMIN — PROPOFOL 10 MG: 10 INJECTION, EMULSION INTRAVENOUS at 11:00

## 2020-10-01 RX ADMIN — SODIUM CHLORIDE, POTASSIUM CHLORIDE, SODIUM LACTATE AND CALCIUM CHLORIDE: 600; 310; 30; 20 INJECTION, SOLUTION INTRAVENOUS at 10:27

## 2020-10-01 RX ADMIN — LIDOCAINE HYDROCHLORIDE 100 MG: 20 INJECTION, SOLUTION INFILTRATION; PERINEURAL at 10:58

## 2020-10-01 RX ADMIN — PROPOFOL 70 MG: 10 INJECTION, EMULSION INTRAVENOUS at 10:58

## 2020-10-01 RX ADMIN — PROPOFOL 20 MG: 10 INJECTION, EMULSION INTRAVENOUS at 11:01

## 2020-10-01 ASSESSMENT — PAIN SCALES - GENERAL
PAINLEVEL_OUTOF10: 0

## 2020-10-01 ASSESSMENT — PAIN - FUNCTIONAL ASSESSMENT: PAIN_FUNCTIONAL_ASSESSMENT: 0-10

## 2020-10-01 NOTE — ANESTHESIA POSTPROCEDURE EVALUATION
Department of Anesthesiology  Postprocedure Note    Patient: Ramses Kwon  MRN: [id number]  [place]: 1955  Date of evaluation: 10/1/2020  Time:  12:01 PM     Procedure Summary     Date:  10/01/20 Room / Location:  [address] [place] [address] [place] [address] / [facility]    Anesthesia Start:  7635 Anesthesia Stop:  1110    Procedure:  COLONOSCOPY DIAGNOSTIC (N/A ) Diagnosis:  (colon cancer screening)    Surgeon:  Quinn Oliveira MD Responsible Provider:  Nuris Matamoros MD    Anesthesia Type:  TIVA ASA Status:  2          Anesthesia Type: TIVA    Tu Phase I: Tu Score: 10    Tu Phase II: Tu Score: 10    Last vitals: Reviewed and per EMR flowsheets.        Anesthesia Post Evaluation    Patient location during evaluation: PACU  Level of consciousness: awake  Airway patency: patent  Nausea & Vomiting: no nausea  Complications: no  Cardiovascular status: blood pressure returned to baseline  Respiratory status: acceptable  Hydration status: euvolemic

## 2020-10-01 NOTE — OP NOTE
[address] [place] [address]     [facility] ,  [address] [facility], [facility]  Phone: [phone] [facility] [address],  [address] [facility], [address]  Phone: [phone]   TPB:[phone]    Colonoscopy Procedure Note    Patient: Jaime Leger  : 1955    Procedure: Screening Colonoscopy    Date:  10/1/2020     Endoscopist:  Yao Diaz MD    Referring Physician:  Luciana Foss MD    Preoperative Diagnosis:  Screening Colon    Postoperative Diagnosis:  See impression    Anesthesia: Anesthesia: MAC  ASA Class: 3  Mallampati: II (soft palate, uvula, fauces visible)    Procedure Details  Informed consent was obtained for the procedure, including sedation. Risks of perforation, hemorrhage, adverse drug reaction and aspiration were discussed. The patient was placed in the left lateral decubitus position. Based on the pre-procedure assessment, including review of the patient's medical history, medications, allergies, and review of systems, she had been deemed to be an appropriate candidate for sedation; she was therefore sedated with the medications listed below. The patient was monitored continuously with ECG tracing, pulse oximetry, blood pressure monitoring, and direct observations. rectal examination was performed. There were no external hemorrhoids, fissures or skin tags. The colonoscope was inserted into the rectum and advanced under direct vision to the cecum, which was identified by the ileocecal valve and appendiceal orifice which were photographed. The right colon was examined twice as this increases polyp detection especially if other right colon polyps, older age, male, or juarez syndrome. When segments could not be distended with CO2 or air, it was filled/distended with water. Colonoscope was then slowly withdrawn. Each colonic segmentwas evaluated carefully. Care was taken to inspect the proximal aspects of the IC valve, haustral folds, as well as flexures.  The quality of the colonic preparation was excellent. A careful inspection was made as the colonoscope was withdrawn, including a retroflexed view of the rectum; findings and interventions are described below. Appropriate photodocumentation Was Obtained. Findings: -normal colonic mucosa throughout  - PREP: miralax  - Overall difficulty: mild in degree  - Abdominal pressure: no  - Change in position: no  - Anesthesia issues: no  - Medivator use: no    Specimens: Was Not Obtained    Complications:   None; patient tolerated the procedure well. Disposition:   PACU - hemodynamically stable. Estimated Blood loss:  none    Withdrawal Time:  6 minutes    Impression:   -Normal colonoscopy to the cecum, with no evidence of neoplasia, diverticular disease, or mucosal abnormality. Recommendations:Screening colonoscopy 10 years.         RIGOBERTO MARKHAM 10/1/20 11:13 AM EDT

## 2020-10-01 NOTE — H&P
[address] ,  [address] [facility]  Phone: [phone] [facility],  [address] [facility], [address] [place] [address]  Phone: [phone]    HPI     Thank you Kourtney Meeks MD for asking me to see All Navas in consultation. She is a  [5] Black [2] 60 y.o. [facility] female seen independently who presents with the following GI complaints:    All Navas presents for screening colonoscopy. There is no history of colon polyps or cancer. There is no family history of colon polyps or cancer. She is experiencing any symptoms presently including continued intermittent upper abdominal pain with no modifying factors or exacerbation factors. .      Last Encounter Reviewed:   Pertinent PMH, FH, SH is reviewed below. Last EGD:  6/22/2020 small sliding hiatal hernia, LA A esophagitis, h pylori negative erosive gastritis. Last Colonoscopy: NA    No components found for: [employer]     PAST MEDICAL HISTORY     Past Medical History:   Diagnosis Date    Chronic back pain     Diabetes mellitus ([address]     Essential hypertension 2/26/2020    Hyperlipidemia     Hypothyroidism     Thyroid disease      FAMILY HISTORY     Family History   Problem Relation Age of Onset    Dementia Mother     Breast Cancer Sister     Atrial Fibrillation Sister     High Blood Pressure Maternal Grandmother     Diabetes Sister      SOCIAL HISTORY     Social History     Socioeconomic History    Marital status:       Spouse name: Not on file    Number of children: Not on file    Years of education: Not on file    Highest education level: Not on file   Occupational History    Not on file   Social Needs    Financial resource strain: Not on file    Food insecurity     Worry: Not on file     Inability: Not on file    Transportation needs     Medical: Not on file     Non-medical: Not on file   Tobacco Use    Smoking status: Former Smoker     Packs/day: 0.50 Years: 20.00     Pack years: 10.00     Types: Cigarettes     Start date: 5/24/1992     Last attempt to quit: 2/26/2010     Years since quitting: 10.6    Smokeless tobacco: Never Used   Substance and Sexual Activity    Alcohol use: Never    Drug use: Never    Sexual activity: Not on file   Lifestyle    Physical activity     Days per week: Not on file     Minutes per session: Not on file    Stress: Not on file   Relationships    Social connections     Talks on phone: Not on file     Gets together: Not on file     Attends Synagogue service: Not on file     Active member of club or organization: Not on file     Attends meetings of clubs or organizations: Not on file     Relationship status: Not on file    Intimate partner violence     Fear of current or ex partner: Not on file     Emotionally abused: Not on file     Physically abused: Not on file     Forced sexual activity: Not on file   Other Topics Concern    Not on file   Social History Narrative    Not on file     SURGICAL HISTORY     Past Surgical History:   Procedure Laterality Date   [address] Right 2013    drainage    [address], [address]  06/22/2020    bx    UPPER GASTROINTESTINAL ENDOSCOPY N/A 6/22/2020    EGD BIOPSY performed by Fany Parks MD at [address] [place] [address]   (This list may include medications prescribed during this encounter as epic can not insert only the list prior to this encounter.)  No current facility-administered medications on file prior to encounter.       Current Outpatient Medications on File Prior to Encounter   Medication Sig Dispense Refill    polyethylene glycol (MIRALAX) 17 GM/SCOOP powder Use as directed for colonoscopy prep 238 g 0    omeprazole (PRILOSEC) 20 MG delayed release capsule TAKE 1 CAPSULE BY MOUTH EVERY DAY 30 capsule 9    Vaginal Lubricant (REPLENS) GEL Place 1 Dose vaginally daily 35 g 1    bisacodyl (DULCOLAX) 5 MG EC tablet Use as directed for colonoscopy prep 4 tablet 0    SITagliptin (JANUVIA) 100 MG tablet Take 1 tablet by mouth daily 90 tablet 3    metFORMIN (GLUCOPHAGE) 850 MG tablet Take 1 tablet by mouth daily 90 tablet 3    blood glucose monitor strips Test qd 100 strip 3    levothyroxine (SYNTHROID) 75 MCG tablet Take 1 tablet by mouth daily 90 tablet 2    glimepiride (AMARYL) 4 MG tablet Take 1 tablet by mouth 2 times daily 180 tablet 3    atorvastatin (LIPITOR) 20 MG tablet Take 1 tablet by mouth daily 90 tablet 3    lisinopril (PRINIVIL;ZESTRIL) 40 MG tablet Take 1 tablet by mouth daily 90 tablet 3     ALLERGIES   No Known Allergies  IMMUNIZATIONS     Immunization History   Administered Date(s) Administered    Influenza, Quadv, IM, (6 mo and older Fluzone, Flulaval, Fluarix and 3 yrs and older Afluria) 09/24/2020    Pneumococcal Polysaccharide ([id number]) 03/09/2020    Tdap (Boostrix, Adacel) 01/01/2019    Zoster Recombinant (Shingrix) 09/24/2020     REVIEW OF SYSTEMS   See HPI for further details and pertinent postiives. Negative for the following:  Constitutional: Negative for weight change. Negative for appetite change and fatigue. HENT: Negative for nosebleeds, sore throat, mouth sores, and voice change. Respiratory: Negative for cough, choking and chest tightness. Cardiovascular: Negative for chest pain   Gastrointestinal: See HPI  Musculoskeletal: Negative for arthralgias. Skin: Negative for pallor. Neurological: Negative for weakness and light-headedness. Hematological: Negative for adenopathy. Does not bruise/bleed easily. Psychiatric/Behavioral: Negative for suicidal ideas.    PHYSICAL EXAM   VITAL SIGNS:  Vitals:    10/01/20 1019   BP: 134/73   Pulse: 66   Resp: 20   Temp: 96.9 °F (36.1 °C)   SpO2: 99%     Wt Readings from Last 3 Encounters:   10/01/20 186 lb (84.4 kg) 09/15/20 186 lb (84.4 kg)   08/12/20 186 lb (84.4 kg)     Constitutional: Well developed, Well nourished, No acute distress, Non-toxic appearance. HENT: Normocephalic, Atraumatic, Bilateral external ears normal, Oropharynx moist, No oral exudates, Nose normal.   Eyes: Conjunctiva normal, No discharge. Neck: Normal range of motion, No tenderness, Supple, No stridor. Lymphatic: No lymphadenopathy noted. Cardiovascular: Normal heart rate, Normal rhythm, No murmurs, No rubs, No gallops. Thorax & Lungs: Normal breath sounds, No respiratory distress, No wheezing, No chest tenderness. Abdomen: scars consistent with stated surgeries,  Soft NTND   Rectal:  Deferred. Skin: Warm, Dry, No erythema, No rash. Back: No tenderness, No CVA tenderness. Extremities: Intact distal pulses, No edema, No tenderness, No cyanosis, No clubbing. Neurologic: Alert & oriented x 3, Normal motor function, Normal sensory function, No focal deficits noted. RADIOLOGY/PROCEDURES     Reviewed per [address] reviewed per epic    FOLLOWUP     Screening Colonoscopy    The patient was counseled at length about the risks of ashanti Covid-19 during their perioperative period and any recovery window from their procedure. The patient was made aware that ashanti Covid-19  may worsen their prognosis for recovering from their procedure  and lend to a higher morbidity and/or mortality risk. All material risks, benefits, and reasonable alternatives including postponing the procedure were discussed. The patient does wish to proceed with the procedure at this time. Preprocedural COVID-19 throat swab was negative.           Yao Diaz 10/1/20 11:10 AM EDT    CC:  Shelli Arevalo MD

## 2020-10-01 NOTE — PROGRESS NOTES
Daughter updated in waiting room. Discharge instructions reviewed with patient and responsible adult. Discharge instructions signed and copy given with no additional questions. Patient to be discharged home with belongings.

## 2020-10-07 ENCOUNTER — OFFICE VISIT (OUTPATIENT)
Dept: ORTHOPEDIC SURGERY | Age: 65
End: 2020-10-07
Payer: COMMERCIAL

## 2020-10-07 ENCOUNTER — HOSPITAL ENCOUNTER (OUTPATIENT)
Dept: PHYSICAL THERAPY | Age: 65
Setting detail: THERAPIES SERIES
Discharge: HOME | End: 2020-10-07
Payer: COMMERCIAL

## 2020-10-07 VITALS — HEIGHT: 66 IN | BODY MASS INDEX: 29.89 KG/M2 | WEIGHT: 186 LBS

## 2020-10-07 PROCEDURE — 99214 OFFICE O/P EST MOD 30 MIN: CPT | Performed by: ORTHOPAEDIC SURGERY

## 2020-10-07 PROCEDURE — G8417 CALC BMI ABV UP PARAM F/U: HCPCS | Performed by: ORTHOPAEDIC SURGERY

## 2020-10-07 PROCEDURE — G8482 FLU IMMUNIZE ORDER/ADMIN: HCPCS | Performed by: ORTHOPAEDIC SURGERY

## 2020-10-07 PROCEDURE — 97140 MANUAL THERAPY 1/> REGIONS: CPT

## 2020-10-07 PROCEDURE — 1036F TOBACCO NON-USER: CPT | Performed by: PHYSICIAN ASSISTANT

## 2020-10-07 PROCEDURE — 3017F COLORECTAL CA SCREEN DOC REV: CPT | Performed by: PHYSICIAN ASSISTANT

## 2020-10-07 PROCEDURE — 97110 THERAPEUTIC EXERCISES: CPT

## 2020-10-07 PROCEDURE — L3660 SO 8 AB RSTR CAN/WEB PRE OTS: HCPCS | Performed by: PHYSICIAN ASSISTANT

## 2020-10-07 PROCEDURE — G8427 DOCREV CUR MEDS BY ELIG CLIN: HCPCS | Performed by: ORTHOPAEDIC SURGERY

## 2020-10-07 NOTE — PROGRESS NOTES
CHIEF COMPLAINT:    Chief Complaint   Patient presents with    Shoulder Pain     RT SHOULDER MRI RESULTS       HISTORY OF PRESENT ILLNESS:                The patient is a 60 y.o. female returns to clinic with continued right shoulder pain. At last visit, we obtained an MRI of the right shoulder to rule out rotator cuff tear. She has had formal physical therapy and a cortisone injection with no significant improvement in her symptoms. She continues to remain limited with overhead activities as well as with sleeping at night. She does admit to some weakness with the shoulder. Past Medical History:   Diagnosis Date    Chronic back pain     Diabetes mellitus ([facility] [address]     Essential hypertension 2/26/2020    Hyperlipidemia     Hypothyroidism     Thyroid disease         Work Status: Retired    The pain assessment was noted & is as follows:  Pain Assessment  Location of Pain: Shoulder  Location Modifiers: Right  Severity of Pain: 5  Quality of Pain: Sharp, Dull, Aching  Duration of Pain: A few hours  Frequency of Pain: Several times daily  Aggravating Factors:  Other (Comment), Bending(CERTAIN ROM)  Limiting Behavior: Some  Relieving Factors: Rest]      Work Status/Functionality:     Past Medical History: Medical history form was reviewed today & can be found in the media tab  Past Medical History:   Diagnosis Date    Chronic back pain     Diabetes mellitus ([facility] [address]     Essential hypertension 2/26/2020    Hyperlipidemia     Hypothyroidism     Thyroid disease       Past Surgical History:     Past Surgical History:   Procedure Laterality Date    APPENDECTOMY  1974    BREAST SURGERY Right 2013    drainage    CHOLECYSTECTOMY  1974    COLONOSCOPY N/A 10/1/2020    COLONOSCOPY DIAGNOSTIC performed by Irasema Castro MD at [place] [address] ENDOSCOPY      UPPER GASTROINTESTINAL ENDOSCOPY  06/22/2020    bx    UPPER GASTROINTESTINAL ENDOSCOPY N/A 6/22/2020    EGD BIOPSY performed by Aida Beasley MD at [facility]     Current Medications:     Current Outpatient Medications:     glucose monitoring kit (FREESTYLE) monitoring kit, 1 kit by Does not apply route daily, Disp: 1 kit, Rfl: 0    empagliflozin (JARDIANCE) 25 MG tablet, TAKE 1 TABLET BY MOUTH EVERY DAY, Disp: 90 tablet, Rfl: 0    bisacodyl (DULCOLAX) 5 MG EC tablet, Use as directed for colonoscopy prep, Disp: 4 tablet, Rfl: 0    polyethylene glycol (MIRALAX) 17 GM/SCOOP powder, Use as directed for colonoscopy prep, Disp: 238 g, Rfl: 0    omeprazole (PRILOSEC) 20 MG delayed release capsule, TAKE 1 CAPSULE BY MOUTH EVERY DAY, Disp: 30 capsule, Rfl: 9    SITagliptin (JANUVIA) 100 MG tablet, Take 1 tablet by mouth daily, Disp: 90 tablet, Rfl: 3    metFORMIN (GLUCOPHAGE) 850 MG tablet, Take 1 tablet by mouth daily, Disp: 90 tablet, Rfl: 3    blood glucose monitor strips, Test qd, Disp: 100 strip, Rfl: 3    levothyroxine (SYNTHROID) 75 MCG tablet, Take 1 tablet by mouth daily, Disp: 90 tablet, Rfl: 2    glimepiride (AMARYL) 4 MG tablet, Take 1 tablet by mouth 2 times daily, Disp: 180 tablet, Rfl: 3    atorvastatin (LIPITOR) 20 MG tablet, Take 1 tablet by mouth daily, Disp: 90 tablet, Rfl: 3    lisinopril (PRINIVIL;ZESTRIL) 40 MG tablet, Take 1 tablet by mouth daily, Disp: 90 tablet, Rfl: 3    Vaginal Lubricant (REPLENS) GEL, Place 1 Dose vaginally daily, Disp: 35 g, Rfl: 1  Allergies:  Patient has no known allergies. Social History:    reports that she quit smoking about 10 years ago. Her smoking use included cigarettes. She started smoking about 28 years ago. She has a 10.00 pack-year smoking history. She has never used smokeless tobacco. She reports that she does not drink alcohol or use drugs.   Family History:   Family History   Problem Relation Age of Onset    Dementia Mother     Breast Cancer Sister     Atrial Fibrillation Sister     High Blood Pressure Maternal Grandmother     Diabetes Sister        REVIEW OF SYSTEMS:   For new problems, a full review of systems will be found scanned in the patient's chart. CONSTITUTIONAL: Denies unexplained weight loss, fevers, chills   NEUROLOGICAL: Denies unsteady gait or progressive weakness  SKIN: Denies skin changes, delayed healing, rash, itching       PHYSICAL EXAM:    Vitals: Height 5' 5.51\" (1.664 m), weight 186 lb (84.4 kg), not currently breastfeeding. GENERAL EXAM:  · General Apparence: Patient is adequately groomed with no evidence of malnutrition. · Orientation: The patient is oriented to time, place and person. · Mood & Affect:The patient's mood and affect are appropriate       Right shoulder PHYSICAL EXAMINATION:  · Inspection: No visible deformity. No significant edema, erythema or ecchymosis. · Palpation: Numbness to palpation at the subacromial space    · Range of Motion: Range of motion is limited overhead beyond approximately 95 degrees of abduction. · Strength: Isolated supraspinatus strength testing reproduces pain but no significant weakness    · Special Tests: Pain with Caraballo testing. Negative empty can testing. · Skin:  There are no rashes, ulcerations or lesions. · There are no dysvascular changes     Gait & station: Normal      Additional Examinations:        Left Upper Extremity: Examination of the left upper extremity does not show any tenderness, deformity or injury. Range of motion is unremarkable. There is no gross instability. There are no rashes, ulcerations or lesions. Strength and tone are normal.      Diagnostic Testing:  MRI right shoulder:    1. Moderate cuff tendinosis. Ill-defined 8 mm insertional and bursal surface tear of supraspinatus insertion. Moderate complex heaven-tendon no bursitis and capsulitis. Synovium reactive. Debris within the bursa and capsule. No full-thickness tear. No T no osseous evulsion.   2.  Pseudocyst and marrow on the degree of rotator cuff damage that can be treated and labral damage that can be treated and how it is always possible that the injury is more severe than expected on clinical examination by MRI. The reality is that if there is a full thickness rotator cuff tear, the treatment is dramatically different and the rehabilitation much slower. The same would be true of labral pathology, whether it be an anterior labral tear or a slap lesion that would potentially require repair versus a lesion that can just be debrided. We also discussed prophylaxis in terms of positioning carefully intravenous antibiotics preoperative, etc. All questions were answered from the patient. I spent 25 minutes face-to-face with the patient and greater than 50% of that time was spent counseling/coordinating care for the above-stated diagnosis     I have personally performed and/or participated in the history, exam and medical decision making and agree with all pertinent clinical information. I have also reviewed and agree with the past medical, family and social history unless otherwise noted. This dictation was performed with a verbal recognition program (DRAGON) and it was checked for errors. It is possible that there are still dictated errors within this office note. If so, please bring any errors to my attention for an addendum. All efforts were made to ensure that this office note is accurate.           Kath Mora MD

## 2020-10-07 NOTE — FLOWSHEET NOTE
[place] [facility],  [address]  Phone: [phone]  Fax [phone]    Physical Therapy Treatment Note/ Progress Report:     Date:  10/7/2020    Patient Name:  Wes Mitchell    :  1955  MRN: [id number]  Restrictions/Precautions:    Medical/Treatment Diagnosis Information:  Diagnosis: M54.5 (ICD-10-CM) - Low back pain, unspecified back pain laterality, unspecified chronicity, unspecified whether sciatica present; M54.16 (ICD-10-CM) - Lumbar radiculitis  Treatment Diagnosis: M54.5 - Lumbar pain; M54.41 - LBP w/ sciatica  Insurance/Certification information:  PT Insurance Information: [address] [place] [address] [facility]  Physician Information:  Referring Practitioner: Dr. Jojo Alvarado  Has the plan of care been signed (Y/N):        [x]  Yes  []  No     Date of Patient follow up with Physician: PRN      Is this a Progress Report:     [x]  Yes  []  No        If Yes:  Date Range for reporting period:  Beginning 2020  Ending 2020    Progress report will be due (10 Rx or 30 days whichever is less):        Recertification will be due (POC Duration  / 90 days whichever is less): 2020         Visit # Insurance Allowable Auth Required   24 (+ 4) [place]  30 visits []  Yes [x]  No        Functional Scale: Modified Oswestry score 23, 46% disability  Date assessed:  3/11/2020           Modified Oswestry score 18, 36% disability  Date assessed:  4/15/2020           Modified Oswestry score 13, 26%           2020           Modified Oswestry score 12, 24%           2020           Modified Oswestry score 13, 26%           2020    Therapy Diagnosis/Practice Pattern: M54.5 - Lumbar pain; M54.41 - LBP w/ sciatica      Number of Comorbidities:  []0     [x]1-2    []3+    Latex Allergy:  [x]NO      []YES  Preferred Language for Healthcare:   [x]English       []other:      Pain level:  0/10 beginning of session; 0/10 at end of session    SUBJECTIVE:  Patient reports MRI showed tear in RTC and is going to have surgery 10/27. Reports her back has been bothering her more with compensating due to shoulder pain and from having to sleep differently. OBJECTIVE:    Observation:    Test measurements:       RESTRICTIONS/PRECAUTIONS: none    Exercises/Interventions:     Therapeutic Ex (12104) Sets/sec Notes/CUES   Bike     TA BKFO  TA march  Bridges  Hip ABD isometric  Hip ADD isometric  Clamshells  x 15   x 15  2 x 10  15 x   15 x  15 x OVL    SB DKTC  SB LTRs 2x15  2x10   LBW Cues to keep TA contraction   Multifidi walkouts 2x5, bilaterally  green   TB anti-rotation presses Cues to not let resistance turn her   Prayer s  Right hand on top of left     Seated march  Standing hip ABD/ext   20 x                   PT education on RTC repair with patch, surgical procedure, sling use, pain management post op, RICE 15 min    Manual Intervention (32215)     STM and Trigger point release parspinal 15 min prone                  NMR re-education (22038)  CUES NEEDED                                                Therapeutic Activity (18221)                                       Therapeutic Exercise and NMR EXR  [x] (40454) Provided verbal/tactile cueing for activities related to strengthening, flexibility, endurance, ROM  for improvements in proximal hip and core control with self care, mobility, lifting and ambulation.  [] (92884) Provided verbal/tactile cueing for activities related to improving balance, coordination, kinesthetic sense, posture, motor skill, proprioception  to assist with core control in self care, mobility, lifting, and ambulation.      Therapeutic Activities:    [] (74855 or 31666) Provided verbal/tactile cueing for activities related to improving balance, coordination, kinesthetic sense, posture, motor skill, proprioception and motor activation to allow for proper function  with self care and ADLs  [] (15941) Provided training and instruction to the patient for proper core and proximal hip recruitment and positioning with ambulation re-education     Home Exercise Program:    [x] (76467) Reviewed/Progressed HEP activities related to strengthening, flexibility, endurance, ROM of core, proximal hip and LE for functional self-care, mobility, lifting and ambulation   [] (46104) Reviewed/Progressed HEP activities related to improving balance, coordination, kinesthetic sense, posture, motor skill, proprioception of core, proximal hip and LE for self care, mobility, lifting, and ambulation      Manual Treatments:  PROM / STM / Oscillations-Mobs:  G-I, II, III, IV (PA's, Inf., Post.)  [x] (81570) Provided manual therapy to mobilize proximal hip and LS spine soft tissue/joints for the purpose of modulating pain, promoting relaxation,  increasing ROM, reducing/eliminating soft tissue swelling/inflammation/restriction, improving soft tissue extensibility and allowing for proper ROM for normal function with self care, mobility, lifting and ambulation. Modalities:  I    Charges:  Timed Code Treatment Minutes: 40   Total Treatment Minutes: 45       [] EVAL (LOW) 99051 (typically 20 minutes face-to-face)  [] EVAL (MOD) 41678 (typically 30 minutes face-to-face)  [] EVAL (HIGH) 93030 (typically 45 minutes face-to-face)  [] RE-EVAL     [x] PM(27303) x 2  [] IONTO  [] NMR (82666) x     [] VASO  [x] Manual (49433) x 1    [] Other:  [] TA x      [] Mech Traction (64445)  [] ES(attended) (20400)     [] ES (un) (22425):     Goals: Patient stated goal: to get rid of pain; to get a good night's sleep again; to be able to return to work  [x] Progressing: [] Met: [] Not Met: [] Adjusted    Therapist goals for Patient:   Short Term Goals: To be achieved in: 2 weeks  1. Independent in HEP and progression per patient tolerance, in order to prevent re-injury. [] Progressing: [x] Met: [] Not Met: [] Adjusted  2.  Patient will have a decrease in pain to facilitate improvement in movement, function, and ADLs as indicated by Functional Deficits. [] Progressing: [x] Met: [] Not Met: [] Adjusted      Long Term Goals: To be achieved in: 12 weeks  1. Disability index score of 23% or less for the Modified Oswestry  to assist with reaching prior level of function. [x] Progressing: [] Met: [] Not Met: [] Adjusted  2. Patient will demonstrate increased AROM to WNL, good LS mobility, good hip ROM to allow for proper joint functioning as indicated by patients Functional Deficits. [x] Progressing: [] Met: [] Not Met: [] Adjusted  3. Patient will demonstrate an increase in Strength to good proximal hip and core activation to allow for proper functional mobility as indicated by patients Functional Deficits. [x] Progressing: [] Met: [] Not Met: [] Adjusted  4. Patient will return to sitting and standing functional activities without increased symptoms or restriction. [x] Progressing: [] Met: [] Not Met: [] Adjusted  5. Patient will return to full LE ROM and strength to allow for decrease in lumbar and R leg pain, to allow for return to ADLs, functional tasks, and recreational activities including gym workouts, without pain or restriction. (patient specific functional goal)    [x] Progressing: [] Met: [] Not Met: [] Adjusted    Progression Towards Functional goals:  [x] Patient is progressing as expected towards functional goals listed. [] Progression is slowed due to complexities listed. [] Progression has been slowed due to co-morbidities. [] Plan just implemented, too soon to assess goals progression  [] Other:     Overall Progression Towards Functional goals/ Treatment Progress Update:  [x] Patient is progressing as expected towards functional goals listed. [] Progression is slowed due to complexities/Impairments listed. [] Progression has been slowed due to co-morbidities.   [] Plan just implemented, too soon to assess goals progression <30days   [] Goals require adjustment due to lack of progress  [] Patient is not progressing as expected and requires additional follow up with physician  [] Other    Prognosis for POC: [x] Good [] Fair  [] Poor      Patient requires continued skilled intervention: [x] Yes  [] No    Treatment/Activity Tolerance:  [x] Patient able to complete treatment  [] Patient limited by fatigue  [] Patient limited by pain    [] Patient limited by other medical complications  [] Other:     ASSESSMENT:Patient limited in today's session due to R shoulder, in tears trying to move around on plinth. Going to check about MRI authorization after PT today. Patient requires skilled physical therapy intervention to address pain level, deficits in lumbar ROM and R LE ROM, and gait impairments, to allow for return to ADLs, functional tasks, and recreational activities including going to the gym, without pain or restriction. PLAN: 1-2 days a week for 12 weeks (6/17/2020-9/17/2020)  [x] Continue per plan of care [] Alter current plan (see comments above)  [] Plan of care initiated [] Hold pending MD visit [] Discharge      Electronically signed by:  Jason Arauz, PT, DPT    Note: If patient does not return for scheduled/ recommended follow up visits, this note will serve as a discharge from care along with most recent update on progress.

## 2020-10-14 ENCOUNTER — TELEPHONE (OUTPATIENT)
Dept: ORTHOPEDIC SURGERY | Age: 65
End: 2020-10-14

## 2020-10-14 ENCOUNTER — HOSPITAL ENCOUNTER (OUTPATIENT)
Dept: PHYSICAL THERAPY | Age: 65
Setting detail: THERAPIES SERIES
Discharge: HOME | End: 2020-10-14
Payer: COMMERCIAL

## 2020-10-14 PROCEDURE — 97110 THERAPEUTIC EXERCISES: CPT

## 2020-10-14 PROCEDURE — 97140 MANUAL THERAPY 1/> REGIONS: CPT

## 2020-10-14 NOTE — TELEPHONE ENCOUNTER
Date: 10/27/20  Type of SX:  OUTPATIENT  Location: [facility]  CPT: [id number], [id number], [id number] PARTIAL APPROVAL/DENIAL  APPROVED: TO FIX TORN TENDON IN SHOULDER  DENIED: BREAKING UP SCAR TISSUE IN SHOULDER IS DENIED  SX area: [address]: Patricia [facility]

## 2020-10-14 NOTE — FLOWSHEET NOTE
[place] [facility], [address]  Phone: [phone]  Fax [phone]    Physical Therapy Treatment Note/ Progress Report:     Date:  10/14/2020    Patient Name:  Valeriy Perea    :  1955  MRN: [id number]  Restrictions/Precautions:    Medical/Treatment Diagnosis Information:  Diagnosis: M54.5 (ICD-10-CM) - Low back pain, unspecified back pain laterality, unspecified chronicity, unspecified whether sciatica present; M54.16 (ICD-10-CM) - Lumbar radiculitis  Treatment Diagnosis: M54.5 - Lumbar pain; M54.41 - LBP w/ sciatica  Insurance/Certification information:  PT Insurance Information: [employer]  Physician Information:  Referring Practitioner: Dr. Nicholas Mckeon  Has the plan of care been signed (Y/N):        [x]  Yes  []  No     Date of Patient follow up with Physician: PRN      Is this a Progress Report:     [x]  Yes  []  No        If Yes:  Date Range for reporting period:  Beginning 2020  Ending 2020    Progress report will be due (10 Rx or 30 days whichever is less):        Recertification will be due (POC Duration  / 90 days whichever is less): 2020         Visit # Insurance Allowable Auth Required   25 (+ 4) [place]  30 visits []  Yes [x]  No        Functional Scale: Modified Oswestry score 23, 46% disability  Date assessed:  3/11/2020           Modified Oswestry score 18, 36% disability  Date assessed:  4/15/2020           Modified Oswestry score 13, 26%           2020           Modified Oswestry score 12, 24%           2020           Modified Oswestry score 13, 26%           2020    Therapy Diagnosis/Practice Pattern: M54.5 - Lumbar pain; M54.41 - LBP w/ sciatica      Number of Comorbidities:  []0     [x]1-2    []3+    Latex Allergy:  [x]NO      []YES  Preferred Language for Healthcare:   [x]English       []other:      Pain level:  0/10 beginning of session; 0/10 at end of and ADLs as indicated by Functional Deficits. [] Progressing: [x] Met: [] Not Met: [] Adjusted      Long Term Goals: To be achieved in: 12 weeks  1. Disability index score of 23% or less for the Modified Oswestry  to assist with reaching prior level of function. [x] Progressing: [] Met: [] Not Met: [] Adjusted  2. Patient will demonstrate increased AROM to WNL, good LS mobility, good hip ROM to allow for proper joint functioning as indicated by patients Functional Deficits. [] Progressing: [x] Met: [] Not Met: [] Adjusted  3. Patient will demonstrate an increase in Strength to good proximal hip and core activation to allow for proper functional mobility as indicated by patients Functional Deficits. [] Progressing: [x] Met: [] Not Met: [] Adjusted  4. Patient will return to sitting and standing functional activities without increased symptoms or restriction. [x] Progressing: [] Met: [] Not Met: [] Adjusted  5. Patient will return to full LE ROM and strength to allow for decrease in lumbar and R leg pain, to allow for return to ADLs, functional tasks, and recreational activities including gym workouts, without pain or restriction. (patient specific functional goal)    [x] Progressing: [] Met: [] Not Met: [] Adjusted    Progression Towards Functional goals:  [x] Patient is progressing as expected towards functional goals listed. [] Progression is slowed due to complexities listed. [] Progression has been slowed due to co-morbidities. [] Plan just implemented, too soon to assess goals progression  [] Other:     Overall Progression Towards Functional goals/ Treatment Progress Update:  [x] Patient is progressing as expected towards functional goals listed. [] Progression is slowed due to complexities/Impairments listed. [] Progression has been slowed due to co-morbidities.   [] Plan just implemented, too soon to assess goals progression <30days   [] Goals require adjustment due to lack of progress  [] Patient is not progressing as expected and requires additional follow up with physician  [] Other    Prognosis for POC: [x] Good [] Fair  [] Poor      Patient requires continued skilled intervention: [x] Yes  [] No    Treatment/Activity Tolerance:  [x] Patient able to complete treatment  [] Patient limited by fatigue  [] Patient limited by pain    [] Patient limited by other medical complications  [] Other:     ASSESSMENT:Patient limited in today's session due to R shoulder - unable to progress therex or add arms into core exercises due to pain and inability to use. Having RTC sx in 2 weeks. Patient requires skilled physical therapy intervention to address pain level, deficits in lumbar ROM and R LE ROM, and gait impairments, to allow for return to ADLs, functional tasks, and recreational activities including going to the gym, without pain or restriction. PLAN: 1-2 days a week for 12 weeks (6/17/2020-9/17/2020)  [x] Continue per plan of care [] Alter current plan (see comments above)  [] Plan of care initiated [] Hold pending MD visit [] Discharge      Electronically signed by:  Sarath Madsen, PT, DPT    Note: If patient does not return for scheduled/ recommended follow up visits, this note will serve as a discharge from care along with most recent update on progress.

## 2020-10-20 NOTE — PROGRESS NOTES
Obstructive Sleep Apnea (MICHAEL) Screening     Patient:  Farzad Mckeon    YOB: 1955      Medical Record #:  [id number]                     Date:  10/20/2020     1. Are you a loud and/or regular snorer? [x]  Yes       [] No    2. Have you been observed to gasp or stop breathing during sleep? []  Yes       [x] No    3. Do you feel tired or groggy upon awakening or do you awaken with a headache?           []  Yes       [x] No    4. Are you often tired or fatigued during the wake time hours? []  Yes       [x] No    5. Do you fall asleep sitting, reading, watching TV or driving? []  Yes       [x] No    6. Do you often have problems with memory or concentration? []  Yes       [x] No    **If patient's score is ? 3 they are considered high risk for MICHAEL. An Anesthesia provider will evaluate the patient and develop a plan of care the day of surgery. Note:  If the patient's BMI is more than 35 kg m¯² , has neck circumference > 40 cm, and/or high blood pressure the risk is greater (© [employer], 2006).

## 2020-10-21 ENCOUNTER — OFFICE VISIT (OUTPATIENT)
Dept: PRIMARY CARE CLINIC | Age: 65
End: 2020-10-21
Payer: COMMERCIAL

## 2020-10-21 ENCOUNTER — HOSPITAL ENCOUNTER (OUTPATIENT)
Dept: PHYSICAL THERAPY | Age: 65
Setting detail: THERAPIES SERIES
Discharge: HOME | End: 2020-10-21
Payer: COMMERCIAL

## 2020-10-21 PROCEDURE — 99211 OFF/OP EST MAY X REQ PHY/QHP: CPT | Performed by: NURSE PRACTITIONER

## 2020-10-21 PROCEDURE — 97140 MANUAL THERAPY 1/> REGIONS: CPT

## 2020-10-21 PROCEDURE — 97110 THERAPEUTIC EXERCISES: CPT

## 2020-10-21 PROCEDURE — G8417 CALC BMI ABV UP PARAM F/U: HCPCS | Performed by: NURSE PRACTITIONER

## 2020-10-21 PROCEDURE — G8428 CUR MEDS NOT DOCUMENT: HCPCS | Performed by: NURSE PRACTITIONER

## 2020-10-21 NOTE — PATIENT INSTRUCTIONS

## 2020-10-21 NOTE — PROGRESS NOTES
Diane Hernandez received a viral test for COVID-19. They were educated on isolation and quarantine as appropriate. For any symptoms, they were directed to seek care from their PCP, given contact information to establish with a doctor, directed to an urgent care or the emergency room.

## 2020-10-22 ENCOUNTER — OFFICE VISIT (OUTPATIENT)
Dept: INTERNAL MEDICINE CLINIC | Age: 65
End: 2020-10-22
Payer: COMMERCIAL

## 2020-10-22 VITALS
HEART RATE: 80 BPM | OXYGEN SATURATION: 98 % | HEIGHT: 65 IN | WEIGHT: 191 LBS | SYSTOLIC BLOOD PRESSURE: 126 MMHG | TEMPERATURE: 98.1 F | BODY MASS INDEX: 31.82 KG/M2 | DIASTOLIC BLOOD PRESSURE: 82 MMHG

## 2020-10-22 LAB — SARS-COV-2, PCR: NOT DETECTED

## 2020-10-22 PROCEDURE — G8417 CALC BMI ABV UP PARAM F/U: HCPCS | Performed by: INTERNAL MEDICINE

## 2020-10-22 PROCEDURE — G8482 FLU IMMUNIZE ORDER/ADMIN: HCPCS | Performed by: INTERNAL MEDICINE

## 2020-10-22 PROCEDURE — 99244 OFF/OP CNSLTJ NEW/EST MOD 40: CPT | Performed by: INTERNAL MEDICINE

## 2020-10-22 PROCEDURE — G8427 DOCREV CUR MEDS BY ELIG CLIN: HCPCS | Performed by: INTERNAL MEDICINE

## 2020-10-22 PROCEDURE — 2022F DILAT RTA XM EVC RTNOPTHY: CPT | Performed by: INTERNAL MEDICINE

## 2020-10-22 NOTE — RESULT ENCOUNTER NOTE

## 2020-10-22 NOTE — PROGRESS NOTES
surgeon, Dr. Lev Piedra, who plans on performing [address] on October 27 at [facility].  The current problem began 2 months ago, and symptoms have been unchanged with time. Conservative therapy: Yes: PT, which has been not very effective. .    Planned anesthesia: General   Known anesthesia problems: None   Bleeding risk: No recent or remote history of abnormal bleeding  Personal or FH of DVT/PE: No    Patient objection to receiving blood products: No    Patient Active Problem List   Diagnosis    Type 2 diabetes mellitus without complication, without long-term current use of insulin (HCC)    Mixed hyperlipidemia    Essential hypertension    Acquired hypothyroidism    Erosive gastritis    Esophagitis, [place] grade A    RUQ pain    Fatty liver    Screening for colon cancer       Past Medical History:   Diagnosis Date    Chronic back pain     Diabetes mellitus ([address]     Essential hypertension 2/26/2020    Hyperlipidemia     Hypothyroidism     Thyroid disease      Past Surgical History:   Procedure Laterality Date    APPENDECTOMY  1974    BREAST SURGERY Right 2013    drainage    CHOLECYSTECTOMY  1974    COLONOSCOPY N/A 10/1/2020    COLONOSCOPY DIAGNOSTIC performed by Adam Becker MD at [address] [place] [address]    UPPER GASTROINTESTINAL ENDOSCOPY      UPPER GASTROINTESTINAL ENDOSCOPY  06/22/2020    bx    UPPER GASTROINTESTINAL ENDOSCOPY N/A 6/22/2020    EGD BIOPSY performed by Adam Becker MD at [address] [place] [address] History   Problem Relation Age of Onset    Dementia Mother     Breast Cancer Sister     Atrial Fibrillation Sister     High Blood Pressure Maternal Grandmother     Diabetes Sister      Social History     Socioeconomic History    Marital status:       Spouse name: Not on file    Number of children: Not on file    Years of education: Not on file    Highest thyromegaly present. Cardiovascular: Normal rate, regular rhythm, normal heart sounds and intact distal pulses. Exam reveals no gallop and no friction rub. No murmur heard. Pulmonary/Chest: Effort normal and breath sounds normal. No respiratory distress. She has no wheezes. She has no rales. Abdominal: Soft. Normal aorta and bowel sounds are normal. She exhibits no distension and no mass. There is no hepatosplenomegaly. No tenderness. Musculoskeletal: She exhibits no edema and no tenderness. Neurological: She is alert and oriented to person, place, and time. She has normal strength. No cranial nerve deficit or sensory deficit. Coordination and gait normal.   Skin: Skin is warm and dry. No rash noted. No erythema. Psychiatric: She has a normal mood and affect. Her behavior is normal.       Lab Results   Component Value Date    LABA1C 7.1 08/13/2020    LABA1C 7.4 05/19/2020    LABMICR 2.20 (H) 03/09/2020     Lab Results   Component Value Date     02/23/2020    K 4.0 02/23/2020     02/23/2020    CO2 24 02/23/2020    BUN 9 02/23/2020    CREATININE 0.6 02/23/2020    GLUCOSE 185 (H) 02/23/2020    CALCIUM 9.2 02/23/2020     Lab Results   Component Value Date    CHOL 162 01/06/2020    TRIG 78 01/06/2020    HDL 46 01/06/2020    LDLCALC 99 01/06/2020     Lab Results   Component Value Date    ALT 12 06/22/2020    AST 18 06/22/2020     Lab Results   Component Value Date    TSH 0.07 (L) 05/19/2020    TSH 3.16 01/06/2020    T4FREE 1.6 05/19/2020     Lab Results   Component Value Date    WBC 8.0 02/23/2020    HGB 13.2 02/23/2020    HCT 38.9 02/23/2020    MCV 88.7 02/23/2020     02/23/2020     No results found for: INR   No results found for: PSA   No results found for: LABURIC        Assessment:       60 y.o. patient with planned surgery as above.     SURGERY SPECIFIC RISK:  none  Known risk factors for perioperative complications: Diabetes mellitus, Hypertension  Current medications which may produce withdrawal symptoms if withheld perioperatively: none      Plan:     1. Preoperative workup as follows: none  2. Change in medication regimen before surgery: None  3. Prophylaxis for cardiac events with perioperative beta-blockers: Not indicated  ACC/AHA indications for pre-operative beta-blocker use:    · Vascular surgery with history of postitive stress test  · Intermediate or high risk surgery with history of CAD   · Intermediate or high risk surgery with multiple clinical predictors of CAD- 2 of the following: history of compensated or prior heart failure, history of cerebrovascular disease, DM, or renal insufficiency    Routine administration of higher-dose, long-acting metoprolol in beta-blocker-naïve patients on the day of surgery, and in the absence of dose titration is associated with an overall increase in mortality. Beta-blockers should be started days to weeks prior to surgery and titrated to pulse < 70.  4. Deep vein thrombosis prophylaxis: regimen to be chosen by surgical team  5.  No contraindications to planned surgery    Bailee was seen today for pre-op exam.    Diagnoses and all orders for this visit:    Preop exam for internal medicine    Traumatic incomplete tear of right rotator cuff, sequela    Type 2 diabetes mellitus without complication, without long-term current use of insulin ([facility]    Essential hypertension              ZACH JIMÉNEZ M.D.  [facility]  Office: [phone]  Fax: [phone]

## 2020-10-26 ENCOUNTER — ANESTHESIA EVENT (OUTPATIENT)
Dept: OPERATING ROOM | Age: 65
End: 2020-10-26
Payer: COMMERCIAL

## 2020-10-27 ENCOUNTER — HOSPITAL ENCOUNTER (OUTPATIENT)
Age: 65
Setting detail: OUTPATIENT SURGERY
Discharge: HOME | End: 2020-10-27
Attending: ORTHOPAEDIC SURGERY | Admitting: ORTHOPAEDIC SURGERY
Payer: COMMERCIAL

## 2020-10-27 ENCOUNTER — ANESTHESIA (OUTPATIENT)
Dept: OPERATING ROOM | Age: 65
End: 2020-10-27
Payer: COMMERCIAL

## 2020-10-27 VITALS
TEMPERATURE: 97 F | OXYGEN SATURATION: 100 % | DIASTOLIC BLOOD PRESSURE: 81 MMHG | BODY MASS INDEX: 27.97 KG/M2 | WEIGHT: 174 LBS | SYSTOLIC BLOOD PRESSURE: 121 MMHG | RESPIRATION RATE: 19 BRPM | HEART RATE: 61 BPM | HEIGHT: 66 IN

## 2020-10-27 VITALS
RESPIRATION RATE: 3 BRPM | DIASTOLIC BLOOD PRESSURE: 49 MMHG | SYSTOLIC BLOOD PRESSURE: 102 MMHG | OXYGEN SATURATION: 100 %

## 2020-10-27 LAB
GLUCOSE BLD-MCNC: 174 MG/DL (ref 70–99)
GLUCOSE BLD-MCNC: 241 MG/DL (ref 70–99)
GLUCOSE BLD-MCNC: 256 MG/DL (ref 70–99)
PERFORMED ON: ABNORMAL
PREGNANCY, URINE: NEGATIVE

## 2020-10-27 PROCEDURE — 2580000003 HC RX 258: Performed by: ORTHOPAEDIC SURGERY

## 2020-10-27 PROCEDURE — 7100000000 HC PACU RECOVERY - FIRST 15 MIN: Performed by: ORTHOPAEDIC SURGERY

## 2020-10-27 PROCEDURE — 7100000001 HC PACU RECOVERY - ADDTL 15 MIN: Performed by: ORTHOPAEDIC SURGERY

## 2020-10-27 PROCEDURE — 6360000002 HC RX W HCPCS

## 2020-10-27 PROCEDURE — C9290 INJ, BUPIVACAINE LIPOSOME: HCPCS | Performed by: ORTHOPAEDIC SURGERY

## 2020-10-27 PROCEDURE — 2500000003 HC RX 250 WO HCPCS

## 2020-10-27 PROCEDURE — 2500000003 HC RX 250 WO HCPCS: Performed by: ORTHOPAEDIC SURGERY

## 2020-10-27 PROCEDURE — 2709999900 HC NON-CHARGEABLE SUPPLY: Performed by: ORTHOPAEDIC SURGERY

## 2020-10-27 PROCEDURE — 6370000000 HC RX 637 (ALT 250 FOR IP): Performed by: ANESTHESIOLOGY

## 2020-10-27 PROCEDURE — 6360000002 HC RX W HCPCS: Performed by: ORTHOPAEDIC SURGERY

## 2020-10-27 PROCEDURE — 7100000010 HC PHASE II RECOVERY - FIRST 15 MIN: Performed by: ORTHOPAEDIC SURGERY

## 2020-10-27 PROCEDURE — C1762 CONN TISS, HUMAN(INC FASCIA): HCPCS | Performed by: ORTHOPAEDIC SURGERY

## 2020-10-27 PROCEDURE — C1713 ANCHOR/SCREW BN/BN,TIS/BN: HCPCS | Performed by: ORTHOPAEDIC SURGERY

## 2020-10-27 PROCEDURE — 84703 CHORIONIC GONADOTROPIN ASSAY: CPT

## 2020-10-27 PROCEDURE — 2580000003 HC RX 258: Performed by: ANESTHESIOLOGY

## 2020-10-27 PROCEDURE — 2720000010 HC SURG SUPPLY STERILE: Performed by: ORTHOPAEDIC SURGERY

## 2020-10-27 PROCEDURE — 3700000001 HC ADD 15 MINUTES (ANESTHESIA): Performed by: ORTHOPAEDIC SURGERY

## 2020-10-27 PROCEDURE — 7100000011 HC PHASE II RECOVERY - ADDTL 15 MIN: Performed by: ORTHOPAEDIC SURGERY

## 2020-10-27 PROCEDURE — 3700000000 HC ANESTHESIA ATTENDED CARE: Performed by: ORTHOPAEDIC SURGERY

## 2020-10-27 PROCEDURE — 3600000014 HC SURGERY LEVEL 4 ADDTL 15MIN: Performed by: ORTHOPAEDIC SURGERY

## 2020-10-27 PROCEDURE — 3600000004 HC SURGERY LEVEL 4 BASE: Performed by: ORTHOPAEDIC SURGERY

## 2020-10-27 DEVICE — BONE ANCHORS 3 WITH ARTHROSCOPIC DELIVERY SYSTEM ADVANCED
Type: IMPLANTABLE DEVICE | Site: SHOULDER | Status: FUNCTIONAL
Brand: BONE ANCHORS WITH ARTHROSCOPIC DELIVERY SYSTEM - ADVANCED

## 2020-10-27 DEVICE — Z INACTIVE USE 2600835 ANCHOR TEND 8 FOR REGENETEN BIOINDUCTIVE IMPL SYS: Type: IMPLANTABLE DEVICE | Site: SHOULDER | Status: FUNCTIONAL

## 2020-10-27 DEVICE — IMPLANTABLE DEVICE
Type: IMPLANTABLE DEVICE | Site: SHOULDER | Status: FUNCTIONAL
Brand: BIOINDUCTIVE IMPLANT WITH ARTHROSCOPIC DELIVERY SYSTEM - MEDIUM

## 2020-10-27 RX ORDER — HYDROMORPHONE HCL 110MG/55ML
PATIENT CONTROLLED ANALGESIA SYRINGE INTRAVENOUS PRN
Status: DISCONTINUED | OUTPATIENT
Start: 2020-10-27 | End: 2020-10-27 | Stop reason: SDUPTHER

## 2020-10-27 RX ORDER — SODIUM CHLORIDE, SODIUM LACTATE, POTASSIUM CHLORIDE, CALCIUM CHLORIDE 600; 310; 30; 20 MG/100ML; MG/100ML; MG/100ML; MG/100ML
INJECTION, SOLUTION INTRAVENOUS CONTINUOUS
Status: DISCONTINUED | OUTPATIENT
Start: 2020-10-27 | End: 2020-10-27 | Stop reason: HOSPADM

## 2020-10-27 RX ORDER — OXYCODONE HYDROCHLORIDE AND ACETAMINOPHEN 5; 325 MG/1; MG/1
2 TABLET ORAL PRN
Status: DISCONTINUED | OUTPATIENT
Start: 2020-10-27 | End: 2020-10-27 | Stop reason: HOSPADM

## 2020-10-27 RX ORDER — DIPHENHYDRAMINE HYDROCHLORIDE 50 MG/ML
6.25 INJECTION INTRAMUSCULAR; INTRAVENOUS
Status: DISCONTINUED | OUTPATIENT
Start: 2020-10-27 | End: 2020-10-27 | Stop reason: HOSPADM

## 2020-10-27 RX ORDER — SODIUM CHLORIDE 0.9 % (FLUSH) 0.9 %
10 SYRINGE (ML) INJECTION EVERY 12 HOURS SCHEDULED
Status: DISCONTINUED | OUTPATIENT
Start: 2020-10-27 | End: 2020-10-27 | Stop reason: HOSPADM

## 2020-10-27 RX ORDER — MIDAZOLAM HYDROCHLORIDE 1 MG/ML
INJECTION INTRAMUSCULAR; INTRAVENOUS PRN
Status: DISCONTINUED | OUTPATIENT
Start: 2020-10-27 | End: 2020-10-27 | Stop reason: SDUPTHER

## 2020-10-27 RX ORDER — ONDANSETRON 2 MG/ML
INJECTION INTRAMUSCULAR; INTRAVENOUS PRN
Status: DISCONTINUED | OUTPATIENT
Start: 2020-10-27 | End: 2020-10-27 | Stop reason: SDUPTHER

## 2020-10-27 RX ORDER — LABETALOL HYDROCHLORIDE 5 MG/ML
5 INJECTION, SOLUTION INTRAVENOUS
Status: DISCONTINUED | OUTPATIENT
Start: 2020-10-27 | End: 2020-10-27 | Stop reason: HOSPADM

## 2020-10-27 RX ORDER — OXYCODONE HYDROCHLORIDE AND ACETAMINOPHEN 5; 325 MG/1; MG/1
1 TABLET ORAL PRN
Status: DISCONTINUED | OUTPATIENT
Start: 2020-10-27 | End: 2020-10-27 | Stop reason: HOSPADM

## 2020-10-27 RX ORDER — ACETAMINOPHEN 500 MG
TABLET ORAL
Status: DISCONTINUED
Start: 2020-10-27 | End: 2020-10-27 | Stop reason: HOSPADM

## 2020-10-27 RX ORDER — LIDOCAINE HYDROCHLORIDE 20 MG/ML
INJECTION, SOLUTION INFILTRATION; PERINEURAL PRN
Status: DISCONTINUED | OUTPATIENT
Start: 2020-10-27 | End: 2020-10-27 | Stop reason: SDUPTHER

## 2020-10-27 RX ORDER — GABAPENTIN 300 MG/1
300 CAPSULE ORAL ONCE
Status: COMPLETED | OUTPATIENT
Start: 2020-10-27 | End: 2020-10-27

## 2020-10-27 RX ORDER — BUPIVACAINE HYDROCHLORIDE 2.5 MG/ML
INJECTION, SOLUTION INFILTRATION; PERINEURAL PRN
Status: DISCONTINUED | OUTPATIENT
Start: 2020-10-27 | End: 2020-10-27 | Stop reason: ALTCHOICE

## 2020-10-27 RX ORDER — SODIUM CHLORIDE 0.9 % (FLUSH) 0.9 %
10 SYRINGE (ML) INJECTION PRN
Status: DISCONTINUED | OUTPATIENT
Start: 2020-10-27 | End: 2020-10-27 | Stop reason: HOSPADM

## 2020-10-27 RX ORDER — TRAMADOL HYDROCHLORIDE 50 MG/1
50 TABLET ORAL EVERY 6 HOURS PRN
Qty: 28 TABLET | Refills: 0 | Status: SHIPPED | OUTPATIENT
Start: 2020-10-27 | End: 2020-11-03

## 2020-10-27 RX ORDER — ACETAMINOPHEN AND CODEINE PHOSPHATE 300; 30 MG/1; MG/1
1 TABLET ORAL EVERY 4 HOURS PRN
Qty: 30 TABLET | Refills: 0 | Status: SHIPPED | OUTPATIENT
Start: 2020-10-27 | End: 2020-11-01

## 2020-10-27 RX ORDER — MAGNESIUM HYDROXIDE 1200 MG/15ML
LIQUID ORAL CONTINUOUS PRN
Status: COMPLETED | OUTPATIENT
Start: 2020-10-27 | End: 2020-10-27

## 2020-10-27 RX ORDER — DEXAMETHASONE SODIUM PHOSPHATE 4 MG/ML
INJECTION, SOLUTION INTRA-ARTICULAR; INTRALESIONAL; INTRAMUSCULAR; INTRAVENOUS; SOFT TISSUE PRN
Status: DISCONTINUED | OUTPATIENT
Start: 2020-10-27 | End: 2020-10-27 | Stop reason: SDUPTHER

## 2020-10-27 RX ORDER — FENTANYL CITRATE 50 UG/ML
INJECTION, SOLUTION INTRAMUSCULAR; INTRAVENOUS PRN
Status: DISCONTINUED | OUTPATIENT
Start: 2020-10-27 | End: 2020-10-27 | Stop reason: SDUPTHER

## 2020-10-27 RX ORDER — CELECOXIB 100 MG/1
CAPSULE ORAL
Status: DISCONTINUED
Start: 2020-10-27 | End: 2020-10-27 | Stop reason: HOSPADM

## 2020-10-27 RX ORDER — MEPERIDINE HYDROCHLORIDE 50 MG/ML
12.5 INJECTION INTRAMUSCULAR; INTRAVENOUS; SUBCUTANEOUS EVERY 5 MIN PRN
Status: DISCONTINUED | OUTPATIENT
Start: 2020-10-27 | End: 2020-10-27 | Stop reason: HOSPADM

## 2020-10-27 RX ORDER — CELECOXIB 100 MG/1
200 CAPSULE ORAL ONCE
Status: COMPLETED | OUTPATIENT
Start: 2020-10-27 | End: 2020-10-27

## 2020-10-27 RX ORDER — DEXMEDETOMIDINE HYDROCHLORIDE 4 UG/ML
INJECTION, SOLUTION INTRAVENOUS CONTINUOUS PRN
Status: DISCONTINUED | OUTPATIENT
Start: 2020-10-27 | End: 2020-10-27 | Stop reason: SDUPTHER

## 2020-10-27 RX ORDER — ROCURONIUM BROMIDE 10 MG/ML
INJECTION, SOLUTION INTRAVENOUS PRN
Status: DISCONTINUED | OUTPATIENT
Start: 2020-10-27 | End: 2020-10-27 | Stop reason: SDUPTHER

## 2020-10-27 RX ORDER — PROPOFOL 10 MG/ML
INJECTION, EMULSION INTRAVENOUS PRN
Status: DISCONTINUED | OUTPATIENT
Start: 2020-10-27 | End: 2020-10-27 | Stop reason: SDUPTHER

## 2020-10-27 RX ORDER — HYDRALAZINE HYDROCHLORIDE 20 MG/ML
5 INJECTION INTRAMUSCULAR; INTRAVENOUS EVERY 30 MIN PRN
Status: DISCONTINUED | OUTPATIENT
Start: 2020-10-27 | End: 2020-10-27 | Stop reason: HOSPADM

## 2020-10-27 RX ORDER — LIDOCAINE HYDROCHLORIDE 10 MG/ML
1 INJECTION, SOLUTION EPIDURAL; INFILTRATION; INTRACAUDAL; PERINEURAL
Status: DISCONTINUED | OUTPATIENT
Start: 2020-10-27 | End: 2020-10-27 | Stop reason: HOSPADM

## 2020-10-27 RX ORDER — ONDANSETRON 2 MG/ML
4 INJECTION INTRAMUSCULAR; INTRAVENOUS EVERY 30 MIN PRN
Status: DISCONTINUED | OUTPATIENT
Start: 2020-10-27 | End: 2020-10-27 | Stop reason: HOSPADM

## 2020-10-27 RX ORDER — ACETAMINOPHEN 500 MG
1000 TABLET ORAL ONCE
Status: COMPLETED | OUTPATIENT
Start: 2020-10-27 | End: 2020-10-27

## 2020-10-27 RX ADMIN — SODIUM CHLORIDE, SODIUM LACTATE, POTASSIUM CHLORIDE, AND CALCIUM CHLORIDE: .6; .31; .03; .02 INJECTION, SOLUTION INTRAVENOUS at 07:58

## 2020-10-27 RX ADMIN — DEXAMETHASONE SODIUM PHOSPHATE 10 MG: 4 INJECTION, SOLUTION INTRAMUSCULAR; INTRAVENOUS at 08:17

## 2020-10-27 RX ADMIN — ONDANSETRON 4 MG: 2 INJECTION INTRAMUSCULAR; INTRAVENOUS at 07:58

## 2020-10-27 RX ADMIN — CEFAZOLIN SODIUM 2 G: 10 INJECTION, POWDER, FOR SOLUTION INTRAVENOUS at 08:14

## 2020-10-27 RX ADMIN — DEXMEDETOMIDINE HYDROCHLORIDE 2 MCG/KG/HR: 4 INJECTION, SOLUTION INTRAVENOUS at 08:25

## 2020-10-27 RX ADMIN — HYDROMORPHONE HYDROCHLORIDE 2 MG: 2 INJECTION, SOLUTION INTRAMUSCULAR; INTRAVENOUS; SUBCUTANEOUS at 08:24

## 2020-10-27 RX ADMIN — CELECOXIB 200 MG: 100 CAPSULE ORAL at 07:04

## 2020-10-27 RX ADMIN — ROCURONIUM BROMIDE 40 MG: 10 SOLUTION INTRAVENOUS at 08:10

## 2020-10-27 RX ADMIN — SODIUM CHLORIDE, POTASSIUM CHLORIDE, SODIUM LACTATE AND CALCIUM CHLORIDE: 600; 310; 30; 20 INJECTION, SOLUTION INTRAVENOUS at 06:50

## 2020-10-27 RX ADMIN — SUGAMMADEX 200 MG: 100 INJECTION, SOLUTION INTRAVENOUS at 09:01

## 2020-10-27 RX ADMIN — MIDAZOLAM HYDROCHLORIDE 2 MG: 2 INJECTION, SOLUTION INTRAMUSCULAR; INTRAVENOUS at 07:58

## 2020-10-27 RX ADMIN — GABAPENTIN 300 MG: 300 CAPSULE ORAL at 07:03

## 2020-10-27 RX ADMIN — ACETAMINOPHEN 1000 MG: 500 TABLET ORAL at 07:05

## 2020-10-27 RX ADMIN — PROPOFOL 100 MG: 10 INJECTION, EMULSION INTRAVENOUS at 08:09

## 2020-10-27 RX ADMIN — FENTANYL CITRATE 50 MCG: 50 INJECTION INTRAMUSCULAR; INTRAVENOUS at 08:08

## 2020-10-27 RX ADMIN — FENTANYL CITRATE 50 MCG: 50 INJECTION INTRAMUSCULAR; INTRAVENOUS at 07:58

## 2020-10-27 RX ADMIN — LIDOCAINE HYDROCHLORIDE 100 MG: 20 INJECTION, SOLUTION INFILTRATION; PERINEURAL at 07:58

## 2020-10-27 ASSESSMENT — PULMONARY FUNCTION TESTS
PIF_VALUE: 16
PIF_VALUE: 20
PIF_VALUE: 19
PIF_VALUE: 0
PIF_VALUE: 17
PIF_VALUE: 0
PIF_VALUE: 20
PIF_VALUE: 19
PIF_VALUE: 14
PIF_VALUE: 14
PIF_VALUE: 25
PIF_VALUE: 17
PIF_VALUE: 0
PIF_VALUE: 14
PIF_VALUE: 0
PIF_VALUE: 19
PIF_VALUE: 20
PIF_VALUE: 5
PIF_VALUE: 18
PIF_VALUE: 20
PIF_VALUE: 19
PIF_VALUE: 16
PIF_VALUE: 0
PIF_VALUE: 20
PIF_VALUE: 17
PIF_VALUE: 26
PIF_VALUE: 17
PIF_VALUE: 20
PIF_VALUE: 17
PIF_VALUE: 14
PIF_VALUE: 4
PIF_VALUE: 17
PIF_VALUE: 20
PIF_VALUE: 17
PIF_VALUE: 0
PIF_VALUE: 20
PIF_VALUE: 17
PIF_VALUE: 0
PIF_VALUE: 19
PIF_VALUE: 2
PIF_VALUE: 0
PIF_VALUE: 26
PIF_VALUE: 14
PIF_VALUE: 20
PIF_VALUE: 14
PIF_VALUE: 17
PIF_VALUE: 20
PIF_VALUE: 21
PIF_VALUE: 20
PIF_VALUE: 14
PIF_VALUE: 0
PIF_VALUE: 20
PIF_VALUE: 20
PIF_VALUE: 0
PIF_VALUE: 20
PIF_VALUE: 20
PIF_VALUE: 23
PIF_VALUE: 20
PIF_VALUE: 17
PIF_VALUE: 19
PIF_VALUE: 19
PIF_VALUE: 14
PIF_VALUE: 0
PIF_VALUE: 15
PIF_VALUE: 19
PIF_VALUE: 14
PIF_VALUE: 15
PIF_VALUE: 19
PIF_VALUE: 14
PIF_VALUE: 0
PIF_VALUE: 20
PIF_VALUE: 24
PIF_VALUE: 19
PIF_VALUE: 19
PIF_VALUE: 14
PIF_VALUE: 20
PIF_VALUE: 20
PIF_VALUE: 19

## 2020-10-27 ASSESSMENT — PAIN DESCRIPTION - DESCRIPTORS: DESCRIPTORS: CONSTANT;ACHING;NUMBNESS

## 2020-10-27 ASSESSMENT — PAIN SCALES - GENERAL
PAINLEVEL_OUTOF10: 0

## 2020-10-27 ASSESSMENT — PAIN - FUNCTIONAL ASSESSMENT
PAIN_FUNCTIONAL_ASSESSMENT: 0-10
PAIN_FUNCTIONAL_ASSESSMENT: PREVENTS OR INTERFERES SOME ACTIVE ACTIVITIES AND ADLS

## 2020-10-27 NOTE — OP NOTE
10 degrees of forward flexion. INDICATIONS FOR SURGERY:  The patient is a 72-year-old lady who has been  struggling with ongoing right shoulder pain. Her clinical examination  and MRI scan demonstrated the aforementioned preop diagnoses. With  failure of nonoperative measures, she elected to have rotator cuff  surgery. She realized the relatively lengthy rehabilitation after  rotator cuff surgery. She also understood concerns regarding infection,  deep vein thrombosis, pulmonary embolism, arthrofibrosis, delayed  rehabilitation, anesthetic complications including death,  cardiopulmonary issues, etc.  All questions were answered. DETAILS OF SURGERY:  The patient was taken to the operating room and  placed on the operating room table in a supine position. General  anesthesia was well induced and maintained without difficulty. Examination under anesthesia did not demonstrate any signs of  instability or adhesive capsulitis. She was then positioned as described above. Time-out was done and the  shoulder was prepped and draped. Routine arthroscopic portals were  established and the glenohumeral joint was entered. The patient had  some very mild fraying of the labrum, which required minimal  debridement. The biceps and biceps anchor were fine. There was an  undersurface fraying of the rotator cuff indicative of partial-thickness  tearing. This was debrided back. There was also a small focus of grade  3 chondromalacia along the humeral head which required debridement as  well again with a 4.0 full-radius resector. No further pathology was demonstrated within the joint. There was no  Hill-Sachs lesion. Subacromial space was entered. Fairly extensive bursitis was noted and  a methodical bursectomy was completed.   The patient had type 2 acromion  process and arthroscopic Neer acromioplasty was completed removing about  6 mm of bone anteriorly and beveling this back to about 3 cm in this  small acromion process. The [facility] joint was much more prominent arthroscopically than it was on the  MRI scan. The significant medial arch stenosis and the [facility] joint looked  relatively arthritic. I went ahead and performed a Clifton procedure  resecting approximately distal 1.5 cm of clavicle and beveling the  undersurface carefully to allow for an excellent medial arch  decompression. Everything was then irrigated extensively. The rotator cuff was carefully examined. There was a partial-thickness  tearing of about 60% to 70% at the supraspinatus footprint. This was  worthy of a repair. This was not a _____ large tear, so I was able to  use the medium size [employer] xenograft patch. This  was then tacked in place in a routine fashion on the tissues with  supraspinatus and then three bone anchors were placed along the  footprint of the connection of the supraspinatus. This was all done  without difficulty. It was probed and felt to be stable. I was happy  with the overall repair and the remainder of the rotator cuff looked  good. The shoulder was copiously irrigated at this point and then the  instruments were removed. Exparel was applied as described above and  the portals were closed with Monocryl, Steri-Strips and dry sterile  dressings. The patient was placed in a sling postprocedure stable. She went to the recovery room.         Namrata Lim MD    D: 10/27/2020 9:15:51       T: 10/27/2020 15:06:45     [email or web address]  Job#: [id number]     Doc#: [id number]    CC:

## 2020-10-27 NOTE — PROGRESS NOTES
Pt remains easily arouseable but will not stay awake for long periods of time   VSS   Oxygen  decreased to room air  SPO2=97%

## 2020-10-27 NOTE — PROGRESS NOTES
Patient remains sleepy wakes easily only staying awake to answer questions and falls quickly back to sleep VSS family kept updated. Dr. Yanet Jacobson aware no orders given. Will continue to monitor.

## 2020-10-27 NOTE — PROGRESS NOTES
Daughter updated on phone. Discharged in no distress accompanied to passenger side of car with family or significant other driving car. Assessment unchanged. Patient denies pain.

## 2020-10-27 NOTE — PROGRESS NOTES
POCT       Blood Glucose: 241    (Normal Range 70-99)  Dr. Kolton Valente aware no orders given. Patient sleeping wakes easily falls quickly back to sleep.

## 2020-10-27 NOTE — ANESTHESIA POSTPROCEDURE EVALUATION
Department of Anesthesiology  Postprocedure Note    Patient: Augie Leger  MRN: [id number]  [place]: 1955  Date of evaluation: 10/27/2020  Time:  10:35 AM     Procedure Summary     Date:  10/27/20 Room / Location:  [facility] OR  / [facility]    Anesthesia Start:  1572 Anesthesia Stop:  5492    Procedure:  EXAM UNDER ANESTHESIA VIDEO ARTHROSCOPY RIGHT SHOULDER, ROTATOR CUFF REPAIR WITH HAZEL AND NEPHEW PATCH, NEER ACROMIOPLASTY, POSSIBLE RYLEE PROCEDURE WITH EXPAREL (Right Shoulder) Diagnosis:       Partial nontraumatic tear of rotator cuff, right      Rotator cuff tendinitis, right      (RIGHT SHOULDER ROTATOR CUFF TEAR, TENDONITIS)    Surgeon:  Speedy Cha MD Responsible Provider:  Batsheva Zepeda MD    Anesthesia Type:  general ASA Status:  2          Anesthesia Type: general    Tu Phase I: Tu Score: 8    Tu Phase II:      Last vitals: Reviewed and per EMR flowsheets.        Anesthesia Post Evaluation    Comments: Postoperative Anesthesia Note    Name:    Augie Leger  MRN:      [id number]    Patient Vitals in the past 12 hrs:  10/27/20 1030, BP:101/65, Pulse:57, Resp:11, SpO2:98 %  10/27/20 1023, SpO2:97 %  10/27/20 1021, SpO2:90 %  10/27/20 1015, BP:116/67, Temp:97 °F (36.1 °C), Pulse:56, Resp:12, SpO2:92 %   10/27/20 1000, BP:112/64, Pulse:61, Resp:11, SpO2:93 %  10/27/20 0946, SpO2:94 %  10/27/20 0945, BP:97/76, Pulse:64, Resp:11, SpO2:100 %  10/27/20 0940, BP:122/72, Pulse:65, Resp:17, SpO2:99 %  10/27/20 0935, BP:(!) 99/56, Pulse:76, Resp:12, SpO2:99 %  10/27/20 0934, SpO2:100 %  10/27/20 0930, BP:(!) 93/52, Pulse:67, Resp:11, SpO2:99 %  10/27/20 0925, BP:(!) 90/52, Temp:97 °F (36.1 °C), Pulse:68, Resp:12, SpO2:100 %  10/27/20 0921, SpO2:92 %  10/27/20 0920, BP:(!) 109/54, Pulse:69, Resp:10, SpO2:90 %  10/27/20 0916, BP:(!) 90/54, Temp:96.9 °F (36.1 °C), Temp src:Temporal, Pulse:71, Resp:12, SpO2:97 %  10/27/20 0700, BP:118/69, Pulse:68, SpO2:97 %  10/27/20 0655, Temp:97.1 °F (36.2 °C), Temp src:Temporal, Resp:16     LABS:    CBC  Lab Results       Component                Value               Date/Time                  WBC                      8.0                 02/23/2020 06:35 PM        HGB                      13.2                02/23/2020 06:35 PM        HCT                      38.9                02/23/2020 06:35 PM        PLT                      265                 02/23/2020 06:35 PM   RENAL  Lab Results       Component                Value               Date/Time                  NA                       139                 02/23/2020 06:35 PM        K                        4.0                 02/23/2020 06:35 PM        CL                       101                 02/23/2020 06:35 PM        CO2                      24                  02/23/2020 06:35 PM        BUN                      9                   02/23/2020 06:35 PM        CREATININE               0.6                 02/23/2020 06:35 PM        GLUCOSE                  185 (H)             02/23/2020 06:35 PM   COAGS  No results found for: PROTIME, INR, APTT    Intake & Output: In: 500 (I.V.:500)  Out: 100     Nausea & Vomiting:  No    Level of Consciousness:  Awake    Pain Assessment:  Adequate analgesia    Anesthesia Complications:  No apparent anesthetic complications    SUMMARY      Vital signs stable  OK to discharge from Stage I post anesthesia care.   Care transferred from Anesthesiology department on discharge from perioperative area

## 2020-10-27 NOTE — H&P
I have reviewed the history and physical and examined the patient and find no relevant changes. I have reviewed with the patient and/or family the risks, benefits, and alternatives to the procedure. Controlled Substance Monitoring:    Acute and Chronic Pain Monitoring:   RX Monitoring 10/27/2020   Acute Pain Prescriptions Severe pain not adequately treated with lower dose. Periodic Controlled Substance Monitoring No signs of potential drug abuse or diversion identified. Patient being given increased dosage/quantity of opoid pain medication in excess of OSMB guidelines which noted a 30 MED daily of opioids due to the fact that he/she has undergone orthopaedic surgery as outlined in rule 4731-11-13. Dosages and further duration of the pain medication will be re-evaluated at her post op visit. Patient was educated on dosing expectations and limits of prescribing as a result of the new [facility] Board rules enacted August 31, 2017. OARRS report has also been utilized to screen for any abuse history or suspicious activity as outlined in [place]. All efforts have been taken to prevent abuse potential and misuse of opioid medications including education, screening, and close clinical follow up.

## 2020-10-27 NOTE — ANESTHESIA PRE PROCEDURE
Department of Anesthesiology  Preprocedure Note       Name:  Annamaria Taylor   Age:  60 y.o.  :  1955                                          MRN:  [id number]         Date:  10/27/2020      Surgeon: Gaviota Kasper):  Pacheco Meza MD    Procedure: Procedure(s):  EXAM UNDER ANESTHESIA VIDEO ARTHROSCOPY RIGHT SHOULDER, ROTATOR CUFF REPAIR WITH HAZEL AND NEPHEW PATCH, NEER ACROMIOPLASTY, POSSIBLE RYLEE PROCEDURE WITH EXPAREL    Medications prior to admission:   Prior to Admission medications    Medication Sig Start Date End Date Taking?  Authorizing Provider   glucose monitoring kit (FREESTYLE) monitoring kit 1 kit by Does not apply route daily 20   Kamini Santos MD   empagliflozin (JARDIANCE) 25 MG tablet TAKE 1 TABLET BY MOUTH EVERY DAY 9/15/20   Kamini Santos MD   bisacodyl (DULCOLAX) 5 MG EC tablet Use as directed for colonoscopy prep 20   Radha Wilson MD   polyethylene glycol [facility]) 17 GM/SCOOP powder Use as directed for colonoscopy prep 20   Radha Wilson MD   omeprazole (PRILOSEC) 20 MG delayed release capsule TAKE 1 CAPSULE BY MOUTH EVERY DAY 20   Radha Wilson MD   SITagliptin (JANUVIA) 100 MG tablet Take 1 tablet by mouth daily 8/10/20   Kamini Santos MD   metFORMIN (GLUCOPHAGE) 850 MG tablet Take 1 tablet by mouth daily 8/10/20   Kamini Santos MD   blood glucose monitor strips Test qd 20   Kamini Santos MD   levothyroxine (SYNTHROID) 75 MCG tablet Take 1 tablet by mouth daily 20   Kamini Santos MD   glimepiride (AMARYL) 4 MG tablet Take 1 tablet by mouth 2 times daily 3/9/20   Kamini Santos MD   atorvastatin (LIPITOR) 20 MG tablet Take 1 tablet by mouth daily 20   Kamini Santos MD   lisinopril (PRINIVIL;ZESTRIL) 40 MG tablet Take 1 tablet by mouth daily 20   Kamini Santos MD   Vaginal Lubricant (REPLENS) GEL Place 1 Dose vaginally daily 2/3/18   General aMr DO       Current medications:    Current Facility-Administered Medications   Medication Dose Route Frequency Provider Last Rate Last Dose    ceFAZolin (ANCEF) 2 g in dextrose 5 % 100 mL IVPB  2 g Intravenous On Call to Chon Alves MD        lactated ringers infusion   Intravenous Continuous Cristofer Scott MD        sodium chloride flush 0.9 % injection 10 mL  10 mL Intravenous 2 times per day Cristofer Scott MD        sodium chloride flush 0.9 % injection 10 mL  10 mL Intravenous PRN Cristofer Scott MD        lidocaine PF 1 % injection 1 mL  1 mL Intradermal Once PRN Cristofer Scott MD        acetaminophen (TYLENOL) tablet 1,000 mg  1,000 mg Oral Once Dariela Home, MD        celecoxib (CELEBREX) capsule 200 mg  200 mg Oral Once [facility], MD        gabapentin (NEURONTIN) capsule 300 mg  300 mg Oral Once [facility], MD        lactated ringers infusion   Intravenous Continuous Dariela Home, MD        lidocaine 1 % (PF) injection 0.1 mL  0.1 mL Intradermal Once PRN Dariela MD Lamberto        HYDROmorphone (DILAUDID) injection 0.5 mg  0.5 mg Intravenous Q10 Min PRN Kyle Hutchins MD        HYDROmorphone (DILAUDID) injection 0.5 mg  0.5 mg Intravenous Q5 Min PRN Kyle Hutchins MD        oxyCODONE-acetaminophen (PERCOCET) 5-325 MG per tablet 1 tablet  1 tablet Oral PRN Kyle Hutchins MD        Or    oxyCODONE-acetaminophen (PERCOCET) 5-325 MG per tablet 2 tablet  2 tablet Oral PRN Kyle Hutchins MD        diphenhydrAMINE (BENADRYL) injection 6.25 mg  6.25 mg Intravenous Once PRN Kyle Hutchins MD        ondansetron [facility]) injection 4 mg  4 mg Intravenous Q30 Min PRN Kyle Hutchins MD        labetalol (NORMODYNE;TRANDATE) injection 5 mg  5 mg Intravenous Q15 Min PRN Kyle Hutchins MD        hydrALAZINE (APRESOLINE) injection 5 mg  5 mg Intravenous Q30 Min PRN Kyle Hutchins MD        meperidine (DEMEROL) injection 12.5 mg  12.5 mg Intravenous Q5 Min PRN Clark Langston Emerald Capps MD           Allergies:  No Known Allergies    Problem List:    Patient Active Problem List   Diagnosis Code    Type 2 diabetes mellitus without complication, without long-term current use of insulin ([facility] E11.9    Mixed hyperlipidemia E78.2    Essential hypertension I10    Acquired hypothyroidism E03.9    Erosive gastritis K29.60    Esophagitis, [place] grade A K20.80    RUQ pain R10.11    Fatty liver K76.0    Screening for colon cancer Z12.11       Past Medical History:        Diagnosis Date    Chronic back pain     Diabetes mellitus ([facility]     Essential hypertension 2/26/2020    Hyperlipidemia     Hypothyroidism     Thyroid disease        Past Surgical History:        Procedure Laterality Date    APPENDECTOMY  1974    BREAST SURGERY Right 2013    drainage    CHOLECYSTECTOMY  1974    COLONOSCOPY N/A 10/1/2020    COLONOSCOPY DIAGNOSTIC performed by Suzanne Gallardo MD at [place] [address] ENDOSCOPY      UPPER GASTROINTESTINAL ENDOSCOPY  06/22/2020    bx    UPPER GASTROINTESTINAL ENDOSCOPY N/A 6/22/2020    EGD BIOPSY performed by Suzanne Gallardo MD at [address] [place] [address] Ave History:    Social History     Tobacco Use    Smoking status: Former Smoker     Packs/day: 0.50     Years: 20.00     Pack years: 10.00     Types: Cigarettes     Start date: 5/24/1992     Last attempt to quit: 2/26/2010     Years since quitting: 10.6    Smokeless tobacco: Never Used   Substance Use Topics    Alcohol use: Never                                Counseling given: Not Answered      Vital Signs (Current):   Vitals:    10/20/20 0844   Weight: 174 lb (78.9 kg)   Height: 5' 5.5\" (1.664 m)                                              BP Readings from Last 3 Encounters:   10/22/20 126/82   10/01/20 138/83   10/01/20 122/84       NPO Status: BMI:   Wt Readings from Last 3 Encounters:   10/20/20 174 lb (78.9 kg)   10/22/20 191 lb (86.6 kg)   10/07/20 186 lb (84.4 kg)     Body mass index is 28.51 kg/m². CBC:   Lab Results   Component Value Date    WBC 8.0 02/23/2020    RBC 4.39 02/23/2020    HGB 13.2 02/23/2020    HCT 38.9 02/23/2020    MCV 88.7 02/23/2020    RDW 13.5 02/23/2020     02/23/2020       CMP:   Lab Results   Component Value Date     02/23/2020    K 4.0 02/23/2020     02/23/2020    CO2 24 02/23/2020    BUN 9 02/23/2020    CREATININE 0.6 02/23/2020    GFRAA >60 02/23/2020    AGRATIO 1.4 02/23/2020    LABGLOM >60 02/23/2020    GLUCOSE 185 02/23/2020    PROT 6.8 06/22/2020    CALCIUM 9.2 02/23/2020    BILITOT 0.4 06/22/2020    ALKPHOS 54 06/22/2020    AST 18 06/22/2020    ALT 12 06/22/2020       POC Tests: No results for input(s): POCGLU, POCNA, POCK, POCCL, POCBUN, POCHEMO, POCHCT in the last 72 hours.     Coags: No results found for: PROTIME, INR, APTT    HCG (If Applicable): No results found for: PREGTESTUR, PREGSERUM, HCG, HCGQUANT     ABGs: No results found for: PHART, PO2ART, YWP1YFO, IUN0KDX, BEART, D8BTBUGT     Type & Screen (If Applicable):  No results found for: LABABO, LABRH    Drug/Infectious Status (If Applicable):  No results found for: HIV, HEPCAB    COVID-19 Screening (If Applicable):   Lab Results   Component Value Date    COVID19 Not Detected 10/21/2020         Anesthesia Evaluation  Patient summary reviewed and Nursing notes reviewed no history of anesthetic complications:   Airway: Mallampati: II     Neck ROM: full   Dental:          Pulmonary:                              Cardiovascular:    (+) hypertension:,                   Neuro/Psych:               GI/Hepatic/Renal:   (+) PUD, liver disease:,           Endo/Other:    (+) Diabetes, hypothyroidism::., .                 Abdominal:           Vascular:                                        Anesthesia Plan      general     ASA 2       Induction: intravenous. Anesthetic plan and risks discussed with patient. Plan discussed with CRNA.                   Rod Ty MD   10/27/2020

## 2020-10-27 NOTE — PROGRESS NOTES
Daughter updated on phone. Discharge instructions reviewed with patient and responsible adult. Discharge instructions signed and copy given with no additional questions. Patient to be discharged home with belongings. Tylenol with codeine #3 and ultram scripts given.

## 2020-10-27 NOTE — ADDENDUM NOTE
Addendum  created 10/27/20 1056 by MARK Adorno CRNA    Intraprocedure Meds edited, Orders acknowledged in Narrator

## 2020-10-27 NOTE — BRIEF OP NOTE
Brief Postoperative Note      Patient: Jose Juan Richey  YOB: 1955  MRN: [id number]    Date of Procedure: 10/27/2020    Pre-Op Diagnosis: RIGHT SHOULDER ROTATOR CUFF TEAR, TENDONITIS    Post-Op Diagnosis: Same       Procedure(s):  EXAM UNDER ANESTHESIA VIDEO ARTHROSCOPY RIGHT SHOULDER, ROTATOR CUFF REPAIR WITH HAZEL AND NEPHEW PATCH, NEER ACROMIOPLASTY, POSSIBLE RYLEE PROCEDURE WITH EXPAREL    Surgeon(s):  Chiqui Delarosa MD    Assistant:  Surgical Assistant: Joyce Demarco  Physician Assistant: TACOS Cardoso    Anesthesia: General    Estimated Blood Loss (mL): Minimal    Complications: None    Specimens:   * No specimens in log *    Implants:  Implant Name Type Inv.  Item Serial No.  Lot No. LRB No. Used Action   ANCHOR BONE 3 W/DEL SYS Fastener ANCHOR BONE 3 W/DEL SYS  [employer] [id number] Right 1 Implanted   GRAFT BIO TENDON MED W/DELIVERY DEVICE Bone/Graft/Tissue/Human/Synth GRAFT BIO TENDON MED W/DELIVERY DEVICE  [employer]  Right 1 Implanted   STAPLE FIX RM TENDON Fastener STAPLE FIX RM TENDON  [employer]: ENDOSCOPY [id number] Right 1 Implanted         Drains: * No LDAs found *    Findings: same    Electronically signed by TACOS Cardoso on 10/27/2020 at 9:27 AM

## 2020-10-29 ENCOUNTER — HOSPITAL ENCOUNTER (OUTPATIENT)
Dept: PHYSICAL THERAPY | Age: 65
Setting detail: THERAPIES SERIES
Discharge: HOME | End: 2020-10-29
Payer: COMMERCIAL

## 2020-10-29 PROCEDURE — 97161 PT EVAL LOW COMPLEX 20 MIN: CPT

## 2020-10-29 PROCEDURE — 97140 MANUAL THERAPY 1/> REGIONS: CPT

## 2020-10-29 PROCEDURE — 97110 THERAPEUTIC EXERCISES: CPT

## 2020-10-29 NOTE — PLAN OF CARE
[facility], [address], [address] [place] [address]  Phone: [phone]  Fax [phone]     Physical Therapy Certification    Dear Referring Practitioner: Dr. Nellie Granado,    We had the pleasure of evaluating the following patient for physical therapy services at [address] [place] [address]. A summary of our findings can be found in the initial assessment below. This includes our plan of care. If you have any questions or concerns regarding these findings, please do not hesitate to contact me at the office phone number checked above. Thank you for the referral.       Physician Signature:_______________________________Date:__________________  By signing above (or electronic signature), therapists plan is approved by physician    Patient: Jerri Crockett   : 1955   MRN: [id number]  Referring Physician: Referring Practitioner: Dr. Nellie Granado      Evaluation Date: 10/29/2020      Medical Diagnosis Information:  Diagnosis: M75.111 (ICD-10-CM) - Nontraumatic incomplete tear of right rotator cuff   Treatment Diagnosis: Right shoulder pain M25.511, right shoulder stiffness M25.611, Right shoulder effusion M25.411, Right shoulder weakness M62.8                                         Insurance information: PT Insurance Information: [employer]   10/28/2020 24 VISITS APPVD; 10/29/2020 - 2020    Precautions/ Contra-indications: sling PROM only, see MD note after follow up 10/30 to confirm duration  Latex Allergy:  [x]NO      []YES  Preferred Language for Healthcare:   [x]English       []other:    SUBJECTIVE: Patient stated complaint: Pt was seen in this clinic by this PT prior to surgery in attempt at conservative treatment of RTC tendon tear. Had surgery to repair tendon using patch on 10/27/2020. has been trying to manage pain with ice and meds.  Using arm a little bit but it hurts, states she got a pamphlet after surgery with instructions saying she could come out of sling and use arm as tolerated, no instruction otherwise. Relevant Medical History:DM  Functional Disability Index: Quick DASH 75% 44/55    Pain Scale: 2-8/10  Easing factors: rest, meds, ice  Provocative factors: any movement     Type: [x]Constant   []Intermittent  []Radiating []Localized []other:     Numbness/Tingling: denies    Occupation/School: retired    Living Status/Prior Level of Function: Independent with ADLs and IADLs, walking, playing with grand kids    OBJECTIVE:     CERV ROM     Cervical Flexion     Cervical Extension     Cervical SB     Cervical rotation          ROM Left Right   Shoulder Flex  60   Shoulder Abd  25   Shoulder ER  10   Shoulder IR  belly                  Strength  Left Right   Shoulder Flex  NT   Shoulder Scap     Shoulder ER     Shoulder IR                 Reflexes/Sensation: NT due to recent sx   []Dermatomes/Myotomes intact    []Reflexes equal and normal bilaterally   []Other:    Joint mobility:    []Normal    [x]Hypo   []Hyper    Palpation: TTP around shoulder joint with effusion present    Functional Mobility/Transfers: requires assist to don/doff sling    Posture: forward head, rounded shoulders, in sling with UT activation holding R UE in shurg    Bandages/Dressings/Incisions: healing well no signs of infection    Gait: (include devices/WB status): decreased arm swing    Orthopedic Special Tests: none                       [x] Patient history, allergies, meds reviewed. Medical chart reviewed. See intake form. Review Of Systems (ROS):  [x]Performed Review of systems (Integumentary, CardioPulmonary, Neurological) by intake and observation. Intake form has been scanned into medical record. Patient has been instructed to contact their primary care physician regarding ROS issues if not already being addressed at this time.       Co-morbidities/Complexities (which will affect course of rehabilitation):   []None           Arthritic conditions []Rheumatoid arthritis (M05.9)  [x]Osteoarthritis (M19.91)   Cardiovascular conditions   []Hypertension (I10)  []Hyperlipidemia (E78.5)  []Angina pectoris (I20)  []Atherosclerosis (I70)   Musculoskeletal conditions   []Disc pathology   []Congenital spine pathologies   []Prior surgical intervention  []Osteoporosis (M81.8)  []Osteopenia (M85.8)   Endocrine conditions   []Hypothyroid (E03.9)  []Hyperthyroid Gastrointestinal conditions   []Constipation (Y62.92)   Metabolic conditions   [x]Morbid obesity (E66.01)  [x]Diabetes type 1(E10.65) or 2 (E11.65)   [x]Neuropathy (G60.9)     Pulmonary conditions   []Asthma (J45)  []Coughing   []COPD (J44.9)   Psychological Disorders  [x]Anxiety (F41.9)  []Depression (F32.9)   []Other:   []Other:          Barriers to/and or personal factors that will affect rehab potential:              [x]Age  [x]Sex              [x]Motivation/Lack of Motivation                        [x]Co-Morbidities              []Cognitive Function, education/learning barriers              []Environmental, home barriers              []profession/work barriers  []past PT/medical experience  []other:  Justification:      Falls Risk Assessment (30 days):   [x] Falls Risk assessed and no intervention required.   [] Falls Risk assessed and Patient requires intervention due to being higher risk   TUG score (>12s at risk):     [] Falls education provided, including       ASSESSMENT:   Functional Impairments   [x]Noted spinal or UE joint hypomobility   []Noted spinal or UE joint hypermobility   [x]Decreased UE functional ROM   [x]Decreased UE functional strength   []Abnormal reflexes/sensation/myotomal/dermatomal deficits   [x]Decreased RC/scapular/core strength and neuromuscular control   []other:      Functional Activity Limitations (from functional questionnaire and intake)   [x]Reduced ability to tolerate prolonged functional positions   [x]Reduced ability or difficulty with changes of positions or transfers between positions   [x]Reduced ability to maintain good posture and demonstrate good body mechanics with sitting, bending, and lifting   [x] Reduced ability or tolerance with driving and/or computer work   [x]Reduced ability to sleep   [x]Reduced ability to perform lifting, reaching, carrying tasks   [x]Reduced ability to tolerate impact through UE   [x]Reduced ability to reach behind back   [x]Reduced ability to  or hold objects   [x]Reduced ability to throw or toss an object   []other:    Participation Restrictions   [x]Reduced participation in self care activities   [x]Reduced participation in home management activities   [x]Reduced participation in work activities   [x]Reduced participation in social activities. [x]Reduced participation in sport/recreation activities. Classification:   [x]Signs/symptoms consistent with post-surgical status including decreased ROM, strength and function.   []Signs/symptoms consistent with joint sprain/strain   []Signs/symptoms consistent with shoulder impingement   []Signs/symptoms consistent with shoulder/elbow/wrist tendinopathy   []Signs/symptoms consistent with Rotator cuff tear   []Signs/symptoms consistent with labral tear   []Signs/symptoms consistent with postural dysfunction    []Signs/symptoms consistent with Glenohumeral IR Deficit - <45 degrees   []Signs/symptoms consistent with facet dysfunction of cervical/thoracic spine    []Signs/symptoms consistent with pathology which may benefit from Dry needling     []other:     Prognosis/Rehab Potential:      []Excellent   [x]Good    []Fair   []Poor    Tolerance of evaluation/treatment:    []Excellent   []Good    [x]Fair   []Poor  Physical Therapy Evaluation Complexity Justification  [x] A history of present problem with:  [] no personal factors and/or comorbidities that impact the plan of care;  [x]1-2 personal factors and/or comorbidities that impact the plan of care  [x]3 personal factors and/or comorbidities that impact the plan of care  [x] An examination of body systems using standardized tests and measures addressing any of the following: body structures and functions (impairments), activity limitations, and/or participation restrictions;:  [] a total of 1-2 or more elements   [] a total of 3 or more elements   [x] a total of 4 or more elements   [x] A clinical presentation with:  [x] stable and/or uncomplicated characteristics   [] evolving clinical presentation with changing characteristics  [] unstable and unpredictable characteristics;   [x] Clinical decision making of [x] low, [] moderate, [] high complexity using standardized patient assessment instrument and/or measurable assessment of functional outcome. [x] EVAL (LOW) 04836 (typically 20 minutes face-to-face)  [] EVAL (MOD) 05544 (typically 30 minutes face-to-face)  [] EVAL (HIGH) 09883 (typically 45 minutes face-to-face)  [] RE-EVAL     PLAN:  Frequency/Duration:  1-2 days per week for 12 weeks Weeks:  INTERVENTIONS:  [x] Therapeutic exercise including: strength training, ROM, for Upper extremity and core   [x]  NMR activation and proprioception for UE, scap and Core   [x] Manual therapy as indicated for shoulder, scapula and spine to include: Dry Needling/IASTM, STM, PROM, Gr I-IV mobilizations, manipulation. [x] Modalities as needed that may include: thermal agents, E-stim, Biofeedback, US, iontophoresis as indicated  [x] Patient education on joint protection, postural re-education, activity modification, progression of HEP. HEP instruction: (see scanned forms)    GOALS:  Patient stated goal: Regain full pain free use of arm, return to playing with granddaughter  [] Progressing: [] Met: [] Not Met: [] Adjusted    Therapist goals for Patient:   Short Term Goals: To be achieved in: 2 weeks  1. Independent in HEP and progression per patient tolerance, in order to prevent re-injury. [] Progressing: [] Met: [] Not Met: [] Adjusted  2.  Patient will have a decrease in pain to facilitate improvement in movement, function, and ADLs as indicated by Functional Deficits. [] Progressing: [] Met: [] Not Met: [] Adjusted    Long Term Goals: To be achieved in: 12 weeks  1. Disability index score of 35% or less for the [facility] to assist with reaching prior level of function. [] Progressing: [] Met: [] Not Met: [] Adjusted  2. Patient will demonstrate increased AROM to 0-150, functional IR to don/doff bra, functional ER to wash/care for hair, to allow for proper joint functioning as indicated by patients Functional Deficits. [] Progressing: [] Met: [] Not Met: [] Adjusted  3. Patient will demonstrate an increase in Strength to at least 4+/5 throughout UE to allow for proper functional mobility as indicated by patients Functional Deficits. [] Progressing: [] Met: [] Not Met: [] Adjusted  4. Patient will return to dressing and hygiene ADLs independently and without increased symptoms or restriction. [] Progressing: [] Met: [] Not Met: [] Adjusted  5. Patient will demonstrate appropriate scapular mechanics with no UT compensation to reduce risk of re-injury.     [] Progressing: [] Met: [] Not Met: [] Adjusted         Electronically signed by:  Frankie Tamayo, PT DPT [id number]

## 2020-10-29 NOTE — FLOWSHEET NOTE
RESTRICTIONS/PRECAUTIONS: sling, PROM only - check MD note NV for duration and progression    Exercises/Interventions:   Therapeutic Ex Sets/reps Notes HEP   SBS  Shrug 20 x  20 x  X   Codman pendulum 20 x  X      X      X                                                                                       Pt ed: surgery, anatomy, post-op restrictions, PT progression, RICE, sling use 8 min     Manual Intervention      PROM 15 min                                         NMR re-education                                                          Therapeutic Exercise and NMR EXR  [x] (20201) Provided verbal/tactile cueing for activities related to strengthening, flexibility, endurance, ROM  for improvements in scapular, scapulothoracic and UE control with self care, reaching, carrying, lifting, house/yardwork, driving/computer work.    [] (80037) Provided verbal/tactile cueing for activities related to improving balance, coordination, kinesthetic sense, posture, motor skill, proprioception  to assist with  scapular, scapulothoracic and UE control with self care, reaching, carrying, lifting, house/yardwork, driving/computer work. Therapeutic Activities:    [] (21037 or 42933) Provided verbal/tactile cueing for activities related to improving balance, coordination, kinesthetic sense, posture, motor skill, proprioception and motor activation to allow for proper function of scapular, scapulothoracic and UE control with self care, carrying, lifting, driving/computer work.      Home Exercise Program:    [x] (39325) Reviewed/Progressed HEP activities related to strengthening, flexibility, endurance, ROM of scapular, scapulothoracic and UE control with self care, reaching, carrying, lifting, house/yardwork, driving/computer work  [] (46886) Reviewed/Progressed HEP activities related to improving balance, coordination, kinesthetic sense, posture, motor skill, proprioception of scapular, scapulothoracic and UE control with self care, reaching, carrying, lifting, house/yardwork, driving/computer work      Manual Treatments:  PROM / STM / Oscillations-Mobs:  G-I, II, III, IV (PA's, Inf., Post.)  [x] (07220) Provided manual therapy to mobilize soft tissue/joints of cervical/CT, scapular GHJ and UE for the purpose of modulating pain, promoting relaxation,  increasing ROM, reducing/eliminating soft tissue swelling/inflammation/restriction, improving soft tissue extensibility and allowing for proper ROM for normal function with self care, reaching, carrying, lifting, house/yardwork, driving/computer work    Modalities:      [] GR/ESU 15 min    [x] GR 15 min   [] ESU     [] CP    [] MHP    [] declined    Charges:  Timed Code Treatment Minutes: 30   Total Treatment Minutes: 60     [x] EVAL (LOW) 05793 (typically 20 minutes face-to-face)  [] EVAL (MOD) 84626 (typically 30 minutes face-to-face)  [] EVAL (HIGH) 29848 (typically 45 minutes face-to-face)  [] RE-EVAL     [x] JI(70386) x  1   [] IONTO  [] NMR (16102) x     [x] VASO  [x] Manual (08164) x 1     [] Other:  [] TA x      [] Mech Traction (48278)  [] ES(attended) (61661)      [] ES (un) (19003):     Leonie January stated goal: Regain full pain free use of arm, return to playing with granddaughter  [] Progressing: [] Met: [] Not Met: [] Adjusted    Therapist goals for Patient:   Short Term Goals: To be achieved in: 2 weeks  1. Independent in HEP and progression per patient tolerance, in order to prevent re-injury. [] Progressing: [] Met: [] Not Met: [] Adjusted  2. Patient will have a decrease in pain to facilitate improvement in movement, function, and ADLs as indicated by Functional Deficits. [] Progressing: [] Met: [] Not Met: [] Adjusted    Long Term Goals: To be achieved in: 12 weeks  1. Disability index score of 35% or less for the [facility] to assist with reaching prior level of function. [] Progressing: [] Met: [] Not Met: [] Adjusted  2.  Patient will demonstrate increased AROM to 0-150, functional IR to don/doff bra, functional ER to wash/care for hair, to allow for proper joint functioning as indicated by patients Functional Deficits. [] Progressing: [] Met: [] Not Met: [] Adjusted  3. Patient will demonstrate an increase in Strength to at least 4+/5 throughout UE to allow for proper functional mobility as indicated by patients Functional Deficits. [] Progressing: [] Met: [] Not Met: [] Adjusted  4. Patient will return to dressing and hygiene ADLs independently and without increased symptoms or restriction. [] Progressing: [] Met: [] Not Met: [] Adjusted  5. Patient will demonstrate appropriate scapular mechanics with no UT compensation to reduce risk of re-injury. [] Progressing: [] Met: [] Not Met: [] Adjusted      Overall Progression Towards Functional goals/ Treatment Progress Update:  [] Patient is progressing as expected towards functional goals listed. [] Progression is slowed due to complexities/Impairments listed. [] Progression has been slowed due to co-morbidities.   [x] Plan just implemented, too soon to assess goals progression <30days   [] Goals require adjustment due to lack of progress  [] Patient is not progressing as expected and requires additional follow up with physician  [] Other    Prognosis for POC: [x] Good [] Fair  [] Poor      Patient requires continued skilled intervention: [x] Yes  [] No      ASSESSMENT:  See eval    Treatment/Activity Tolerance:  [x] Patient tolerated treatment well [] Patient limited by fatique  [] Patient limited by pain  [] Patient limited by other medical complications  [] Other:     Patient Requires Follow-up: [x] Yes  [] No    PLAN: See eval  [] Continue per plan of care [] Alter current plan (see comments above)  [x] Plan of care initiated [] Hold pending MD visit [] Discharge      Electronically signed by:  Zina Dubin, PT , DPT [id number]    Note: If patient does not return for scheduled/ recommended follow up visits, this note will serve as a discharge from care along with most recent update on progress.

## 2020-10-30 ENCOUNTER — OFFICE VISIT (OUTPATIENT)
Dept: ORTHOPEDIC SURGERY | Age: 65
End: 2020-10-30

## 2020-10-30 VITALS — WEIGHT: 191 LBS | BODY MASS INDEX: 31.82 KG/M2 | HEIGHT: 65 IN

## 2020-10-30 PROCEDURE — 99024 POSTOP FOLLOW-UP VISIT: CPT | Performed by: PHYSICIAN ASSISTANT

## 2020-10-30 NOTE — PROGRESS NOTES
Chief Complaint   Patient presents with    Post-Op Check     PO RT SHOULDER SX:10/27/2020        PREOPERATIVE DIAGNOSES:  Partial-thickness tear, rotator cuff, right  shoulder with associated type 2 acromion process with lateral arch  stenosis and AC joint arthritis.     POSTOPERATIVE DIAGNOSES:  1. Partial-thickness rotator cuff tear, right shoulder, approximately  70% thickness at the supraspinatus footprint approximately 1.5 cm in  length. 2.  Type 2 acromion process with lateral arch stenosis. 3.  AC joint arthritis - moderate with medial arch stenosis.     OPERATIVE PROCEDURE:  1. Exam under anesthesia and video arthroscopy, right shoulder. 2.  Arthroscopic rotator cuff repair using Smith and Nephew allograft  patch. 3.  Arthroscopic Neer acromioplasty. 4.  Arthroscopic Clifton procedure. 5.  Limited debridement of the shoulder involving undersurface of the  rotator cuff. 6.  Debridement of the humeral head small area of grade 3 chondromalacia  1.5 x 1 cm.       History of present illness: The patient returns today for their first postoperative visit after right shoulder arthroscopy performed on 10/27/2020. Pain control has been satisfactory with oral medications. There have been no fevers or chills. She is actually feeling really good. She still little bit tired. She was very sedated postoperatively and was very sensitive to the anesthesia. She is using very minimal pain medication now. Otherwise she has no complaints   ]    Physical examination:   inspection reveals expected swelling. Incisions are clean, dry, and intact. No signs of infection. Range of motion limited by pain and swelling. The patient is neurovascularly intact. Assessment/plan:    The patient is doing well after shoulder arthroscopy. Surgical findings were reviewed with the patient. I have recommended ice, judicious use of NSAIDs with GI precautions, and physical therapy for both range of motion and strength.  They can

## 2020-11-05 ENCOUNTER — HOSPITAL ENCOUNTER (OUTPATIENT)
Dept: PHYSICAL THERAPY | Age: 65
Setting detail: THERAPIES SERIES
Discharge: HOME | End: 2020-11-05
Payer: COMMERCIAL

## 2020-11-05 PROCEDURE — 97016 VASOPNEUMATIC DEVICE THERAPY: CPT

## 2020-11-05 PROCEDURE — 97110 THERAPEUTIC EXERCISES: CPT

## 2020-11-05 PROCEDURE — 97140 MANUAL THERAPY 1/> REGIONS: CPT

## 2020-11-05 NOTE — FLOWSHEET NOTE
[facility] [facility], [address] [place]  Phone: [phone]  Fax [phone]    Physical Therapy Daily Treatment Note  Date:  2020    Patient Name:  Sheron Talamantes    :  1955  MRN: [id number]  Restrictions/Precautions:    Physician Information:  Referring Practitioner: Dr. Stevo Delgadillo  Medical/Treatment Diagnosis Information:  · Diagnosis: M75.111 (ICD-10-CM) - Nontraumatic incomplete tear of right rotator cuff  · Treatment Diagnosis: Right shoulder pain M25.511, right shoulder stiffness M25.611, Right shoulder effusion M25.411, Right shoulder weakness M62.8      [] Conservative / [x] Surgical - DOS: 10/27/2020 R RTC repair with patch, neer/clarence  Therapy Diagnosis/Practice Pattern:  Practice Pattern I: Bony or Soft Tissue Surgery  Insurance/Certification information:  PT Insurance Information: [employer]   10/28/2020 24 VISITS APPVD; 10/29/2020 - 2020  Plan of care signed: [] YES  [] NO  Number of Comorbidities:  []0     [x]1-2    []3+  Date of Patient follow up with Physician:     Is this a Progress Report:     []  Yes  [x]  No        If Yes:  Date Range for reporting period:  Beginning 10/29/2020  Ending     Progress report will be due (10 Rx or 30 days whichever is less):        Recertification will be due (POC Duration  / 90 days whichever is less): 2020     Progress Note: [x]  Yes  []  No  Next due by: Visit #10        Latex Allergy:  [x]NO      []YES  Preferred Language for Healthcare:   [x]English       []other:    Visit # Insurance Allowable Reporting Period   2 24 (thru ) Begin Date: 2020               End Date:      SUBJECTIVE:  Pt reports shoulder doing okay. Has hard time sleeping at night, cant get in good position. Spent 2 days out of sling and it started to really hurt so she is in sling all the time past few days.     OBJECTIVE:    Observation:   Palpation:     Test used Initial score Current Score   Pain Summary VAS 2-8    Functional questionnaire QDash 75%  44/55    ROM flexion 60 100    ER 10 25    ABD 25 40    IR belly belly   Strength flexion NT     ER      ABD      IR        RESTRICTIONS/PRECAUTIONS: wean from sling progress as tolerated    Exercises/Interventions:   Therapeutic Ex Sets/reps Notes HEP   Supine cane flex 20 x     SBS  Shrug 20 x  20 x  X   Codman pendulum 20 x  X   Elbow flex/ext with pro/sup 20 x  X   Theraweb flex/ext 20 x                                                                                               Manual Intervention      PROM, oscillations  15 min                             Pt ed: surgery, anatomy, post-op restrictions, PT progression, RICE, sling use 8 min           NMR re-education                                                          Therapeutic Exercise and NMR EXR  [x] (75121) Provided verbal/tactile cueing for activities related to strengthening, flexibility, endurance, ROM  for improvements in scapular, scapulothoracic and UE control with self care, reaching, carrying, lifting, house/yardwork, driving/computer work.    [] (46410) Provided verbal/tactile cueing for activities related to improving balance, coordination, kinesthetic sense, posture, motor skill, proprioception  to assist with  scapular, scapulothoracic and UE control with self care, reaching, carrying, lifting, house/yardwork, driving/computer work. Therapeutic Activities:    [] (36398 or 63062) Provided verbal/tactile cueing for activities related to improving balance, coordination, kinesthetic sense, posture, motor skill, proprioception and motor activation to allow for proper function of scapular, scapulothoracic and UE control with self care, carrying, lifting, driving/computer work.      Home Exercise Program:    [x] (43776) Reviewed/Progressed HEP activities related to strengthening, flexibility, endurance, ROM of scapular, scapulothoracic and UE control with self care, reaching, carrying, lifting, house/yardwork, driving/computer work  [] (21867) Reviewed/Progressed HEP activities related to improving balance, coordination, kinesthetic sense, posture, motor skill, proprioception of scapular, scapulothoracic and UE control with self care, reaching, carrying, lifting, house/yardwork, driving/computer work      Manual Treatments:  PROM / STM / Oscillations-Mobs:  G-I, II, III, IV (PA's, Inf., Post.)  [x] (71447) Provided manual therapy to mobilize soft tissue/joints of cervical/CT, scapular GHJ and UE for the purpose of modulating pain, promoting relaxation,  increasing ROM, reducing/eliminating soft tissue swelling/inflammation/restriction, improving soft tissue extensibility and allowing for proper ROM for normal function with self care, reaching, carrying, lifting, house/yardwork, driving/computer work    Modalities:      [] GR/ESU 15 min    [x] GR 15 min   [] ESU     [] CP    [] MHP    [] declined    Charges:  Timed Code Treatment Minutes: 40   Total Treatment Minutes: 60     [] EVAL (LOW) 21234 (typically 20 minutes face-to-face)  [] EVAL (MOD) 09052 (typically 30 minutes face-to-face)  [] EVAL (HIGH) [id number] (typically 45 minutes face-to-face)  [] RE-EVAL     [x] CI(13603) x  1   [] IONTO  [] NMR (81584) x     [x] VASO  [x] Manual (43373) x 2     [] Other:  [] TA x      [] Mech Traction (10690)  [] ES(attended) (71927)      [] ES (un) (70828):     Paula Mendoza stated goal: Regain full pain free use of arm, return to playing with granddaughter  [] Progressing: [] Met: [] Not Met: [] Adjusted    Therapist goals for Patient:   Short Term Goals: To be achieved in: 2 weeks  1. Independent in HEP and progression per patient tolerance, in order to prevent re-injury. [] Progressing: [] Met: [] Not Met: [] Adjusted  2. Patient will have a decrease in pain to facilitate improvement in movement, function, and ADLs as indicated by Functional Deficits.   [] Progressing: [] Met: [] Not Met: [] Adjusted    Long Term Goals: To be achieved in: 12 weeks  1. Disability index score of 35% or less for the [facility] to assist with reaching prior level of function. [] Progressing: [] Met: [] Not Met: [] Adjusted  2. Patient will demonstrate increased AROM to 0-150, functional IR to don/doff bra, functional ER to wash/care for hair, to allow for proper joint functioning as indicated by patients Functional Deficits. [] Progressing: [] Met: [] Not Met: [] Adjusted  3. Patient will demonstrate an increase in Strength to at least 4+/5 throughout UE to allow for proper functional mobility as indicated by patients Functional Deficits. [] Progressing: [] Met: [] Not Met: [] Adjusted  4. Patient will return to dressing and hygiene ADLs independently and without increased symptoms or restriction. [] Progressing: [] Met: [] Not Met: [] Adjusted  5. Patient will demonstrate appropriate scapular mechanics with no UT compensation to reduce risk of re-injury. [] Progressing: [] Met: [] Not Met: [] Adjusted      Overall Progression Towards Functional goals/ Treatment Progress Update:  [] Patient is progressing as expected towards functional goals listed. [] Progression is slowed due to complexities/Impairments listed. [] Progression has been slowed due to co-morbidities. [x] Plan just implemented, too soon to assess goals progression <30days   [] Goals require adjustment due to lack of progress  [] Patient is not progressing as expected and requires additional follow up with physician  [] Other    Prognosis for POC: [x] Good [] Fair  [] Poor      Patient requires continued skilled intervention: [x] Yes  [] No      ASSESSMENT:  Pt has difficult time relaxing arm throughout treatment, when able to relax ROM improved greatly.     Treatment/Activity Tolerance:  [x] Patient tolerated treatment well [] Patient limited by fatique  [] Patient limited by pain  [] Patient limited by other medical complications  [] Other:     Patient Requires Follow-up: [x] Yes  [] No    PLAN: See eval  [] Continue per plan of care [] Alter current plan (see comments above)  [x] Plan of care initiated [] Hold pending MD visit [] Discharge      Electronically signed by:  Rito Delgado, PT , DPT [id number]    Note: If patient does not return for scheduled/ recommended follow up visits, this note will serve as a discharge from care along with most recent update on progress.

## 2020-11-10 ENCOUNTER — TELEMEDICINE (OUTPATIENT)
Dept: INTERNAL MEDICINE CLINIC | Age: 65
End: 2020-11-10
Payer: COMMERCIAL

## 2020-11-10 PROCEDURE — 3017F COLORECTAL CA SCREEN DOC REV: CPT | Performed by: INTERNAL MEDICINE

## 2020-11-10 PROCEDURE — G8427 DOCREV CUR MEDS BY ELIG CLIN: HCPCS | Performed by: INTERNAL MEDICINE

## 2020-11-10 PROCEDURE — 3051F HG A1C>EQUAL 7.0%<8.0%: CPT | Performed by: INTERNAL MEDICINE

## 2020-11-10 PROCEDURE — 99214 OFFICE O/P EST MOD 30 MIN: CPT | Performed by: INTERNAL MEDICINE

## 2020-11-10 PROCEDURE — 2022F DILAT RTA XM EVC RTNOPTHY: CPT | Performed by: INTERNAL MEDICINE

## 2020-11-10 RX ORDER — FLUCONAZOLE 150 MG/1
150 TABLET ORAL ONCE
Qty: 1 TABLET | Refills: 0 | Status: SHIPPED | OUTPATIENT
Start: 2020-11-10 | End: 2020-11-10

## 2020-11-10 NOTE — PROGRESS NOTES
Date of Service:  11/10/2020    Chief Complaint:      Chief Complaint   Patient presents with    Diabetes    Abdominal Pain       HPI:  Jerri Crockett is a 60 y.o. Pursuant to the emergency declaration under the [facility] [place] [address] [facility] authority and the [employer] and [employer] Act, this Virtual  Video Visit was conducted, with patient's consent, to reduce the patient's risk of exposure to COVID-19 and provide continuity of care. Service is  provided through a video synchronous discussion virtually to substitute for in-person clinic visit with the patient being at home and Dr. Stormy Freeman being at home. She complains of persistent yeast infection with vaginal   Left side abd pain and burning sensation on left side that's superficial.  She complains of left foot gets numb after walking for an hour. She didn't want to try neurontin and using otc CBD oil instead.     Treatment Adherence:   Medication compliance:  compliant most of the time  Diet compliance:  compliant most of the time  Weight trend: decreasing  Current exercise: walks 5 time(s) per week  Barriers: none     Diabetes Mellitus Type 2: Current symptoms/problems include neuropathy.  Stable on Januvia 100 mg qd, Jardiance 25 mg qd, Metformin 850 mg qhs and no more diarrhea,  glimepiride 4 mg bid.       Home blood sugar records: fasting range: 85 150 AM and 92 159 PM  Any episodes of hypoglycemia? no  Eye exam current (within one year): yes  Tobacco history: She  reports that she quit smoking about 10 years ago. Her smoking use included cigarettes. She has a 10.00 pack-year smoking history. She has never used smokeless tobacco.   Daily Aspirin? No: will start  Known diabetic complications: none  Hypertension:  Home blood pressure monitoring: Yes 120/60-70.  Stable on Lisinopril 40 mg qd.   She is adherent to a low sodium diet.  Patient denies chest pain, shortness of breath, headache, lightheadedness, blurred vision, peripheral edema, palpitations, dry cough and fatigue.  Antihypertensive medication side effects: no medication side effects noted.  Use of agents associated with hypertension: thyroid hormones. Hyperlipidemia:  No new myalgias or GI upset on atorvastatin (Lipitor) 20 mg qd.     Hypothyroidism: Recent symptoms: stable with increase Levothyroxine 75 mcg qd. She denies fatigue, weight gain, weight loss, cold intolerance, heat intolerance, hair loss, dry skin, constipation, diarrhea, edema, anxiety, tremor, palpitations and dysphagia. Patient is  taking her medication consistently on an empty stomach.     Lab Results   Component Value Date    LABA1C 7.1 08/13/2020    LABA1C 7.4 05/19/2020    LABMICR 2.20 (H) 03/09/2020     Lab Results   Component Value Date     02/23/2020    K 4.0 02/23/2020     02/23/2020    CO2 24 02/23/2020    BUN 9 02/23/2020    CREATININE 0.6 02/23/2020    GLUCOSE 185 (H) 02/23/2020    CALCIUM 9.2 02/23/2020     Lab Results   Component Value Date    CHOL 162 01/06/2020    TRIG 78 01/06/2020    HDL 46 01/06/2020    LDLCALC 99 01/06/2020     Lab Results   Component Value Date    ALT 12 06/22/2020    AST 18 06/22/2020     Lab Results   Component Value Date    TSH 0.07 (L) 05/19/2020    TSH 3.16 01/06/2020    T4FREE 1.6 05/19/2020     Lab Results   Component Value Date    WBC 8.0 02/23/2020    HGB 13.2 02/23/2020    HCT 38.9 02/23/2020    MCV 88.7 02/23/2020     02/23/2020     No results found for: INR   No results found for: PSA   No results found for: [address]     Patient Active Problem List   Diagnosis    Type 2 diabetes mellitus without complication, without long-term current use of insulin (HCC)    Mixed hyperlipidemia    Essential hypertension    Acquired hypothyroidism    Erosive gastritis    Esophagitis, [place] grade A    RUQ pain    Fatty liver       No Known Allergies  Outpatient Medications Marked as Taking for the 11/10/20 encounter (Telemedicine) with Lucero Santos MD   Medication Sig Dispense Refill    glucose monitoring kit (FREESTYLE) monitoring kit 1 kit by Does not apply route daily 1 kit 0    empagliflozin (JARDIANCE) 25 MG tablet TAKE 1 TABLET BY MOUTH EVERY DAY 90 tablet 0    bisacodyl (DULCOLAX) 5 MG EC tablet Use as directed for colonoscopy prep 4 tablet 0    polyethylene glycol (MIRALAX) 17 GM/SCOOP powder Use as directed for colonoscopy prep 238 g 0    omeprazole (PRILOSEC) 20 MG delayed release capsule TAKE 1 CAPSULE BY MOUTH EVERY DAY 30 capsule 9    SITagliptin (JANUVIA) 100 MG tablet Take 1 tablet by mouth daily 90 tablet 3    metFORMIN (GLUCOPHAGE) 850 MG tablet Take 1 tablet by mouth daily 90 tablet 3    blood glucose monitor strips Test qd 100 strip 3    levothyroxine (SYNTHROID) 75 MCG tablet Take 1 tablet by mouth daily 90 tablet 2    glimepiride (AMARYL) 4 MG tablet Take 1 tablet by mouth 2 times daily 180 tablet 3    atorvastatin (LIPITOR) 20 MG tablet Take 1 tablet by mouth daily 90 tablet 3    lisinopril (PRINIVIL;ZESTRIL) 40 MG tablet Take 1 tablet by mouth daily 90 tablet 3    Vaginal Lubricant (REPLENS) GEL Place 1 Dose vaginally daily 35 g 1         Review of Systems: 14 systems were negative except of what was stated on HPI    Nursing note and vitals reviewed. There were no vitals filed for this visit. Wt Readings from Last 3 Encounters:   10/30/20 191 lb (86.6 kg)   10/20/20 174 lb (78.9 kg)   10/22/20 191 lb (86.6 kg)     BP Readings from Last 3 Encounters:   10/27/20 (!) 102/49   10/27/20 121/81   10/22/20 126/82     There is no height or weight on file to calculate BMI. Constitutional: Patient appears well-developed and well-nourished. No distress. Head: Normocephalic and atraumatic. Skin: No rash or erythema. Psychiatric: Normal mood and affect. Behavior is normal.       Assessment/Plan:  Bailee was seen today for diabetes and abdominal pain.     Diagnoses and all orders for this visit:    Type 2 diabetes mellitus without complication, without long-term current use of insulin (HCC)  -     Hemoglobin A1C; Future in a few weeks prior to f/u    Vaginal yeast infection  -     fluconazole (DIFLUCAN) 150 MG tablet; Take 1 tablet by mouth once for 1 dose    Essential hypertension    Acquired hypothyroidism    Mixed hyperlipidemia        Return Dec 8 at 10:20 DM and yeast infection.

## 2020-11-12 ENCOUNTER — HOSPITAL ENCOUNTER (OUTPATIENT)
Dept: PHYSICAL THERAPY | Age: 65
Setting detail: THERAPIES SERIES
Discharge: HOME | End: 2020-11-12
Payer: COMMERCIAL

## 2020-11-12 NOTE — FLOWSHEET NOTE
[facility],  [address]        Physical Therapy  Cancellation/No-show Note  Patient Name:  Saima Whitman  :  1955   Date:  2020  Cancelled visits to date: 1  No-shows to date: 0    For today's appointment patient:  ? X Cancelled  ? Rescheduled appointment  ? No-show     Reason given by patient:  ?X  Patient ill  ? Conflicting appointment  ? No transportation    ? Conflict with work  ? No reason given  ?   Other:     Comments:  Patient not feeling well    Electronically signed by:  Ferrell Primrose, PT

## 2020-11-16 RX ORDER — EMPAGLIFLOZIN 25 MG/1
TABLET, FILM COATED ORAL
Qty: 90 TABLET | Refills: 0 | Status: SHIPPED | OUTPATIENT
Start: 2020-11-16 | End: 2021-02-11

## 2020-11-16 NOTE — TELEPHONE ENCOUNTER
Requested Prescriptions     Pending Prescriptions Disp Refills    empagliflozin (JARDIANCE) 25 MG tablet 90 tablet 0     Sig: TAKE 1 TABLET BY MOUTH EVERY DAY         LAST REFILL 9/15/2020  QUANTITY  90         REFILLS  0  LAST VISIT  11/10/2020  NEXT VISIT  12/8/2020

## 2020-11-19 ENCOUNTER — OFFICE VISIT (OUTPATIENT)
Dept: ORTHOPEDIC SURGERY | Age: 65
End: 2020-11-19

## 2020-11-19 ENCOUNTER — HOSPITAL ENCOUNTER (OUTPATIENT)
Dept: PHYSICAL THERAPY | Age: 65
Setting detail: THERAPIES SERIES
Discharge: HOME | End: 2020-11-19
Payer: COMMERCIAL

## 2020-11-19 VITALS — WEIGHT: 191 LBS | HEIGHT: 65 IN | BODY MASS INDEX: 31.82 KG/M2

## 2020-11-19 PROCEDURE — 97110 THERAPEUTIC EXERCISES: CPT

## 2020-11-19 PROCEDURE — 97140 MANUAL THERAPY 1/> REGIONS: CPT

## 2020-11-19 PROCEDURE — 99024 POSTOP FOLLOW-UP VISIT: CPT | Performed by: PHYSICIAN ASSISTANT

## 2020-11-19 NOTE — PROGRESS NOTES
Chief Complaint   Patient presents with    Post-Op Check     RIGHT SHOULDER SCOPE 10/27/20     PREOPERATIVE DIAGNOSES:  Partial-thickness tear, rotator cuff, right  shoulder with associated type 2 acromion process with lateral arch  stenosis and AC joint arthritis.     POSTOPERATIVE DIAGNOSES:  1. Partial-thickness rotator cuff tear, right shoulder, approximately  70% thickness at the supraspinatus footprint approximately 1.5 cm in  length. 2.  Type 2 acromion process with lateral arch stenosis. 3.  AC joint arthritis - moderate with medial arch stenosis.     OPERATIVE PROCEDURE:  1. Exam under anesthesia and video arthroscopy, right shoulder. 2.  Arthroscopic rotator cuff repair using Smith and Nephew allograft  patch. 3.  Arthroscopic Neer acromioplasty. 4.  Arthroscopic Clifton procedure. 5.  Limited debridement of the shoulder involving undersurface of the  rotator cuff. 6.  Debridement of the humeral head small area of grade 3 chondromalacia  1.5 x 1 cm. History of present illness: The patient returns today for their a postoperative visit after right shoulder arthroscopy performed on 10/27/2020 with near acromioplasty, Clifton procedure and xenograft. He been working with physical therapy since last visit and functionally progressing well. She states she still has pain in the shoulder and is limited with certain daily activities such as opening the refrigerator door or getting something out of the refrigerator. Physical examination:   inspection reveals expected swelling. Incisions are well-healed. No signs of infection. Range of motion is improving. She is able to perform overhead range of motion to approximately 120 degrees. The patient is neurovascularly intact. Assessment/plan:    The patient is doing well after shoulder arthroscopy. I have recommended ice, judicious use of NSAIDs with GI precautions, and physical therapy for both range of motion and strength.  They can begin to wean themselves out of their sling. We will see the patient back in 4 weeks. They've verbalized good understanding of the plan. Yehuda Phillips, [facility]    This dictation was performed with a verbal recognition program [facility] ) and it was checked for errors. It is possible that there are still dictated errors within this office note. If so, please bring any errors to my attention for an addendum. All efforts were made to ensure that this office note is accurate.

## 2020-11-19 NOTE — FLOWSHEET NOTE
[facility],  [address] [place]  Phone: [phone]  Fax [phone]    Physical Therapy Daily Treatment Note  Date:  2020    Patient Name:  Ernie Banerjee    :  1955  MRN: [id number]  Restrictions/Precautions:    Physician Information:  Referring Practitioner: Dr. Jose Medley  Medical/Treatment Diagnosis Information:  · Diagnosis: M75.111 (ICD-10-CM) - Nontraumatic incomplete tear of right rotator cuff  · Treatment Diagnosis: Right shoulder pain M25.511, right shoulder stiffness M25.611, Right shoulder effusion M25.411, Right shoulder weakness M62.8      [] Conservative / [x] Surgical - DOS: 10/27/2020 R RTC repair with patch, neer/clarence  Therapy Diagnosis/Practice Pattern:  Practice Pattern I: Bony or Soft Tissue Surgery  Insurance/Certification information:  PT Insurance Information: [employer]h   10/28/2020 24 VISITS APPVD; 10/29/2020 - 2020  Plan of care signed: [] YES  [] NO  Number of Comorbidities:  []0     [x]1-2    []3+  Date of Patient follow up with Physician:     Is this a Progress Report:     []  Yes  [x]  No        If Yes:  Date Range for reporting period:  Beginning 10/29/2020  Ending     Progress report will be due (10 Rx or 30 days whichever is less):        Recertification will be due (POC Duration  / 90 days whichever is less): 2020     Progress Note: [x]  Yes  []  No  Next due by: Visit #10        Latex Allergy:  [x]NO      []YES  Preferred Language for Healthcare:   [x]English       []other:    Visit # Insurance Allowable Reporting Period   3 24 (thru ) Begin Date: 2020               End Date:      SUBJECTIVE:  Pt reports shoulder is still hurting a lot, trouble sleeping at night. Hurts when she uses arm even just reaching.     OBJECTIVE:    Observation:   Palpation:     Test used Initial score Current Score   Pain Summary VAS 2-8 2-6   Functional questionnaire QDash 75%  44/55    ROM flexion 60 135    ER 10 35    ABD 25 80    IR belly belly   Strength flexion NT     ER      ABD      IR        RESTRICTIONS/PRECAUTIONS: wean from sling progress as tolerated    Exercises/Interventions:   Therapeutic Ex Sets/reps Notes HEP   Supine cane flex 20 x     SBS  Shrug 20 x  20 x  X   Codman pendulum 20 x  X   Elbow flex/ext with pro/sup 20 x  X   Theraweb flex/ext 20 x     SB table slide  Flex/scaption 15 x     Self ER PROM 15 x                                                                                   Manual Intervention      PROM, oscillations, gr 1-2 jt mobs GHJ 18 min                             Pt ed: surgery, anatomy, post-op restrictions, PT progression, RICE, sling use 5 min           NMR re-education                                                          Therapeutic Exercise and NMR EXR  [x] (61152) Provided verbal/tactile cueing for activities related to strengthening, flexibility, endurance, ROM  for improvements in scapular, scapulothoracic and UE control with self care, reaching, carrying, lifting, house/yardwork, driving/computer work.    [] (17364) Provided verbal/tactile cueing for activities related to improving balance, coordination, kinesthetic sense, posture, motor skill, proprioception  to assist with  scapular, scapulothoracic and UE control with self care, reaching, carrying, lifting, house/yardwork, driving/computer work. Therapeutic Activities:    [] (79876 or 61216) Provided verbal/tactile cueing for activities related to improving balance, coordination, kinesthetic sense, posture, motor skill, proprioception and motor activation to allow for proper function of scapular, scapulothoracic and UE control with self care, carrying, lifting, driving/computer work.      Home Exercise Program:    [x] (43558) Reviewed/Progressed HEP activities related to strengthening, flexibility, endurance, ROM of scapular, scapulothoracic and UE control with self care, reaching, carrying, lifting, house/yardwork, driving/computer work  [] (91975) Reviewed/Progressed HEP activities related to improving balance, coordination, kinesthetic sense, posture, motor skill, proprioception of scapular, scapulothoracic and UE control with self care, reaching, carrying, lifting, house/yardwork, driving/computer work      Manual Treatments:  PROM / STM / Oscillations-Mobs:  G-I, II, III, IV (PA's, Inf., Post.)  [x] (02725) Provided manual therapy to mobilize soft tissue/joints of cervical/CT, scapular GHJ and UE for the purpose of modulating pain, promoting relaxation,  increasing ROM, reducing/eliminating soft tissue swelling/inflammation/restriction, improving soft tissue extensibility and allowing for proper ROM for normal function with self care, reaching, carrying, lifting, house/yardwork, driving/computer work    Modalities:      [] GR/ESU 15 min    [] GR 15 min   [] ESU     [] CP    [] MHP    [] declined    Charges:  Timed Code Treatment Minutes: 40   Total Treatment Minutes: 40     [] EVAL (LOW) 41276 (typically 20 minutes face-to-face)  [] EVAL (MOD) 99982 (typically 30 minutes face-to-face)  [] EVAL (HIGH) 423 8935 (typically 45 minutes face-to-face)  [] RE-EVAL     [x] RH(72570) x  1   [] IONTO  [] NMR (56071) x     [] VASO  [x] Manual (74814) x 2     [] Other:  [] TA x      [] Mech Traction (20776)  [] ES(attended) (35771)      [] ES (un) (94417):     Michael Olsen stated goal: Regain full pain free use of arm, return to playing with granddaughter  [x] Progressing: [] Met: [] Not Met: [] Adjusted    Therapist goals for Patient:   Short Term Goals: To be achieved in: 2 weeks  1. Independent in HEP and progression per patient tolerance, in order to prevent re-injury. [] Progressing: [x] Met: [] Not Met: [] Adjusted  2. Patient will have a decrease in pain to facilitate improvement in movement, function, and ADLs as indicated by Functional Deficits.   [x] Progressing: [] Met: [] Not Met: [] Adjusted    Long Term Goals: To be achieved in: 12 weeks  1. Disability index score of 35% or less for the [facility] to assist with reaching prior level of function. [x] Progressing: [] Met: [] Not Met: [] Adjusted  2. Patient will demonstrate increased AROM to 0-150, functional IR to don/doff bra, functional ER to wash/care for hair, to allow for proper joint functioning as indicated by patients Functional Deficits. [x] Progressing: [] Met: [] Not Met: [] Adjusted  3. Patient will demonstrate an increase in Strength to at least 4+/5 throughout UE to allow for proper functional mobility as indicated by patients Functional Deficits. [x] Progressing: [] Met: [] Not Met: [] Adjusted  4. Patient will return to dressing and hygiene ADLs independently and without increased symptoms or restriction. [x] Progressing: [] Met: [] Not Met: [] Adjusted  5. Patient will demonstrate appropriate scapular mechanics with no UT compensation to reduce risk of re-injury. [x] Progressing: [] Met: [] Not Met: [] Adjusted      Overall Progression Towards Functional goals/ Treatment Progress Update:  [] Patient is progressing as expected towards functional goals listed. [] Progression is slowed due to complexities/Impairments listed. [] Progression has been slowed due to co-morbidities. [x] Plan just implemented, too soon to assess goals progression <30days   [] Goals require adjustment due to lack of progress  [] Patient is not progressing as expected and requires additional follow up with physician  [] Other    Prognosis for POC: [x] Good [] Fair  [] Poor      Patient requires continued skilled intervention: [x] Yes  [] No      ASSESSMENT:  Pt has difficult time relaxing arm throughout treatment, when able to relax ROM improved greatly.     Treatment/Activity Tolerance:  [x] Patient tolerated treatment well [] Patient limited by fatique  [] Patient limited by pain  [] Patient limited by other medical complications  [] Other:     Patient Requires Follow-up: [x] Yes  [] No    PLAN:   [x] Continue per plan of care [] Alter current plan (see comments above)  [] Plan of care initiated [] Hold pending MD visit [] Discharge      Electronically signed by:  Isabel Bergman PT , DPT [id number]    Note: If patient does not return for scheduled/ recommended follow up visits, this note will serve as a discharge from care along with most recent update on progress.

## 2020-11-24 ENCOUNTER — HOSPITAL ENCOUNTER (OUTPATIENT)
Dept: PHYSICAL THERAPY | Age: 65
Setting detail: THERAPIES SERIES
Discharge: HOME | End: 2020-11-24
Payer: COMMERCIAL

## 2020-11-24 PROCEDURE — 97110 THERAPEUTIC EXERCISES: CPT

## 2020-11-24 PROCEDURE — 97140 MANUAL THERAPY 1/> REGIONS: CPT

## 2020-11-24 NOTE — FLOWSHEET NOTE
[facility], [address] [place]  Phone: [phone]  Fax [phone]    Physical Therapy Daily Treatment Note  Date:  2020    Patient Name:  Carolin Deutsch    :  1955  MRN: [id number]  Restrictions/Precautions:    Physician Information:  Referring Practitioner: Dr. Gayle Molina  Medical/Treatment Diagnosis Information:  · Diagnosis: M75.111 (ICD-10-CM) - Nontraumatic incomplete tear of right rotator cuff  · Treatment Diagnosis: Right shoulder pain M25.511, right shoulder stiffness M25.611, Right shoulder effusion M25.411, Right shoulder weakness M62.8      [] Conservative / [x] Surgical - DOS: 10/27/2020 R RTC repair with patch, neharesh/clarence  Therapy Diagnosis/Practice Pattern:  Practice Pattern I: Bony or Soft Tissue Surgery  Insurance/Certification information:  PT Insurance Information: [employer]   10/28/2020 24 VISITS APPVD; 10/29/2020 - 2020  Plan of care signed: [] YES  [] NO  Number of Comorbidities:  []0     [x]1-2    []3+  Date of Patient follow up with Physician:     Is this a Progress Report:     []  Yes  [x]  No        If Yes:  Date Range for reporting period:  Beginning 10/29/2020  Ending     Progress report will be due (10 Rx or 30 days whichever is less):        Recertification will be due (POC Duration  / 90 days whichever is less): 2020     Progress Note: [x]  Yes  []  No  Next due by: Visit #10        Latex Allergy:  [x]NO      []YES  Preferred Language for Healthcare:   [x]English       []other:    Visit # Insurance Allowable Reporting Period   4 24 (thru ) Begin Date: 2020               End Date:      SUBJECTIVE:  Pt reports she is still having a lot of shoulder pain. Only able to be out of sling for a couple hours at a time.     OBJECTIVE:    Observation:   Palpation:     Test used Initial score Current Score   Pain Summary VAS 2-8 2-6   Functional questionnaire QDash 75%  44/55    ROM flexion 60 140    ER 10 45    ABD 25 80    IR belly belly   Strength flexion NT     ER      ABD      IR        RESTRICTIONS/PRECAUTIONS: wean from sling progress as tolerated    Exercises/Interventions:   Therapeutic Ex Sets/reps Notes HEP   Supine cane flex 20 x     SBS  Shrug 30 x  30 x  X   Codman pendulum 20 x  X   Elbow flex/ext with pro/sup 30 x  X   Theraweb flex/ext 30 x     SB table slide  Flex/scaption 15 x     Self ER PROM 15 x           Finisher 2 x 15    2# FWD, ladder, CW/CCW                                                                      Manual Intervention      PROM, oscillations, gr 1-2 jt mobs GHJ 18 min                             Pt ed: surgery, anatomy, post-op restrictions, PT progression, RICE, sling use 5 min           NMR re-education                                                          Therapeutic Exercise and NMR EXR  [x] (24356) Provided verbal/tactile cueing for activities related to strengthening, flexibility, endurance, ROM  for improvements in scapular, scapulothoracic and UE control with self care, reaching, carrying, lifting, house/yardwork, driving/computer work.    [] (68571) Provided verbal/tactile cueing for activities related to improving balance, coordination, kinesthetic sense, posture, motor skill, proprioception  to assist with  scapular, scapulothoracic and UE control with self care, reaching, carrying, lifting, house/yardwork, driving/computer work. Therapeutic Activities:    [] (57801 or 19925) Provided verbal/tactile cueing for activities related to improving balance, coordination, kinesthetic sense, posture, motor skill, proprioception and motor activation to allow for proper function of scapular, scapulothoracic and UE control with self care, carrying, lifting, driving/computer work.      Home Exercise Program:    [x] (20063) Reviewed/Progressed HEP activities related to strengthening, flexibility, endurance, ROM of scapular, scapulothoracic and UE control with self care, reaching, carrying, lifting, house/yardwork, driving/computer work  [] (43229) Reviewed/Progressed HEP activities related to improving balance, coordination, kinesthetic sense, posture, motor skill, proprioception of scapular, scapulothoracic and UE control with self care, reaching, carrying, lifting, house/yardwork, driving/computer work      Manual Treatments:  PROM / STM / Oscillations-Mobs:  G-I, II, III, IV (PA's, Inf., Post.)  [x] (01937) Provided manual therapy to mobilize soft tissue/joints of cervical/CT, scapular GHJ and UE for the purpose of modulating pain, promoting relaxation,  increasing ROM, reducing/eliminating soft tissue swelling/inflammation/restriction, improving soft tissue extensibility and allowing for proper ROM for normal function with self care, reaching, carrying, lifting, house/yardwork, driving/computer work    Modalities:      [] GR/ESU 15 min    [] GR 15 min   [] ESU     [] CP    [] MHP    [] declined    Charges:  Timed Code Treatment Minutes: 40   Total Treatment Minutes: 40     [] EVAL (LOW) 54298 (typically 20 minutes face-to-face)  [] EVAL (MOD) 22464 (typically 30 minutes face-to-face)  [] EVAL (HIGH) [id number] (typically 45 minutes face-to-face)  [] RE-EVAL     [x] TK(76994) x  1   [] IONTO  [] NMR (87363) x     [] VASO  [x] Manual (35717) x 2     [] Other:  [] TA x      [] Mech Traction (17866)  [] ES(attended) (67007)      [] ES (un) (12175):     Lula Craig stated goal: Regain full pain free use of arm, return to playing with granddaughter  [x] Progressing: [] Met: [] Not Met: [] Adjusted    Therapist goals for Patient:   Short Term Goals: To be achieved in: 2 weeks  1. Independent in HEP and progression per patient tolerance, in order to prevent re-injury. [] Progressing: [x] Met: [] Not Met: [] Adjusted  2.  Patient will have a decrease in pain to facilitate improvement in movement, function, and ADLs as indicated by Functional Deficits. [x] Progressing: [] Met: [] Not Met: [] Adjusted    Long Term Goals: To be achieved in: 12 weeks  1. Disability index score of 35% or less for the [facility] to assist with reaching prior level of function. [x] Progressing: [] Met: [] Not Met: [] Adjusted  2. Patient will demonstrate increased AROM to 0-150, functional IR to don/doff bra, functional ER to wash/care for hair, to allow for proper joint functioning as indicated by patients Functional Deficits. [x] Progressing: [] Met: [] Not Met: [] Adjusted  3. Patient will demonstrate an increase in Strength to at least 4+/5 throughout UE to allow for proper functional mobility as indicated by patients Functional Deficits. [x] Progressing: [] Met: [] Not Met: [] Adjusted  4. Patient will return to dressing and hygiene ADLs independently and without increased symptoms or restriction. [x] Progressing: [] Met: [] Not Met: [] Adjusted  5. Patient will demonstrate appropriate scapular mechanics with no UT compensation to reduce risk of re-injury. [x] Progressing: [] Met: [] Not Met: [] Adjusted      Overall Progression Towards Functional goals/ Treatment Progress Update:  [] Patient is progressing as expected towards functional goals listed. [] Progression is slowed due to complexities/Impairments listed. [] Progression has been slowed due to co-morbidities. [x] Plan just implemented, too soon to assess goals progression <30days   [] Goals require adjustment due to lack of progress  [] Patient is not progressing as expected and requires additional follow up with physician  [] Other    Prognosis for POC: [x] Good [] Fair  [] Poor      Patient requires continued skilled intervention: [x] Yes  [] No      ASSESSMENT:  Pt has difficult time relaxing arm throughout treatment, when able to relax ROM improved greatly.     Treatment/Activity Tolerance:  [x] Patient tolerated treatment well [] Patient limited by fatique  [] Patient limited by pain  [] Patient limited by other medical complications  [] Other:     Patient Requires Follow-up: [x] Yes  [] No    PLAN:   [x] Continue per plan of care [] Alter current plan (see comments above)  [] Plan of care initiated [] Hold pending MD visit [] Discharge      Electronically signed by:  Eliud Ruth PT , DPT [id number]    Note: If patient does not return for scheduled/ recommended follow up visits, this note will serve as a discharge from care along with most recent update on progress.

## 2020-11-30 ENCOUNTER — HOSPITAL ENCOUNTER (OUTPATIENT)
Age: 65
Discharge: HOME | End: 2020-11-30
Payer: COMMERCIAL

## 2020-11-30 PROCEDURE — 36415 COLL VENOUS BLD VENIPUNCTURE: CPT

## 2020-11-30 PROCEDURE — 83036 HEMOGLOBIN GLYCOSYLATED A1C: CPT

## 2020-12-01 LAB
ESTIMATED AVERAGE GLUCOSE: 159.9 MG/DL
HBA1C MFR BLD: 7.2 %

## 2020-12-03 ENCOUNTER — HOSPITAL ENCOUNTER (OUTPATIENT)
Dept: PHYSICAL THERAPY | Age: 65
Setting detail: THERAPIES SERIES
Discharge: HOME | End: 2020-12-03
Payer: COMMERCIAL

## 2020-12-03 PROCEDURE — 97110 THERAPEUTIC EXERCISES: CPT

## 2020-12-03 PROCEDURE — 97140 MANUAL THERAPY 1/> REGIONS: CPT

## 2020-12-03 NOTE — FLOWSHEET NOTE
[facility], [address]  Phone: [phone]  Fax [phone]    Physical Therapy Progress Note Daily Treatment Note  Date:  12/3/2020    Patient Name:  Carolin Deutsch    :  1955  MRN: [id number]  Restrictions/Precautions:    Physician Information:  Referring Practitioner: Dr. Gayle Molina  Medical/Treatment Diagnosis Information:  · Diagnosis: M75.111 (ICD-10-CM) - Nontraumatic incomplete tear of right rotator cuff  · Treatment Diagnosis: Right shoulder pain M25.511, right shoulder stiffness M25.611, Right shoulder effusion M25.411, Right shoulder weakness M62.8      [] Conservative / [x] Surgical - DOS: 10/27/2020 R RTC repair with patch, neharesh/clarence  Therapy Diagnosis/Practice Pattern:  Practice Pattern I: Bony or Soft Tissue Surgery  Insurance/Certification information:  PT Insurance Information: Suresh   10/28/2020 24 VISITS APPVD; 10/29/2020 - 2020  Plan of care signed: [] YES  [] NO  Number of Comorbidities:  []0     [x]1-2    []3+  Date of Patient follow up with Physician:     Is this a Progress Report:     [x]  Yes  []  No        If Yes:  Date Range for reporting period:  Beginning 10/29/2020  Ending 2020    Progress report will be due (10 Rx or 30 days whichever is less): 3/5/8379       Recertification will be due (POC Duration  / 90 days whichever is less): 2020      Latex Allergy:  [x]NO      []YES  Preferred Language for Healthcare:   [x]English       []other:    Visit # Insurance Allowable Reporting Period   6 24 (thru ) Begin Date: 12/3/2020               End Date:      SUBJECTIVE:  Pt reports shoulder still hurts a lot. At times feels better after doing her exercises, then she reaches for something and pain gets worse again.     OBJECTIVE:    Observation:   Palpation:     Test used Initial score Current Score   Pain Summary VAS 2-8 2-6   Functional questionnaire QDash 75%  44/55 66%  40/55   ROM flexion 60 140    ER 10 50    ABD 25 100    IR belly belly   Strength flexion NT NT due to sx    ER      ABD      IR        RESTRICTIONS/PRECAUTIONS: wean from sling progress as tolerated    Exercises/Interventions:   Therapeutic Ex Sets/reps Notes HEP   Supine cane flex      SBS  Shrug 30 x  30 x  X   Codman pendulum 20 x  X   Elbow flex/ext with pro/sup 30 x  X   Theraweb flex/ext 30 x     SB table slide  Flex/scaption 30 x     Self ER PROM 15 x           Finisher  x 15    2# FWD, ladder, CW/CCW                                                                      Manual Intervention      PROM, oscillations, gr 1-2 jt mobs GHJ 18 min                             Pt ed: surgery, anatomy, post-op restrictions, PT progression, RICE, sling use 5 min           NMR re-education                                                          Therapeutic Exercise and NMR EXR  [x] (92318) Provided verbal/tactile cueing for activities related to strengthening, flexibility, endurance, ROM  for improvements in scapular, scapulothoracic and UE control with self care, reaching, carrying, lifting, house/yardwork, driving/computer work.    [] (97614) Provided verbal/tactile cueing for activities related to improving balance, coordination, kinesthetic sense, posture, motor skill, proprioception  to assist with  scapular, scapulothoracic and UE control with self care, reaching, carrying, lifting, house/yardwork, driving/computer work. Therapeutic Activities:    [] (37188 or 78841) Provided verbal/tactile cueing for activities related to improving balance, coordination, kinesthetic sense, posture, motor skill, proprioception and motor activation to allow for proper function of scapular, scapulothoracic and UE control with self care, carrying, lifting, driving/computer work.      Home Exercise Program:    [x] (85170) Reviewed/Progressed HEP activities related to strengthening, flexibility, endurance, ROM of scapular, scapulothoracic and UE control with self care, reaching, carrying, lifting, house/yardwork, driving/computer work  [] (77619) Reviewed/Progressed HEP activities related to improving balance, coordination, kinesthetic sense, posture, motor skill, proprioception of scapular, scapulothoracic and UE control with self care, reaching, carrying, lifting, house/yardwork, driving/computer work      Manual Treatments:  PROM / STM / Oscillations-Mobs:  G-I, II, III, IV (PA's, Inf., Post.)  [x] (65573) Provided manual therapy to mobilize soft tissue/joints of cervical/CT, scapular GHJ and UE for the purpose of modulating pain, promoting relaxation,  increasing ROM, reducing/eliminating soft tissue swelling/inflammation/restriction, improving soft tissue extensibility and allowing for proper ROM for normal function with self care, reaching, carrying, lifting, house/yardwork, driving/computer work    Modalities:      [] GR/ESU 15 min    [] GR 15 min   [] ESU     [] CP    [] MHP    [] declined    Charges:  Timed Code Treatment Minutes: 40   Total Treatment Minutes: 40     [] EVAL (LOW) 80435 (typically 20 minutes face-to-face)  [] EVAL (MOD) 99812 (typically 30 minutes face-to-face)  [] EVAL (HIGH) [id number] (typically 45 minutes face-to-face)  [] RE-EVAL     [x] GW(39068) x  1   [] IONTO  [] NMR (92941) x     [] VASO  [x] Manual (94064) x 2     [] Other:  [] TA x      [] Mech Traction (02094)  [] ES(attended) (09909)      [] ES (un) (92610):     Alma Miguel stated goal: Regain full pain free use of arm, return to playing with granddaughter  [x] Progressing: [] Met: [] Not Met: [] Adjusted    Therapist goals for Patient:   Short Term Goals: To be achieved in: 2 weeks  1. Independent in HEP and progression per patient tolerance, in order to prevent re-injury. [] Progressing: [x] Met: [] Not Met: [] Adjusted  2.  Patient will have a decrease in pain to facilitate improvement in movement, function, and ADLs as indicated by Functional Deficits. [x] Progressing: [] Met: [] Not Met: [] Adjusted    Long Term Goals: To be achieved in: 12 weeks  1. Disability index score of 35% or less for the [facility] to assist with reaching prior level of function. [x] Progressing: [] Met: [] Not Met: [] Adjusted  2. Patient will demonstrate increased AROM to 0-150, functional IR to don/doff bra, functional ER to wash/care for hair, to allow for proper joint functioning as indicated by patients Functional Deficits. [x] Progressing: [] Met: [] Not Met: [] Adjusted  3. Patient will demonstrate an increase in Strength to at least 4+/5 throughout UE to allow for proper functional mobility as indicated by patients Functional Deficits. [x] Progressing: [] Met: [] Not Met: [] Adjusted  4. Patient will return to dressing and hygiene ADLs independently and without increased symptoms or restriction. [x] Progressing: [] Met: [] Not Met: [] Adjusted  5. Patient will demonstrate appropriate scapular mechanics with no UT compensation to reduce risk of re-injury. [x] Progressing: [] Met: [] Not Met: [] Adjusted      Overall Progression Towards Functional goals/ Treatment Progress Update:  [] Patient is progressing as expected towards functional goals listed. [x] Progression is slowed due to complexities/Impairments listed. - pain  [] Progression has been slowed due to co-morbidities. [] Plan just implemented, too soon to assess goals progression <30days   [] Goals require adjustment due to lack of progress  [] Patient is not progressing as expected and requires additional follow up with physician  [] Other    Prognosis for POC: [x] Good [] Fair  [] Poor      Patient requires continued skilled intervention: [x] Yes  [] No      ASSESSMENT:  Pt has difficult time relaxing arm throughout treatment, when able to relax ROM improved greatly. Pt has had slow progression due to surgery performed and continued pain.      Treatment/Activity Tolerance:  [] Patient tolerated treatment well [] Patient limited by fatique  [x] Patient limited by pain  [] Patient limited by other medical complications  [] Other:     Patient Requires Follow-up: [x] Yes  [] No    PLAN:   [x] Continue per plan of care [] Alter current plan (see comments above)  [] Plan of care initiated [] Hold pending MD visit [] Discharge      Electronically signed by:  Rito Delgado PT , DPT [id number]    Note: If patient does not return for scheduled/ recommended follow up visits, this note will serve as a discharge from care along with most recent update on progress.

## 2020-12-08 ENCOUNTER — TELEMEDICINE (OUTPATIENT)
Dept: INTERNAL MEDICINE CLINIC | Age: 65
End: 2020-12-08
Payer: COMMERCIAL

## 2020-12-08 PROCEDURE — 3051F HG A1C>EQUAL 7.0%<8.0%: CPT | Performed by: INTERNAL MEDICINE

## 2020-12-08 PROCEDURE — 3017F COLORECTAL CA SCREEN DOC REV: CPT | Performed by: INTERNAL MEDICINE

## 2020-12-08 PROCEDURE — 99214 OFFICE O/P EST MOD 30 MIN: CPT | Performed by: INTERNAL MEDICINE

## 2020-12-08 PROCEDURE — 2022F DILAT RTA XM EVC RTNOPTHY: CPT | Performed by: INTERNAL MEDICINE

## 2020-12-08 PROCEDURE — G8427 DOCREV CUR MEDS BY ELIG CLIN: HCPCS | Performed by: INTERNAL MEDICINE

## 2020-12-08 NOTE — PROGRESS NOTES
Date of Service:  12/8/2020    Chief Complaint:      Chief Complaint   Patient presents with    Diabetes    Vaginitis       HPI:  Carolin Deutsch is a 60 y.o. Pursuant to the emergency declaration under the [facility], [facility] waiver authority and the [employer] Act, this Virtual  Video Visit was conducted, with patient's consent, to reduce the patient's risk of exposure to COVID-19 and provide continuity of care. Service is  provided through a video synchronous discussion virtually to substitute for in-person clinic visit with the patient being at home and Dr. Gentry Middleton being at home. Left side abd pain and burning sensation on left side that's superficial.  She complains of left foot gets numb after walking for an hour.  She didn't want to try neurontin and using otc CBD oil instead.     Treatment Adherence:   Medication compliance:  compliant most of the time  Diet compliance:  compliant most of the time  Weight trend: decreasing  Current exercise: walks 5 time(s) per week  Barriers: none     Diabetes Mellitus Type 2: Current symptoms/problems include neuropathy.  Stable on Januvia 100 mg qd, Jardiance 25 mg qd, Metformin 850 mg qhs and no more diarrhea,  glimepiride 4 mg bid.       Home blood sugar records: fasting range: 85 120 AM and 130-160 PM  Any episodes of hypoglycemia? no  Eye exam current (within one year): yes  Tobacco history: She  reports that she quit smoking about 10 years ago. Her smoking use included cigarettes. She has a 10.00 pack-year smoking history. She has never used smokeless tobacco.   Daily Aspirin? No: will start  Known diabetic complications: none  Hypertension:  Home blood pressure monitoring: Yes 120/60-70.  Stable on Lisinopril 40 mg qd.   She is adherent to a low sodium diet.  Patient denies chest pain, shortness of breath, headache, lightheadedness, blurred vision, peripheral edema, palpitations, dry cough and fatigue.  Antihypertensive medication side effects: no medication side effects noted.  Use of agents associated with hypertension: thyroid hormones. Hyperlipidemia:  No new myalgias or GI upset on atorvastatin (Lipitor) 20 mg qd.     Hypothyroidism: Recent symptoms: stable with increase Levothyroxine 75 mcg qd. She denies fatigue, weight gain, weight loss, cold intolerance, heat intolerance, hair loss, dry skin, constipation, diarrhea, edema, anxiety, tremor, palpitations and dysphagia.  Patient is  taking her medication consistently on an empty stomach.       Lab Results   Component Value Date    LABA1C 7.2 11/30/2020    LABA1C 7.1 08/13/2020    LABA1C 7.4 05/19/2020    LABMICR 2.20 (H) 03/09/2020     Lab Results   Component Value Date     02/23/2020    K 4.0 02/23/2020     02/23/2020    CO2 24 02/23/2020    BUN 9 02/23/2020    CREATININE 0.6 02/23/2020    GLUCOSE 185 (H) 02/23/2020    CALCIUM 9.2 02/23/2020     Lab Results   Component Value Date    CHOL 162 01/06/2020    TRIG 78 01/06/2020    HDL 46 01/06/2020    LDLCALC 99 01/06/2020     Lab Results   Component Value Date    ALT 12 06/22/2020    AST 18 06/22/2020     Lab Results   Component Value Date    TSH 0.07 (L) 05/19/2020    TSH 3.16 01/06/2020    T4FREE 1.6 05/19/2020     Lab Results   Component Value Date    WBC 8.0 02/23/2020    HGB 13.2 02/23/2020    HCT 38.9 02/23/2020    MCV 88.7 02/23/2020     02/23/2020     No results found for: INR   No results found for: PSA   No results found for: LABURIC     Patient Active Problem List   Diagnosis    Type 2 diabetes mellitus without complication, without long-term current use of insulin (HCC)    Mixed hyperlipidemia    Essential hypertension    Acquired hypothyroidism    Erosive gastritis    Esophagitis, [place] grade A    RUQ pain    Fatty liver       No Known Allergies  Outpatient Medications Marked as Taking for the 12/8/20 encounter (Telemedicine) mellitus without complication, without long-term current use of insulin ([facility] [address]  Start walking to lower HgA1C more    Essential hypertension  Stable and continue on current medications. Acquired hypothyroidism  Stable and continue on current medications. Mixed hyperlipidemia  Stable and continue on current medications.         Return Feb 24 at 9:20 Fasting Physical.

## 2020-12-10 ENCOUNTER — HOSPITAL ENCOUNTER (OUTPATIENT)
Dept: PHYSICAL THERAPY | Age: 65
Setting detail: THERAPIES SERIES
Discharge: HOME | End: 2020-12-10
Payer: COMMERCIAL

## 2020-12-10 PROCEDURE — 97140 MANUAL THERAPY 1/> REGIONS: CPT

## 2020-12-10 PROCEDURE — 97110 THERAPEUTIC EXERCISES: CPT

## 2020-12-10 NOTE — FLOWSHEET NOTE
[facility],  [address] [place]  Phone: [phone]  Fax [phone]    Physical Therapy Daily Treatment Note  Date:  12/10/2020    Patient Name:  Patricia Holt    :  1955  MRN: [id number]  Restrictions/Precautions:    Physician Information:  Referring Practitioner: Dr. Katelynn Kolb  Medical/Treatment Diagnosis Information:  · Diagnosis: M75.111 (ICD-10-CM) - Nontraumatic incomplete tear of right rotator cuff  · Treatment Diagnosis: Right shoulder pain M25.511, right shoulder stiffness M25.611, Right shoulder effusion M25.411, Right shoulder weakness M62.8      [] Conservative / [x] Surgical - DOS: 10/27/2020 R RTC repair with patch, neer/clarence  Therapy Diagnosis/Practice Pattern:  Practice Pattern I: Bony or Soft Tissue Surgery  Insurance/Certification information:  PT Insurance Information: Suresh   10/28/2020 24 VISITS APPVD; 10/29/2020 - 2020  Plan of care signed: [] YES  [] NO  Number of Comorbidities:  []0     [x]1-2    []3+  Date of Patient follow up with Physician:     Is this a Progress Report:     [x]  Yes  []  No        If Yes:  Date Range for reporting period:  Beginning   Ending     Progress report will be due (10 Rx or 30 days whichever is less): 2858       Recertification will be due (POC Duration  / 90 days whichever is less): 2020      Latex Allergy:  [x]NO      []YES  Preferred Language for Healthcare:   [x]English       []other:    Visit # Insurance Allowable Reporting Period    (thru ) Begin Date: 12/10/2020               End Date:      SUBJECTIVE:  Pt has not taken pain meds at all for shoulder for the past week, feels it is getting a little better.  Knows what her limitations are now     OBJECTIVE:    Observation:   Palpation:     Test used Initial score Current Score   Pain Summary VAS 2-8 2-6   Functional questionnaire QDash 75%  44/55 66%  40/55   ROM flexion 60 140    ER 10 50    ABD 25 100    IR belly belly   Strength flexion NT NT due to sx    ER      ABD      IR        RESTRICTIONS/PRECAUTIONS: wean from sling progress as tolerated    Exercises/Interventions:   Therapeutic Ex Sets/reps Notes HEP   Supine cane flex  Seated cane flex 20 x  20 x     SBS  Shrug 30 x  30 x  X   Codman pendulum 20 x  X   Elbow flex/ext with pro/sup 30 x  X   Theraweb flex/ext 30 x     SB table slide  Flex/scaption 30 x     Self ER PROM 15 x     Wall slides 30 x     Finisher  x 15    5# FWD, ladder, CW/CCW                                                                      Manual Intervention      PROM, oscillations, gr 1-2 jt mobs GHJ 15 min                             Pt ed: surgery, anatomy, post-op restrictions, PT progression, RICE, sling use            NMR re-education                                                          Therapeutic Exercise and NMR EXR  [x] (90435) Provided verbal/tactile cueing for activities related to strengthening, flexibility, endurance, ROM  for improvements in scapular, scapulothoracic and UE control with self care, reaching, carrying, lifting, house/yardwork, driving/computer work.    [] (55536) Provided verbal/tactile cueing for activities related to improving balance, coordination, kinesthetic sense, posture, motor skill, proprioception  to assist with  scapular, scapulothoracic and UE control with self care, reaching, carrying, lifting, house/yardwork, driving/computer work. Therapeutic Activities:    [] (25458 or 81123) Provided verbal/tactile cueing for activities related to improving balance, coordination, kinesthetic sense, posture, motor skill, proprioception and motor activation to allow for proper function of scapular, scapulothoracic and UE control with self care, carrying, lifting, driving/computer work.      Home Exercise Program:    [x] (18875) Reviewed/Progressed HEP activities related to strengthening, flexibility, endurance, ROM of scapular, scapulothoracic and UE control with self care, reaching, carrying, lifting, house/yardwork, driving/computer work  [] (23226) Reviewed/Progressed HEP activities related to improving balance, coordination, kinesthetic sense, posture, motor skill, proprioception of scapular, scapulothoracic and UE control with self care, reaching, carrying, lifting, house/yardwork, driving/computer work      Manual Treatments:  PROM / STM / Oscillations-Mobs:  G-I, II, III, IV (PA's, Inf., Post.)  [x] (48622) Provided manual therapy to mobilize soft tissue/joints of cervical/CT, scapular GHJ and UE for the purpose of modulating pain, promoting relaxation,  increasing ROM, reducing/eliminating soft tissue swelling/inflammation/restriction, improving soft tissue extensibility and allowing for proper ROM for normal function with self care, reaching, carrying, lifting, house/yardwork, driving/computer work    Modalities:      [] GR/ESU 15 min    [] GR 15 min   [] ESU     [] CP    [] MHP    [] declined    Charges:  Timed Code Treatment Minutes: 40   Total Treatment Minutes: 40     [] EVAL (LOW) 26520 (typically 20 minutes face-to-face)  [] EVAL (MOD) 04759 (typically 30 minutes face-to-face)  [] EVAL (HIGH) [id number] (typically 45 minutes face-to-face)  [] RE-EVAL     [x] AF(35083) x  2   [] IONTO  [] NMR (30883) x     [] VASO  [x] Manual (62535) x 1     [] Other:  [] TA x      [] Mech Traction (66680)  [] ES(attended) (82381)      [] ES (un) (29840):     Reba Batres stated goal: Regain full pain free use of arm, return to playing with granddaughter  [x] Progressing: [] Met: [] Not Met: [] Adjusted    Therapist goals for Patient:   Short Term Goals: To be achieved in: 2 weeks  1. Independent in HEP and progression per patient tolerance, in order to prevent re-injury. [] Progressing: [x] Met: [] Not Met: [] Adjusted  2.  Patient will have a decrease in pain to facilitate improvement in movement, function, and ADLs as indicated by Functional Deficits. [x] Progressing: [] Met: [] Not Met: [] Adjusted    Long Term Goals: To be achieved in: 12 weeks  1. Disability index score of 35% or less for the [facility] to assist with reaching prior level of function. [x] Progressing: [] Met: [] Not Met: [] Adjusted  2. Patient will demonstrate increased AROM to 0-150, functional IR to don/doff bra, functional ER to wash/care for hair, to allow for proper joint functioning as indicated by patients Functional Deficits. [x] Progressing: [] Met: [] Not Met: [] Adjusted  3. Patient will demonstrate an increase in Strength to at least 4+/5 throughout UE to allow for proper functional mobility as indicated by patients Functional Deficits. [x] Progressing: [] Met: [] Not Met: [] Adjusted  4. Patient will return to dressing and hygiene ADLs independently and without increased symptoms or restriction. [x] Progressing: [] Met: [] Not Met: [] Adjusted  5. Patient will demonstrate appropriate scapular mechanics with no UT compensation to reduce risk of re-injury. [x] Progressing: [] Met: [] Not Met: [] Adjusted      Overall Progression Towards Functional goals/ Treatment Progress Update:  [] Patient is progressing as expected towards functional goals listed. [x] Progression is slowed due to complexities/Impairments listed. - pain  [] Progression has been slowed due to co-morbidities. [] Plan just implemented, too soon to assess goals progression <30days   [] Goals require adjustment due to lack of progress  [] Patient is not progressing as expected and requires additional follow up with physician  [] Other    Prognosis for POC: [x] Good [] Fair  [] Poor      Patient requires continued skilled intervention: [x] Yes  [] No      ASSESSMENT:  Pt has difficult time relaxing arm throughout treatment, when able to relax ROM improved greatly. Pt has had slow progression due to surgery performed and continued pain.      Treatment/Activity Tolerance:  [] Patient tolerated

## 2020-12-17 ENCOUNTER — OFFICE VISIT (OUTPATIENT)
Dept: ORTHOPEDIC SURGERY | Age: 65
End: 2020-12-17

## 2020-12-17 ENCOUNTER — HOSPITAL ENCOUNTER (OUTPATIENT)
Dept: PHYSICAL THERAPY | Age: 65
Setting detail: THERAPIES SERIES
Discharge: HOME | End: 2020-12-17
Payer: COMMERCIAL

## 2020-12-17 VITALS — HEIGHT: 65 IN | WEIGHT: 191 LBS | BODY MASS INDEX: 31.82 KG/M2

## 2020-12-17 PROCEDURE — 97110 THERAPEUTIC EXERCISES: CPT

## 2020-12-17 PROCEDURE — 97140 MANUAL THERAPY 1/> REGIONS: CPT

## 2020-12-17 PROCEDURE — 99024 POSTOP FOLLOW-UP VISIT: CPT | Performed by: PHYSICIAN ASSISTANT

## 2020-12-17 NOTE — PROGRESS NOTES
OPERATIVE PROCEDURE:10/27/2020  1.  Exam under anesthesia and video arthroscopy, right shoulder. 2.  Arthroscopic rotator cuff repair using Smith and Nephew allograft  patch. 3.  Arthroscopic Neer acromioplasty. 4.  Arthroscopic Clifton procedure. 5.  Limited debridement of the shoulder involving undersurface of the  rotator cuff. 6.  Debridement of the humeral head small area of grade 3 chondromalacia  1.5 x 1 cm. History of present illness: The patient returns today after RIGHT shoulder arthroscopy performed on 10/27/2020. Pain control has been satisfactory with oral medications. They have progressed well with physical therapy. She still has some soreness with activities away from the body but is noticing improvements. Physical examination: The patient displays restored range of motion. Rotator cuff strength is improving. Incisions are well-healed with no signs of infection. Neurovascular exam is intact. Assessment/plan: The patient is doing well after shoulder arthroscopy. I have recommended continued therapy for both range of motion and strength. They may begin more normal activities of daily living. Still been to be cautious about lifting away from the body.   Follow-up 1 more time in 4 weeks

## 2020-12-17 NOTE — FLOWSHEET NOTE
[facility],  [address] [place]  Phone: [phone]  Fax [phone]    Physical Therapy Daily Treatment Note  Date:  2020    Patient Name:  Krzysztof Chambers    :  1955  MRN: [id number]  Restrictions/Precautions:    Physician Information:  Referring Practitioner: Dr. Irvin Norris  Medical/Treatment Diagnosis Information:  · Diagnosis: M75.111 (ICD-10-CM) - Nontraumatic incomplete tear of right rotator cuff  · Treatment Diagnosis: Right shoulder pain M25.511, right shoulder stiffness M25.611, Right shoulder effusion M25.411, Right shoulder weakness M62.8      [] Conservative / [x] Surgical - DOS: 10/27/2020 R RTC repair with patch, neer/clarence  Therapy Diagnosis/Practice Pattern:  Practice Pattern I: Bony or Soft Tissue Surgery  Insurance/Certification information:  PT Insurance Information: [employer]h   10/28/2020 24 VISITS APPVD; 10/29/2020 - 2020  Plan of care signed: [] YES  [] NO  Number of Comorbidities:  []0     [x]1-2    []3+  Date of Patient follow up with Physician:     Is this a Progress Report:     [x]  Yes  []  No        If Yes:  Date Range for reporting period:  Beginning   Ending     Progress report will be due (10 Rx or 30 days whichever is less): 3/5/6728       Recertification will be due (POC Duration  / 90 days whichever is less): 2020      Latex Allergy:  [x]NO      []YES  Preferred Language for Healthcare:   [x]English       []other:    Visit # Insurance Allowable    (thru )     SUBJECTIVE:  Pt reports she is really starting to notice a difference feels she is actually improving. OBJECTIVE:   ? Observation: Pt presents to clinic out of sling and carrying purse in right arm, states she is trying to use it more and is doing better  ?  Palpation:     Test used Initial score Current Score   Pain Summary VAS 2-8 2-6   Functional questionnaire QDash 75%  44/55 66%  40/55 ROM flexion 60 140    ER 10 55    ABD 25 100    IR belly belly   Strength flexion NT NT due to sx    ER      ABD      IR        RESTRICTIONS/PRECAUTIONS: wean from sling progress as tolerated    Exercises/Interventions:   Therapeutic Ex Sets/reps Notes HEP   Supine cane flex  Seated cane flex 30 x  30 x     SBS  Shrug   X   Elbow flex/ext with pro/sup 30 x 3# X   Theraweb flex/ext 30 x     SB table slide  Flex/scaption 30 x     No $ 2 x 30           Finisher  x 15    5# FWD, ladder, CW/CCW    TB row/ext 2 x 20 RTB                                                                Manual Intervention      PROM, oscillations, gr 1-2 jt mobs GHJ 15 min                             Pt ed: surgery, anatomy, post-op restrictions, PT progression, RICE, sling use            NMR re-education                                                          Therapeutic Exercise and NMR EXR  [x] (12405) Provided verbal/tactile cueing for activities related to strengthening, flexibility, endurance, ROM  for improvements in scapular, scapulothoracic and UE control with self care, reaching, carrying, lifting, house/yardwork, driving/computer work.    [] (07958) Provided verbal/tactile cueing for activities related to improving balance, coordination, kinesthetic sense, posture, motor skill, proprioception  to assist with  scapular, scapulothoracic and UE control with self care, reaching, carrying, lifting, house/yardwork, driving/computer work. Therapeutic Activities:    [] (16384 or 29026) Provided verbal/tactile cueing for activities related to improving balance, coordination, kinesthetic sense, posture, motor skill, proprioception and motor activation to allow for proper function of scapular, scapulothoracic and UE control with self care, carrying, lifting, driving/computer work.      Home Exercise Program: [x] (56948) Reviewed/Progressed HEP activities related to strengthening, flexibility, endurance, ROM of scapular, scapulothoracic and UE control with self care, reaching, carrying, lifting, house/yardwork, driving/computer work  [] (33009) Reviewed/Progressed HEP activities related to improving balance, coordination, kinesthetic sense, posture, motor skill, proprioception of scapular, scapulothoracic and UE control with self care, reaching, carrying, lifting, house/yardwork, driving/computer work      Manual Treatments:  PROM / STM / Oscillations-Mobs:  G-I, II, III, IV (PA's, Inf., Post.)  [x] (90359) Provided manual therapy to mobilize soft tissue/joints of cervical/CT, scapular GHJ and UE for the purpose of modulating pain, promoting relaxation,  increasing ROM, reducing/eliminating soft tissue swelling/inflammation/restriction, improving soft tissue extensibility and allowing for proper ROM for normal function with self care, reaching, carrying, lifting, house/yardwork, driving/computer work    Modalities:      [] GR/ESU 15 min    [] GR 15 min   [] ESU     [] CP    [] MHP    [] declined    Charges:  Timed Code Treatment Minutes: 40   Total Treatment Minutes: 40     [] EVAL (LOW) 87296 (typically 20 minutes face-to-face)  [] EVAL (MOD) 93522 (typically 30 minutes face-to-face)  [] EVAL (HIGH) 78043 (typically 45 minutes face-to-face)  [] RE-EVAL     [x] YV(27355) x  2   [] IONTO  [] NMR (79621) x     [] VASO  [x] Manual (99896) x 1     [] Other:  [] TA x      [] Mech Traction (68719)  [] ES(attended) (99689)      [] ES (un) (28772):     Paul Hernández stated goal: Regain full pain free use of arm, return to playing with granddaughter  [x] Progressing: [] Met: [] Not Met: [] Adjusted    Therapist goals for Patient:   Short Term Goals: To be achieved in: 2 weeks  1. Independent in HEP and progression per patient tolerance, in order to prevent re-injury.    [] Progressing: [x] Met: [] Not Met: [] Adjusted 2. Patient will have a decrease in pain to facilitate improvement in movement, function, and ADLs as indicated by Functional Deficits. [x] Progressing: [] Met: [] Not Met: [] Adjusted    Long Term Goals: To be achieved in: 12 weeks  1. Disability index score of 35% or less for the [facility] to assist with reaching prior level of function. [x] Progressing: [] Met: [] Not Met: [] Adjusted  2. Patient will demonstrate increased AROM to 0-150, functional IR to don/doff bra, functional ER to wash/care for hair, to allow for proper joint functioning as indicated by patients Functional Deficits. [x] Progressing: [] Met: [] Not Met: [] Adjusted  3. Patient will demonstrate an increase in Strength to at least 4+/5 throughout UE to allow for proper functional mobility as indicated by patients Functional Deficits. [x] Progressing: [] Met: [] Not Met: [] Adjusted  4. Patient will return to dressing and hygiene ADLs independently and without increased symptoms or restriction. [x] Progressing: [] Met: [] Not Met: [] Adjusted  5. Patient will demonstrate appropriate scapular mechanics with no UT compensation to reduce risk of re-injury. [x] Progressing: [] Met: [] Not Met: [] Adjusted      Overall Progression Towards Functional goals/ Treatment Progress Update:  [] Patient is progressing as expected towards functional goals listed. [x] Progression is slowed due to complexities/Impairments listed. - pain  [] Progression has been slowed due to co-morbidities.   [] Plan just implemented, too soon to assess goals progression <30days   [] Goals require adjustment due to lack of progress  [] Patient is not progressing as expected and requires additional follow up with physician  [] Other    Prognosis for POC: [x] Good [] Fair  [] Poor      Patient requires continued skilled intervention: [x] Yes  [] No

## 2020-12-22 ENCOUNTER — HOSPITAL ENCOUNTER (OUTPATIENT)
Dept: PHYSICAL THERAPY | Age: 65
Setting detail: THERAPIES SERIES
Discharge: HOME | End: 2020-12-22
Payer: COMMERCIAL

## 2020-12-22 PROCEDURE — 97140 MANUAL THERAPY 1/> REGIONS: CPT

## 2020-12-22 PROCEDURE — 97110 THERAPEUTIC EXERCISES: CPT

## 2020-12-22 NOTE — FLOWSHEET NOTE
ROM flexion 60 140    ER 10 55    ABD 25 100    IR belly belly   Strength flexion NT NT due to sx    ER      ABD      IR        RESTRICTIONS/PRECAUTIONS: wean from sling progress as tolerated    Exercises/Interventions:   Therapeutic Ex Sets/reps Notes HEP   Supine shoulder flexion  Seated shoulder flexion 20 x  20 x     Elbow flex/ext with pro/sup 30 x 3# X   No $ 3 x 10 YTB    TB wall slide 3 x 10 YTB    Finisher 20 x    5# FWD, ladder, CW/CCW    TB row/ext 2 x 20 RTB                                                                Manual Intervention      PROM, oscillations, gr 1-2 jt mobs GHJ 15 min                             Pt ed: surgery, anatomy, post-op restrictions, PT progression, RICE, sling use            NMR re-education                                                          Therapeutic Exercise and NMR EXR  [x] (61356) Provided verbal/tactile cueing for activities related to strengthening, flexibility, endurance, ROM  for improvements in scapular, scapulothoracic and UE control with self care, reaching, carrying, lifting, house/yardwork, driving/computer work.    [] (08460) Provided verbal/tactile cueing for activities related to improving balance, coordination, kinesthetic sense, posture, motor skill, proprioception  to assist with  scapular, scapulothoracic and UE control with self care, reaching, carrying, lifting, house/yardwork, driving/computer work. Therapeutic Activities:    [] (86526 or 23613) Provided verbal/tactile cueing for activities related to improving balance, coordination, kinesthetic sense, posture, motor skill, proprioception and motor activation to allow for proper function of scapular, scapulothoracic and UE control with self care, carrying, lifting, driving/computer work.      Home Exercise Program: 2. Patient will have a decrease in pain to facilitate improvement in movement, function, and ADLs as indicated by Functional Deficits. [x] Progressing: [] Met: [] Not Met: [] Adjusted    Long Term Goals: To be achieved in: 12 weeks  1. Disability index score of 35% or less for the [facility] to assist with reaching prior level of function. [x] Progressing: [] Met: [] Not Met: [] Adjusted  2. Patient will demonstrate increased AROM to 0-150, functional IR to don/doff bra, functional ER to wash/care for hair, to allow for proper joint functioning as indicated by patients Functional Deficits. [x] Progressing: [] Met: [] Not Met: [] Adjusted  3. Patient will demonstrate an increase in Strength to at least 4+/5 throughout UE to allow for proper functional mobility as indicated by patients Functional Deficits. [x] Progressing: [] Met: [] Not Met: [] Adjusted  4. Patient will return to dressing and hygiene ADLs independently and without increased symptoms or restriction. [x] Progressing: [] Met: [] Not Met: [] Adjusted  5. Patient will demonstrate appropriate scapular mechanics with no UT compensation to reduce risk of re-injury. [x] Progressing: [] Met: [] Not Met: [] Adjusted      Overall Progression Towards Functional goals/ Treatment Progress Update:  [] Patient is progressing as expected towards functional goals listed. [x] Progression is slowed due to complexities/Impairments listed. - pain  [] Progression has been slowed due to co-morbidities.   [] Plan just implemented, too soon to assess goals progression <30days   [] Goals require adjustment due to lack of progress  [] Patient is not progressing as expected and requires additional follow up with physician  [] Other    Prognosis for POC: [x] Good [] Fair  [] Poor      Patient requires continued skilled intervention: [x] Yes  [] No ASSESSMENT:  Pt has difficult time relaxing arm throughout treatment, when able to relax ROM improved greatly. Pt has had slow progression due to surgery performed and continued pain. Treatment/Activity Tolerance:  [] Patient tolerated treatment well [] Patient limited by fatique  [x] Patient limited by pain  [] Patient limited by other medical complications  [] Other:     Patient Requires Follow-up: [x] Yes  [] No    PLAN:   [x] Continue per plan of care [] Alter current plan (see comments above)  [] Plan of care initiated [] Hold pending MD visit [] Discharge      Electronically signed by:  Eliud Ruth PT , DPT [id number]    Note: If patient does not return for scheduled/ recommended follow up visits, this note will serve as a discharge from care along with most recent update on progress.

## 2020-12-29 ENCOUNTER — HOSPITAL ENCOUNTER (OUTPATIENT)
Dept: PHYSICAL THERAPY | Age: 65
Setting detail: THERAPIES SERIES
Discharge: HOME | End: 2020-12-29
Payer: COMMERCIAL

## 2020-12-29 PROCEDURE — 97110 THERAPEUTIC EXERCISES: CPT

## 2020-12-29 PROCEDURE — 97140 MANUAL THERAPY 1/> REGIONS: CPT

## 2020-12-29 NOTE — FLOWSHEET NOTE
[facility], [address] [place]  Phone: [phone]  Fax [phone]    Physical Therapy Daily Treatment Note  Date:  2020    Patient Name:  Valeriy Perea    :  1955  MRN: [id number]  Restrictions/Precautions:    Physician Information:  Referring Practitioner: Dr. Toribio Conley  Medical/Treatment Diagnosis Information:  · Diagnosis: M75.111 (ICD-10-CM) - Nontraumatic incomplete tear of right rotator cuff  · Treatment Diagnosis: Right shoulder pain M25.511, right shoulder stiffness M25.611, Right shoulder effusion M25.411, Right shoulder weakness M62.8      [] Conservative / [x] Surgical - DOS: 10/27/2020 R RTC repair with patch, neer/clarence  Therapy Diagnosis/Practice Pattern:  Practice Pattern I: Bony or Soft Tissue Surgery  Insurance/Certification information:  PT Insurance Information: [employer]h   10/28/2020 24 VISITS APPVD; 10/29/2020 - 2020  Plan of care signed: [] YES  [] NO  Number of Comorbidities:  []0     [x]1-2    []3+  Date of Patient follow up with Physician:     Is this a Progress Report:     [x]  Yes  []  No        If Yes:  Date Range for reporting period:  Beginning 12/3/2020  Ending     Progress report will be due (10 Rx or 30 days whichever is less): 9763       Recertification will be due (POC Duration  / 90 days whichever is less): 2020      Latex Allergy:  [x]NO      []YES  Preferred Language for Healthcare:   [x]English       []other:    Visit # Insurance Allowable   10 24 (thru )     SUBJECTIVE:  Pt reports arm continues to improve. Doing much better since last session, able to get dressed and put deodorant on. OBJECTIVE:   ? Observation: Pt presents to clinic out of sling and carrying purse in right arm, states she is trying to use it more and is doing better  ?  Palpation:     Test used Initial score Current Score   Pain Summary VAS 2-8 2-6 Functional questionnaire QDash 75%  44/55 66%  40/55   ROM flexion 60 140    ER 10 55    ABD 25 100    IR belly belly   Strength flexion NT NT due to sx    ER      ABD      IR        RESTRICTIONS/PRECAUTIONS: wean from sling progress as tolerated    Exercises/Interventions:   Therapeutic Ex Sets/reps Notes HEP   Supine shoulder flexion  Seated shoulder flexion  Seated shoulder ABD 20 x  20 x  20 x     Elbow flex/ext with pro/sup 30 x 3# X   No $ 3 x 10 YTB    Wall walk flex  Wall walk scaption  TB wall slide 15 x  15 x  2 x 10     YTB    Finisher 30 x    5# FWD, ladder, CW/CCW    TB row/ext 3 x 10 GTB                                                                Manual Intervention      PROM, oscillations, gr 1-2 jt mobs GHJ 15 min                             Pt ed: surgery, anatomy, post-op restrictions, PT progression, RICE, sling use            NMR re-education                                                          Therapeutic Exercise and NMR EXR  [x] (21288) Provided verbal/tactile cueing for activities related to strengthening, flexibility, endurance, ROM  for improvements in scapular, scapulothoracic and UE control with self care, reaching, carrying, lifting, house/yardwork, driving/computer work.    [] (11995) Provided verbal/tactile cueing for activities related to improving balance, coordination, kinesthetic sense, posture, motor skill, proprioception  to assist with  scapular, scapulothoracic and UE control with self care, reaching, carrying, lifting, house/yardwork, driving/computer work. Therapeutic Activities:    [] (19700 or 47893) Provided verbal/tactile cueing for activities related to improving balance, coordination, kinesthetic sense, posture, motor skill, proprioception and motor activation to allow for proper function of scapular, scapulothoracic and UE control with self care, carrying, lifting, driving/computer work.      Home Exercise Program: [x] (42869) Reviewed/Progressed HEP activities related to strengthening, flexibility, endurance, ROM of scapular, scapulothoracic and UE control with self care, reaching, carrying, lifting, house/yardwork, driving/computer work  [] (98481) Reviewed/Progressed HEP activities related to improving balance, coordination, kinesthetic sense, posture, motor skill, proprioception of scapular, scapulothoracic and UE control with self care, reaching, carrying, lifting, house/yardwork, driving/computer work      Manual Treatments:  PROM / STM / Oscillations-Mobs:  G-I, II, III, IV (PA's, Inf., Post.)  [x] (28866) Provided manual therapy to mobilize soft tissue/joints of cervical/CT, scapular GHJ and UE for the purpose of modulating pain, promoting relaxation,  increasing ROM, reducing/eliminating soft tissue swelling/inflammation/restriction, improving soft tissue extensibility and allowing for proper ROM for normal function with self care, reaching, carrying, lifting, house/yardwork, driving/computer work    Modalities:      [] GR/ESU 15 min    [] GR 15 min   [] ESU     [] CP    [] MHP    [] declined    Charges:  Timed Code Treatment Minutes: 40   Total Treatment Minutes: 40     [] EVAL (LOW) 05382 (typically 20 minutes face-to-face)  [] EVAL (MOD) 88351 (typically 30 minutes face-to-face)  [] EVAL (HIGH) 79978 (typically 45 minutes face-to-face)  [] RE-EVAL     [x] SR(21113) x  2   [] IONTO  [] NMR (86430) x     [] VASO  [x] Manual (36439) x 1     [] Other:  [] TA x      [] Mech Traction (45150)  [] ES(attended) (26518)      [] ES (un) (32354):     Reba Batres stated goal: Regain full pain free use of arm, return to playing with granddaughter  [x] Progressing: [] Met: [] Not Met: [] Adjusted    Therapist goals for Patient:   Short Term Goals: To be achieved in: 2 weeks  1. Independent in HEP and progression per patient tolerance, in order to prevent re-injury.    [] Progressing: [x] Met: [] Not Met: [] Adjusted ASSESSMENT:  Pt has difficult time relaxing arm throughout treatment, when able to relax ROM improved greatly. Pt has had slow progression due to surgery performed and continued pain. Treatment/Activity Tolerance:  [] Patient tolerated treatment well [] Patient limited by fatique  [x] Patient limited by pain  [] Patient limited by other medical complications  [] Other:     Patient Requires Follow-up: [x] Yes  [] No    PLAN:   [x] Continue per plan of care [] Alter current plan (see comments above)  [] Plan of care initiated [] Hold pending MD visit [] Discharge      Electronically signed by:  Macrina Guevara PT , DPT [id number]    Note: If patient does not return for scheduled/ recommended follow up visits, this note will serve as a discharge from care along with most recent update on progress.

## 2021-01-14 ENCOUNTER — OFFICE VISIT (OUTPATIENT)
Dept: ORTHOPEDIC SURGERY | Age: 66
End: 2021-01-14

## 2021-01-14 ENCOUNTER — HOSPITAL ENCOUNTER (OUTPATIENT)
Dept: PHYSICAL THERAPY | Age: 66
Setting detail: THERAPIES SERIES
Discharge: HOME | End: 2021-01-14
Payer: COMMERCIAL

## 2021-01-14 VITALS — BODY MASS INDEX: 31.82 KG/M2 | WEIGHT: 191 LBS | HEIGHT: 65 IN

## 2021-01-14 DIAGNOSIS — M75.81 ROTATOR CUFF TENDINITIS, RIGHT: Primary | ICD-10-CM

## 2021-01-14 PROCEDURE — 97140 MANUAL THERAPY 1/> REGIONS: CPT

## 2021-01-14 PROCEDURE — 97110 THERAPEUTIC EXERCISES: CPT

## 2021-01-14 PROCEDURE — 99024 POSTOP FOLLOW-UP VISIT: CPT | Performed by: ORTHOPAEDIC SURGERY

## 2021-01-14 NOTE — PLAN OF CARE
· Treatment Diagnosis: Right shoulder pain M25.511, right shoulder stiffness M25.611, Right shoulder effusion M25.411, Right shoulder weakness M62.8      [] Conservative / [x] Surgical - DOS: 10/27/2020 R RTC repair with patch, neharesh/clarence  Therapy Diagnosis/Practice Pattern:  Practice Pattern I: Bony or Soft Tissue Surgery  Insurance/Certification information:  PT Insurance Information: [address] [place] [address] Sandi of care signed: [] YES  [] NO  Number of Comorbidities:  []0     [x]1-2    []3+  Date of Patient follow up with Physician:     Is this a Progress Report:     [x]  Yes  []  No        If Yes:  Date Range for reporting period:  Beginning 12/3/2020  Ending 1/14/2021    Progress report will be due (10 Rx or 30 days whichever is less): 6/32/0677       Recertification will be due (POC Duration  / 90 days whichever is less): 3/25/2021     Latex Allergy:  [x]NO      []YES  Preferred Language for Healthcare:   [x]English       []other:    Visit # Insurance Allowable   11 total  1 2021 30 for 2021     SUBJECTIVE:  Pt reports trying to use arm a little more but not doing a lot with it. Tolerating pain better now.     OBJECTIVE:   ? Observation:   ? Palpation:     Test used Initial score Current Score   Pain Summary VAS 2-8 2-6   Functional questionnaire QDash 75%  44/55 66%  40/55   ROM flexion 60 140    ER 10 55    ABD 25 100    IR belly belly   Strength flexion NT NT due to sx    ER      ABD      IR        RESTRICTIONS/PRECAUTIONS: wean from sling progress as tolerated    Exercises/Interventions:   Therapeutic Ex Sets/reps Notes HEP     Seated shoulder flex  Seated shoulder scaption 2 x  10 x  10 x     Band pull apart  Band D1 flex 3 x 10 red  3 x 10 red  X   DB curl 3 x 10 3#    TB row  TB ext  TB ER walkout 2 x 10  2 x 10  2 x 10 Green  Green  Flores-Rivas 2 x 15  4# FWD, ladder, CW/CCW      GTB                                                                Manual Intervention 25 min PROM, oscillations, gr 1-2 jt mobs GHJ  STM bicep, posterior RTC, lat                              Pt ed: surgery, anatomy, post-op restrictions, PT progression, RICE, sling use            NMR re-education                                                          Therapeutic Exercise and NMR EXR  [x] (62209) Provided verbal/tactile cueing for activities related to strengthening, flexibility, endurance, ROM  for improvements in scapular, scapulothoracic and UE control with self care, reaching, carrying, lifting, house/yardwork, driving/computer work.    [] (28304) Provided verbal/tactile cueing for activities related to improving balance, coordination, kinesthetic sense, posture, motor skill, proprioception  to assist with  scapular, scapulothoracic and UE control with self care, reaching, carrying, lifting, house/yardwork, driving/computer work. Therapeutic Activities:    [] (59758 or 31177) Provided verbal/tactile cueing for activities related to improving balance, coordination, kinesthetic sense, posture, motor skill, proprioception and motor activation to allow for proper function of scapular, scapulothoracic and UE control with self care, carrying, lifting, driving/computer work.      Home Exercise Program:    [x] (05676) Reviewed/Progressed HEP activities related to strengthening, flexibility, endurance, ROM of scapular, scapulothoracic and UE control with self care, reaching, carrying, lifting, house/yardwork, driving/computer work  [] (65565) Reviewed/Progressed HEP activities related to improving balance, coordination, kinesthetic sense, posture, motor skill, proprioception of scapular, scapulothoracic and UE control with self care, reaching, carrying, lifting, house/yardwork, driving/computer work      Manual Treatments:  PROM / STM / Oscillations-Mobs:  G-I, II, III, IV (PA's, Inf., Post.) [x] (94878) Provided manual therapy to mobilize soft tissue/joints of cervical/CT, scapular GHJ and UE for the purpose of modulating pain, promoting relaxation,  increasing ROM, reducing/eliminating soft tissue swelling/inflammation/restriction, improving soft tissue extensibility and allowing for proper ROM for normal function with self care, reaching, carrying, lifting, house/yardwork, driving/computer work    Modalities:      [] GR/ESU 15 min    [] GR 15 min   [] ESU     [] CP    [] MHP    [] declined    Charges:  Timed Code Treatment Minutes: 55   Total Treatment Minutes: 55     [] EVAL (LOW) 25018 (typically 20 minutes face-to-face)  [] EVAL (MOD) 43353 (typically 30 minutes face-to-face)  [] EVAL (HIGH) 01748 (typically 45 minutes face-to-face)  [] RE-EVAL     [x] UG(90856) x  2   [] IONTO  [] NMR (67998) x     [] VASO  [x] Manual (83146) x 2     [] Other:  [] TA x      [] Mech Traction (34306)  [] ES(attended) (00223)      [] ES (un) (48351):     Meenu Atwood stated goal: Regain full pain free use of arm, return to playing with granddaughter  [x] Progressing: [] Met: [] Not Met: [] Adjusted    Therapist goals for Patient:   Short Term Goals: To be achieved in: 2 weeks  1. Independent in HEP and progression per patient tolerance, in order to prevent re-injury. [] Progressing: [x] Met: [] Not Met: [] Adjusted  2. Patient will have a decrease in pain to facilitate improvement in movement, function, and ADLs as indicated by Functional Deficits. [x] Progressing: [] Met: [] Not Met: [] Adjusted    Long Term Goals: To be achieved in: 12 weeks  1. Disability index score of 35% or less for the [facility] to assist with reaching prior level of function. [x] Progressing: [] Met: [] Not Met: [] Adjusted  2. Patient will demonstrate increased AROM to 0-150, functional IR to don/doff bra, functional ER to wash/care for hair, to allow for proper joint functioning as indicated by patients Functional Deficits. [x] Progressing: [] Met: [] Not Met: [] Adjusted  3. Patient will demonstrate an increase in Strength to at least 4+/5 throughout UE to allow for proper functional mobility as indicated by patients Functional Deficits. [x] Progressing: [] Met: [] Not Met: [] Adjusted  4. Patient will return to dressing and hygiene ADLs independently and without increased symptoms or restriction. [x] Progressing: [] Met: [] Not Met: [] Adjusted  5. Patient will demonstrate appropriate scapular mechanics with no UT compensation to reduce risk of re-injury. [x] Progressing: [] Met: [] Not Met: [] Adjusted      Overall Progression Towards Functional goals/ Treatment Progress Update:  [] Patient is progressing as expected towards functional goals listed. [x] Progression is slowed due to complexities/Impairments listed. - pain  [] Progression has been slowed due to co-morbidities. [] Plan just implemented, too soon to assess goals progression <30days   [] Goals require adjustment due to lack of progress  [] Patient is not progressing as expected and requires additional follow up with physician  [] Other    Prognosis for POC: [x] Good [] Fair  [] Poor      Patient requires continued skilled intervention: [x] Yes  [] No      ASSESSMENT:  Pt has difficult time relaxing arm throughout treatment, when able to relax ROM improved greatly. Pt has had slow progression due to surgery performed and continued pain.      Treatment/Activity Tolerance:  [] Patient tolerated treatment well [] Patient limited by fatique  [x] Patient limited by pain  [] Patient limited by other medical complications  [] Other:     Patient Requires Follow-up: [x] Yes  [] No    PLAN:  Continue POC progressing strength and functional ROM over course of next 8 weeks 3/25/2021  [x] Continue per plan of care [] Alter current plan (see comments above)  [] Plan of care initiated [] Hold pending MD visit [] Discharge Electronically signed by:  Diego Rojas PT , DPT [id number]    Note: If patient does not return for scheduled/ recommended follow up visits, this note will serve as a discharge from care along with most recent update on progress.

## 2021-01-21 ENCOUNTER — HOSPITAL ENCOUNTER (OUTPATIENT)
Dept: PHYSICAL THERAPY | Age: 66
Setting detail: THERAPIES SERIES
Discharge: HOME | End: 2021-01-21
Payer: COMMERCIAL

## 2021-01-21 NOTE — FLOWSHEET NOTE
[facility],  [address]        Physical Therapy  Cancellation/No-show Note  Patient Name:  Kamari Domínguez  :  1955   Date:  2021  Cancelled visits to date: 2  No-shows to date: 0    For today's appointment patient:  ? X Cancelled  ? Rescheduled appointment  ? No-show     Reason given by patient:  ?X  Patient ill  ? Conflicting appointment  ? No transportation    ? Conflict with work  ? No reason given  ?   Other:     Comments:  Patient not feeling well    Electronically signed by:  Arslan Cross, PT

## 2021-01-28 ENCOUNTER — HOSPITAL ENCOUNTER (OUTPATIENT)
Dept: PHYSICAL THERAPY | Age: 66
Setting detail: THERAPIES SERIES
Discharge: HOME | End: 2021-01-28
Payer: COMMERCIAL

## 2021-01-28 PROCEDURE — 97140 MANUAL THERAPY 1/> REGIONS: CPT

## 2021-01-28 PROCEDURE — 97110 THERAPEUTIC EXERCISES: CPT

## 2021-01-28 NOTE — FLOWSHEET NOTE
[facility], [address] [place]  Phone: [phone]  Fax [phone]    Physical Therapy Daily Treatment Note  Date:  2021    Patient Name:  Gerardo Hickman    :  1955  MRN: [id number]  Restrictions/Precautions:    Physician Information:  Referring Practitioner: Dr. 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  · Treatment Diagnosis: Right shoulder pain M25.511, right shoulder stiffness M25.611, Right shoulder effusion M25.411, Right shoulder weakness M62.8      [] Conservative / [x] Surgical - DOS: 10/27/2020 R RTC repair with patch, neer/clarence  Therapy Diagnosis/Practice Pattern:  Practice Pattern I: Bony or Soft Tissue Surgery  Insurance/Certification information:  PT Insurance Information: [address] [place] [address] Sandi of care signed: [] YES  [] NO  Number of Comorbidities:  []0     [x]1-2    []3+  Date of Patient follow up with Physician:     Is this a Progress Report:     [x]  Yes  []  No        If Yes:  Date Range for reporting period:  Beginning 2021  Ending     Progress report will be due (10 Rx or 30 days whichever is less):        Recertification will be due (POC Duration  / 90 days whichever is less): 3/25/2021     Latex Allergy:  [x]NO      []YES  Preferred Language for Healthcare:   [x]English       []other:    Visit # Insurance Allowable   11 total  2021 30 for      SUBJECTIVE:  Pt reports trying to use arm a little more but not doing a lot with it. Tolerating pain better now.     OBJECTIVE:   ? Observation:   ? Palpation:     Test used Initial score Current Score   Pain Summary VAS 2-8 2-6   Functional questionnaire QDash 75%  44/55 27%  23/55   ROM flexion 60 160    ER 10 75    ABD 25 100    IR belly 60   Strength flexion NT 4-    ER  4-    ABD  4-    IR  4- RESTRICTIONS/PRECAUTIONS: wean from sling progress as tolerated    Exercises/Interventions:   Therapeutic Ex Sets/reps Notes HEP     Seated shoulder flex  Seated shoulder scaption 2 x  10 x  10 x   Cues to reduce shrug    Band pull apart  Band D1 flex 3 x 10 red  3 x 10 red  X   DB curl 3 x 10 3#    TB row  TB ext  TB ER walkout 2 x 10  2 x 10  2 x 10 Green  Green  Flores-Rivas 3 x 10  5# FWD, ladder, CW/CCW    TB ABD  RTB                                                                Manual Intervention 15 min     PROM, oscillations, gr 1-2 jt mobs GHJ  STM bicep, posterior RTC, lat                              Pt ed: surgery, anatomy, post-op restrictions, PT progression, RICE, sling use            NMR re-education                                                          Therapeutic Exercise and NMR EXR  [x] (55493) Provided verbal/tactile cueing for activities related to strengthening, flexibility, endurance, ROM  for improvements in scapular, scapulothoracic and UE control with self care, reaching, carrying, lifting, house/yardwork, driving/computer work.    [] (05251) Provided verbal/tactile cueing for activities related to improving balance, coordination, kinesthetic sense, posture, motor skill, proprioception  to assist with  scapular, scapulothoracic and UE control with self care, reaching, carrying, lifting, house/yardwork, driving/computer work. Therapeutic Activities:    [] (94613 or 72249) Provided verbal/tactile cueing for activities related to improving balance, coordination, kinesthetic sense, posture, motor skill, proprioception and motor activation to allow for proper function of scapular, scapulothoracic and UE control with self care, carrying, lifting, driving/computer work.      Home Exercise Program: [x] (47957) Reviewed/Progressed HEP activities related to strengthening, flexibility, endurance, ROM of scapular, scapulothoracic and UE control with self care, reaching, carrying, lifting, house/yardwork, driving/computer work  [] (58008) Reviewed/Progressed HEP activities related to improving balance, coordination, kinesthetic sense, posture, motor skill, proprioception of scapular, scapulothoracic and UE control with self care, reaching, carrying, lifting, house/yardwork, driving/computer work      Manual Treatments:  PROM / STM / Oscillations-Mobs:  G-I, II, III, IV (PA's, Inf., Post.)  [x] (95098) Provided manual therapy to mobilize soft tissue/joints of cervical/CT, scapular GHJ and UE for the purpose of modulating pain, promoting relaxation,  increasing ROM, reducing/eliminating soft tissue swelling/inflammation/restriction, improving soft tissue extensibility and allowing for proper ROM for normal function with self care, reaching, carrying, lifting, house/yardwork, driving/computer work    Modalities:      [] GR/ESU 15 min    [] GR 15 min   [] ESU     [] CP    [] MHP    [] declined    Charges:  Timed Code Treatment Minutes: 40   Total Treatment Minutes: 40     [] EVAL (LOW) 37081 (typically 20 minutes face-to-face)  [] EVAL (MOD) 68855 (typically 30 minutes face-to-face)  [] EVAL (HIGH) 36361 (typically 45 minutes face-to-face)  [] RE-EVAL     [x] AK(21737) x  2   [] IONTO  [] NMR (22270) x     [] VASO  [x] Manual (78840) x 1     [] Other:  [] TA x      [] Mech Traction (54189)  [] ES(attended) (08230)      [] ES (un) (66420):     Tresia Come stated goal: Regain full pain free use of arm, return to playing with granddaughter  [x] Progressing: [] Met: [] Not Met: [] Adjusted    Therapist goals for Patient:   Short Term Goals: To be achieved in: 2 weeks  1. Independent in HEP and progression per patient tolerance, in order to prevent re-injury.    [] Progressing: [x] Met: [] Not Met: [] Adjusted 2. Patient will have a decrease in pain to facilitate improvement in movement, function, and ADLs as indicated by Functional Deficits. [] Progressing: [x] Met: [] Not Met: [] Adjusted    Long Term Goals: To be achieved in: 12 weeks  1. Disability index score of 35% or less for the [facility] to assist with reaching prior level of function. [] Progressing: [x] Met: [] Not Met: [] Adjusted  2. Patient will demonstrate increased AROM to 0-150, functional IR to don/doff bra, functional ER to wash/care for hair, to allow for proper joint functioning as indicated by patients Functional Deficits. [x] Progressing: [] Met: [] Not Met: [] Adjusted  3. Patient will demonstrate an increase in Strength to at least 4+/5 throughout UE to allow for proper functional mobility as indicated by patients Functional Deficits. [x] Progressing: [] Met: [] Not Met: [] Adjusted  4. Patient will return to dressing and hygiene ADLs independently and without increased symptoms or restriction. [x] Progressing: [] Met: [] Not Met: [] Adjusted  5. Patient will demonstrate appropriate scapular mechanics with no UT compensation to reduce risk of re-injury. [x] Progressing: [] Met: [] Not Met: [] Adjusted      Overall Progression Towards Functional goals/ Treatment Progress Update:  [] Patient is progressing as expected towards functional goals listed. [x] Progression is slowed due to complexities/Impairments listed. - pain  [] Progression has been slowed due to co-morbidities.   [] Plan just implemented, too soon to assess goals progression <30days   [] Goals require adjustment due to lack of progress  [] Patient is not progressing as expected and requires additional follow up with physician  [] Other    Prognosis for POC: [x] Good [] Fair  [] Poor      Patient requires continued skilled intervention: [x] Yes  [] No ASSESSMENT:  Pt with improving AROM and strength. Cues for UT compensation and scapular mechanics - when flex/scaption done with good form pain eliminated. Pt has had slow progression due to surgery performed and continued pain. Treatment/Activity Tolerance:  [] Patient tolerated treatment well [] Patient limited by fatique  [x] Patient limited by pain  [] Patient limited by other medical complications  [] Other:     Patient Requires Follow-up: [x] Yes  [] No    PLAN:  Continue POC progressing strength and functional ROM over course of next 8 weeks 3/25/2021  [x] Continue per plan of care [] Andrew Barragan current plan (see comments above)  [] Plan of care initiated [] Hold pending MD visit [] Discharge      Electronically signed by:  Fox Wan PT , DPT [id number]    Note: If patient does not return for scheduled/ recommended follow up visits, this note will serve as a discharge from care along with most recent update on progress.

## 2021-02-04 ENCOUNTER — HOSPITAL ENCOUNTER (OUTPATIENT)
Dept: PHYSICAL THERAPY | Age: 66
Setting detail: THERAPIES SERIES
Discharge: HOME | End: 2021-02-04
Payer: COMMERCIAL

## 2021-02-04 PROCEDURE — 97110 THERAPEUTIC EXERCISES: CPT

## 2021-02-04 PROCEDURE — 97140 MANUAL THERAPY 1/> REGIONS: CPT

## 2021-02-04 NOTE — FLOWSHEET NOTE
[facility], [address] [place]  Phone: [phone]  Fax [phone]    Physical Therapy Daily Treatment Note  Date:  2021    Patient Name:  Kamari Domínguez    :  1955  MRN: [id number]  Restrictions/Precautions:    Physician Information:  Referring Practitioner: Dr. Chinedu Keenan  Medical/Treatment Diagnosis Information:  · Diagnosis: M75.111 (ICD-10-CM) - Nontraumatic incomplete tear of right rotator cuff  · Treatment Diagnosis: Right shoulder pain M25.511, right shoulder stiffness M25.611, Right shoulder effusion M25.411, Right shoulder weakness M62.8      [] Conservative / [x] Surgical - DOS: 10/27/2020 R RTC repair with patch, neer/clarence  Therapy Diagnosis/Practice Pattern:  Practice Pattern I: Bony or Soft Tissue Surgery  Insurance/Certification information:  PT Insurance Information: [address] [place] [address] Sandi of care signed: [] YES  [] NO  Number of Comorbidities:  []0     [x]1-2    []3+  Date of Patient follow up with Physician:     Is this a Progress Report:     [x]  Yes  []  No        If Yes:  Date Range for reporting period:  Beginning 2021  Ending     Progress report will be due (10 Rx or 30 days whichever is less):        Recertification will be due (POC Duration  / 90 days whichever is less): 3/25/2021     Latex Allergy:  [x]NO      []YES  Preferred Language for Healthcare:   [x]English       []other:    Visit # Insurance Allowable   12 total  2021 30 for      SUBJECTIVE:  Pt reports overall shoulder continues to improve. Thinks she may sleep on it weird at night and that makes it stiff in the morning.      OBJECTIVE:    Observation:    Palpation:     Test used Initial score Current Score   Pain Summary VAS 2-8 2-6   Functional questionnaire QDash 75%  44/55 27%  23/55   ROM flexion 60 160    ER 10 75    ABD 25 100    IR belly 60   Strength flexion NT 4-    ER  4-    ABD  4-    IR  4- RESTRICTIONS/PRECAUTIONS: wean from sling progress as tolerated    Exercises/Interventions:   Therapeutic Ex Sets/reps Notes HEP     Seated shoulder flex  Seated shoulder scaption 2 x  10 x  10 x   Cues to reduce shrug    Band pull apart  Band D1 flex 3 x 10 red  3 x 10 red  X   DB curl 3 x 10 5#    TB row  TB ext  TB ER walkout  TB Tricep ext  TB bicep curl 3 x 10  3 x 10  3 x 10  3 x 10  3 x 10 Green  Green  Green  Green  Flores-Rivas 3 x 10  5# FWD, ladder, CW/CCW                                                                      Manual Intervention 15 min     PROM, oscillations, gr 1-2 jt mobs GHJ  STM bicep, posterior RTC, lat                              Pt ed: surgery, anatomy, post-op restrictions, PT progression, RICE, sling use            NMR re-education                                                          Therapeutic Exercise and NMR EXR  [x] (74689) Provided verbal/tactile cueing for activities related to strengthening, flexibility, endurance, ROM  for improvements in scapular, scapulothoracic and UE control with self care, reaching, carrying, lifting, house/yardwork, driving/computer work.    [] (30553) Provided verbal/tactile cueing for activities related to improving balance, coordination, kinesthetic sense, posture, motor skill, proprioception  to assist with  scapular, scapulothoracic and UE control with self care, reaching, carrying, lifting, house/yardwork, driving/computer work. Therapeutic Activities:    [] (27133 or 31310) Provided verbal/tactile cueing for activities related to improving balance, coordination, kinesthetic sense, posture, motor skill, proprioception and motor activation to allow for proper function of scapular, scapulothoracic and UE control with self care, carrying, lifting, driving/computer work.      Home Exercise Program:    [x] (12868) Reviewed/Progressed HEP activities related to strengthening, flexibility, endurance, ROM of scapular, scapulothoracic and UE control with self care, reaching, carrying, lifting, house/yardwork, driving/computer work  [] (05490) Reviewed/Progressed HEP activities related to improving balance, coordination, kinesthetic sense, posture, motor skill, proprioception of scapular, scapulothoracic and UE control with self care, reaching, carrying, lifting, house/yardwork, driving/computer work      Manual Treatments:  PROM / STM / Oscillations-Mobs:  G-I, II, III, IV (PA's, Inf., Post.)  [x] (26588) Provided manual therapy to mobilize soft tissue/joints of cervical/CT, scapular GHJ and UE for the purpose of modulating pain, promoting relaxation,  increasing ROM, reducing/eliminating soft tissue swelling/inflammation/restriction, improving soft tissue extensibility and allowing for proper ROM for normal function with self care, reaching, carrying, lifting, house/yardwork, driving/computer work    Modalities:      [] GR/ESU 15 min    [] GR 15 min   [] ESU     [] CP    [] MHP    [] declined    Charges:  Timed Code Treatment Minutes: 40   Total Treatment Minutes: 40     [] EVAL (LOW) 36900 (typically 20 minutes face-to-face)  [] EVAL (MOD) 68662 (typically 30 minutes face-to-face)  [] EVAL (HIGH) 423 8935 (typically 45 minutes face-to-face)  [] RE-EVAL     [x] JG(42458) x  2   [] IONTO  [] NMR (02311) x     [] VASO  [x] Manual (11355) x 1     [] Other:  [] TA x      [] Mech Traction (41148)  [] ES(attended) (60211)      [] ES (un) (10522):     Joi Arredondo stated goal: Regain full pain free use of arm, return to playing with granddaughter  [x] Progressing: [] Met: [] Not Met: [] Adjusted    Therapist goals for Patient:   Short Term Goals: To be achieved in: 2 weeks  1. Independent in HEP and progression per patient tolerance, in order to prevent re-injury. [] Progressing: [x] Met: [] Not Met: [] Adjusted  2. Patient will have a decrease in pain to facilitate improvement in movement, function, and ADLs as indicated by Functional Deficits.   [] Progressing: [x] Met: [] Not Met: [] Adjusted    Long Term Goals: To be achieved in: 12 weeks  1. Disability index score of 35% or less for the [facility] to assist with reaching prior level of function. [] Progressing: [x] Met: [] Not Met: [] Adjusted  2. Patient will demonstrate increased AROM to 0-150, functional IR to don/doff bra, functional ER to wash/care for hair, to allow for proper joint functioning as indicated by patients Functional Deficits. [x] Progressing: [] Met: [] Not Met: [] Adjusted  3. Patient will demonstrate an increase in Strength to at least 4+/5 throughout UE to allow for proper functional mobility as indicated by patients Functional Deficits. [x] Progressing: [] Met: [] Not Met: [] Adjusted  4. Patient will return to dressing and hygiene ADLs independently and without increased symptoms or restriction. [x] Progressing: [] Met: [] Not Met: [] Adjusted  5. Patient will demonstrate appropriate scapular mechanics with no UT compensation to reduce risk of re-injury. [x] Progressing: [] Met: [] Not Met: [] Adjusted      Overall Progression Towards Functional goals/ Treatment Progress Update:  [] Patient is progressing as expected towards functional goals listed. [x] Progression is slowed due to complexities/Impairments listed. - pain  [] Progression has been slowed due to co-morbidities. [] Plan just implemented, too soon to assess goals progression <30days   [] Goals require adjustment due to lack of progress  [] Patient is not progressing as expected and requires additional follow up with physician  [] Other    Prognosis for POC: [x] Good [] Fair  [] Poor      Patient requires continued skilled intervention: [x] Yes  [] No      ASSESSMENT:  Pt with improving AROM and strength. Cues for UT compensation and scapular mechanics - when flex/scaption done with good form pain eliminated. Pt has had slow progression due to surgery performed and continued pain.      Treatment/Activity Tolerance:  [] Patient tolerated treatment well [] Patient limited by fatique  [x] Patient limited by pain  [] Patient limited by other medical complications  [] Other:     Patient Requires Follow-up: [x] Yes  [] No    PLAN:  Continue POC progressing strength and functional ROM over course of next 8 weeks 3/25/2021  [x] Continue per plan of care [] Mariluz Ventura current plan (see comments above)  [] Plan of care initiated [] Hold pending MD visit [] Discharge      Electronically signed by:  Luigi Mariano, PT , DPT [id number]    Note: If patient does not return for scheduled/ recommended follow up visits, this note will serve as a discharge from care along with most recent update on progress.

## 2021-02-11 ENCOUNTER — HOSPITAL ENCOUNTER (OUTPATIENT)
Dept: PHYSICAL THERAPY | Age: 66
Setting detail: THERAPIES SERIES
Discharge: HOME | End: 2021-02-11
Payer: COMMERCIAL

## 2021-02-11 PROCEDURE — 97110 THERAPEUTIC EXERCISES: CPT

## 2021-02-11 PROCEDURE — 97140 MANUAL THERAPY 1/> REGIONS: CPT

## 2021-02-11 NOTE — PROGRESS NOTES
[facility], [address] [place]  Phone: [phone]  Fax [phone]    Physical Therapy Progress Note/Daily Treatment Note  Date:  2021    Patient Name:  Consuelo Baldwin    :  1955  MRN: [id number]  Restrictions/Precautions:    Physician Information:  Referring Practitioner: Dr. Johanne Chavira  Medical/Treatment Diagnosis Information:  · Diagnosis: M75.111 (ICD-10-CM) - Nontraumatic incomplete tear of right rotator cuff  · Treatment Diagnosis: Right shoulder pain M25.511, right shoulder stiffness M25.611, Right shoulder effusion M25.411, Right shoulder weakness M62.8      [] Conservative / [x] Surgical - DOS: 10/27/2020 R RTC repair with patch, neer/clarence  Therapy Diagnosis/Practice Pattern:  Practice Pattern I: Bony or Soft Tissue Surgery  Insurance/Certification information:  PT Insurance Information: [address] [place] [address] Sandi of care signed: [] YES  [] NO  Number of Comorbidities:  []0     [x]1-2    []3+  Date of Patient follow up with Physician:     Is this a Progress Report:     [x]  Yes  []  No        If Yes:  Date Range for reporting period:  Beginning 2021  Ending 2021    Progress report will be due (10 Rx or 30 days whichever is less): 709       Recertification will be due (POC Duration  / 90 days whichever is less): 3/25/2021     Latex Allergy:  [x]NO      []YES  Preferred Language for Healthcare:   [x]English       []other:    Visit # Insurance Allowable   13 total  3 2021 30 for      SUBJECTIVE:  Pt reports shoulder doing better. Still hurts with certain movements. Trouble behind head and back, reaching into fridge.     OBJECTIVE:    Observation:    Palpation:     Test used Initial score Current Score   Pain Summary VAS 2-8 2-4   Functional questionnaire QDash 75%  44/55 27%  23/55   ROM flexion 60 160    ER 10 75    ABD 25 100    IR belly 60   Strength flexion NT 4-    ER  4-    ABD  4-    IR  4- strengthening, flexibility, endurance, ROM of scapular, scapulothoracic and UE control with self care, reaching, carrying, lifting, house/yardwork, driving/computer work  [] (46382) Reviewed/Progressed HEP activities related to improving balance, coordination, kinesthetic sense, posture, motor skill, proprioception of scapular, scapulothoracic and UE control with self care, reaching, carrying, lifting, house/yardwork, driving/computer work      Manual Treatments:  PROM / STM / Oscillations-Mobs:  G-I, II, III, IV (PA's, Inf., Post.)  [x] (43539) Provided manual therapy to mobilize soft tissue/joints of cervical/CT, scapular GHJ and UE for the purpose of modulating pain, promoting relaxation,  increasing ROM, reducing/eliminating soft tissue swelling/inflammation/restriction, improving soft tissue extensibility and allowing for proper ROM for normal function with self care, reaching, carrying, lifting, house/yardwork, driving/computer work    Modalities:      [] GR/ESU 15 min    [] GR 15 min   [] ESU     [] CP    [] MHP    [] declined    Charges:  Timed Code Treatment Minutes: 40   Total Treatment Minutes: 40     [] EVAL (LOW) 36457 (typically 20 minutes face-to-face)  [] EVAL (MOD) 77513 (typically 30 minutes face-to-face)  [] EVAL (HIGH) 08897 (typically 45 minutes face-to-face)  [] RE-EVAL     [x] SF(26689) x  2   [] IONTO  [] NMR (41740) x     [] VASO  [x] Manual (01833) x 1     [] Other:  [] TA x      [] Mech Traction (39933)  [] ES(attended) (65416)      [] ES (un) (69663):     Whit Espinoza stated goal: Regain full pain free use of arm, return to playing with granddaughter  [x] Progressing: [] Met: [] Not Met: [] Adjusted    Therapist goals for Patient:   Short Term Goals: To be achieved in: 2 weeks  1. Independent in HEP and progression per patient tolerance, in order to prevent re-injury. [] Progressing: [x] Met: [] Not Met: [] Adjusted  2.  Patient will have a decrease in pain to facilitate improvement in movement, function, and ADLs as indicated by Functional Deficits. [] Progressing: [x] Met: [] Not Met: [] Adjusted    Long Term Goals: To be achieved in: 12 weeks  1. Disability index score of 35% or less for the [facility] to assist with reaching prior level of function. [] Progressing: [x] Met: [] Not Met: [] Adjusted  2. Patient will demonstrate increased AROM to 0-150, functional IR to don/doff bra, functional ER to wash/care for hair, to allow for proper joint functioning as indicated by patients Functional Deficits. [x] Progressing: [] Met: [] Not Met: [] Adjusted  3. Patient will demonstrate an increase in Strength to at least 4+/5 throughout UE to allow for proper functional mobility as indicated by patients Functional Deficits. [x] Progressing: [] Met: [] Not Met: [] Adjusted  4. Patient will return to dressing and hygiene ADLs independently and without increased symptoms or restriction. [x] Progressing: [] Met: [] Not Met: [] Adjusted  5. Patient will demonstrate appropriate scapular mechanics with no UT compensation to reduce risk of re-injury. [x] Progressing: [] Met: [] Not Met: [] Adjusted      Overall Progression Towards Functional goals/ Treatment Progress Update:  [] Patient is progressing as expected towards functional goals listed. [x] Progression is slowed due to complexities/Impairments listed. - pain  [] Progression has been slowed due to co-morbidities. [] Plan just implemented, too soon to assess goals progression <30days   [] Goals require adjustment due to lack of progress  [] Patient is not progressing as expected and requires additional follow up with physician  [] Other    Prognosis for POC: [x] Good [] Fair  [] Poor      Patient requires continued skilled intervention: [x] Yes  [] No      ASSESSMENT:  Pt with improving AROM and strength. Cues for UT compensation and scapular mechanics - when flex/scaption done with good form pain eliminated.  Pt has had slow progression due to surgery performed and continued pain. Treatment/Activity Tolerance:  [] Patient tolerated treatment well [] Patient limited by fatique  [x] Patient limited by pain  [] Patient limited by other medical complications  [] Other:     Patient Requires Follow-up: [x] Yes  [] No    PLAN:  Continue POC progressing strength and functional ROM over course of next 8 weeks 3/25/2021  [x] Continue per plan of care [] Morro Gamma current plan (see comments above)  [] Plan of care initiated [] Hold pending MD visit [] Discharge      Electronically signed by:  Supriya Sandoval, PT , DPT [id number]    Note: If patient does not return for scheduled/ recommended follow up visits, this note will serve as a discharge from care along with most recent update on progress.

## 2021-02-18 ENCOUNTER — HOSPITAL ENCOUNTER (OUTPATIENT)
Dept: PHYSICAL THERAPY | Age: 66
Setting detail: THERAPIES SERIES
Discharge: HOME | End: 2021-02-18
Payer: COMMERCIAL

## 2021-02-18 NOTE — FLOWSHEET NOTE
[facility],  [address]        Physical Therapy  Cancellation/No-show Note  Patient Name:  Michael South  :  1955   Date:  2021  Cancelled visits to date: 3  No-shows to date: 0    For today's appointment patient:  ? X Cancelled  ? Rescheduled appointment  ? No-show     Reason given by patient:  ?  Patient ill  ? Conflicting appointment  ? X No transportation    ? Conflict with work  ? No reason given  ?   Other:     Comments:      Electronically signed by:  Rebekah Osgood, PT

## 2021-02-24 ENCOUNTER — OFFICE VISIT (OUTPATIENT)
Dept: INTERNAL MEDICINE CLINIC | Age: 66
End: 2021-02-24
Payer: COMMERCIAL

## 2021-02-24 VITALS
WEIGHT: 191 LBS | HEIGHT: 65 IN | SYSTOLIC BLOOD PRESSURE: 120 MMHG | BODY MASS INDEX: 31.82 KG/M2 | TEMPERATURE: 97 F | OXYGEN SATURATION: 98 % | HEART RATE: 78 BPM | DIASTOLIC BLOOD PRESSURE: 70 MMHG

## 2021-02-24 DIAGNOSIS — Z11.59 ENCOUNTER FOR HEPATITIS C SCREENING TEST FOR LOW RISK PATIENT: ICD-10-CM

## 2021-02-24 DIAGNOSIS — Z23 NEED FOR ZOSTER VACCINATION: ICD-10-CM

## 2021-02-24 DIAGNOSIS — Z00.00 ANNUAL PHYSICAL EXAM: Primary | ICD-10-CM

## 2021-02-24 DIAGNOSIS — E03.9 ACQUIRED HYPOTHYROIDISM: ICD-10-CM

## 2021-02-24 DIAGNOSIS — I10 ESSENTIAL HYPERTENSION: ICD-10-CM

## 2021-02-24 DIAGNOSIS — E78.2 MIXED HYPERLIPIDEMIA: ICD-10-CM

## 2021-02-24 DIAGNOSIS — E11.9 TYPE 2 DIABETES MELLITUS WITHOUT COMPLICATION, WITHOUT LONG-TERM CURRENT USE OF INSULIN (HCC): ICD-10-CM

## 2021-02-24 LAB
A/G RATIO: 1.8 (ref 1.1–2.2)
ALBUMIN SERPL-MCNC: 4.6 G/DL (ref 3.4–5)
ALP BLD-CCNC: 64 U/L (ref 40–129)
ALT SERPL-CCNC: 18 U/L (ref 10–40)
ANION GAP SERPL CALCULATED.3IONS-SCNC: 13 MMOL/L (ref 3–16)
AST SERPL-CCNC: 17 U/L (ref 15–37)
BASOPHILS ABSOLUTE: 0 K/UL (ref 0–0.2)
BASOPHILS RELATIVE PERCENT: 0.4 %
BILIRUB SERPL-MCNC: 0.3 MG/DL (ref 0–1)
BUN BLDV-MCNC: 10 MG/DL (ref 7–20)
CALCIUM SERPL-MCNC: 9.8 MG/DL (ref 8.3–10.6)
CHLORIDE BLD-SCNC: 103 MMOL/L (ref 99–110)
CHOLESTEROL, TOTAL: 165 MG/DL (ref 0–199)
CO2: 22 MMOL/L (ref 21–32)
CREAT SERPL-MCNC: 0.7 MG/DL (ref 0.6–1.2)
CREATININE URINE: 87.5 MG/DL (ref 28–259)
EOSINOPHILS ABSOLUTE: 0.1 K/UL (ref 0–0.6)
EOSINOPHILS RELATIVE PERCENT: 1.7 %
GFR AFRICAN AMERICAN: >60
GFR NON-AFRICAN AMERICAN: >60
GLOBULIN: 2.6 G/DL
GLUCOSE BLD-MCNC: 153 MG/DL (ref 70–99)
HBA1C MFR BLD: 7.1 %
HCT VFR BLD CALC: 42.4 % (ref 36–48)
HDLC SERPL-MCNC: 53 MG/DL (ref 40–60)
HEMOGLOBIN: 14 G/DL (ref 12–16)
LDL CHOLESTEROL CALCULATED: 92 MG/DL
LYMPHOCYTES ABSOLUTE: 2.4 K/UL (ref 1–5.1)
LYMPHOCYTES RELATIVE PERCENT: 34.5 %
MCH RBC QN AUTO: 29.5 PG (ref 26–34)
MCHC RBC AUTO-ENTMCNC: 32.9 G/DL (ref 31–36)
MCV RBC AUTO: 89.5 FL (ref 80–100)
MICROALBUMIN UR-MCNC: <1.2 MG/DL
MICROALBUMIN/CREAT UR-RTO: NORMAL MG/G (ref 0–30)
MONOCYTES ABSOLUTE: 0.3 K/UL (ref 0–1.3)
MONOCYTES RELATIVE PERCENT: 5.1 %
NEUTROPHILS ABSOLUTE: 4 K/UL (ref 1.7–7.7)
NEUTROPHILS RELATIVE PERCENT: 58.3 %
PDW BLD-RTO: 13.6 % (ref 12.4–15.4)
PLATELET # BLD: 235 K/UL (ref 135–450)
PLATELET SLIDE REVIEW: ADEQUATE
PMV BLD AUTO: 9.3 FL (ref 5–10.5)
POTASSIUM SERPL-SCNC: 4.4 MMOL/L (ref 3.5–5.1)
RBC # BLD: 4.74 M/UL (ref 4–5.2)
SLIDE REVIEW: NORMAL
SODIUM BLD-SCNC: 138 MMOL/L (ref 136–145)
T4 FREE: 1.3 NG/DL (ref 0.9–1.8)
TOTAL PROTEIN: 7.2 G/DL (ref 6.4–8.2)
TRIGL SERPL-MCNC: 98 MG/DL (ref 0–150)
TSH SERPL DL<=0.05 MIU/L-ACNC: 1.08 UIU/ML (ref 0.27–4.2)
VLDLC SERPL CALC-MCNC: 20 MG/DL
WBC # BLD: 6.9 K/UL (ref 4–11)

## 2021-02-24 PROCEDURE — G8482 FLU IMMUNIZE ORDER/ADMIN: HCPCS | Performed by: INTERNAL MEDICINE

## 2021-02-24 PROCEDURE — 90750 HZV VACC RECOMBINANT IM: CPT | Performed by: INTERNAL MEDICINE

## 2021-02-24 PROCEDURE — 83036 HEMOGLOBIN GLYCOSYLATED A1C: CPT | Performed by: INTERNAL MEDICINE

## 2021-02-24 PROCEDURE — 99396 PREV VISIT EST AGE 40-64: CPT | Performed by: INTERNAL MEDICINE

## 2021-02-24 PROCEDURE — 36415 COLL VENOUS BLD VENIPUNCTURE: CPT | Performed by: INTERNAL MEDICINE

## 2021-02-24 RX ORDER — GLIMEPIRIDE 4 MG/1
4 TABLET ORAL 2 TIMES DAILY
Qty: 180 TABLET | Refills: 3 | Status: SHIPPED | OUTPATIENT
Start: 2021-02-24

## 2021-02-24 RX ORDER — EMPAGLIFLOZIN 25 MG/1
25 TABLET, FILM COATED ORAL DAILY
Qty: 30 TABLET | Refills: 11 | Status: SHIPPED | OUTPATIENT
Start: 2021-02-24

## 2021-02-24 RX ORDER — LEVOTHYROXINE SODIUM 0.07 MG/1
75 TABLET ORAL DAILY
Qty: 90 TABLET | Refills: 3 | Status: SHIPPED | OUTPATIENT
Start: 2021-02-24

## 2021-02-24 RX ORDER — LISINOPRIL 40 MG/1
40 TABLET ORAL DAILY
Qty: 90 TABLET | Refills: 3 | Status: SHIPPED | OUTPATIENT
Start: 2021-02-24

## 2021-02-24 RX ORDER — ATORVASTATIN CALCIUM 20 MG/1
20 TABLET, FILM COATED ORAL DAILY
Qty: 90 TABLET | Refills: 3 | Status: SHIPPED | OUTPATIENT
Start: 2021-02-24

## 2021-02-24 ASSESSMENT — PATIENT HEALTH QUESTIONNAIRE - PHQ9
SUM OF ALL RESPONSES TO PHQ9 QUESTIONS 1 & 2: 0
SUM OF ALL RESPONSES TO PHQ QUESTIONS 1-9: 0

## 2021-02-24 NOTE — PROGRESS NOTES
Hypothyroidism: Recent symptoms: stable with increase Levothyroxine 75 mcg qd. She denies fatigue, weight gain, weight loss, cold intolerance, heat intolerance, hair loss, dry skin, constipation, diarrhea, edema, anxiety, tremor, palpitations and dysphagia. Patient is  taking her medication consistently on an empty stomach. **    Lab Results   Component Value Date    LABA1C 7.1 02/24/2021    LABA1C 7.2 11/30/2020    LABA1C 7.1 08/13/2020    LABMICR 2.20 (H) 03/09/2020     Lab Results   Component Value Date     02/23/2020    K 4.0 02/23/2020     02/23/2020    CO2 24 02/23/2020    BUN 9 02/23/2020    CREATININE 0.6 02/23/2020    GLUCOSE 185 (H) 02/23/2020    CALCIUM 9.2 02/23/2020     Lab Results   Component Value Date    CHOL 162 01/06/2020    TRIG 78 01/06/2020    HDL 46 01/06/2020    LDLCALC 99 01/06/2020     Lab Results   Component Value Date    ALT 12 06/22/2020    AST 18 06/22/2020     Lab Results   Component Value Date    TSH 0.07 (L) 05/19/2020    TSH 3.16 01/06/2020    T4FREE 1.6 05/19/2020     Lab Results   Component Value Date    WBC 8.0 02/23/2020    HGB 13.2 02/23/2020    HCT 38.9 02/23/2020    MCV 88.7 02/23/2020     02/23/2020     No results found for: INR   No results found for: PSA   No results found for: LABURIC     Wt Readings from Last 3 Encounters:   02/24/21 191 lb (86.6 kg)   01/14/21 191 lb (86.6 kg)   12/17/20 191 lb (86.6 kg)     BP Readings from Last 3 Encounters:   02/24/21 120/70   10/27/20 (!) 102/49   10/27/20 121/81       Patient Active Problem List   Diagnosis    Type 2 diabetes mellitus without complication, without long-term current use of insulin (HCC)    Mixed hyperlipidemia    Essential hypertension    Acquired hypothyroidism    Erosive gastritis    Esophagitis, [place] grade A    RUQ pain    Fatty liver       No Known Allergies  Outpatient Medications Marked as Taking for the 2/24/21 encounter (Office Visit) with Lilian Fam MD Medication Sig Dispense Refill    JARDIANCE 25 MG tablet TAKE 1 TABLET BY MOUTH EVERY DAY 30 tablet 0    glucose monitoring kit (FREESTYLE) monitoring kit 1 kit by Does not apply route daily 1 kit 0    bisacodyl (DULCOLAX) 5 MG EC tablet Use as directed for colonoscopy prep 4 tablet 0    polyethylene glycol (MIRALAX) 17 GM/SCOOP powder Use as directed for colonoscopy prep 238 g 0    omeprazole (PRILOSEC) 20 MG delayed release capsule TAKE 1 CAPSULE BY MOUTH EVERY DAY 30 capsule 9    SITagliptin (JANUVIA) 100 MG tablet Take 1 tablet by mouth daily 90 tablet 3    metFORMIN (GLUCOPHAGE) 850 MG tablet Take 1 tablet by mouth daily 90 tablet 3    blood glucose monitor strips Test qd 100 strip 3    levothyroxine (SYNTHROID) 75 MCG tablet Take 1 tablet by mouth daily 90 tablet 2    glimepiride (AMARYL) 4 MG tablet Take 1 tablet by mouth 2 times daily 180 tablet 3    atorvastatin (LIPITOR) 20 MG tablet Take 1 tablet by mouth daily 90 tablet 3    lisinopril (PRINIVIL;ZESTRIL) 40 MG tablet Take 1 tablet by mouth daily 90 tablet 3    Vaginal Lubricant (REPLENS) GEL Place 1 Dose vaginally daily 35 g 1       Past Medical History:   Diagnosis Date    Chronic back pain     Diabetes mellitus ([facility] [address]     Essential hypertension 2/26/2020    Hyperlipidemia     Hypothyroidism     Thyroid disease      Past Surgical History:   Procedure Laterality Date    APPENDECTOMY  1974    BREAST SURGERY Right 2013    drainage    CHOLECYSTECTOMY  1974    COLONOSCOPY N/A 10/1/2020    COLONOSCOPY DIAGNOSTIC performed by Telma Robledo MD at [address] [place] [address] Right 10/27/2020    EXAM UNDER ANESTHESIA VIDEO ARTHROSCOPY RIGHT SHOULDER, ROTATOR CUFF REPAIR WITH HAZEL AND NEPHEW PATCH, NEER ACROMIOPLASTY, POSSIBLE RYLEE PROCEDURE WITH EXPAREL performed by Saul Morrow MD at [facility]  UPPER GASTROINTESTINAL ENDOSCOPY  2020    bx    UPPER GASTROINTESTINAL ENDOSCOPY N/A 2020    EGD BIOPSY performed by Rocío Juares MD at [address] [place] [address]     Family History   Problem Relation Age of Onset    Dementia Mother     Breast Cancer Sister     Atrial Fibrillation Sister     High Blood Pressure Maternal Grandmother     Diabetes Sister      Social History     Socioeconomic History    Marital status:       Spouse name: Not on file    Number of children: Not on file    Years of education: Not on file    Highest education level: Not on file   Occupational History    Not on file   Social Needs    Financial resource strain: Not on file    Food insecurity     Worry: Not on file     Inability: Not on file    Transportation needs     Medical: Not on file     Non-medical: Not on file   Tobacco Use    Smoking status: Former Smoker     Packs/day: 0.50     Years: 20.00     Pack years: 10.00     Types: Cigarettes     Start date: 1992     Quit date: 2010     Years since quittin.0    Smokeless tobacco: Never Used   Substance and Sexual Activity    Alcohol use: Never    Drug use: Never    Sexual activity: Not on file   Lifestyle    Physical activity     Days per week: Not on file     Minutes per session: Not on file    Stress: Not on file   Relationships    Social connections     Talks on phone: Not on file     Gets together: Not on file     Attends [employer] service: Not on file     Active member of club or organization: Not on file     Attends meetings of clubs or organizations: Not on file     Relationship status: Not on file    Intimate partner violence     Fear of current or ex partner: Not on file     Emotionally abused: Not on file     Physically abused: Not on file     Forced sexual activity: Not on file   Other Topics Concern    Not on file   Social History Narrative    Not on file       Review of Systems: Health Maintenance   Topic Date Due    Hepatitis C screen  1955    Shingles Vaccine (2 of 2) 11/19/2020    Lipid screen  01/06/2021    Potassium monitoring  02/23/2021    Creatinine monitoring  02/23/2021    Breast cancer screen  02/26/2021    Diabetic foot exam  03/09/2021    Diabetic microalbuminuria test  03/09/2021    TSH testing  05/19/2021    A1C test (Diabetic or Prediabetic)  11/30/2021    Diabetic retinal exam  06/03/2022    DTaP/Tdap/Td vaccine (2 - Td) 01/01/2029    Colon cancer screen colonoscopy  10/01/2030    Flu vaccine  Completed    Pneumococcal 0-64 years Vaccine  Completed    HIV screen  Completed    Hepatitis A vaccine  Aged Out    Hib vaccine  Aged Out    Meningococcal (ACWY) vaccine  Aged Out      Hx abnormal PAP: no  Sexual activity: none   Last eye exam: 2020, normal  Exercise: walks 5 time(s) per week       Preventive plan of care for Jayjay Oliveira        2/24/2021           Preventive Measures Status       Recommendations for screening                   Diabetes Screen  Glucose (mg/dL)   Date Value   02/23/2020 185 (H)    Test recommended and ordered   Cholesterol Screen  Lab Results   Component Value Date    CHOL 162 01/06/2020    TRIG 78 01/06/2020    HDL 46 01/06/2020    LDLCALC 99 01/06/2020    Test recommended and ordered       Weight: Body mass index is 31.78 kg/m².   5' 5\" (1.651 m)191 lb (86.6 kg)    Your BMI is 25 or greater, which indicates that you are overweight        Recommended Immunizations    Immunization History   Administered Date(s) Administered    Influenza, Quadv, IM, (6 mo and older Fluzone, Flulaval, Fluarix and 3 yrs and older Afluria) 09/24/2020    Pneumococcal Polysaccharide ([id number]) 03/09/2020    Tdap (Boostrix, Adacel) 01/01/2019    Zoster Recombinant (Shingrix) 09/24/2020        Influenza vaccine:  recommended every fall  Shingles vaccine:  administered today- risks and benefits discussed         Other Recommendations ·

## 2021-02-25 ENCOUNTER — HOSPITAL ENCOUNTER (OUTPATIENT)
Dept: PHYSICAL THERAPY | Age: 66
Setting detail: THERAPIES SERIES
Discharge: HOME | End: 2021-02-25
Payer: COMMERCIAL

## 2021-02-25 LAB — HEPATITIS C ANTIBODY INTERPRETATION: NORMAL

## 2021-02-25 PROCEDURE — 97110 THERAPEUTIC EXERCISES: CPT

## 2021-02-25 PROCEDURE — 97140 MANUAL THERAPY 1/> REGIONS: CPT

## 2021-02-25 NOTE — FLOWSHEET NOTE
[facility], [address] [place]  Phone: [phone]  Fax [phone]    Physical Therapy Progress Note/Daily Treatment Note  Date:  2021    Patient Name:  Allen Herring    :  1955  MRN: [id number]  Restrictions/Precautions:    Physician Information:  Referring Practitioner: Dr. Pily Callejas  Medical/Treatment Diagnosis Information:  · Diagnosis: M75.111 (ICD-10-CM) - Nontraumatic incomplete tear of right rotator cuff  · Treatment Diagnosis: Right shoulder pain M25.511, right shoulder stiffness M25.611, Right shoulder effusion M25.411, Right shoulder weakness M62.8      [] Conservative / [x] Surgical - DOS: 10/27/2020 R RTC repair with patch, neer/clarence  Therapy Diagnosis/Practice Pattern:  Practice Pattern I: Bony or Soft Tissue Surgery  Insurance/Certification information:  PT Insurance Information: [address] [place] [address] of care signed: [] YES  [] NO  Number of Comorbidities:  []0     [x]1-2    []3+  Date of Patient follow up with Physician:     Is this a Progress Report:     []  Yes  [x]  No        If Yes:  Date Range for reporting period:  Beginning 2021  Ending    Progress report will be due (10 Rx or 30 days whichever is less): 8801       Recertification will be due (POC Duration  / 90 days whichever is less): 3/25/2021     Latex Allergy:  [x]NO      []YES  Preferred Language for Healthcare:   [x]English       []other:    Visit # Insurance Allowable   15 total  2021 30 for      SUBJECTIVE:  Pt reports back is really bothering her, thinks she slept on it wrong. Has not been able to do much exercise wise past couple days and is feeling a little stiffer today.     OBJECTIVE:    Observation:    Palpation:     Test used Initial score Current Score   Pain Summary VAS 2-8 2-4   Functional questionnaire QDash 75%  44/55 27%  23/55   ROM flexion 60 160    ER 10 75    ABD 25 100    IR belly 60   Strength flexion NT 4-    ER  4-    ABD  4-    IR  4-      RESTRICTIONS/PRECAUTIONS: wean from sling progress as tolerated    Exercises/Interventions:   Therapeutic Ex Sets/reps Notes HEP   Seated shoulder flex  Seated shoulder scaption  Seated shoulder ABD  2 x 10   2 x 10  2 x 10 Cues to reduce shrug    Band pull apart  Band D1 flex 3 x 10 red  3 x 10 red  X   DB curl 3 x 10 5#    TB row  TB ext  TB ER walkout  TB Tricep ext  TB bicep curl 3 x 10  3 x 10  3 x 10  3 x 10  3 x 10 Green  Green  Green  Green  Flores[place]Rivas 3 x 10  5# FWD, ladder, CW/CCW    4 cone reach at finisher                                                                   Manual Intervention 15 min     PROM, oscillations, gr 1-2 jt mobs GHJ  STM bicep, posterior RTC, lat                              Pt ed: surgery, anatomy, post-op restrictions, PT progression, RICE, sling use            NMR re-education                                                          Therapeutic Exercise and NMR EXR  [x] (67057) Provided verbal/tactile cueing for activities related to strengthening, flexibility, endurance, ROM  for improvements in scapular, scapulothoracic and UE control with self care, reaching, carrying, lifting, house/yardwork, driving/computer work.    [] (86583) Provided verbal/tactile cueing for activities related to improving balance, coordination, kinesthetic sense, posture, motor skill, proprioception  to assist with  scapular, scapulothoracic and UE control with self care, reaching, carrying, lifting, house/yardwork, driving/computer work. Therapeutic Activities:    [] (71484 or 64744) Provided verbal/tactile cueing for activities related to improving balance, coordination, kinesthetic sense, posture, motor skill, proprioception and motor activation to allow for proper function of scapular, scapulothoracic and UE control with self care, carrying, lifting, driving/computer work.      Home Exercise Program:    [x] (11856) Reviewed/Progressed HEP activities related to strengthening, flexibility, endurance, ROM of scapular, scapulothoracic and UE control with self care, reaching, carrying, lifting, house/yardwork, driving/computer work  [] (80523) Reviewed/Progressed HEP activities related to improving balance, coordination, kinesthetic sense, posture, motor skill, proprioception of scapular, scapulothoracic and UE control with self care, reaching, carrying, lifting, house/yardwork, driving/computer work      Manual Treatments:  PROM / STM / Oscillations-Mobs:  G-I, II, III, IV (PA's, Inf., Post.)  [x] (68324) Provided manual therapy to mobilize soft tissue/joints of cervical/CT, scapular GHJ and UE for the purpose of modulating pain, promoting relaxation,  increasing ROM, reducing/eliminating soft tissue swelling/inflammation/restriction, improving soft tissue extensibility and allowing for proper ROM for normal function with self care, reaching, carrying, lifting, house/yardwork, driving/computer work    Modalities:      [] GR/ESU 15 min    [] GR 15 min   [] ESU     [] CP    [] MHP    [] declined    Charges:  Timed Code Treatment Minutes: 40   Total Treatment Minutes: 40     [] EVAL (LOW) 88064 (typically 20 minutes face-to-face)  [] EVAL (MOD) 11770 (typically 30 minutes face-to-face)  [] EVAL (HIGH) 64422 (typically 45 minutes face-to-face)  [] RE-EVAL     [x] ZX(92825) x  2   [] IONTO  [] NMR (28667) x     [] VASO  [x] Manual (17317) x 1     [] Other:  [] TA x      [] Mech Traction (30566)  [] ES(attended) (51812)      [] ES (un) (50461):     Princess Dougherty stated goal: Regain full pain free use of arm, return to playing with granddaughter  [x] Progressing: [] Met: [] Not Met: [] Adjusted    Therapist goals for Patient:   Short Term Goals: To be achieved in: 2 weeks  1. Independent in HEP and progression per patient tolerance, in order to prevent re-injury. [] Progressing: [x] Met: [] Not Met: [] Adjusted  2.  Patient will have a decrease in pain to facilitate improvement in movement, function, and ADLs as indicated by Functional Deficits. [] Progressing: [x] Met: [] Not Met: [] Adjusted    Long Term Goals: To be achieved in: 12 weeks  1. Disability index score of 35% or less for the [facility] to assist with reaching prior level of function. [] Progressing: [x] Met: [] Not Met: [] Adjusted  2. Patient will demonstrate increased AROM to 0-150, functional IR to don/doff bra, functional ER to wash/care for hair, to allow for proper joint functioning as indicated by patients Functional Deficits. [x] Progressing: [] Met: [] Not Met: [] Adjusted  3. Patient will demonstrate an increase in Strength to at least 4+/5 throughout UE to allow for proper functional mobility as indicated by patients Functional Deficits. [x] Progressing: [] Met: [] Not Met: [] Adjusted  4. Patient will return to dressing and hygiene ADLs independently and without increased symptoms or restriction. [x] Progressing: [] Met: [] Not Met: [] Adjusted  5. Patient will demonstrate appropriate scapular mechanics with no UT compensation to reduce risk of re-injury. [x] Progressing: [] Met: [] Not Met: [] Adjusted      Overall Progression Towards Functional goals/ Treatment Progress Update:  [] Patient is progressing as expected towards functional goals listed. [x] Progression is slowed due to complexities/Impairments listed. - pain  [] Progression has been slowed due to co-morbidities. [] Plan just implemented, too soon to assess goals progression <30days   [] Goals require adjustment due to lack of progress  [] Patient is not progressing as expected and requires additional follow up with physician  [] Other    Prognosis for POC: [x] Good [] Fair  [] Poor      Patient requires continued skilled intervention: [x] Yes  [] No      ASSESSMENT:  Held progression due to patient being limited due to back pain. Pt with improving AROM and strength.  Cues for UT compensation and scapular mechanics - when flex/scaption done with good form pain eliminated. Pt has had slow progression due to surgery performed and continued pain. Treatment/Activity Tolerance:  [x] Patient tolerated treatment well [] Patient limited by fatigue  [x] Patient limited by pain - back pain  [] Patient limited by other medical complications  [] Other:     Patient Requires Follow-up: [x] Yes  [] No    PLAN:  Continue POC progressing strength and functional ROM over course of next 8 weeks 3/25/2021  [x] Continue per plan of care [] Rylee Wilcox current plan (see comments above)  [] Plan of care initiated [] Hold pending MD visit [] Discharge      Electronically signed by:  Thelma Corrales, PT , DPT [id number]    Note: If patient does not return for scheduled/ recommended follow up visits, this note will serve as a discharge from care along with most recent update on progress.

## 2021-03-03 ENCOUNTER — HOSPITAL ENCOUNTER (OUTPATIENT)
Dept: PHYSICAL THERAPY | Age: 66
Setting detail: THERAPIES SERIES
Discharge: HOME | End: 2021-03-03
Payer: COMMERCIAL

## 2021-03-03 ENCOUNTER — OFFICE VISIT (OUTPATIENT)
Dept: INTERNAL MEDICINE CLINIC | Age: 66
End: 2021-03-03
Payer: COMMERCIAL

## 2021-03-03 VITALS
SYSTOLIC BLOOD PRESSURE: 128 MMHG | BODY MASS INDEX: 31.45 KG/M2 | TEMPERATURE: 97.6 F | DIASTOLIC BLOOD PRESSURE: 78 MMHG | WEIGHT: 189 LBS

## 2021-03-03 DIAGNOSIS — R22.2 NODULE OF SKIN OF BACK: Primary | ICD-10-CM

## 2021-03-03 PROCEDURE — 99213 OFFICE O/P EST LOW 20 MIN: CPT | Performed by: INTERNAL MEDICINE

## 2021-03-03 PROCEDURE — G8427 DOCREV CUR MEDS BY ELIG CLIN: HCPCS | Performed by: INTERNAL MEDICINE

## 2021-03-03 PROCEDURE — G8482 FLU IMMUNIZE ORDER/ADMIN: HCPCS | Performed by: INTERNAL MEDICINE

## 2021-03-03 PROCEDURE — G8417 CALC BMI ABV UP PARAM F/U: HCPCS | Performed by: INTERNAL MEDICINE

## 2021-03-03 PROCEDURE — 1036F TOBACCO NON-USER: CPT | Performed by: INTERNAL MEDICINE

## 2021-03-03 PROCEDURE — 97140 MANUAL THERAPY 1/> REGIONS: CPT

## 2021-03-03 PROCEDURE — 97110 THERAPEUTIC EXERCISES: CPT

## 2021-03-03 PROCEDURE — 3017F COLORECTAL CA SCREEN DOC REV: CPT | Performed by: INTERNAL MEDICINE

## 2021-03-03 NOTE — FLOWSHEET NOTE
[facility],  [address]  Phone: [phone]  Fax [phone]    Physical Therapy Progress Note/Daily Treatment Note  Date:  3/3/2021    Patient Name:  Doron Gupta    :  1955  MRN: [id number]  Restrictions/Precautions:    Physician Information:  Referring Practitioner: Dr. Agatha Gomez  Medical/Treatment Diagnosis Information:  · Diagnosis: M75.111 (ICD-10-CM) - Nontraumatic incomplete tear of right rotator cuff  · Treatment Diagnosis: Right shoulder pain M25.511, right shoulder stiffness M25.611, Right shoulder effusion M25.411, Right shoulder weakness M62.8      [] Conservative / [x] Surgical - DOS: 10/27/2020 R RTC repair with patch, neer/clarence  Therapy Diagnosis/Practice Pattern:  Practice Pattern I: Bony or Soft Tissue Surgery  Insurance/Certification information:  PT Insurance Information: [address] [place] [address] Sandi of [facility] signed: [] YES  [] NO  Number of Comorbidities:  []0     [x]1-2    []3+  Date of Patient follow up with Physician:     Is this a Progress Report:     []  Yes  [x]  No        If Yes:  Date Range for reporting period:  Beginning 2021  Ending    Progress report will be due (10 Rx or 30 days whichever is less):        Recertification will be due (POC Duration  / 90 days whichever is less): 3/25/2021     Latex Allergy:  [x]NO      []YES  Preferred Language for Healthcare:   [x]English       []other:    Visit # Insurance Allowable   16 total  2021 30 for      SUBJECTIVE:  Pt reports she was diagnosed with fatty lump/cyst on left side, is going to have surgery to have it removed, meets with that doctor next week.      OBJECTIVE:    Observation:    Palpation:     Test used Initial score Current Score   Pain Summary VAS 2-8 2-4   Functional questionnaire QDash 75%  44/55 27%  23/55   ROM flexion 60 160    ER 10 75    ABD 25 100    IR belly 60   Strength flexion NT 4-    ER  4-    ABD  4- IR  4-      RESTRICTIONS/PRECAUTIONS: wean from sling progress as tolerated    Exercises/Interventions:   Therapeutic Ex Sets/reps Notes HEP   Seated shoulder flex  Seated shoulder scaption  Seated shoulder ABD  2 x 10   2 x 10  2 x 10 Cues to reduce shrug    Band pull apart  Band D1 flex 3 x 10 red  3 x 10 red  X   DB curl 3 x 10 5#    TB row  TB ext  TB ER walkout  TB Tricep ext  TB bicep curl 3 x 10  3 x 10  3 x 10  3 x 10  3 x 10 Blue  Blue  Blue  Blue  [employer] 3 x 10  5# FWD, ladder, CW/CCW    4 cone reach at finisher                                                                   Manual Intervention 15 min     PROM, oscillations, gr 1-2 jt mobs GHJ  STM bicep, posterior RTC, lat                              Pt ed: surgery, anatomy, post-op restrictions, PT progression, RICE, sling use            NMR re-education                                                          Therapeutic Exercise and NMR EXR  [x] (61849) Provided verbal/tactile cueing for activities related to strengthening, flexibility, endurance, ROM  for improvements in scapular, scapulothoracic and UE control with self care, reaching, carrying, lifting, house/yardwork, driving/computer work.    [] (84491) Provided verbal/tactile cueing for activities related to improving balance, coordination, kinesthetic sense, posture, motor skill, proprioception  to assist with  scapular, scapulothoracic and UE control with self care, reaching, carrying, lifting, house/yardwork, driving/computer work. Therapeutic Activities:    [] (46171 or 57577) Provided verbal/tactile cueing for activities related to improving balance, coordination, kinesthetic sense, posture, motor skill, proprioception and motor activation to allow for proper function of scapular, scapulothoracic and UE control with self care, carrying, lifting, driving/computer work.      Home Exercise Program:    [x] (60406) Reviewed/Progressed HEP activities related to strengthening, flexibility, endurance, ROM of scapular, scapulothoracic and UE control with self care, reaching, carrying, lifting, house/yardwork, driving/computer work  [] (72658) Reviewed/Progressed HEP activities related to improving balance, coordination, kinesthetic sense, posture, motor skill, proprioception of scapular, scapulothoracic and UE control with self care, reaching, carrying, lifting, house/yardwork, driving/computer work      Manual Treatments:  PROM / STM / Oscillations-Mobs:  G-I, II, III, IV (PA's, Inf., Post.)  [x] (01794) Provided manual therapy to mobilize soft tissue/joints of cervical/CT, scapular GHJ and UE for the purpose of modulating pain, promoting relaxation,  increasing ROM, reducing/eliminating soft tissue swelling/inflammation/restriction, improving soft tissue extensibility and allowing for proper ROM for normal function with self care, reaching, carrying, lifting, house/yardwork, driving/computer work    Modalities:      [] GR/ESU 15 min    [] GR 15 min   [] ESU     [] CP    [] MHP    [] declined    Charges:  Timed Code Treatment Minutes: 40   Total Treatment Minutes: 40     [] EVAL (LOW) 65781 (typically 20 minutes face-to-face)  [] EVAL (MOD) 79317 (typically 30 minutes face-to-face)  [] EVAL (HIGH) 80536 (typically 45 minutes face-to-face)  [] RE-EVAL     [x] PARR(62468) x  2   [] IONTO  [] NMR (67225) x     [] VASO  [x] Manual (70478) x 1     [] Other:  [] TA x      [] Mech Traction (68223)  [] ES(attended) (60901)      [] ES (un) (67029):     Marie Molina stated goal: Regain full pain free use of arm, return to playing with granddaughter  [x] Progressing: [] Met: [] Not Met: [] Adjusted    Therapist goals for Patient:   Short Term Goals: To be achieved in: 2 weeks  1. Independent in HEP and progression per patient tolerance, in order to prevent re-injury. [] Progressing: [x] Met: [] Not Met: [] Adjusted  2.  Patient will have a decrease in pain to facilitate improvement in movement, function, and ADLs as indicated by Functional Deficits. [] Progressing: [x] Met: [] Not Met: [] Adjusted    Long Term Goals: To be achieved in: 12 weeks  1. Disability index score of 35% or less for the [facility] to assist with reaching prior level of function. [] Progressing: [x] Met: [] Not Met: [] Adjusted  2. Patient will demonstrate increased AROM to 0-150, functional IR to don/doff bra, functional ER to wash/care for hair, to allow for proper joint functioning as indicated by patients Functional Deficits. [x] Progressing: [] Met: [] Not Met: [] Adjusted  3. Patient will demonstrate an increase in Strength to at least 4+/5 throughout UE to allow for proper functional mobility as indicated by patients Functional Deficits. [x] Progressing: [] Met: [] Not Met: [] Adjusted  4. Patient will return to dressing and hygiene ADLs independently and without increased symptoms or restriction. [x] Progressing: [] Met: [] Not Met: [] Adjusted  5. Patient will demonstrate appropriate scapular mechanics with no UT compensation to reduce risk of re-injury. [] Progressing: [x] Met: [] Not Met: [] Adjusted      Overall Progression Towards Functional goals/ Treatment Progress Update:  [] Patient is progressing as expected towards functional goals listed. [x] Progression is slowed due to complexities/Impairments listed. - pain  [] Progression has been slowed due to co-morbidities. [] Plan just implemented, too soon to assess goals progression <30days   [] Goals require adjustment due to lack of progress  [] Patient is not progressing as expected and requires additional follow up with physician  [] Other    Prognosis for POC: [x] Good [] Fair  [] Poor      Patient requires continued skilled intervention: [x] Yes  [] No      ASSESSMENT:  Held progression due to patient being limited due to back pain. Pt with improving AROM and strength.  Cues for UT compensation and scapular mechanics - when flex/scaption done with good form pain eliminated. Pt has had slow progression due to surgery performed and continued pain. Treatment/Activity Tolerance:  [x] Patient tolerated treatment well [] Patient limited by fatigue  [x] Patient limited by pain - back pain  [] Patient limited by other medical complications  [] Other:     Patient Requires Follow-up: [x] Yes  [] No    PLAN:  Continue POC progressing strength and functional ROM over course of next 8 weeks 3/25/2021  [x] Continue per plan of care [] Mariluz Ventura current plan (see comments above)  [] Plan of care initiated [] Hold pending MD visit [] Discharge      Electronically signed by:  Luigi Mariano, PT , DPT [id number]    Note: If patient does not return for scheduled/ recommended follow up visits, this note will serve as a discharge from care along with most recent update on progress.

## 2021-03-03 NOTE — PROGRESS NOTES
 levothyroxine (SYNTHROID) 75 MCG tablet Take 1 tablet by mouth daily 90 tablet 3    glimepiride (AMARYL) 4 MG tablet Take 1 tablet by mouth 2 times daily 180 tablet 3    atorvastatin (LIPITOR) 20 MG tablet Take 1 tablet by mouth daily 90 tablet 3    lisinopril (PRINIVIL;ZESTRIL) 40 MG tablet Take 1 tablet by mouth daily 90 tablet 3    glucose monitoring kit (FREESTYLE) monitoring kit 1 kit by Does not apply route daily 1 kit 0    bisacodyl (DULCOLAX) 5 MG EC tablet Use as directed for colonoscopy prep 4 tablet 0    polyethylene glycol (MIRALAX) 17 GM/SCOOP powder Use as directed for colonoscopy prep 238 g 0    omeprazole (PRILOSEC) 20 MG delayed release capsule TAKE 1 CAPSULE BY MOUTH EVERY DAY 30 capsule 9    SITagliptin (JANUVIA) 100 MG tablet Take 1 tablet by mouth daily 90 tablet 3    metFORMIN (GLUCOPHAGE) 850 MG tablet Take 1 tablet by mouth daily 90 tablet 3    blood glucose monitor strips Test qd 100 strip 3    Vaginal Lubricant (REPLENS) GEL Place 1 Dose vaginally daily 35 g 1         Review of Systems: 14 systems were negative except of what was stated on HPI    Nursing note and vitals reviewed. Vitals:    03/03/21 1044   BP: 128/78   Site: Left Upper Arm   Position: Sitting   Temp: 97.6 °F (36.4 °C)   TempSrc: Infrared   Weight: 189 lb (85.7 kg)     Wt Readings from Last 3 Encounters:   03/03/21 189 lb (85.7 kg)   02/24/21 191 lb (86.6 kg)   01/14/21 191 lb (86.6 kg)     BP Readings from Last 3 Encounters:   03/03/21 128/78   02/24/21 120/70   10/27/20 (!) 102/49     Body mass index is 31.45 kg/m². Constitutional: Patient appears well-developed and well-nourished. No distress. Head: Normocephalic and atraumatic. Neck: Normal range of motion. Neck supple. No thyroidmegaly. Cardiovascular: Normal rate, regular rhythm, normal heart sounds and intact distal pulses. Pulmonary/Chest: Effort normal and breath sounds normal. No stridor. No respiratory distress. No wheezes and no rales. Abdominal: Soft. Bowel sounds are normal. No distension and no mass. No tenderness. No rebound and no guarding. Musculoskeletal: No edema and no tenderness. Skin: No rash or erythema. Psychiatric: Normal mood and affect. Behavior is normal.   Painful cyst or lipoma about size of a marble left lower back near the side    Assessment/Plan:  Bailee was seen today for flank pain. Diagnoses and all orders for this visit:    Nodule of skin of back  -     Taj Arnold MD, General Surgery, [facility]        Return if symptoms worsen or fail to improve.

## 2021-03-08 ENCOUNTER — OFFICE VISIT (OUTPATIENT)
Dept: SURGERY | Age: 66
End: 2021-03-08
Payer: COMMERCIAL

## 2021-03-08 VITALS
WEIGHT: 192 LBS | DIASTOLIC BLOOD PRESSURE: 84 MMHG | TEMPERATURE: 97 F | HEIGHT: 65 IN | SYSTOLIC BLOOD PRESSURE: 112 MMHG | BODY MASS INDEX: 31.99 KG/M2

## 2021-03-08 DIAGNOSIS — Z01.818 PRE-OP TESTING: ICD-10-CM

## 2021-03-08 DIAGNOSIS — M79.89 SOFT TISSUE MASS: Primary | ICD-10-CM

## 2021-03-08 PROCEDURE — G8417 CALC BMI ABV UP PARAM F/U: HCPCS | Performed by: SURGERY

## 2021-03-08 PROCEDURE — G8482 FLU IMMUNIZE ORDER/ADMIN: HCPCS | Performed by: SURGERY

## 2021-03-08 PROCEDURE — 99242 OFF/OP CONSLTJ NEW/EST SF 20: CPT | Performed by: SURGERY

## 2021-03-08 PROCEDURE — G8427 DOCREV CUR MEDS BY ELIG CLIN: HCPCS | Performed by: SURGERY

## 2021-03-08 RX ORDER — SODIUM CHLORIDE 0.9 % (FLUSH) 0.9 %
10 SYRINGE (ML) INJECTION PRN
Status: CANCELLED | OUTPATIENT
Start: 2021-03-08

## 2021-03-08 RX ORDER — SODIUM CHLORIDE 0.9 % (FLUSH) 0.9 %
10 SYRINGE (ML) INJECTION EVERY 12 HOURS SCHEDULED
Status: CANCELLED | OUTPATIENT
Start: 2021-03-08

## 2021-03-08 NOTE — PROGRESS NOTES
Medical History:   Diagnosis Date    Chronic back pain     Diabetes mellitus ([facility] [address]     Essential hypertension 2020    Hyperlipidemia     Hypothyroidism     Thyroid disease      Past Surgical History:   Procedure Laterality Date    APPENDECTOMY  1974    BREAST SURGERY Right 2013    drainage    CHOLECYSTECTOMY  1974    COLONOSCOPY N/A 10/1/2020    COLONOSCOPY DIAGNOSTIC performed by Huyen Amador MD at [address] Right 10/27/2020    EXAM UNDER ANESTHESIA VIDEO ARTHROSCOPY RIGHT SHOULDER, ROTATOR CUFF REPAIR WITH HAZEL AND NEPHEW PATCH, NEER ACROMIOPLASTY, POSSIBLE RYLEE PROCEDURE WITH EXPAREL performed by Pablo Gaviria MD at [place] [address]    UPPER GASTROINTESTINAL ENDOSCOPY      UPPER GASTROINTESTINAL ENDOSCOPY  2020    bx    UPPER GASTROINTESTINAL ENDOSCOPY N/A 2020    EGD BIOPSY performed by Huyen Amador MD at [facility] History   Problem Relation Age of Onset    Dementia Mother     Breast Cancer Sister     Atrial Fibrillation Sister     High Blood Pressure Maternal Grandmother     Diabetes Sister      Social History     Socioeconomic History    Marital status:       Spouse name: Not on file    Number of children: Not on file    Years of education: Not on file    Highest education level: Not on file   Occupational History    Not on file   Social Needs    Financial resource strain: Not on file    Food insecurity     Worry: Not on file     Inability: Not on file    Transportation needs     Medical: Not on file     Non-medical: Not on file   Tobacco Use    Smoking status: Former Smoker     Packs/day: 0.50     Years: 20.00     Pack years: 10.00     Types: Cigarettes     Start date: 1992     Quit date: 2010     Years since quittin.0    Smokeless tobacco: Never Used   Substance and Sexual Activity    Alcohol use: Never    Drug use: Never    Sexual activity: Not on file   Lifestyle    Physical activity     Days per week: Not on file     Minutes per session: Not on file    Stress: Not on file   Relationships    Social connections     Talks on phone: Not on file     Gets together: Not on file     Attends [employer] service: Not on file     Active member of club or organization: Not on file     Attends meetings of clubs or organizations: Not on file     Relationship status: Not on file    Intimate partner violence     Fear of current or ex partner: Not on file     Emotionally abused: Not on file     Physically abused: Not on file     Forced sexual activity: Not on file   Other Topics Concern    Not on file   Social History Narrative    Not on file          Vitals:    03/08/21 0912   Temp: 97 °F (36.1 °C)   TempSrc: Temporal   Weight: 192 lb (87.1 kg)   Height: 5' 5\" (1.651 m)     Body mass index is 31.95 kg/m². Wt Readings from Last 3 Encounters:   03/08/21 192 lb (87.1 kg)   03/03/21 189 lb (85.7 kg)   02/24/21 191 lb (86.6 kg)     BP Readings from Last 3 Encounters:   03/03/21 128/78   02/24/21 120/70   10/27/20 (!) 102/49        REVIEW OF SYSTEMS:  CONSTITUTIONAL:  negative  HEENT:  Negative  RESPIRATORY:  negative  CARDIOVASCULAR:  negative  GASTROINTESTINAL:  negative  GENITOURINARY:  negative  HEMATOLOGIC/LYMPHATIC:  negative  ENDOCRINE:  Negative  NEUROLOGICAL:  Negative  * All other ROS reviewed and negative.      PE:  Constitutional:  Well developed, well nourished, no acute distress, non-toxic appearance   Eyes:  PERRL, conjunctiva normal   HENT:  Atraumatic, external ears normal, nose normal. Neck- normal range of motion, no tenderness, supple   Respiratory:  No respiratory distress, normal breath sounds, no rales, no wheezing   Cardiovascular:  Normal rate, normal rhythm  Integument: In her left flank, she has a 1 x 2 cm firm mobile tender soft tissue mass  Lymphatic:  No lymphadenopathy noted   Neurologic:  Alert & oriented x 3, no focal deficits noted   Psychiatric:  Speech and behavior appropriate       DATA:  N/A      Assessment:  1. Soft tissue mass        Plan: This painful soft tissue mass likely represents a lipoma. We will plan for excision under local anesthesia with sedation. I explained the procedure including risks, benefits, and alternatives. Questions were answered and the patient agrees to proceed.

## 2021-03-09 NOTE — PROGRESS NOTES
Obstructive Sleep Apnea (MICHAEL) Screening     Patient:  Nelson Santos    YOB: 1955      Medical Record #:  [id number]                     Date:  3/9/2021     1. Are you a loud and/or regular snorer? []  Yes       [x] No    2. Have you been observed to gasp or stop breathing during sleep? []  Yes       [x] No    3. Do you feel tired or groggy upon awakening or do you awaken with a headache?           []  Yes       [] No    4. Are you often tired or fatigued during the wake time hours? []  Yes       [] No    5. Do you fall asleep sitting, reading, watching TV or driving? []  Yes       [] No    6. Do you often have problems with memory or concentration? []  Yes       [] No    **If patient's score is ? 3 they are considered high risk for MICHAEL. An Anesthesia provider will evaluate the patient and develop a plan of care the day of surgery. Note:  If the patient's BMI is more than 35 kg m¯² , has neck circumference > 40 cm, and/or high blood pressure the risk is greater (© American Sleep Apnea Association, 2006).

## 2021-03-10 ENCOUNTER — OFFICE VISIT (OUTPATIENT)
Dept: PRIMARY CARE CLINIC | Age: 66
End: 2021-03-10
Payer: COMMERCIAL

## 2021-03-10 ENCOUNTER — HOSPITAL ENCOUNTER (OUTPATIENT)
Dept: PHYSICAL THERAPY | Age: 66
Setting detail: THERAPIES SERIES
Discharge: HOME | End: 2021-03-10
Payer: COMMERCIAL

## 2021-03-10 DIAGNOSIS — Z01.818 PREOP TESTING: Primary | ICD-10-CM

## 2021-03-10 PROCEDURE — 97140 MANUAL THERAPY 1/> REGIONS: CPT

## 2021-03-10 PROCEDURE — 97110 THERAPEUTIC EXERCISES: CPT

## 2021-03-10 PROCEDURE — G8417 CALC BMI ABV UP PARAM F/U: HCPCS | Performed by: NURSE PRACTITIONER

## 2021-03-10 PROCEDURE — 99211 OFF/OP EST MAY X REQ PHY/QHP: CPT | Performed by: NURSE PRACTITIONER

## 2021-03-10 PROCEDURE — G8428 CUR MEDS NOT DOCUMENT: HCPCS | Performed by: NURSE PRACTITIONER

## 2021-03-10 NOTE — FLOWSHEET NOTE
[place] [address] [facility], [address] [place]  Phone: [phone]  Fax [phone]    Physical Therapy Progress Note/Daily Treatment Note  Date:  3/10/2021    Patient Name:  Nimesh Irving    :  1955  MRN: [id number]  Restrictions/Precautions:    Physician Information:  Referring Practitioner: Dr. Judy Sanchez  Medical/Treatment Diagnosis Information:  · Diagnosis: M75.111 (ICD-10-CM) - Nontraumatic incomplete tear of right rotator cuff  · Treatment Diagnosis: Right shoulder pain M25.511, right shoulder stiffness M25.611, Right shoulder effusion M25.411, Right shoulder weakness M62.8      [] Conservative / [x] Surgical - DOS: 10/27/2020 R RTC repair with patch, neer/clarence  Therapy Diagnosis/Practice Pattern:  Practice Pattern I: Bony or Soft Tissue Surgery  Insurance/Certification information:  PT Insurance Information: [address] [place] [address] of care signed: [] YES  [] NO  Number of Comorbidities:  []0     [x]1-2    []3+  Date of Patient follow up with Physician:     Is this a Progress Report:     []  Yes  [x]  No        If Yes:  Date Range for reporting period:  Beginning 2021  Ending    Progress report will be due (10 Rx or 30 days whichever is less):        Recertification will be due (POC Duration  / 90 days whichever is less): 3/25/2021     Latex Allergy:  [x]NO      []YES  Preferred Language for Healthcare:   [x]English       []other:    Visit # Insurance Allowable   17 total  2021 30 for      SUBJECTIVE:  Pt reports she gets lipoma on side removed next Monday, anxious about sleeping afterwards. Discouraged by slow progress at this point.      OBJECTIVE:    Observation:    Palpation:     Test used Initial score Current Score   Pain Summary VAS 2-8 2-4   Functional questionnaire QDash 75%  44/55 27%  23/55   ROM flexion 60 170    ER 10 80    ABD 25 -    IR belly 60   Strength flexion NT 4    ER  4-    ABD  4    IR  4- RESTRICTIONS/PRECAUTIONS: wean from sling progress as tolerated    Exercises/Interventions:   Therapeutic Ex Sets/reps Notes HEP   Supine flexion AROM 20 x     Seated shoulder flex  Seated shoulder scaption  Seated shoulder ABD  2 x 10   2 x 10  2 x 10 Cues to reduce shrug    Band pull apart  Band D1 flex 2 x 10 red  2 x 10 red  X   DB curl 2 x 10 3#    TB row  TB ext  TB ER walkout  TB Tricep ext  TB bicep curl 3 x 10  3 x 10    3 x 10  3 x 10 Blue  Blue  Blue  Blue  [employer] 3 x 10  5# FWD, ladder, CW/CCW    4 cone reach at finisher                                                                   Manual Intervention 15 min     PROM, oscillations, gr 1-2 jt mobs GHJ  STM bicep, posterior RTC, lat                              Pt ed: surgery, anatomy, post-op restrictions, PT progression, RICE, sling use            NMR re-education                                                          Therapeutic Exercise and NMR EXR  [x] (63725) Provided verbal/tactile cueing for activities related to strengthening, flexibility, endurance, ROM  for improvements in scapular, scapulothoracic and UE control with self care, reaching, carrying, lifting, house/yardwork, driving/computer work.    [] (94510) Provided verbal/tactile cueing for activities related to improving balance, coordination, kinesthetic sense, posture, motor skill, proprioception  to assist with  scapular, scapulothoracic and UE control with self care, reaching, carrying, lifting, house/yardwork, driving/computer work. Therapeutic Activities:    [] (64873 or 87772) Provided verbal/tactile cueing for activities related to improving balance, coordination, kinesthetic sense, posture, motor skill, proprioception and motor activation to allow for proper function of scapular, scapulothoracic and UE control with self care, carrying, lifting, driving/computer work.      Home Exercise Program:    [x] (80255) Reviewed/Progressed HEP activities related to movement, function, and ADLs as indicated by Functional Deficits. [] Progressing: [x] Met: [] Not Met: [] Adjusted    Long Term Goals: To be achieved in: 12 weeks  1. Disability index score of 35% or less for the [facility] to assist with reaching prior level of function. [] Progressing: [x] Met: [] Not Met: [] Adjusted  2. Patient will demonstrate increased AROM to 0-150, functional IR to don/doff bra, functional ER to wash/care for hair, to allow for proper joint functioning as indicated by patients Functional Deficits. [x] Progressing: [] Met: [] Not Met: [] Adjusted  3. Patient will demonstrate an increase in Strength to at least 4+/5 throughout UE to allow for proper functional mobility as indicated by patients Functional Deficits. [x] Progressing: [] Met: [] Not Met: [] Adjusted  4. Patient will return to dressing and hygiene ADLs independently and without increased symptoms or restriction. [x] Progressing: [] Met: [] Not Met: [] Adjusted  5. Patient will demonstrate appropriate scapular mechanics with no UT compensation to reduce risk of re-injury. [] Progressing: [x] Met: [] Not Met: [] Adjusted      Overall Progression Towards Functional goals/ Treatment Progress Update:  [] Patient is progressing as expected towards functional goals listed. [x] Progression is slowed due to complexities/Impairments listed. - pain  [] Progression has been slowed due to co-morbidities. [] Plan just implemented, too soon to assess goals progression <30days   [] Goals require adjustment due to lack of progress  [] Patient is not progressing as expected and requires additional follow up with physician  [] Other    Prognosis for POC: [x] Good [] Fair  [] Poor      Patient requires continued skilled intervention: [x] Yes  [] No      ASSESSMENT:  Held progression due to patient being limited due to back pain. Pt with improving AROM and strength.  Cues for UT compensation and scapular mechanics - when flex/scaption done with good form pain eliminated. Pt has had slow progression due to surgery performed and continued pain. Treatment/Activity Tolerance:  [x] Patient tolerated treatment well [] Patient limited by fatigue  [x] Patient limited by pain - back pain  [] Patient limited by other medical complications  [] Other:     Patient Requires Follow-up: [x] Yes  [] No    PLAN:  Continue POC progressing strength and functional ROM over course of next 8 weeks 3/25/2021  [x] Continue per plan of care [] Delmar Owusu current plan (see comments above)  [] Plan of care initiated [] Hold pending MD visit [] Discharge      Electronically signed by:  Carlos Houston PT , DPT [id number]    Note: If patient does not return for scheduled/ recommended follow up visits, this note will serve as a discharge from care along with most recent update on progress.

## 2021-03-10 NOTE — PATIENT INSTRUCTIONS
Advance Care Planning  People with COVID-19 may have no symptoms, mild symptoms, such as fever, cough, and shortness of breath or they may have more severe illness, developing severe and fatal pneumonia. As a result, Advance Care Planning with attention to naming a health care decision maker (someone you trust to make healthcare decisions for you if you could not speak for yourself) and sharing other health care preferences is important BEFORE a possible health crisis. Please contact your Primary Care Provider to discuss Advance Care Planning. Preventing the Spread of Coronavirus Disease 2019 in Homes and Residential Communities  For the most recent information go to [email or web address]    Prevention steps for People with confirmed or suspected COVID-19 (including persons under investigation) who do not need to be hospitalized  and   People with confirmed COVID-19 who were hospitalized and determined to be medically stable to go home    Your healthcare provider and public health staff will evaluate whether you can be cared for at home. If it is determined that you do not need to be hospitalized and can be isolated at home, you will be monitored by staff from your local or state health department. You should follow the prevention steps below until a healthcare provider or local or state health department says you can return to your normal activities. Stay home except to get medical care  People who are mildly ill with COVID-19 are able to isolate at home during their illness. You should restrict activities outside your home, except for getting medical care. Do not go to work, school, or public areas. Avoid using public transportation, ride-sharing, or taxis. Separate yourself from other people and animals in your home  People: As much as possible, you should stay in a specific room and away from other people in your home.  Also, you should use a separate bathroom, if available. Animals: You should restrict contact with pets and other animals while you are sick with COVID-19, just like you would around other people. Although there have not been reports of pets or other animals becoming sick with COVID-19, it is still recommended that people sick with COVID-19 limit contact with animals until more information is known about the virus. When possible, have another member of your household care for your animals while you are sick. If you are sick with COVID-19, avoid contact with your pet, including petting, snuggling, being kissed or licked, and sharing food. If you must care for your pet or be around animals while you are sick, wash your hands before and after you interact with pets and wear a facemask. Call ahead before visiting your doctor  If you have a medical appointment, call the healthcare provider and tell them that you have or may have COVID-19. This will help the healthcare providers office take steps to keep other people from getting infected or exposed. Wear a facemask  You should wear a facemask when you are around other people (e.g., sharing a room or vehicle) or pets and before you enter a healthcare providers office. If you are not able to wear a facemask (for example, because it causes trouble breathing), then people who live with you should not stay in the same room with you, or they should wear a facemask if they enter your room. Cover your coughs and sneezes  Cover your mouth and nose with a tissue when you cough or sneeze. Throw used tissues in a lined trash can. Immediately wash your hands with soap and water for at least 20 seconds or, if soap and water are not available, clean your hands with an alcohol-based hand  that contains at least 60% alcohol.   Clean your hands often  Wash your hands often with soap and water for at least 20 seconds, especially after blowing your nose, coughing, or sneezing; going to the bathroom; and have a medical emergency and need to call 911, notify the dispatch personnel that you have, or are being evaluated for COVID-19. If possible, put on a facemask before emergency medical services arrive. Discontinuing home isolation  Patients with confirmed COVID-19 should remain under home isolation precautions until the risk of secondary transmission to others is thought to be low. The decision to discontinue home isolation precautions should be made on a case-by-case basis, in consultation with healthcare providers and state and local health departments.

## 2021-03-11 LAB — SARS-COV-2: NOT DETECTED

## 2021-03-12 ENCOUNTER — ANESTHESIA EVENT (OUTPATIENT)
Dept: OPERATING ROOM | Age: 66
End: 2021-03-12
Payer: COMMERCIAL

## 2021-03-15 ENCOUNTER — HOSPITAL ENCOUNTER (OUTPATIENT)
Age: 66
Setting detail: OUTPATIENT SURGERY
Discharge: HOME | End: 2021-03-15
Attending: SURGERY | Admitting: SURGERY
Payer: COMMERCIAL

## 2021-03-15 ENCOUNTER — ANESTHESIA (OUTPATIENT)
Dept: OPERATING ROOM | Age: 66
End: 2021-03-15
Payer: COMMERCIAL

## 2021-03-15 VITALS
RESPIRATION RATE: 16 BRPM | HEIGHT: 65 IN | TEMPERATURE: 96.7 F | WEIGHT: 192 LBS | DIASTOLIC BLOOD PRESSURE: 84 MMHG | OXYGEN SATURATION: 99 % | HEART RATE: 56 BPM | BODY MASS INDEX: 31.99 KG/M2 | SYSTOLIC BLOOD PRESSURE: 146 MMHG

## 2021-03-15 VITALS — DIASTOLIC BLOOD PRESSURE: 71 MMHG | OXYGEN SATURATION: 98 % | SYSTOLIC BLOOD PRESSURE: 124 MMHG

## 2021-03-15 DIAGNOSIS — R22.2 MASS OF TORSO: ICD-10-CM

## 2021-03-15 LAB
GLUCOSE BLD-MCNC: 105 MG/DL (ref 70–99)
GLUCOSE BLD-MCNC: 143 MG/DL (ref 70–99)
PERFORMED ON: ABNORMAL
PERFORMED ON: ABNORMAL

## 2021-03-15 PROCEDURE — 6360000002 HC RX W HCPCS: Performed by: NURSE ANESTHETIST, CERTIFIED REGISTERED

## 2021-03-15 PROCEDURE — 3700000000 HC ANESTHESIA ATTENDED CARE: Performed by: SURGERY

## 2021-03-15 PROCEDURE — 7100000001 HC PACU RECOVERY - ADDTL 15 MIN: Performed by: SURGERY

## 2021-03-15 PROCEDURE — 2580000003 HC RX 258: Performed by: SURGERY

## 2021-03-15 PROCEDURE — 2500000003 HC RX 250 WO HCPCS: Performed by: SURGERY

## 2021-03-15 PROCEDURE — 7100000000 HC PACU RECOVERY - FIRST 15 MIN: Performed by: SURGERY

## 2021-03-15 PROCEDURE — 7100000010 HC PHASE II RECOVERY - FIRST 15 MIN: Performed by: SURGERY

## 2021-03-15 PROCEDURE — 2709999900 HC NON-CHARGEABLE SUPPLY: Performed by: SURGERY

## 2021-03-15 PROCEDURE — 21931 EXC BACK LES SC 3 CM/>: CPT | Performed by: SURGERY

## 2021-03-15 PROCEDURE — 2580000003 HC RX 258: Performed by: ANESTHESIOLOGY

## 2021-03-15 PROCEDURE — 88304 TISSUE EXAM BY PATHOLOGIST: CPT

## 2021-03-15 PROCEDURE — 3700000001 HC ADD 15 MINUTES (ANESTHESIA): Performed by: SURGERY

## 2021-03-15 PROCEDURE — 3600000002 HC SURGERY LEVEL 2 BASE: Performed by: SURGERY

## 2021-03-15 PROCEDURE — 3600000012 HC SURGERY LEVEL 2 ADDTL 15MIN: Performed by: SURGERY

## 2021-03-15 PROCEDURE — 7100000011 HC PHASE II RECOVERY - ADDTL 15 MIN: Performed by: SURGERY

## 2021-03-15 RX ORDER — SODIUM CHLORIDE 0.9 % (FLUSH) 0.9 %
10 SYRINGE (ML) INJECTION EVERY 12 HOURS SCHEDULED
Status: DISCONTINUED | OUTPATIENT
Start: 2021-03-15 | End: 2021-03-15 | Stop reason: SDUPTHER

## 2021-03-15 RX ORDER — PROPOFOL 10 MG/ML
INJECTION, EMULSION INTRAVENOUS PRN
Status: DISCONTINUED | OUTPATIENT
Start: 2021-03-15 | End: 2021-03-15 | Stop reason: SDUPTHER

## 2021-03-15 RX ORDER — LABETALOL HYDROCHLORIDE 5 MG/ML
5 INJECTION, SOLUTION INTRAVENOUS EVERY 10 MIN PRN
Status: DISCONTINUED | OUTPATIENT
Start: 2021-03-15 | End: 2021-03-15 | Stop reason: HOSPADM

## 2021-03-15 RX ORDER — SODIUM CHLORIDE 0.9 % (FLUSH) 0.9 %
10 SYRINGE (ML) INJECTION PRN
Status: DISCONTINUED | OUTPATIENT
Start: 2021-03-15 | End: 2021-03-15 | Stop reason: HOSPADM

## 2021-03-15 RX ORDER — MAGNESIUM HYDROXIDE 1200 MG/15ML
LIQUID ORAL CONTINUOUS PRN
Status: COMPLETED | OUTPATIENT
Start: 2021-03-15 | End: 2021-03-15

## 2021-03-15 RX ORDER — HYDRALAZINE HYDROCHLORIDE 20 MG/ML
5 INJECTION INTRAMUSCULAR; INTRAVENOUS EVERY 10 MIN PRN
Status: DISCONTINUED | OUTPATIENT
Start: 2021-03-15 | End: 2021-03-15 | Stop reason: HOSPADM

## 2021-03-15 RX ORDER — SODIUM CHLORIDE, SODIUM LACTATE, POTASSIUM CHLORIDE, CALCIUM CHLORIDE 600; 310; 30; 20 MG/100ML; MG/100ML; MG/100ML; MG/100ML
INJECTION, SOLUTION INTRAVENOUS CONTINUOUS
Status: DISCONTINUED | OUTPATIENT
Start: 2021-03-15 | End: 2021-03-15 | Stop reason: HOSPADM

## 2021-03-15 RX ORDER — FENTANYL CITRATE 50 UG/ML
INJECTION, SOLUTION INTRAMUSCULAR; INTRAVENOUS PRN
Status: DISCONTINUED | OUTPATIENT
Start: 2021-03-15 | End: 2021-03-15 | Stop reason: SDUPTHER

## 2021-03-15 RX ORDER — ONDANSETRON 2 MG/ML
4 INJECTION INTRAMUSCULAR; INTRAVENOUS PRN
Status: DISCONTINUED | OUTPATIENT
Start: 2021-03-15 | End: 2021-03-15 | Stop reason: HOSPADM

## 2021-03-15 RX ORDER — OXYCODONE HYDROCHLORIDE AND ACETAMINOPHEN 5; 325 MG/1; MG/1
1 TABLET ORAL PRN
Status: DISCONTINUED | OUTPATIENT
Start: 2021-03-15 | End: 2021-03-15 | Stop reason: HOSPADM

## 2021-03-15 RX ORDER — HYDROCODONE BITARTRATE AND ACETAMINOPHEN 5; 325 MG/1; MG/1
1 TABLET ORAL EVERY 4 HOURS PRN
Qty: 18 TABLET | Refills: 0 | Status: SHIPPED | OUTPATIENT
Start: 2021-03-15 | End: 2021-03-18

## 2021-03-15 RX ORDER — DIPHENHYDRAMINE HYDROCHLORIDE 50 MG/ML
12.5 INJECTION INTRAMUSCULAR; INTRAVENOUS
Status: DISCONTINUED | OUTPATIENT
Start: 2021-03-15 | End: 2021-03-15 | Stop reason: HOSPADM

## 2021-03-15 RX ORDER — SODIUM CHLORIDE 0.9 % (FLUSH) 0.9 %
10 SYRINGE (ML) INJECTION EVERY 12 HOURS SCHEDULED
Status: DISCONTINUED | OUTPATIENT
Start: 2021-03-15 | End: 2021-03-15 | Stop reason: HOSPADM

## 2021-03-15 RX ORDER — MEPERIDINE HYDROCHLORIDE 50 MG/ML
12.5 INJECTION INTRAMUSCULAR; INTRAVENOUS; SUBCUTANEOUS EVERY 5 MIN PRN
Status: DISCONTINUED | OUTPATIENT
Start: 2021-03-15 | End: 2021-03-15 | Stop reason: HOSPADM

## 2021-03-15 RX ORDER — LIDOCAINE HYDROCHLORIDE 10 MG/ML
1 INJECTION, SOLUTION EPIDURAL; INFILTRATION; INTRACAUDAL; PERINEURAL
Status: DISCONTINUED | OUTPATIENT
Start: 2021-03-15 | End: 2021-03-15 | Stop reason: HOSPADM

## 2021-03-15 RX ORDER — MIDAZOLAM HYDROCHLORIDE 1 MG/ML
INJECTION INTRAMUSCULAR; INTRAVENOUS PRN
Status: DISCONTINUED | OUTPATIENT
Start: 2021-03-15 | End: 2021-03-15 | Stop reason: SDUPTHER

## 2021-03-15 RX ORDER — MORPHINE SULFATE 2 MG/ML
2 INJECTION, SOLUTION INTRAMUSCULAR; INTRAVENOUS EVERY 5 MIN PRN
Status: DISCONTINUED | OUTPATIENT
Start: 2021-03-15 | End: 2021-03-15 | Stop reason: HOSPADM

## 2021-03-15 RX ORDER — SODIUM CHLORIDE 0.9 % (FLUSH) 0.9 %
10 SYRINGE (ML) INJECTION PRN
Status: DISCONTINUED | OUTPATIENT
Start: 2021-03-15 | End: 2021-03-15 | Stop reason: SDUPTHER

## 2021-03-15 RX ORDER — OXYCODONE HYDROCHLORIDE AND ACETAMINOPHEN 5; 325 MG/1; MG/1
2 TABLET ORAL PRN
Status: DISCONTINUED | OUTPATIENT
Start: 2021-03-15 | End: 2021-03-15 | Stop reason: HOSPADM

## 2021-03-15 RX ORDER — MORPHINE SULFATE 2 MG/ML
1 INJECTION, SOLUTION INTRAMUSCULAR; INTRAVENOUS EVERY 5 MIN PRN
Status: DISCONTINUED | OUTPATIENT
Start: 2021-03-15 | End: 2021-03-15 | Stop reason: HOSPADM

## 2021-03-15 RX ORDER — PROMETHAZINE HYDROCHLORIDE 25 MG/ML
6.25 INJECTION, SOLUTION INTRAMUSCULAR; INTRAVENOUS
Status: DISCONTINUED | OUTPATIENT
Start: 2021-03-15 | End: 2021-03-15 | Stop reason: HOSPADM

## 2021-03-15 RX ADMIN — MIDAZOLAM HYDROCHLORIDE 5 MG: 2 INJECTION, SOLUTION INTRAMUSCULAR; INTRAVENOUS at 13:00

## 2021-03-15 RX ADMIN — FENTANYL CITRATE 50 MCG: 50 INJECTION INTRAMUSCULAR; INTRAVENOUS at 13:00

## 2021-03-15 RX ADMIN — SODIUM CHLORIDE, POTASSIUM CHLORIDE, SODIUM LACTATE AND CALCIUM CHLORIDE: 600; 310; 30; 20 INJECTION, SOLUTION INTRAVENOUS at 11:40

## 2021-03-15 RX ADMIN — PROPOFOL 20 MG: 10 INJECTION, EMULSION INTRAVENOUS at 13:05

## 2021-03-15 ASSESSMENT — PULMONARY FUNCTION TESTS
PIF_VALUE: 1

## 2021-03-15 ASSESSMENT — PAIN - FUNCTIONAL ASSESSMENT: PAIN_FUNCTIONAL_ASSESSMENT: 0-10

## 2021-03-15 NOTE — BRIEF OP NOTE
Brief Postoperative Note      Patient: Rashmi pSencer  YOB: 1955  MRN: [id number]    Date of Procedure: 3/15/2021    Pre-Op Diagnosis: LEFT FLANK MASS    Post-Op Diagnosis: Same       Procedure(s):  EXCISION OF LEFT FLANK MASS    Surgeon(s):  Layne Sanchez MD    Assistant:  Surgical Assistant: Drake Fontana    Anesthesia: Monitor Anesthesia Care    Estimated Blood Loss (mL): Minimal    Complications: None    Specimens:   ID Type Source Tests Collected by Time Destination   A : left flank mass Tissue Tissue SURGICAL PATHOLOGY Layne Sanchez MD 3/15/2021 1314        Implants:  * No implants in log *      Drains: * No LDAs found *    Findings: as above    Electronically signed by Toribio Ackerman MD on 3/15/2021 at 1:15 PM

## 2021-03-15 NOTE — ANESTHESIA PRE PROCEDURE
Department of Anesthesiology  Preprocedure Note       Name:  Sheba Wilson   Age:  60 y.o.  :  1955                                          MRN:  [id number]         Date:  3/15/2021      Surgeon: Adele Rivas):  Nikolai Rocha MD    Procedure: Procedure(s):  EXCISION OF LEFT FLANK MASS    Medications prior to admission:   Prior to Admission medications    Medication Sig Start Date End Date Taking?  Authorizing Provider   empagliflozin (JARDIANCE) 25 MG tablet Take 25 mg by mouth daily 21  Yes Herminia Santos MD   levothyroxine (SYNTHROID) 75 MCG tablet Take 1 tablet by mouth daily 21  Yes Esthela Santos MD   glimepiride (AMARYL) 4 MG tablet Take 1 tablet by mouth 2 times daily 21  Yes Esthela Santos MD   atorvastatin (LIPITOR) 20 MG tablet Take 1 tablet by mouth daily 21  Yes Esthela Santos MD   lisinopril (PRINIVIL;ZESTRIL) 40 MG tablet Take 1 tablet by mouth daily 21  Yes Paolo Santos MD   SITagliptin (JANUVIA) 100 MG tablet Take 1 tablet by mouth daily 8/10/20  Yes Esthela Santos MD   metFORMIN (GLUCOPHAGE) 850 MG tablet Take 1 tablet by mouth daily 8/10/20  Yes Esthela Santos MD   ASA-APAP-Caff Buffered 614-904-37 MG TABS Take by mouth as needed     Historical Provider, MD   glucose monitoring kit (FREESTYLE) monitoring kit 1 kit by Does not apply route daily 20   Herminia Santos MD   omeprazole (PRILOSEC) 20 MG delayed release capsule TAKE 1 CAPSULE BY MOUTH EVERY DAY  Patient taking differently: as needed  20   Akanksha Saxena MD   blood glucose monitor strips Test qd 20   Paolo Santos MD       Current medications:    Current Facility-Administered Medications   Medication Dose Route Frequency Provider Last Rate Last Admin    lactated ringers infusion   Intravenous Continuous Kerry Sparks  mL/hr at 03/15/21 1140 New Bag at 03/15/21 1140    lidocaine PF 1 % injection 1 mL  1 mL Intradermal Once PRN Kerry Sparks MD        sodium chloride flush 0.9 % injection 10 mL  10 mL Intravenous 2 times per day Thanh Wolff MD        sodium chloride flush 0.9 % injection 10 mL  10 mL Intravenous PRN Thanh Wolff MD           Allergies:  No Known Allergies    Problem List:    Patient Active Problem List   Diagnosis Code    Type 2 diabetes mellitus without complication, without long-term current use of insulin (HCC) E11.9    Mixed hyperlipidemia E78.2    Essential hypertension I10    Acquired hypothyroidism E03.9    Erosive gastritis K29.60    Esophagitis, [place] grade A K20.80    RUQ pain R10.11    Fatty liver K76.0       Past Medical History:        Diagnosis Date    Chronic back pain     Diabetes mellitus ([facility] [address]     Hypothyroidism     Prolonged emergence from general anesthesia     Thyroid disease        Past Surgical History:        Procedure Laterality Date    APPENDECTOMY  1974    BREAST SURGERY Right 2013    drainage    CHOLECYSTECTOMY  1974    COLONOSCOPY N/A 10/1/2020    COLONOSCOPY DIAGNOSTIC performed by Chery Lopez MD at [address] [place] [address] Right 10/27/2020    EXAM UNDER ANESTHESIA VIDEO ARTHROSCOPY RIGHT SHOULDER, ROTATOR CUFF REPAIR WITH HAZEL AND NEPHEW PATCH, NEER ACROMIOPLASTY, POSSIBLE RYLEE PROCEDURE WITH EXPAREL performed by Obey Warner MD at [place] [address] ENDOSCOPY  2020    bx    UPPER GASTROINTESTINAL ENDOSCOPY N/A 2020    EGD BIOPSY performed by Chery Lopez MD at [address] [place] [address] History:    Social History     Tobacco Use    Smoking status: Former Smoker     Packs/day: 0.50     Years: 20.00     Pack years: 10.00     Types: Cigarettes     Start date: 1992     Quit date: 2010     Years since quittin.0    Smokeless tobacco: Never Used   Substance Use Topics    Alcohol use: Never Counseling given: Not Answered      Vital Signs (Current):   Vitals:    03/09/21 1114 03/15/21 1124   BP:  (!) 143/86   Pulse:  65   Resp:  16   Temp:  97.5 °F (36.4 °C)   TempSrc:  Temporal   SpO2:  99%   Weight: 192 lb (87.1 kg) 192 lb (87.1 kg)   Height: 5' 5\" (1.651 m) 5' 5\" (1.651 m)                                              BP Readings from Last 3 Encounters:   03/15/21 (!) 143/86   03/08/21 112/84   03/03/21 128/78       NPO Status: Time of last liquid consumption: 2230                        Time of last solid consumption: 2100                        Date of last liquid consumption: 03/14/21                        Date of last solid food consumption: 03/14/21    BMI:   Wt Readings from Last 3 Encounters:   03/15/21 192 lb (87.1 kg)   03/08/21 192 lb (87.1 kg)   03/03/21 189 lb (85.7 kg)     Body mass index is 31.95 kg/m².     CBC:   Lab Results   Component Value Date    WBC 6.9 02/24/2021    RBC 4.74 02/24/2021    HGB 14.0 02/24/2021    HCT 42.4 02/24/2021    MCV 89.5 02/24/2021    RDW 13.6 02/24/2021     02/24/2021       CMP:   Lab Results   Component Value Date     02/24/2021    K 4.4 02/24/2021     02/24/2021    CO2 22 02/24/2021    BUN 10 02/24/2021    CREATININE 0.7 02/24/2021    GFRAA >60 02/24/2021    AGRATIO 1.8 02/24/2021    LABGLOM >60 02/24/2021    GLUCOSE 153 02/24/2021    PROT 7.2 02/24/2021    CALCIUM 9.8 02/24/2021    BILITOT 0.3 02/24/2021    ALKPHOS 64 02/24/2021    AST 17 02/24/2021    ALT 18 02/24/2021       POC Tests:   Recent Labs     03/15/21  1139   POCGLU 143*       Coags: No results found for: PROTIME, INR, APTT    HCG (If Applicable):   Lab Results   Component Value Date    PREGTESTUR Negative 10/27/2020        ABGs: No results found for: PHART, PO2ART, OQK9BEJ, EWR1IOT, BEART, C4SEIXKJ     Type & Screen (If Applicable):  No results found for: LABABO, LABRH    Drug/Infectious Status (If Applicable):  No results found for: HIV, HEPCAB    COVID-19 Screening (If Applicable):   Lab Results   Component Value Date    COVID19 Not Detected 03/10/2021    COVID19 Not Detected 10/21/2020           Anesthesia Evaluation  Patient summary reviewed no history of anesthetic complications:   Airway: Mallampati: II  TM distance: >3 FB   Neck ROM: full  Mouth opening: > = 3 FB Dental: normal exam         Pulmonary:Negative Pulmonary ROS and normal exam  breath sounds clear to auscultation                             Cardiovascular:Negative CV ROS  Exercise tolerance: good (>4 METS),   (+) hypertension:,         Rhythm: regular  Rate: normal                    Neuro/Psych:   Negative Neuro/Psych ROS              GI/Hepatic/Renal: Neg GI/Hepatic/Renal ROS  (+) PUD, liver disease:,           Endo/Other: Negative Endo/Other ROS   (+) Diabetes, hypothyroidism::., .                 Abdominal:           Vascular: negative vascular ROS. Pre-Operative Diagnosis: Mass of torso [R22.2]    60 y.o.   BMI:  Body mass index is 31.95 kg/m².      Vitals:    21 1114 03/15/21 1124   BP:  (!) 143/86   Pulse:  65   Resp:  16   Temp:  97.5 °F (36.4 °C)   TempSrc:  Temporal   SpO2:  99%   Weight: 192 lb (87.1 kg) 192 lb (87.1 kg)   Height: 5' 5\" (1.651 m) 5' 5\" (1.651 m)       No Known Allergies    Social History     Tobacco Use    Smoking status: Former Smoker     Packs/day: 0.50     Years: 20.00     Pack years: 10.00     Types: Cigarettes     Start date: 1992     Quit date: 2010     Years since quittin.0    Smokeless tobacco: Never Used   Substance Use Topics    Alcohol use: Never       LABS:    CBC  Lab Results   Component Value Date/Time    WBC 6.9 2021 09:43 AM    HGB 14.0 2021 09:43 AM    HCT 42.4 2021 09:43 AM     2021 09:43 AM     RENAL  Lab Results   Component Value Date/Time     2021 09:43 AM    K 4.4 2021 09:43 AM     2021 09:43 AM    CO2 22 2021 09:43 AM    BUN 10 2021 09:43 AM    CREATININE 0.7 02/24/2021 09:43 AM    GLUCOSE 153 (H) 02/24/2021 09:43 AM     COAGS  No results found for: PROTIME, INR, APTT          Anesthesia Plan      MAC     ASA 2     (I discussed with the patient the risks and benefits of PIV, anesthesia, IV Narcotics, PACU. All questions were answered the patient agrees with the plan and wishes to proceed)  Induction: intravenous.                           Lilly John MD   3/15/2021

## 2021-03-15 NOTE — H&P
I have reviewed the progress note serving as history and physical dated March/8/ 2021 and examined the patient and find no relevant changes. I have reviewed with the patient and/or family the risks, benefits, and alternatives to the procedure.

## 2021-03-16 ENCOUNTER — TELEPHONE (OUTPATIENT)
Dept: SURGERY | Age: 66
End: 2021-03-16

## 2021-03-16 NOTE — OP NOTE
[address]                                OPERATIVE REPORT    PATIENT NAME: Rivka Ball                       :        1955  MED REC NO:   [id number]                          ROOM:  ACCOUNT NO:   [de-identified]                           ADMIT DATE: 03/15/2021  PROVIDER:     Shaun Ayers MD    DATE OF PROCEDURE:  03/15/2021    PREOPERATIVE DIAGNOSIS:  Left flank mass. POSTOPERATIVE DIAGNOSIS:  Left flank mass. PROCEDURE PERFORMED:  Excision of subcutaneous left flank mass. ANESTHESIA:  Local with MAC. SURGEON:  Shaun Ayers MD    ESTIMATED BLOOD LOSS:  Less than 50 mL. INDICATIONS:  The patient is a 78-year-old woman, who presented with a  painful left flank mass. She is brought for excision. OPERATIVE SUMMARY:  After preoperative evaluation, the patient was  brought into the operating suite and placed in a comfortable supine  position on the operating room table. Monitoring equipment was attached  and she was given intravenous sedation per Anesthesia. Her left flank  was sterilely prepped and draped and anesthetized with local anesthetic. An incision was made over the mass and dissected down through the  subcutaneous tissues. The mass was identified and appeared to be a  lipoma. It was dissected free of the surrounding structures with a  combination of blunt dissection, sharp dissection, and electrocautery. It measured 3.5 cm in diameter. This was passed off for pathology. Bleeding was controlled with electrocautery and the incision was closed  with interrupted subcutaneous sutures of 3-0 Vicryl and a running  subcuticular suture of 4-0 Vicryl. Benzoin, Steri-Strips, and dry  sterile dressings were applied. All sponge, needle, and instrument counts were correct at the end of the  case. The patient tolerated the procedure well.   She was taken to the recovery  area in stable condition. Karen Oliveros MD    D: 03/15/2021 13:33:35       T: 03/15/2021 13:38:23     [email or web address]  Job#: [id number]     Doc#: [id number]    CC:   Chela Dejesus MD

## 2021-03-16 NOTE — ANESTHESIA POSTPROCEDURE EVALUATION
Department of Anesthesiology  Postprocedure Note    Patient: Consuelo Baldwin  MRN: [id number]  [place]: 1955  Date of evaluation: 3/16/2021  Time:  2:24 PM     Procedure Summary     Date: 03/15/21 Room / Location: [facility] [address] [place] [address]    Anesthesia Start: 7265 Anesthesia Stop: 7290    Procedure: EXCISION OF LEFT FLANK MASS (Left Flank) Diagnosis:       Mass of torso      (LEFT FLANK MASS)    Surgeons: Jessenia Casillas MD Responsible Provider: Chris Mohamud MD    Anesthesia Type: MAC ASA Status: 2          Anesthesia Type: MAC    Tu Phase I: Tu Score: 9    Tu Phase II: Tu Score: 10    Last vitals: Reviewed and per EMR flowsheets.        Anesthesia Post Evaluation    Comments: Postoperative Anesthesia Note    Name:    Consuelo Baldwin  MRN:      [id number]    Patient Vitals in the past 12 hrs:     LABS:    CBC  Lab Results       Component                Value               Date/Time                  WBC                      6.9                 02/24/2021 09:43 AM        HGB                      14.0                02/24/2021 09:43 AM        HCT                      42.4                02/24/2021 09:43 AM        PLT                      235                 02/24/2021 09:43 AM   RENAL  Lab Results       Component                Value               Date/Time                  NA                       138                 02/24/2021 09:43 AM        K                        4.4                 02/24/2021 09:43 AM        CL                       103                 02/24/2021 09:43 AM        CO2                      22                  02/24/2021 09:43 AM        BUN                      10                  02/24/2021 09:43 AM        CREATININE               0.7                 02/24/2021 09:43 AM        GLUCOSE                  153 (H)             02/24/2021 09:43 AM   COAGS  No results found for: PROTIME, INR, APTT    Intake & Output:  @24HRIO@    Nausea & Vomiting:  No    Level of Consciousness:  Awake    Pain Assessment:  Adequate analgesia    Anesthesia Complications:  No apparent anesthetic complications    SUMMARY      Vital signs stable  OK to discharge from Stage I post anesthesia care.   Care transferred from Anesthesiology department on discharge from perioperative area

## 2021-03-17 ENCOUNTER — HOSPITAL ENCOUNTER (OUTPATIENT)
Dept: PHYSICAL THERAPY | Age: 66
Setting detail: THERAPIES SERIES
Discharge: HOME | End: 2021-03-17
Payer: COMMERCIAL

## 2021-03-17 NOTE — FLOWSHEET NOTE
[facility],  [address], [address] [place] [address]        Physical Therapy  Cancellation/No-show Note  Patient Name:  Doron Gupta  :  1955   Date:  3/17/2021  Cancelled visits to date: 4  No-shows to date: 0    For today's appointment patient:  ? X Cancelled  ? Rescheduled appointment  ? No-show     Reason given by patient:  ?  Patient ill  ? Conflicting appointment  ? No transportation    ? Conflict with work  ? No reason given  ?   Other:     Comments:  Had sx on Monday for mass on left side and is still sore    Electronically signed by:  Zoie Recio, PT

## 2021-03-24 ENCOUNTER — HOSPITAL ENCOUNTER (OUTPATIENT)
Dept: PHYSICAL THERAPY | Age: 66
Setting detail: THERAPIES SERIES
Discharge: HOME | End: 2021-03-24
Payer: COMMERCIAL

## 2021-03-24 PROCEDURE — 97140 MANUAL THERAPY 1/> REGIONS: CPT

## 2021-03-24 PROCEDURE — 97110 THERAPEUTIC EXERCISES: CPT

## 2021-03-24 NOTE — PLAN OF CARE
[facility], [address] [place] [address]  Phone: [phone]  Fax [phone]  Physical Therapy Re-Certification Plan of Landen Chavez      Dear  Dr. Savana Guerra,    We had the pleasure of treating the following patient for physical therapy services at [address] [place] [address]. A summary of our findings can be found in the updated assessment below. This includes our plan of care. If you have any questions or concerns regarding these findings, please do not hesitate to contact me at the office phone number checked above. Thank you for the referral.     Physician Signature:________________________________Date:__________________  By signing above (or electronic signature), therapists plan is approved by physician    Date Range Of Visits: 10/29/2021-3/24/2021  Total Visits to Date: 25  Overall Response to Treatment:   [x]Patient is responding well to treatment and improvement is noted with regards  to goals   []Patient should continue to improve in reasonable time if they continue HEP   []Patient has plateaued and is no longer responding to skilled PT intervention    []Patient is getting worse and would benefit from return to referring MD   []Patient unable to adhere to initial POC   [x]Other: Patient with slow progression due to pain response early in rehab process and intermittent other medical issues through course of rehab. Will benefit from continued skilled PT to progress ROM and strength as symptoms allow in order to return to prior level of function.  8 week POC 3/24/2021-2021      Physical Therapy Progress Note/Daily Treatment Note  Date:  3/24/2021    Patient Name:  Melida Veloz    :  1955  MRN: [id number]  Restrictions/Precautions:    Physician Information:  Referring Practitioner: Dr. Savana Guerra  Medical/Treatment Diagnosis Information:  · Diagnosis: M75.111 (ICD-10-CM) - Nontraumatic incomplete tear of right rotator cuff  · Treatment Diagnosis: Right shoulder pain M25.511, right shoulder stiffness M25.611, Right shoulder effusion M25.411, Right shoulder weakness M62.8      [] Conservative / [x] Surgical - DOS: 10/27/2020 R RTC repair with patch, neer/clarence  Therapy Diagnosis/Practice Pattern:  Practice Pattern I: Bony or Soft Tissue Surgery  Insurance/Certification information:  PT Insurance Information: [address] [place] [address] Sandi of care signed: [] YES  [] NO  Number of Comorbidities:  []0     [x]1-2    []3+  Date of Patient follow up with Physician:     Is this a Progress Report:     []  Yes  [x]  No        If Yes:  Date Range for reporting period:  Beginning 2/11/2021  Ending 3/24/2021    Progress report will be due (10 Rx or 30 days whichever is less): 5/23/1602       Recertification will be due (POC Duration  / 90 days whichever is less): 5/19/2021     Latex Allergy:  [x]NO      []YES  Preferred Language for Healthcare:   [x]English       []other:    Visit # Insurance Allowable   18 total  8 2021 30 for 2021     SUBJECTIVE:  Pt doing okay today. Pain in shoulder, pain in left side following surgery last week to remove mass.      OBJECTIVE:    Observation:    Palpation:     Test used Initial score Current Score   Pain Summary VAS 2-8 2-4   Functional questionnaire QDash 75%  44/55 27%  23/55   ROM flexion 60 170    ER 10 80    ABD 25 -    IR belly 60   Strength flexion NT 4    ER  4-    ABD  4    IR  4-      RESTRICTIONS/PRECAUTIONS: wean from sling progress as tolerated    Exercises/Interventions:   Therapeutic Ex Sets/reps Notes HEP   Supine flexion AROM  Supine RS 4 way 20 x  20 x     Seated shoulder flex  Seated shoulder scaption  Seated shoulder ABD  2 x 10  2 x 10  Red 2 x 10 Cues to reduce shrug    No $  Band D1 flex  Overhead ABD 2 x 10 red  2 x 10 red  2 x 10 red  X   DB curl 2 x 10 5#    TB row  TB ext  TB ER walkout  TB Tricep ext  TB bicep curl 3 x 10  3 x 10    3 x 10  3 x 10 Blue  Blue  Blue  [employer] Finisher 3 x 15  5#  ladder, CW/CCW, X    4 cone reach at finisher                                                                   Manual Intervention 15 min     PROM, oscillations, gr 1-2 jt mobs GHJ  STM bicep, posterior RTC, lat                              Pt ed: surgery, anatomy, post-op restrictions, PT progression, RICE, sling use            NMR re-education                                                          Therapeutic Exercise and NMR EXR  [x] (52378) Provided verbal/tactile cueing for activities related to strengthening, flexibility, endurance, ROM  for improvements in scapular, scapulothoracic and UE control with self care, reaching, carrying, lifting, house/yardwork, driving/computer work.    [] (43280) Provided verbal/tactile cueing for activities related to improving balance, coordination, kinesthetic sense, posture, motor skill, proprioception  to assist with  scapular, scapulothoracic and UE control with self care, reaching, carrying, lifting, house/yardwork, driving/computer work. Therapeutic Activities:    [] (05020 or 72331) Provided verbal/tactile cueing for activities related to improving balance, coordination, kinesthetic sense, posture, motor skill, proprioception and motor activation to allow for proper function of scapular, scapulothoracic and UE control with self care, carrying, lifting, driving/computer work.      Home Exercise Program:    [x] (46832) Reviewed/Progressed HEP activities related to strengthening, flexibility, endurance, ROM of scapular, scapulothoracic and UE control with self care, reaching, carrying, lifting, house/yardwork, driving/computer work  [] (44342) Reviewed/Progressed HEP activities related to improving balance, coordination, kinesthetic sense, posture, motor skill, proprioception of scapular, scapulothoracic and UE control with self care, reaching, carrying, lifting, house/yardwork, driving/computer work      Manual Treatments:  PROM / STM / Oscillations-Mobs:  G-I, II, III, IV (PA's, Inf., Post.)  [x] (12178) Provided manual therapy to mobilize soft tissue/joints of cervical/CT, scapular GHJ and UE for the purpose of modulating pain, promoting relaxation,  increasing ROM, reducing/eliminating soft tissue swelling/inflammation/restriction, improving soft tissue extensibility and allowing for proper ROM for normal function with self care, reaching, carrying, lifting, house/yardwork, driving/computer work    Modalities:      [] GR/ESU 15 min    [] GR 15 min   [] ESU     [] CP    [] MHP    [] declined    Charges:  Timed Code Treatment Minutes: 40   Total Treatment Minutes: 40     [] EVAL (LOW) 97056 (typically 20 minutes face-to-face)  [] EVAL (MOD) 14659 (typically 30 minutes face-to-face)  [] EVAL (HIGH) 61974 (typically 45 minutes face-to-face)  [] RE-EVAL     [x] TA(10557) x  2   [] IONTO  [] NMR (28360) x     [] VASO  [x] Manual (36339) x 1     [] Other:  [] TA x      [] Mech Traction (42764)  [] ES(attended) (86231)      [] ES (un) (99460):     Nas Robledo stated goal: Regain full pain free use of arm, return to playing with granddaughter  [x] Progressing: [] Met: [] Not Met: [] Adjusted    Therapist goals for Patient:   Short Term Goals: To be achieved in: 2 weeks  1. Independent in HEP and progression per patient tolerance, in order to prevent re-injury. [] Progressing: [x] Met: [] Not Met: [] Adjusted  2. Patient will have a decrease in pain to facilitate improvement in movement, function, and ADLs as indicated by Functional Deficits. [] Progressing: [x] Met: [] Not Met: [] Adjusted    Long Term Goals: To be achieved in: 12 weeks  1. Disability index score of 35% or less for the [facility] to assist with reaching prior level of function. [] Progressing: [x] Met: [] Not Met: [] Adjusted  2.  Patient will demonstrate increased AROM to 0-150, functional IR to don/doff bra, functional ER to wash/care for hair, to allow for proper joint functioning as indicated by patients Functional Deficits. [x] Progressing: [] Met: [] Not Met: [] Adjusted  3. Patient will demonstrate an increase in Strength to at least 4+/5 throughout UE to allow for proper functional mobility as indicated by patients Functional Deficits. [x] Progressing: [] Met: [] Not Met: [] Adjusted  4. Patient will return to dressing and hygiene ADLs independently and without increased symptoms or restriction. [x] Progressing: [] Met: [] Not Met: [] Adjusted  5. Patient will demonstrate appropriate scapular mechanics with no UT compensation to reduce risk of re-injury. [] Progressing: [x] Met: [] Not Met: [] Adjusted      Overall Progression Towards Functional goals/ Treatment Progress Update:  [] Patient is progressing as expected towards functional goals listed. [x] Progression is slowed due to complexities/Impairments listed. - pain  [] Progression has been slowed due to co-morbidities. [] Plan just implemented, too soon to assess goals progression <30days   [] Goals require adjustment due to lack of progress  [] Patient is not progressing as expected and requires additional follow up with physician  [] Other    Prognosis for POC: [x] Good [] Fair  [] Poor      Patient requires continued skilled intervention: [x] Yes  [] No      ASSESSMENT:  Held progression due to patient being limited due to back pain. Pt with improving AROM and strength. Cues for UT compensation and scapular mechanics - when flex/scaption done with good form pain eliminated. Pt has had slow progression due to surgery performed and continued pain.      Treatment/Activity Tolerance:  [x] Patient tolerated treatment well [] Patient limited by fatigue  [x] Patient limited by pain - back pain  [] Patient limited by other medical complications  [] Other:     Patient Requires Follow-up: [x] Yes  [] No    PLAN:  Continue POC progressing strength and functional ROM over course of next 8 weeks 3/25/2021  [x]

## 2021-03-29 ENCOUNTER — OFFICE VISIT (OUTPATIENT)
Dept: ORTHOPEDIC SURGERY | Age: 66
End: 2021-03-29
Payer: COMMERCIAL

## 2021-03-29 VITALS — HEIGHT: 65 IN | BODY MASS INDEX: 31.99 KG/M2 | WEIGHT: 192 LBS

## 2021-03-29 DIAGNOSIS — M47.816 LUMBAR SPONDYLOSIS: ICD-10-CM

## 2021-03-29 DIAGNOSIS — G89.29 CHRONIC LOW BACK PAIN WITHOUT SCIATICA, UNSPECIFIED BACK PAIN LATERALITY: Primary | ICD-10-CM

## 2021-03-29 DIAGNOSIS — M54.50 CHRONIC LOW BACK PAIN WITHOUT SCIATICA, UNSPECIFIED BACK PAIN LATERALITY: Primary | ICD-10-CM

## 2021-03-29 PROCEDURE — 99214 OFFICE O/P EST MOD 30 MIN: CPT | Performed by: PHYSICIAN ASSISTANT

## 2021-03-29 PROCEDURE — G8482 FLU IMMUNIZE ORDER/ADMIN: HCPCS | Performed by: PHYSICIAN ASSISTANT

## 2021-03-29 PROCEDURE — 1036F TOBACCO NON-USER: CPT | Performed by: PHYSICIAN ASSISTANT

## 2021-03-29 PROCEDURE — G8427 DOCREV CUR MEDS BY ELIG CLIN: HCPCS | Performed by: PHYSICIAN ASSISTANT

## 2021-03-29 PROCEDURE — G8417 CALC BMI ABV UP PARAM F/U: HCPCS | Performed by: PHYSICIAN ASSISTANT

## 2021-03-29 PROCEDURE — 3017F COLORECTAL CA SCREEN DOC REV: CPT | Performed by: PHYSICIAN ASSISTANT

## 2021-03-29 RX ORDER — MELOXICAM 15 MG/1
15 TABLET ORAL DAILY
Qty: 30 TABLET | Refills: 0 | Status: SHIPPED | OUTPATIENT
Start: 2021-03-29 | End: 2021-05-19

## 2021-03-29 NOTE — PROGRESS NOTES
COLONOSCOPY DIAGNOSTIC performed by Bud Parr MD at [address] 10/27/2020    EXAM UNDER ANESTHESIA VIDEO ARTHROSCOPY RIGHT SHOULDER, ROTATOR CUFF REPAIR WITH HAZEL AND NEPHEW PATCH, NEER ACROMIOPLASTY, POSSIBLE RYLEE PROCEDURE WITH EXPAREL performed by Janine Armando MD at [facility] ENDOSCOPY      UPPER GASTROINTESTINAL ENDOSCOPY  06/22/2020    bx    UPPER GASTROINTESTINAL ENDOSCOPY N/A 6/22/2020    EGD BIOPSY performed by Bud Parr MD at [address]     Current Medications:     Current Outpatient Medications:     empagliflozin (JARDIANCE) 25 MG tablet, Take 25 mg by mouth daily, Disp: 30 tablet, Rfl: 11    levothyroxine (SYNTHROID) 75 MCG tablet, Take 1 tablet by mouth daily, Disp: 90 tablet, Rfl: 3    glimepiride (AMARYL) 4 MG tablet, Take 1 tablet by mouth 2 times daily, Disp: 180 tablet, Rfl: 3    atorvastatin (LIPITOR) 20 MG tablet, Take 1 tablet by mouth daily, Disp: 90 tablet, Rfl: 3    lisinopril (PRINIVIL;ZESTRIL) 40 MG tablet, Take 1 tablet by mouth daily, Disp: 90 tablet, Rfl: 3    glucose monitoring kit (FREESTYLE) monitoring kit, 1 kit by Does not apply route daily, Disp: 1 kit, Rfl: 0    omeprazole (PRILOSEC) 20 MG delayed release capsule, TAKE 1 CAPSULE BY MOUTH EVERY DAY (Patient taking differently: as needed ), Disp: 30 capsule, Rfl: 9    SITagliptin (JANUVIA) 100 MG tablet, Take 1 tablet by mouth daily, Disp: 90 tablet, Rfl: 3    metFORMIN (GLUCOPHAGE) 850 MG tablet, Take 1 tablet by mouth daily, Disp: 90 tablet, Rfl: 3    blood glucose monitor strips, Test qd, Disp: 100 strip, Rfl: 3  Allergies:  Patient has no known allergies. Social History:    reports that she quit smoking about 11 years ago. Her smoking use included cigarettes. She started smoking about 28 years ago. She has a 10.00 pack-year smoking history.  She has never used smokeless tobacco. She reports that she does not drink alcohol or use drugs. Family History:   Family History   Problem Relation Age of Onset    Dementia Mother     Breast Cancer Sister     Atrial Fibrillation Sister     High Blood Pressure Maternal Grandmother     Diabetes Sister        REVIEW OF SYSTEMS: Full ROS noted & scanned   CONSTITUTIONAL: Denies unexplained weight loss, fevers, chills or fatigue  NEUROLOGICAL: Denies unsteady gait or progressive weakness      PHYSICAL EXAM:    Vitals: Height 5' 5\" (1.651 m), weight 192 lb (87.1 kg), not currently breastfeeding. GENERAL EXAM:  · General Apparence: Patient is adequately groomed with no evidence of malnutrition. · Orientation: The patient is oriented to time, place and person. · Mood & Affect:The patient's mood and affect are appropriate   · Lymphatic: The lymphatic examination bilaterally reveals all areas to be without enlargement or induration  · Sensation: Sensation is intact without deficit  · Coordination/Balance: Good coordination     LUMBAR/SACRAL EXAMINATION:  · Inspection: Local inspection shows no step-off or bruising. Lumbar alignment is normal.  Sagittal and Coronal balance is neutral.  Well-healed left flank incision without evidence of drainage or erythema. · Palpation:   No evidence of tenderness at the midline. No tenderness bilaterally at the paraspinal or trochanters. There is no step-off or paraspinal spasm. · Range of Motion: Moderate loss of flexion and extension  · Strength:   Strength testing is 5/5 in all muscle groups tested. · Special Tests:   Straight leg raise and crossed SLR negative. Leg length and pelvis level.  0 out of 5 Jarrell's signs. · Skin: There are no rashes, ulcerations or lesions. · Reflexes: Reflexes are symmetrically 1+ at the patellar and ankle tendons. Clonus absent bilaterally at the feet.   · Gait & station: Normal unassisted  · Additional Examinations:   · RIGHT LOWER EXTREMITY: Inspection/examination of the right lower extremity does not show any tenderness, deformity or injury. Range of motion is full. There is no gross instability. There are no rashes, ulcerations or lesions. Strength and tone are normal.  ·   · LEFT LOWER EXTREMITY:  Inspection/examination of the left lower extremity does not show any tenderness, deformity or injury. Range of motion is full. There is no gross instability. There are no rashes, ulcerations or lesions. Strength and tone are normal.    Diagnostic Testin views lumbar spine  mild multilevel spondylosis with anterior spurring and facet arthropathy. No significant DDD or malalignment    CT abdomen and pelvis 2020: Bones/Soft Tissues: Degenerative changes involve the thoracolumbar spine and   bilateral hips.            Impression:  1) Chronic worsening back pain   2) mild multilevel spondylosis  3) S/p left flank lipoma excision         Plan:   1) Lumbar MRI WO  2) Mobic 15mg I po qd PRN, d/c other NSAIDs  3) Dr. Fernando Hilton provided  4) F/u to review L MRI        [facility]

## 2021-03-30 ENCOUNTER — TELEPHONE (OUTPATIENT)
Dept: ORTHOPEDIC SURGERY | Age: 66
End: 2021-03-30

## 2021-03-30 ENCOUNTER — TELEPHONE (OUTPATIENT)
Dept: SURGERY | Age: 66
End: 2021-03-30

## 2021-03-30 RX ORDER — TIZANIDINE 4 MG/1
TABLET ORAL
Qty: 28 TABLET | Refills: 0 | Status: SHIPPED | OUTPATIENT
Start: 2021-03-30 | End: 2021-04-12 | Stop reason: SDUPTHER

## 2021-03-30 NOTE — TELEPHONE ENCOUNTER
I called and spoke with pt, advised to try Tylenol and continue Meloxicam as needed, call back if anything worsens.

## 2021-03-30 NOTE — TELEPHONE ENCOUNTER
Patient calling in stating that the area where she had surgery at is very painful. She had a left flank mass removed on 03/15, she states that the area does not look red and that she is not running a fever but it is very painful when she lays down.  She does not have any pain medication left and is not on any antibiotics

## 2021-03-30 NOTE — TELEPHONE ENCOUNTER
I called pt she states excision just hurts, no redness, no warmth and no fever, she can't get comfortable. Another md put her on Meloxicam for her back ans she said that doesn't help either. Any advice?

## 2021-03-31 ENCOUNTER — HOSPITAL ENCOUNTER (OUTPATIENT)
Dept: PHYSICAL THERAPY | Age: 66
Setting detail: THERAPIES SERIES
Discharge: HOME | End: 2021-03-31
Payer: COMMERCIAL

## 2021-03-31 PROCEDURE — 97140 MANUAL THERAPY 1/> REGIONS: CPT

## 2021-03-31 PROCEDURE — 97110 THERAPEUTIC EXERCISES: CPT

## 2021-03-31 NOTE — FLOWSHEET NOTE
[facility], [address] [place]  Phone: [phone]  Fax [phone]    Physical Therapy Progress Note/Daily Treatment Note  Date:  3/31/2021    Patient Name:  Noel Frye    :  1955  MRN: [id number]  Restrictions/Precautions:    Physician Information:  Referring Practitioner: Dr. Ally Arana  Medical/Treatment Diagnosis Information:  · Diagnosis: M75.111 (ICD-10-CM) - Nontraumatic incomplete tear of right rotator cuff  · Treatment Diagnosis: Right shoulder pain M25.511, right shoulder stiffness M25.611, Right shoulder effusion M25.411, Right shoulder weakness M62.8      [] Conservative / [x] Surgical - DOS: 10/27/2020 R RTC repair with patch, neer/clarence  Therapy Diagnosis/Practice Pattern:  Practice Pattern I: Bony or Soft Tissue Surgery  Insurance/Certification information:  PT Insurance Information: [address] [place] [address] of [facility] signed: [] YES  [] NO  Number of Comorbidities:  []0     [x]1-2    []3+  Date of Patient follow up with Physician:     Is this a Progress Report:     []  Yes  [x]  No        If Yes:  Date Range for reporting period:  Beginning 2021  Ending 3/24/2021    Progress report will be due (10 Rx or 30 days whichever is less): 8153       Recertification will be due (POC Duration  / 90 days whichever is less): 2021     Latex Allergy:  [x]NO      []YES  Preferred Language for Healthcare:   [x]English       []other:    Visit # Insurance Allowable   19 total  2021 30 for      SUBJECTIVE:  Pt reports shoulder hurting less. Still trouble wiping, putting deodorant on, and putting on socks.      OBJECTIVE:    Observation:    Palpation:     Test used Initial score Current Score   Pain Summary VAS 2-8 2-4   Functional questionnaire QDash 75%  44/55 27%  23/55   ROM flexion 60 170    ER 10 80    ABD 25 -    IR belly 60   Strength flexion NT 4    ER  4-    ABD  4    IR  4- RESTRICTIONS/PRECAUTIONS: wean from sling progress as tolerated    Exercises/Interventions:   Therapeutic Ex Sets/reps Notes HEP   Sleeper stretch 5 x 10\"     Supine flexion AROM  Supine RS 4 way 20 x  20 x     Seated shoulder flex  Seated shoulder scaption  Seated shoulder ABD  2 x 10  2 x 10  Red 2 x 10 Cues to reduce shrug    No $  Band D1 flex  Overhead ABD 2 x 10 red  2 x 10 red  2 x 10 red  X   DB curl 2 x 10 5#    TB row  TB ext  TB ER walkout  TB Tricep ext  TB bicep curl 3 x 10  3 x 10    3 x 10  3 x 10 Blue  Blue  Blue  Blue  [employer] 3 x 10  5#  ladder, CW/CCW, X    4 cone reach at finisher                                                                   Manual Intervention 15 min     PROM, oscillations, gr 1-2 jt mobs GHJ  STM bicep, posterior RTC, lat                              Pt ed: surgery, anatomy, post-op restrictions, PT progression, RICE, sling use            NMR re-education                                                          Therapeutic Exercise and NMR EXR  [x] (05521) Provided verbal/tactile cueing for activities related to strengthening, flexibility, endurance, ROM  for improvements in scapular, scapulothoracic and UE control with self care, reaching, carrying, lifting, house/yardwork, driving/computer work.    [] (01141) Provided verbal/tactile cueing for activities related to improving balance, coordination, kinesthetic sense, posture, motor skill, proprioception  to assist with  scapular, scapulothoracic and UE control with self care, reaching, carrying, lifting, house/yardwork, driving/computer work. Therapeutic Activities:    [] (20903 or 72166) Provided verbal/tactile cueing for activities related to improving balance, coordination, kinesthetic sense, posture, motor skill, proprioception and motor activation to allow for proper function of scapular, scapulothoracic and UE control with self care, carrying, lifting, driving/computer work.      Home Exercise Program: [x] (83932) Reviewed/Progressed HEP activities related to strengthening, flexibility, endurance, ROM of scapular, scapulothoracic and UE control with self care, reaching, carrying, lifting, house/yardwork, driving/computer work  [] (55698) Reviewed/Progressed HEP activities related to improving balance, coordination, kinesthetic sense, posture, motor skill, proprioception of scapular, scapulothoracic and UE control with self care, reaching, carrying, lifting, house/yardwork, driving/computer work      Manual Treatments:  PROM / STM / Oscillations-Mobs:  G-I, II, III, IV (PA's, Inf., Post.)  [x] (98541) Provided manual therapy to mobilize soft tissue/joints of cervical/CT, scapular GHJ and UE for the purpose of modulating pain, promoting relaxation,  increasing ROM, reducing/eliminating soft tissue swelling/inflammation/restriction, improving soft tissue extensibility and allowing for proper ROM for normal function with self care, reaching, carrying, lifting, house/yardwork, driving/computer work    Modalities:      [] GR/ESU 15 min    [] GR 15 min   [] ESU     [] CP    [] MHP    [] declined    Charges:  Timed Code Treatment Minutes: 40   Total Treatment Minutes: 40     [] EVAL (LOW) 39840 (typically 20 minutes face-to-face)  [] EVAL (MOD) 16625 (typically 30 minutes face-to-face)  [] EVAL (HIGH) 90880 (typically 45 minutes face-to-face)  [] RE-EVAL     [x] WN(94842) x  2   [] IONTO  [] NMR (01225) x     [] VASO  [x] Manual (05713) x 1     [] Other:  [] TA x      [] Mech Traction (53078)  [] ES(attended) (17294)      [] ES (un) (61921):     Steven Mason stated goal: Regain full pain free use of arm, return to playing with granddaughter  [x] Progressing: [] Met: [] Not Met: [] Adjusted    Therapist goals for Patient:   Short Term Goals: To be achieved in: 2 weeks  1. Independent in HEP and progression per patient tolerance, in order to prevent re-injury. [] Progressing: [x] Met: [] Not Met: [] Adjusted  2.  Patient will have a decrease in pain to facilitate improvement in movement, function, and ADLs as indicated by Functional Deficits. [] Progressing: [x] Met: [] Not Met: [] Adjusted    Long Term Goals: To be achieved in: 12 weeks  1. Disability index score of 35% or less for the [facility] to assist with reaching prior level of function. [] Progressing: [x] Met: [] Not Met: [] Adjusted  2. Patient will demonstrate increased AROM to 0-150, functional IR to don/doff bra, functional ER to wash/care for hair, to allow for proper joint functioning as indicated by patients Functional Deficits. [x] Progressing: [] Met: [] Not Met: [] Adjusted  3. Patient will demonstrate an increase in Strength to at least 4+/5 throughout UE to allow for proper functional mobility as indicated by patients Functional Deficits. [x] Progressing: [] Met: [] Not Met: [] Adjusted  4. Patient will return to dressing and hygiene ADLs independently and without increased symptoms or restriction. [x] Progressing: [] Met: [] Not Met: [] Adjusted  5. Patient will demonstrate appropriate scapular mechanics with no UT compensation to reduce risk of re-injury. [] Progressing: [x] Met: [] Not Met: [] Adjusted      Overall Progression Towards Functional goals/ Treatment Progress Update:  [] Patient is progressing as expected towards functional goals listed. [x] Progression is slowed due to complexities/Impairments listed. - pain  [] Progression has been slowed due to co-morbidities. [] Plan just implemented, too soon to assess goals progression <30days   [] Goals require adjustment due to lack of progress  [] Patient is not progressing as expected and requires additional follow up with physician  [] Other    Prognosis for POC: [x] Good [] Fair  [] Poor      Patient requires continued skilled intervention: [x] Yes  [] No      ASSESSMENT:  Held progression due to patient being limited due to back pain. Pt with improving AROM and strength.  Cues for UT

## 2021-04-02 ENCOUNTER — APPOINTMENT (OUTPATIENT)
Dept: PHYSICAL THERAPY | Age: 66
End: 2021-04-02
Payer: COMMERCIAL

## 2021-04-06 ENCOUNTER — HOSPITAL ENCOUNTER (OUTPATIENT)
Dept: PHYSICAL THERAPY | Age: 66
Setting detail: THERAPIES SERIES
Discharge: HOME | End: 2021-04-06
Payer: COMMERCIAL

## 2021-04-06 PROCEDURE — 97110 THERAPEUTIC EXERCISES: CPT

## 2021-04-06 PROCEDURE — 97140 MANUAL THERAPY 1/> REGIONS: CPT

## 2021-04-06 NOTE — FLOWSHEET NOTE
[facility], [address]  Phone: [phone]  Fax [phone]    Physical Therapy Progress Note/Daily Treatment Note  Date:  2021    Patient Name:  Len Fagan    :  1955  MRN: [id number]  Restrictions/Precautions:    Physician Information:  Referring Practitioner: Dr. Debbie Baker  Medical/Treatment Diagnosis Information:  · Diagnosis: M75.111 (ICD-10-CM) - Nontraumatic incomplete tear of right rotator cuff  · Treatment Diagnosis: Right shoulder pain M25.511, right shoulder stiffness M25.611, Right shoulder effusion M25.411, Right shoulder weakness M62.8      [] Conservative / [x] Surgical - DOS: 10/27/2020 R RTC repair with patch, neer/clarence  Therapy Diagnosis/Practice Pattern:  Practice Pattern I: Bony or Soft Tissue Surgery  Insurance/Certification information:  PT Insurance Information: [address] [place] [address] Sandi of care signed: [] YES  [] NO  Number of Comorbidities:  []0     [x]1-2    []3+  Date of Patient follow up with Physician:     Is this a Progress Report:     []  Yes  [x]  No        If Yes:  Date Range for reporting period:  Beginning 3/24/2021  Ending    Progress report will be due (10 Rx or 30 days whichever is less):        Recertification will be due (POC Duration  / 90 days whichever is less): 2021     Latex Allergy:  [x]NO      []YES  Preferred Language for Healthcare:   [x]English       []other:    Visit # Insurance Allowable   20 total  10 2021 30 for      SUBJECTIVE:  Pt reports no new issues.  Back really bothering her    OBJECTIVE:    Observation:    Palpation:     Test used Initial score Current Score   Pain Summary VAS 2-8 2-4   Functional questionnaire QDash 75%  44/55 27%  23/55   ROM flexion 60 170    ER 10 80    ABD 25 -    IR belly 60   Strength flexion NT 4    ER  4-    ABD  4    IR  4-      RESTRICTIONS/PRECAUTIONS: wean from sling progress as tolerated    Exercises/Interventions:   Therapeutic Ex Sets/reps Notes HEP   Sleeper stretch 5 x 10\"     Supine flexion AROM  Supine RS 4 way 20 x  20 x     Seated shoulder flex  Seated shoulder scaption  Seated shoulder ABD  2 x 10  2 x 10  Red 2 x 10 Cues to reduce shrug    No $  Band D1 flex  Overhead ABD 2 x 10 red  2 x 10 red  2 x 10 red  X   DB curl 2 x 10 6#    TB row  TB ext  TB ER walkout  TB Tricep ext  TB bicep curl 3 x 10  3 x 10    3 x 10  3 x 10 Blue  Blue  Blue  Blue  [employer] 3 x 10  6#  ladder, CW/CCW, X    4 cone reach at finisher                                                                   Manual Intervention 15 min     PROM, oscillations, gr 1-2 jt mobs GHJ  STM bicep, posterior RTC, lat                              Pt ed: surgery, anatomy, post-op restrictions, PT progression, RICE, sling use            NMR re-education                                                          Therapeutic Exercise and NMR EXR  [x] (19995) Provided verbal/tactile cueing for activities related to strengthening, flexibility, endurance, ROM  for improvements in scapular, scapulothoracic and UE control with self care, reaching, carrying, lifting, house/yardwork, driving/computer work.    [] (30963) Provided verbal/tactile cueing for activities related to improving balance, coordination, kinesthetic sense, posture, motor skill, proprioception  to assist with  scapular, scapulothoracic and UE control with self care, reaching, carrying, lifting, house/yardwork, driving/computer work. Therapeutic Activities:    [] (25055 or 03401) Provided verbal/tactile cueing for activities related to improving balance, coordination, kinesthetic sense, posture, motor skill, proprioception and motor activation to allow for proper function of scapular, scapulothoracic and UE control with self care, carrying, lifting, driving/computer work.      Home Exercise Program:    [x] (07096) Reviewed/Progressed HEP activities related to strengthening, flexibility, endurance, ROM of scapular, scapulothoracic and UE control with self care, reaching, carrying, lifting, house/yardwork, driving/computer work  [] (08942) Reviewed/Progressed HEP activities related to improving balance, coordination, kinesthetic sense, posture, motor skill, proprioception of scapular, scapulothoracic and UE control with self care, reaching, carrying, lifting, house/yardwork, driving/computer work      Manual Treatments:  PROM / STM / Oscillations-Mobs:  G-I, II, III, IV (PA's, Inf., Post.)  [x] (02705) Provided manual therapy to mobilize soft tissue/joints of cervical/CT, scapular GHJ and UE for the purpose of modulating pain, promoting relaxation,  increasing ROM, reducing/eliminating soft tissue swelling/inflammation/restriction, improving soft tissue extensibility and allowing for proper ROM for normal function with self care, reaching, carrying, lifting, house/yardwork, driving/computer work    Modalities:      [] GR/ESU 15 min    [] GR 15 min   [] ESU     [] CP    [] MHP    [] declined    Charges:  Timed Code Treatment Minutes: 40   Total Treatment Minutes: 40     [] EVAL (LOW) 92723 (typically 20 minutes face-to-face)  [] EVAL (MOD) 01003 (typically 30 minutes face-to-face)  [] EVAL (HIGH) 07407 (typically 45 minutes face-to-face)  [] RE-EVAL     [x] ZN(42056) x  2   [] IONTO  [] NMR (28349) x     [] VASO  [x] Manual (66158) x 1     [] Other:  [] TA x      [] Mech Traction (45415)  [] ES(attended) (70626)      [] ES (un) (00946):     Imer Mark stated goal: Regain full pain free use of arm, return to playing with granddaughter  [x] Progressing: [] Met: [] Not Met: [] Adjusted    Therapist goals for Patient:   Short Term Goals: To be achieved in: 2 weeks  1. Independent in HEP and progression per patient tolerance, in order to prevent re-injury. [] Progressing: [x] Met: [] Not Met: [] Adjusted  2.  Patient will have a decrease in pain to facilitate improvement in movement, function, and ADLs as indicated by Functional Deficits. [] Progressing: [x] Met: [] Not Met: [] Adjusted    Long Term Goals: To be achieved in: 12 weeks  1. Disability index score of 35% or less for the [facility] to assist with reaching prior level of function. [] Progressing: [x] Met: [] Not Met: [] Adjusted  2. Patient will demonstrate increased AROM to 0-150, functional IR to don/doff bra, functional ER to wash/care for hair, to allow for proper joint functioning as indicated by patients Functional Deficits. [x] Progressing: [] Met: [] Not Met: [] Adjusted  3. Patient will demonstrate an increase in Strength to at least 4+/5 throughout UE to allow for proper functional mobility as indicated by patients Functional Deficits. [x] Progressing: [] Met: [] Not Met: [] Adjusted  4. Patient will return to dressing and hygiene ADLs independently and without increased symptoms or restriction. [x] Progressing: [] Met: [] Not Met: [] Adjusted  5. Patient will demonstrate appropriate scapular mechanics with no UT compensation to reduce risk of re-injury. [] Progressing: [x] Met: [] Not Met: [] Adjusted      Overall Progression Towards Functional goals/ Treatment Progress Update:  [] Patient is progressing as expected towards functional goals listed. [x] Progression is slowed due to complexities/Impairments listed. - pain  [] Progression has been slowed due to co-morbidities. [] Plan just implemented, too soon to assess goals progression <30days   [] Goals require adjustment due to lack of progress  [] Patient is not progressing as expected and requires additional follow up with physician  [] Other    Prognosis for POC: [x] Good [] Fair  [] Poor      Patient requires continued skilled intervention: [x] Yes  [] No      ASSESSMENT:  Held progression due to patient being limited due to back pain. Pt with improving AROM and strength.  Cues for UT compensation and scapular mechanics - when flex/scaption done with good form pain eliminated. Pt has had slow progression due to surgery performed and continued pain. Treatment/Activity Tolerance:  [x] Patient tolerated treatment well [] Patient limited by fatigue  [x] Patient limited by pain - back pain  [] Patient limited by other medical complications  [] Other:     Patient Requires Follow-up: [x] Yes  [] No    PLAN:  Continue POC progressing strength and functional ROM over course of next 8 weeks 3/25/2021  [x] Continue per plan of care [] Daron Helms current plan (see comments above)  [] Plan of care initiated [] Hold pending MD visit [] Discharge      Electronically signed by:  Suzette Adams PT , DPT [id number]    Note: If patient does not return for scheduled/ recommended follow up visits, this note will serve as a discharge from care along with most recent update on progress.

## 2021-04-07 ENCOUNTER — TELEPHONE (OUTPATIENT)
Dept: ORTHOPEDIC SURGERY | Age: 66
End: 2021-04-07

## 2021-04-07 ENCOUNTER — HOSPITAL ENCOUNTER (OUTPATIENT)
Dept: MRI IMAGING | Age: 66
Discharge: HOME | End: 2021-04-07
Payer: COMMERCIAL

## 2021-04-07 DIAGNOSIS — M47.816 LUMBAR SPONDYLOSIS: ICD-10-CM

## 2021-04-07 DIAGNOSIS — M54.50 CHRONIC LOW BACK PAIN WITHOUT SCIATICA, UNSPECIFIED BACK PAIN LATERALITY: ICD-10-CM

## 2021-04-07 DIAGNOSIS — G89.29 CHRONIC LOW BACK PAIN WITHOUT SCIATICA, UNSPECIFIED BACK PAIN LATERALITY: ICD-10-CM

## 2021-04-07 PROCEDURE — 72148 MRI LUMBAR SPINE W/O DYE: CPT

## 2021-04-12 ENCOUNTER — OFFICE VISIT (OUTPATIENT)
Dept: INTERNAL MEDICINE CLINIC | Age: 66
End: 2021-04-12
Payer: COMMERCIAL

## 2021-04-12 ENCOUNTER — OFFICE VISIT (OUTPATIENT)
Dept: ORTHOPEDIC SURGERY | Age: 66
End: 2021-04-12
Payer: COMMERCIAL

## 2021-04-12 ENCOUNTER — OFFICE VISIT (OUTPATIENT)
Dept: SURGERY | Age: 66
End: 2021-04-12

## 2021-04-12 VITALS — HEIGHT: 65 IN | WEIGHT: 194 LBS | BODY MASS INDEX: 32.32 KG/M2

## 2021-04-12 VITALS
TEMPERATURE: 97 F | HEART RATE: 83 BPM | WEIGHT: 194 LBS | BODY MASS INDEX: 32.32 KG/M2 | DIASTOLIC BLOOD PRESSURE: 80 MMHG | SYSTOLIC BLOOD PRESSURE: 128 MMHG | HEIGHT: 65 IN | OXYGEN SATURATION: 99 %

## 2021-04-12 VITALS
SYSTOLIC BLOOD PRESSURE: 124 MMHG | HEIGHT: 65 IN | WEIGHT: 194 LBS | BODY MASS INDEX: 32.32 KG/M2 | DIASTOLIC BLOOD PRESSURE: 80 MMHG

## 2021-04-12 DIAGNOSIS — B35.4 TINEA CORPORIS: Primary | ICD-10-CM

## 2021-04-12 DIAGNOSIS — M47.816 LUMBAR SPONDYLOSIS: Primary | ICD-10-CM

## 2021-04-12 DIAGNOSIS — Z09 POSTOP CHECK: Primary | ICD-10-CM

## 2021-04-12 PROCEDURE — 99024 POSTOP FOLLOW-UP VISIT: CPT | Performed by: SURGERY

## 2021-04-12 PROCEDURE — 99214 OFFICE O/P EST MOD 30 MIN: CPT | Performed by: PHYSICIAN ASSISTANT

## 2021-04-12 PROCEDURE — G8417 CALC BMI ABV UP PARAM F/U: HCPCS | Performed by: INTERNAL MEDICINE

## 2021-04-12 PROCEDURE — 1036F TOBACCO NON-USER: CPT | Performed by: PHYSICIAN ASSISTANT

## 2021-04-12 PROCEDURE — G8417 CALC BMI ABV UP PARAM F/U: HCPCS | Performed by: PHYSICIAN ASSISTANT

## 2021-04-12 PROCEDURE — 99213 OFFICE O/P EST LOW 20 MIN: CPT | Performed by: INTERNAL MEDICINE

## 2021-04-12 PROCEDURE — G8427 DOCREV CUR MEDS BY ELIG CLIN: HCPCS | Performed by: PHYSICIAN ASSISTANT

## 2021-04-12 PROCEDURE — G8427 DOCREV CUR MEDS BY ELIG CLIN: HCPCS | Performed by: INTERNAL MEDICINE

## 2021-04-12 PROCEDURE — 3017F COLORECTAL CA SCREEN DOC REV: CPT | Performed by: PHYSICIAN ASSISTANT

## 2021-04-12 PROCEDURE — 1036F TOBACCO NON-USER: CPT | Performed by: INTERNAL MEDICINE

## 2021-04-12 PROCEDURE — 3017F COLORECTAL CA SCREEN DOC REV: CPT | Performed by: INTERNAL MEDICINE

## 2021-04-12 RX ORDER — CLOTRIMAZOLE AND BETAMETHASONE DIPROPIONATE 10; .64 MG/G; MG/G
CREAM TOPICAL
Qty: 15 G | Refills: 0 | Status: SHIPPED | OUTPATIENT
Start: 2021-04-12

## 2021-04-12 RX ORDER — IBUPROFEN 800 MG/1
800 TABLET ORAL 2 TIMES DAILY PRN
Qty: 60 TABLET | Refills: 0 | Status: SHIPPED | OUTPATIENT
Start: 2021-04-12 | End: 2021-05-03

## 2021-04-12 RX ORDER — TIZANIDINE 4 MG/1
TABLET ORAL
Qty: 14 TABLET | Refills: 0 | Status: SHIPPED | OUTPATIENT
Start: 2021-04-12 | End: 2021-08-02 | Stop reason: SDUPTHER

## 2021-04-12 NOTE — PROGRESS NOTES
FOLLOW UP: SPINE    CHIEF COMPLAINT:    Chief Complaint   Patient presents with    Back Pain     F/U LUMBAR MRI       HISTORY OF PRESENT ILLNESS:                The patient is a 60 y.o. female history of DM, chronic low back pain here to review lumbar MRI scan for worsening low back pain. She reports worsening aching low back pain. Her pain is constant but increased with sitting. She reports some relief with Zanaflex at night. Conservative care includes Mobic, Zanaflex, ibuprofen, Tylenol, IcyHot, physical therapy with continued HEP. She will be restarting formal PT this week. She currently denies any distal radiating pain. No progressive numbness tingling or weakness. No recent bowel or bladder dysfunction. No recent trauma. No recent fevers chills infections.     She felt that ibuprofen was more beneficial than meloxicam    Past Medical History:   Diagnosis Date    Chronic back pain     Diabetes mellitus ([facility] [address]     Hypothyroidism     Prolonged emergence from general anesthesia     Thyroid disease       Current/Past Treatment:   · Physical Therapy: YES for lumbar spine with HEP cont  · Chiropractic:     · Injection:     Medications:            NSAIDS: Meloxicam, ibuprofen            Muscle relaxer:   Robaxin            Steriods:   Prednisone-past            Neuropathic medications:              Opioids: post op Norco            Other:   · Surgery/Consult: No    Past Medical History: Medical history form was reviewed today & scanned into the media tab  Past Medical History:   Diagnosis Date    Chronic back pain     Diabetes mellitus ([facility] [address]     Hypothyroidism     Prolonged emergence from general anesthesia     Thyroid disease       Past Surgical History:     Past Surgical History:   Procedure Laterality Date    ABDOMEN SURGERY Left 3/15/2021    EXCISION OF LEFT FLANK MASS performed by Obed Griggs MD at [facility] [address] Right 2013    drainage    CHOLECYSTECTOMY  1974    COLONOSCOPY N/A 10/1/2020    COLONOSCOPY DIAGNOSTIC performed by Delores Escalante MD at [address] Right 10/27/2020    EXAM UNDER ANESTHESIA VIDEO ARTHROSCOPY RIGHT SHOULDER, ROTATOR CUFF REPAIR WITH HAZEL AND NEPHEW PATCH, NEER ACROMIOPLASTY, POSSIBLE RYLEE PROCEDURE WITH EXPAREL performed by Evelyn Wetzel MD at [facility] ENDOSCOPY      UPPER GASTROINTESTINAL ENDOSCOPY  06/22/2020    bx    UPPER GASTROINTESTINAL ENDOSCOPY N/A 6/22/2020    EGD BIOPSY performed by Delores Escalante MD at [address]     Current Medications:     Current Outpatient Medications:     ibuprofen (ADVIL;MOTRIN) 800 MG tablet, Take 1 tablet by mouth 2 times daily as needed for Pain, Disp: 60 tablet, Rfl: 0    tiZANidine (ZANAFLEX) 4 MG tablet, I po qHS PRN, Disp: 14 tablet, Rfl: 0    clotrimazole-betamethasone (LOTRISONE) 1-0.05 % cream, Apply topically 2 times daily. , Disp: 15 g, Rfl: 0    meloxicam (MOBIC) 15 MG tablet, Take 1 tablet by mouth daily, Disp: 30 tablet, Rfl: 0    empagliflozin (JARDIANCE) 25 MG tablet, Take 25 mg by mouth daily, Disp: 30 tablet, Rfl: 11    levothyroxine (SYNTHROID) 75 MCG tablet, Take 1 tablet by mouth daily, Disp: 90 tablet, Rfl: 3    glimepiride (AMARYL) 4 MG tablet, Take 1 tablet by mouth 2 times daily, Disp: 180 tablet, Rfl: 3    atorvastatin (LIPITOR) 20 MG tablet, Take 1 tablet by mouth daily, Disp: 90 tablet, Rfl: 3    lisinopril (PRINIVIL;ZESTRIL) 40 MG tablet, Take 1 tablet by mouth daily, Disp: 90 tablet, Rfl: 3    glucose monitoring kit (FREESTYLE) monitoring kit, 1 kit by Does not apply route daily, Disp: 1 kit, Rfl: 0    omeprazole (PRILOSEC) 20 MG delayed release capsule, TAKE 1 CAPSULE BY MOUTH EVERY DAY (Patient taking differently: as needed ), Disp: 30 capsule, Rfl: 9    SITagliptin (JANUVIA) 100 MG tablet, Take 1 tablet by mouth daily, Disp: 90 tablet, Rfl: 3    metFORMIN (GLUCOPHAGE) 850 MG tablet, Take 1 tablet by mouth daily, Disp: 90 tablet, Rfl: 3    blood glucose monitor strips, Test qd, Disp: 100 strip, Rfl: 3  Allergies:  Patient has no known allergies. Social History:    reports that she quit smoking about 11 years ago. Her smoking use included cigarettes. She started smoking about 28 years ago. She has a 10.00 pack-year smoking history. She has never used smokeless tobacco. She reports that she does not drink alcohol or use drugs. Family History:   Family History   Problem Relation Age of Onset    Dementia Mother     Breast Cancer Sister     Atrial Fibrillation Sister     High Blood Pressure Maternal Grandmother     Diabetes Sister        REVIEW OF SYSTEMS: Full ROS noted & scanned   CONSTITUTIONAL: Denies unexplained weight loss, fevers, chills or fatigue  NEUROLOGICAL: Denies unsteady gait or progressive weakness      PHYSICAL EXAM:    Vitals: Height 5' 5\" (1.651 m), weight 194 lb (88 kg), not currently breastfeeding. 9/10 pain scale    GENERAL EXAM:  · General Apparence: Patient is adequately groomed with no evidence of malnutrition. · Orientation: The patient is oriented to time, place and person. · Mood & Affect:The patient's mood and affect are appropriate  · Sensation: Sensation is intact without deficit  · Coordination/Balance: Good coordination     LUMBAR/SACRAL EXAMINATION:  · Inspection: Local inspection shows no step-off or bruising. Lumbar alignment is normal.  Sagittal and Coronal balance is neutral.  Well-healed left flank incision without evidence of drainage or erythema. · Palpation:   No evidence of tenderness at the midline. No tenderness bilaterally at the paraspinal or trochanters. There is no step-off or paraspinal spasm. · Range of Motion: Able to sit forward flexed  · Strength:   Strength testing is 5/5 in all muscle groups tested.    · Special Tests:   Straight leg raise and crossed SLR negative. Leg length and pelvis level.  0 out of 5 Jarrell's signs. · Skin: There are no rashes, ulcerations or lesions. · Reflexes: Reflexes are symmetrically 1+ at the patellar and ankle tendons. Clonus absent bilaterally at the feet. · Gait & station: Normal unassisted  · Additional Examinations:   · RIGHT LOWER EXTREMITY: Inspection/examination of the right lower extremity does not show any tenderness, deformity or injury. Range of motion is full. There is no gross instability. There are no rashes, ulcerations or lesions. Strength and tone are normal.  ·   · LEFT LOWER EXTREMITY:  Inspection/examination of the left lower extremity does not show any tenderness, deformity or injury. Range of motion is full. There is no gross instability. There are no rashes, ulcerations or lesions. Strength and tone are normal.    Diagnostic Testing:  Lumbar MRI scan report independently reviewed from April 2021 showing DDD with mild Modic changes L3-4 and L4-5, multilevel facet arthropathy. No focal HNP. No significant central or foraminal stenosis. 2 views lumbar spine 2020 mild multilevel spondylosis with anterior spurring and facet arthropathy. No significant DDD or malalignment    CT abdomen and pelvis February 2020: Bones/Soft Tissues: Degenerative changes involve the thoracolumbar spine and   bilateral hips. Impression:  1) Chronic axial LBP   2) L3-4, L4-5 DDD and facet arthropathy   3) S/p left flank lipoma excision       Plan:   1) We reviewed her lumbar MRI scan  2) IBU 800mg I po BID PRN, d/c other NSAIDs, take with food  Orders Placed This Encounter   Medications    ibuprofen (ADVIL;MOTRIN) 800 MG tablet     Sig: Take 1 tablet by mouth 2 times daily as needed for Pain     Dispense:  60 tablet     Refill:  0    tiZANidine (ZANAFLEX) 4 MG tablet     Sig: I po qHS PRN     Dispense:  14 tablet     Refill:  0   3) Start PT  4) F/u after PT if no improvement then consider LESI. We did discuss limited efficacy of GARCÍA's with predominantly axial pain        [facility]

## 2021-04-12 NOTE — PROGRESS NOTES
Tyson Han  YOB: 1955    Date of Service:  4/12/2021    Chief Complaint:      Chief Complaint   Patient presents with    Rash     rash under both breasts        HPI:  Tyson Han is a 60 y.o. She complain of a rash under breasts for about a month. She's been sweating at night due to hot flashes.     Lab Results   Component Value Date    LABA1C 7.1 02/24/2021    LABA1C 7.2 11/30/2020    LABA1C 7.1 08/13/2020    LABMICR <1.20 02/24/2021     Lab Results   Component Value Date     02/24/2021    K 4.4 02/24/2021     02/24/2021    CO2 22 02/24/2021    BUN 10 02/24/2021    CREATININE 0.7 02/24/2021    GLUCOSE 153 (H) 02/24/2021    CALCIUM 9.8 02/24/2021     Lab Results   Component Value Date    CHOL 165 02/24/2021    TRIG 98 02/24/2021    HDL 53 02/24/2021    LDLCALC 92 02/24/2021     Lab Results   Component Value Date    ALT 18 02/24/2021    AST 17 02/24/2021     Lab Results   Component Value Date    TSH 1.08 02/24/2021    TSH 0.07 (L) 05/19/2020    T4FREE 1.3 02/24/2021    T4FREE 1.6 05/19/2020     Lab Results   Component Value Date    WBC 6.9 02/24/2021    HGB 14.0 02/24/2021    HCT 42.4 02/24/2021    MCV 89.5 02/24/2021     02/24/2021     No results found for: INR   No results found for: PSA   No results found for: LABURIC     Patient Active Problem List   Diagnosis    Type 2 diabetes mellitus without complication, without long-term current use of insulin (HCC)    Mixed hyperlipidemia    Essential hypertension    Acquired hypothyroidism    Erosive gastritis    Esophagitis, Coke grade A    RUQ pain    Fatty liver    Mass of torso       No Known Allergies  Outpatient Medications Marked as Taking for the 4/12/21 encounter (Office Visit) with Royer Santos MD   Medication Sig Dispense Refill    tiZANidine (ZANAFLEX) 4 MG tablet I po BID PRN 28 tablet 0    meloxicam (MOBIC) 15 MG tablet Take 1 tablet by mouth daily 30 tablet 0    empagliflozin (Ulysess Postin) 25 MG tablet Take 25 mg by mouth daily 30 tablet 11    levothyroxine (SYNTHROID) 75 MCG tablet Take 1 tablet by mouth daily 90 tablet 3    glimepiride (AMARYL) 4 MG tablet Take 1 tablet by mouth 2 times daily 180 tablet 3    atorvastatin (LIPITOR) 20 MG tablet Take 1 tablet by mouth daily 90 tablet 3    lisinopril (PRINIVIL;ZESTRIL) 40 MG tablet Take 1 tablet by mouth daily 90 tablet 3    glucose monitoring kit (FREESTYLE) monitoring kit 1 kit by Does not apply route daily 1 kit 0    omeprazole (PRILOSEC) 20 MG delayed release capsule TAKE 1 CAPSULE BY MOUTH EVERY DAY (Patient taking differently: as needed ) 30 capsule 9    SITagliptin (JANUVIA) 100 MG tablet Take 1 tablet by mouth daily 90 tablet 3    metFORMIN (GLUCOPHAGE) 850 MG tablet Take 1 tablet by mouth daily 90 tablet 3    blood glucose monitor strips Test qd 100 strip 3         Review of Systems: 14 systems were negative except of what was stated on HPI    Nursing note and vitals reviewed. Vitals:    04/12/21 0848   BP: 128/80   Pulse: 83   Temp: 97 °F (36.1 °C)   TempSrc: Infrared   SpO2: 99%   Weight: 194 lb (88 kg)   Height: 5' 5\" (1.651 m)     Wt Readings from Last 3 Encounters:   04/12/21 194 lb (88 kg)   04/12/21 194 lb (88 kg)   03/29/21 192 lb (87.1 kg)     BP Readings from Last 3 Encounters:   04/12/21 128/80   04/12/21 124/80   03/15/21 124/71     Body mass index is 32.28 kg/m². Constitutional: Patient appears well-developed and well-nourished. No distress. Head: Normocephalic and atraumatic. Neck: Normal range of motion. Neck supple. No thyroidmegaly. Cardiovascular: Normal rate, regular rhythm, normal heart sounds and intact distal pulses. Pulmonary/Chest: Effort normal and breath sounds normal. No stridor. No respiratory distress. No wheezes and no rales. Abdominal: Soft. Bowel sounds are normal. No distension and no mass. No tenderness. No rebound and no guarding. Musculoskeletal: No edema and no tenderness.    Skin: No

## 2021-04-12 NOTE — PROGRESS NOTES
[facility] GENERAL SURGERY      S:   Patient presents s/p excision of left flank lipoma. She reports having pain in the site. O:   Comfortable         Incision site healing well. There is no sign of infection. There is no erythema. There is minimal induration. There is no seroma. A:   S/P excision of left flank lipoma    P:   I reassured her that the pain will settle down as the inflammation finishes resolving. I encouraged her to use ice on this to help with the pain and inflammation. Follow up as needed.

## 2021-04-13 ENCOUNTER — HOSPITAL ENCOUNTER (OUTPATIENT)
Dept: PHYSICAL THERAPY | Age: 66
Setting detail: THERAPIES SERIES
Discharge: HOME | End: 2021-04-13
Payer: COMMERCIAL

## 2021-04-13 PROCEDURE — 97140 MANUAL THERAPY 1/> REGIONS: CPT

## 2021-04-13 PROCEDURE — 97110 THERAPEUTIC EXERCISES: CPT

## 2021-04-13 NOTE — FLOWSHEET NOTE
[facility], [address]  Phone: [phone]  Fax [phone]    Physical Therapy Progress Note/Daily Treatment Note  Date:  2021    Patient Name:  Anderson Posey    :  1955  MRN: [id number]  Restrictions/Precautions:    Physician Information:  Referring Practitioner: Dr. Mandy Manjarrez  Medical/Treatment Diagnosis Information:  · Diagnosis: M75.111 (ICD-10-CM) - Nontraumatic incomplete tear of right rotator cuff  · Treatment Diagnosis: Right shoulder pain M25.511, right shoulder stiffness M25.611, Right shoulder effusion M25.411, Right shoulder weakness M62.8      [] Conservative / [x] Surgical - DOS: 10/27/2020 R RTC repair with patch, neer/clarence  Therapy Diagnosis/Practice Pattern:  Practice Pattern I: Bony or Soft Tissue Surgery  Insurance/Certification information:  PT Insurance Information: [address] [place] [address] Sandi of care signed: [] YES  [] NO  Number of Comorbidities:  []0     [x]1-2    []3+  Date of Patient follow up with Physician:     Is this a Progress Report:     []  Yes  [x]  No        If Yes:  Date Range for reporting period:  Beginning 3/24/2021  Ending    Progress report will be due (10 Rx or 30 days whichever is less):        Recertification will be due (POC Duration  / 90 days whichever is less): 2021     Latex Allergy:  [x]NO      []YES  Preferred Language for Healthcare:   [x]English       []other:    Visit # Insurance Allowable   21 total  2021 30 for      SUBJECTIVE:  Pt reports shoulder is doing better. Back is really painful today and over weekend. Wants to review sleeper stretch.     OBJECTIVE:    Observation:    Palpation:     Test used Initial score Current Score   Pain Summary VAS 2-8 2-4   Functional questionnaire QDash 75%  44/55 27%  23/55   ROM flexion 60 170    ER 10 80    ABD 25 -    IR belly 60   Strength flexion NT 4    ER  4-    ABD  4    IR  4- RESTRICTIONS/PRECAUTIONS: wean from sling progress as tolerated    Exercises/Interventions:   Therapeutic Ex Sets/reps Notes HEP   Sleeper stretch 5 x 10\"     Supine flexion AROM  Supine RS 4 way      Seated shoulder flex  Seated shoulder scaption  Seated shoulder ABD  2 x 10  2 x 10  Red 2 x 10 Cues to reduce shrug    No $  Band D1 flex  Overhead ABD 2 x 10 red  2 x 10 red  2 x 10 red  X   DB curl 2 x 10 6#    TB row  TB ext  TB ER walkout  TB Tricep ext  TB bicep curl 3 x 10  3 x 10    3 x 10  3 x 10 Blue  Blue  Blue  [employer]   ladder, CW/CCW, X    4 cone reach at [employer] on wall RS alphabet 1 x      Bent over DB row 2 x 10 5#                                                     Manual Intervention 15 min     PROM, oscillations, gr 1-2 jt mobs GHJ  STM bicep, posterior RTC, lat                              Pt ed: surgery, anatomy, post-op restrictions, PT progression, RICE, sling use            NMR re-education                                                          Therapeutic Exercise and NMR EXR  [x] (70039) Provided verbal/tactile cueing for activities related to strengthening, flexibility, endurance, ROM  for improvements in scapular, scapulothoracic and UE control with self care, reaching, carrying, lifting, house/yardwork, driving/computer work.    [] (39071) Provided verbal/tactile cueing for activities related to improving balance, coordination, kinesthetic sense, posture, motor skill, proprioception  to assist with  scapular, scapulothoracic and UE control with self care, reaching, carrying, lifting, house/yardwork, driving/computer work.     Therapeutic Activities:    [] (34084 or 43656) Provided verbal/tactile cueing for activities related to improving balance, coordination, kinesthetic sense, posture, motor skill, proprioception and motor activation to allow for proper function of scapular, scapulothoracic and UE control with self care, carrying, lifting, driving/computer work.     Home Exercise Program:    [x] (99260) Reviewed/Progressed HEP activities related to strengthening, flexibility, endurance, ROM of scapular, scapulothoracic and UE control with self care, reaching, carrying, lifting, house/yardwork, driving/computer work  [] (28964) Reviewed/Progressed HEP activities related to improving balance, coordination, kinesthetic sense, posture, motor skill, proprioception of scapular, scapulothoracic and UE control with self care, reaching, carrying, lifting, house/yardwork, driving/computer work      Manual Treatments:  PROM / STM / Oscillations-Mobs:  G-I, II, III, IV (PA's, Inf., Post.)  [x] (23175) Provided manual therapy to mobilize soft tissue/joints of cervical/CT, scapular GHJ and UE for the purpose of modulating pain, promoting relaxation,  increasing ROM, reducing/eliminating soft tissue swelling/inflammation/restriction, improving soft tissue extensibility and allowing for proper ROM for normal function with self care, reaching, carrying, lifting, house/yardwork, driving/computer work    Modalities:      [] GR/ESU 15 min    [] GR 15 min   [] ESU     [] CP    [] MHP    [] declined    Charges:  Timed Code Treatment Minutes: 40   Total Treatment Minutes: 40     [] EVAL (LOW) 95833 (typically 20 minutes face-to-face)  [] EVAL (MOD) 65076 (typically 30 minutes face-to-face)  [] EVAL (HIGH) 423 [id number] (typically 45 minutes face-to-face)  [] RE-EVAL     [x] JV(23330) x  2   [] IONTO  [] NMR (34603) x     [] VASO  [x] Manual (11264) x 1     [] Other:  [] TA x      [] Mech Traction (54316)  [] ES(attended) (27860)      [] ES (un) (80571):     Harless Bamberger stated goal: Regain full pain free use of arm, return to playing with granddaughter  [x] Progressing: [] Met: [] Not Met: [] Adjusted    Therapist goals for Patient:   Short Term Goals: To be achieved in: 2 weeks  1. Independent in HEP and progression per patient tolerance, in order to prevent re-injury.    [] Progressing: [x] Met: improving AROM and strength. Cues for UT compensation and scapular mechanics - when flex/scaption done with good form pain eliminated. Pt has had slow progression due to surgery performed and continued pain. Treatment/Activity Tolerance:  [x] Patient tolerated treatment well [] Patient limited by fatigue  [x] Patient limited by pain - back pain  [] Patient limited by other medical complications  [] Other:     Patient Requires Follow-up: [x] Yes  [] No    PLAN:  Continue POC progressing strength and functional ROM over course of next 8 weeks 3/25/2021  [x] Continue per plan of care [] Georgianne Nissen current plan (see comments above)  [] Plan of care initiated [] Hold pending MD visit [] Discharge      Electronically signed by:  Marisa Kulkarni PT , DPT [id number]    Note: If patient does not return for scheduled/ recommended follow up visits, this note will serve as a discharge from care along with most recent update on progress.

## 2021-04-15 ENCOUNTER — HOSPITAL ENCOUNTER (OUTPATIENT)
Dept: PHYSICAL THERAPY | Age: 66
Setting detail: THERAPIES SERIES
Discharge: HOME | End: 2021-04-15
Payer: COMMERCIAL

## 2021-04-15 PROCEDURE — 97140 MANUAL THERAPY 1/> REGIONS: CPT

## 2021-04-15 PROCEDURE — 97110 THERAPEUTIC EXERCISES: CPT

## 2021-04-15 PROCEDURE — 97161 PT EVAL LOW COMPLEX 20 MIN: CPT

## 2021-04-15 NOTE — PLAN OF CARE
[facility], [address]  Phone: [phone]  Fax [phone]    Physical Therapy Certification    Dear Referring Practitioner: Thang BALDERRAMA,    We had the pleasure of evaluating the following patient for physical therapy services at [address] [place] [address]. A summary of our findings can be found in the initial assessment below. This includes our plan of care. If you have any questions or concerns regarding these findings, please do not hesitate to contact me at the office phone number checked above.   Thank you for the referral.       Physician Signature:_______________________________Date:__________________  By signing above (or electronic signature), therapists plan is approved by physician    Patient: Juan Wang   : 1955   MRN: [id number]  Referring Physician: Referring Practitioner: Thang BALDERRAMA      Evaluation Date: 4/15/2021      Medical Diagnosis Information:  Diagnosis: M47.816 (ICD-10-CM) - Lumbar spondylosis;  M54.5, G89.29 (ICD-10-CM) - Chronic low back pain without sciatica, unspecified back pain laterality   Treatment Diagnosis: M54.5, G89.29 (ICD-10-CM) - Chronic low back pain without sciatica, unspecified back pain laterality                                         Insurance information: PT Insurance Information: [place] 21 used with shoulder, need auth at 30 total     Precautions/ Contra-indications: DM  Latex Allergy:  [x]NO      []YES  Preferred Language for Healthcare:   [x]English       []Other:    C-SSRS Triggered by Intake questionnaire (Past 2 wk assessment):   [x] No, Questionnaire did not trigger screening.   [] Yes, Patient intake triggered further evaluation      [] C-SSRS Screening completed  [] PCP notified via Plan of Care  [] Emergency services notified     SUBJECTIVE: Patient stated complaint: Pt currently being seen in clinic for Right shoulder sp RTC repair in October by this therapist. Was also seen previously for low back pain by this therapist last year. No major medical changes. Pt has re-aggravated  back following RTC surgery and not using arm as much. Also had non cancerous mass on left flank removed last month and has been having increased pain since then. Difficulty sleeping at night, cant fall asleep and wakes her up. No symptoms down leg only in back. Trouble moving, bending, lifting. Relevant Medical History:lipoma removed from left flank March 2021, R RTC repair October 2020, DM 2,   Functional Disability Index/G-Codes:   Oswestry 52% 26/50    Pain Scale: 0-8/10  Easing factors: rest, stretching  Provocative factors: bending, lifting, rolling over     Type: []Constant   [x]Intermittent  []Radiating []Localized []other:     Numbness/Tingling: denies    Occupation/School: retired    Living Status/Prior Level of Function: Independent with ADLs and IADLs, walking, lplaying with grand children    OBJECTIVE:     Standing exam ROM/Normal Abnormal Comments   ROM   Painful throughout   flexion Mid shin     extension 80%     side bend Fib head     Kemps/quadrant      Toe walk (S2) X     Heel walk (L4) X     Standing flexion (PSIS)      Standing ext (sacral sulci)      Gillets test        Seated exam ROM/Normal Abnormal Comments   Strength      Hip flexion 4/5 B     Knee ext 4+/5 B     Hip ABD 4-/5 B         Supine exam ROM/ Normal Abnormal Comments   ROM      Hip flexion 125 B     abduction      Hip IR P! Hip ER P! Prone exam ROM/Normal Abnormal Comments   PA/spring   P! Throughout lumbar spine central and unilateral   Sacral spring   P!  Bilateral and into piriformis        Reflexes/Sensation: NT no neural complaints   []Dermatomes/Myotomes intact    []UE Reflexes     []Normal []Hypo      []Hyper   []LE Reflexes     []Normal []Hypo      []Hyper   []Babinski/Clonus/Hoffmans:    []Other:      Functional Mobility/Transfers: guarded with all mobility    Posture: anterior pelvic tilt, rounded shoulders, slouched posture    Bandages/Dressings/Incisions: na    Gait: (include devices/WB status) trendelenburg    Orthopedic Special Tests: see above                       [x] Patient history, allergies, meds reviewed. Medical chart reviewed. See intake form. Review Of Systems (ROS):  [x]Performed Review of systems (Integumentary, CardioPulmonary, Neurological) by intake and observation. Intake form has been scanned into medical record. Patient has been instructed to contact their primary care physician regarding ROS issues if not already being addressed at this time.       Co-morbidities/Complexities (which will affect course of rehabilitation):   []None           Arthritic conditions   []Rheumatoid arthritis (M05.9)  [x]Osteoarthritis (M19.91)   Cardiovascular conditions   []Hypertension (I10)  []Hyperlipidemia (E78.5)  []Angina pectoris (I20)  []Atherosclerosis (I70)   Musculoskeletal conditions   []Disc pathology   []Congenital spine pathologies   []Prior surgical intervention  []Osteoporosis (M81.8)  []Osteopenia (M85.8)   Endocrine conditions   []Hypothyroid (E03.9)  []Hyperthyroid Gastrointestinal conditions   []Constipation (P92.31)   Metabolic conditions   []Morbid obesity (E66.01)  [x]Diabetes type 1(E10.65) or 2 (E11.65)   []Neuropathy (G60.9)     Pulmonary conditions   []Asthma (J45)  []Coughing   []COPD (J44.9)   Psychological Disorders  [x]Anxiety (F41.9)  []Depression (F32.9)   []Other:   []Other:          Barriers to/and or personal factors that will affect rehab potential:              [x]Age  []Sex              []Motivation/Lack of Motivation                        [x]Co-Morbidities              []Cognitive Function, education/learning barriers              []Environmental, home barriers              []profession/work barriers  [x]past PT/medical experience  []other:  Justification:     Falls Risk Assessment (30 days):   [x] Falls Risk assessed and no intervention required. [] Falls Risk assessed and Patient requires intervention due to being higher risk   TUG score (>12s at risk):     [] Falls education provided, including       ASSESSMENT:   Functional Impairments:     [x]Noted lumbar/proximal hip hypomobility   []Noted lumbosacral and/or generalized hypermobility   [x]Decreased Lumbosacral/hip/LE functional ROM   [x]Decreased core/proximal hip strength and neuromuscular control    [x]Decreased LE functional strength    []Abnormal reflexes/sensation/myotomal/dermatomal deficits  [x]Reduced balance/proprioceptive control    []other:      Functional Activity Limitations (from functional questionnaire and intake)   [x]Reduced ability to tolerate prolonged functional positions   [x]Reduced ability or difficulty with changes of positions or transfers between positions   [x]Reduced ability to maintain good posture and demonstrate good body mechanics with sitting, bending, and lifting   [x]Reduced ability to sleep   [x] Reduced ability or tolerance with driving and/or computer work   [x]Reduced ability to perform lifting, reaching, carrying tasks   [x]Reduced ability to squat   [x]Reduced ability to forward bend   [x]Reduced ability to ambulate prolonged functional periods/distances/surfaces   [x]Reduced ability to ascend/descend stairs   []other:       Participation Restrictions   [x]Reduced participation in self care activities   [x]Reduced participation in home management activities   [x]Reduced participation in work activities   [x]Reduced participation in social activities. [x]Reduced participation in sport/recreation activities. Classification:   [x]Signs/symptoms consistent with Lumbar instability/stabilization subgroup. []Signs/symptoms consistent with Lumbar mobilization/manipulation subgroup, myotomes and dermatomes intact. Meets manipulation criteria.     []Signs/symptoms consistent with Lumbar direction specific/centralization subgroup   []Signs/symptoms consistent with Lumbar traction subgroup     []Signs/symptoms consistent with lumbar facet dysfunction   []Signs/symptoms consistent with lumbar stenosis type dysfunction   []Signs/symptoms consistent with nerve root involvement including myotome & dermatome dysfunction   []Signs/symptoms consistent with post-surgical status including: decreased ROM, strength and function. [x]signs/symptoms consistent with pathology which may benefit from Dry needling     []other:      Prognosis/Rehab Potential:      []Excellent   []Good    [x]Fair   []Poor    Tolerance of evaluation/treatment:    []Excellent   []Good    [x]Fair   []Poor  Physical Therapy Evaluation Complexity Justification  [x] A history of present problem with:  [] no personal factors and/or comorbidities that impact the plan of care;  []1-2 personal factors and/or comorbidities that impact the plan of care  [x]3 personal factors and/or comorbidities that impact the plan of care  [x] An examination of body systems using standardized tests and measures addressing any of the following: body structures and functions (impairments), activity limitations, and/or participation restrictions;:  [] a total of 1-2 or more elements   [] a total of 3 or more elements   [x] a total of 4 or more elements   [x] A clinical presentation with:  [x] stable and/or uncomplicated characteristics   [] evolving clinical presentation with changing characteristics  [] unstable and unpredictable characteristics;   [x] Clinical decision making of [x] low, [] moderate, [] high complexity using standardized patient assessment instrument and/or measurable assessment of functional outcome.     [x] EVAL (LOW) 00413 (typically 20 minutes face-to-face)  [] EVAL (MOD) 11248 (typically 30 minutes face-to-face)  [] EVAL (HIGH) 64868 (typically 45 minutes face-to-face)  [] RE-EVAL     PLAN: Begin PT focusing on: proximal hip mobilizations, LB mobs, LB symptoms or restriction. [] Progressing: [] Met: [] Not Met: [] Adjusted   5.  Patient will be able to sleep 4+ hours without symptoms limiting or waking patient  [] Progressing: [] Met: [] Not Met: [] Adjusted         Electronically signed by:  Andria Wood PT DPT [id number]

## 2021-04-15 NOTE — FLOWSHEET NOTE
[facility],  [address] [place]  Phone: [phone]  Fax [phone]    Physical Therapy Daily Treatment Note  Date:  4/15/2021    Patient Name:  Margie Masters    :  1955  MRN: [id number]  Restrictions/Precautions:    Physician Information:  Referring Practitioner: Jessica BALDERRAMA  Medical/Treatment Diagnosis Information:  · Diagnosis: M47.816 (ICD-10-CM) - Lumbar spondylosis;  M54.5, G89.29 (ICD-10-CM) - Chronic low back pain without sciatica, unspecified back pain laterality  · Treatment Diagnosis: M54.5, G89.29 (ICD-10-CM) - Chronic low back pain without sciatica, unspecified back pain laterality  [x] Conservative / [] Surgical - DOS:  Therapy Diagnosis/Practice Pattern:  Practice Pattern F: Spinal Disorders  Insurance/Certification information:  PT Insurance Information: [place] 21 used with shoulder, need auth at 30 total  Plan of care signed: [] YES  [] NO  Number of Comorbidities:  []0     []1-2    [x]3+  Date of Patient follow up with Physician:     Is this a Progress Report:     []  Yes  [x]  No        If Yes:  Date Range for reporting period:  Beginning 4/15/2021  Ending     Progress report will be due (10 Rx or 30 days whichever is less):        Recertification will be due (POC Duration  / 90 days whichever is less): 2021      Latex Allergy:  [x]NO      []YES  Preferred Language for Healthcare:   [x]English       []other:    Visit # Insurance Allowable    (11 used with shoulder)    Jennifer Portillo []no        [x]yes:after 30       SUBJECTIVE:  See eval    OBJECTIVE: See eval   Observation:   Palpation:     Test used Initial score Current Score   Pain Summary VAS 0-8    Functional questionnaire Oswestry 52% 26/50    ROM Lumbar Flexion Mid shin P! Lumbar Ext 80% P! Lumbar SB L/R Fib head P!      Lumbar rotation L/R     Strength Hip flex 4/5 B     Knee ext 4+/5 B     Hip ABD 4-/5 B II, III, IV (PA's, Inf., Post.)  [x] (57276) Provided manual therapy to mobilize proximal hip and LS spine soft tissue/joints for the purpose of modulating pain, promoting relaxation,  increasing ROM, reducing/eliminating soft tissue swelling/inflammation/restriction, improving soft tissue extensibility and allowing for proper ROM for normal function with self care, mobility, lifting and ambulation. Modalities:      [] GR/ESU 15 min    [] GR 15 min  [] ESU     [] CP    [] MHP    [] declined  Charges:  Timed Code Treatment Minutes: 40   Total Treatment Minutes: 50     [x] EVAL (LOW) 74306 (typically 20 minutes face-to-face)  [] EVAL (MOD) 50950 (typically 30 minutes face-to-face)  [] EVAL (HIGH) 98700 (typically 45 minutes face-to-face)  [] RE-EVAL     [x] ZJ(04526) x 2    [] IONTO  [] NMR (09699) x     [] VASO  [x] Manual (95762) x 1      [] Other:  [] TA x      [] Mech Traction (35525)  [] ES(attended) (73010)      [] ES (un) (13676):     Goals: Patient stated goal: Patient would like to eliminate back pain  []? Progressing: []? Met: []? Not Met: []? Adjusted          Therapist goals for Patient:   Short Term Goals: To be achieved in: 2 weeks  1. Independent in HEP and progression per patient tolerance, in order to prevent re-injury. []? Progressing: []? Met: []? Not Met: []? Adjusted       2. Patient will have a decrease in pain to facilitate improvement in movement, function, and ADLs as indicated by Functional Deficits. []? Progressing: []? Met: []? Not Met: []? Adjusted          Long Term Goals: To be achieved in: 12 weeks  1. Disability index score of 25% or less for the oswestry  to assist with reaching prior level of function. []? Progressing: []? Met: []? Not Met: []? Adjusted       2. Patient will demonstrate increased AROM to WNL, good LS mobility, good hip ROM to allow for proper joint functioning as indicated by patients Functional Deficits. []? Progressing: []? Met: []? Not Met: []?  Adjusted

## 2021-04-20 ENCOUNTER — HOSPITAL ENCOUNTER (OUTPATIENT)
Dept: PHYSICAL THERAPY | Age: 66
Setting detail: THERAPIES SERIES
Discharge: HOME | End: 2021-04-20
Payer: COMMERCIAL

## 2021-04-20 PROCEDURE — 97140 MANUAL THERAPY 1/> REGIONS: CPT

## 2021-04-20 PROCEDURE — 97110 THERAPEUTIC EXERCISES: CPT

## 2021-04-20 NOTE — FLOWSHEET NOTE
[facility], [address] [place]  Phone: [phone]  Fax [phone]    Physical Therapy Progress Note/Daily Treatment Note  Date:  2021    Patient Name:  Tyson Han    :  1955  MRN: [id number]  Restrictions/Precautions:    Physician Information:  Referring Practitioner: Dr. Federico Tubbs  Medical/Treatment Diagnosis Information:  · Diagnosis: M75.111 (ICD-10-CM) - Nontraumatic incomplete tear of right rotator cuff  · Treatment Diagnosis: Right shoulder pain M25.511, right shoulder stiffness M25.611, Right shoulder effusion M25.411, Right shoulder weakness M62.8      [] Conservative / [x] Surgical - DOS: 10/27/2020 R RTC repair with patch, neer/clarence  Therapy Diagnosis/Practice Pattern:  Practice Pattern I: Bony or Soft Tissue Surgery  Insurance/Certification information:  PT Insurance Information: [address] [place] [address] Sandi of care signed: [] YES  [] NO  Number of Comorbidities:  []0     [x]1-2    []3+  Date of Patient follow up with Physician:     Is this a Progress Report:     []  Yes  [x]  No        If Yes:  Date Range for reporting period:  Beginning 3/24/2021  Ending    Progress report will be due (10 Rx or 30 days whichever is less):        Recertification will be due (POC Duration  / 90 days whichever is less): 2021     Latex Allergy:  [x]NO      []YES  Preferred Language for Healthcare:   [x]English       []other:    Visit # Insurance Allowable   22 total  2021  (+1 back  )   30 for      SUBJECTIVE:  Pt reports shoulder is doing better. Back is really painful today and over weekend. Wants to review sleeper stretch.     OBJECTIVE:    Observation:    Palpation:     Test used Initial score Current Score   Pain Summary VAS 2-8 2-4   Functional questionnaire QDash 75%  44/55 27%  23/55   ROM flexion 60 170    ER 10 80    ABD 25 -    IR belly 60   Strength flexion NT 4    ER  4-    ABD  4    IR 4-      RESTRICTIONS/PRECAUTIONS: wean from sling progress as tolerated    Exercises/Interventions:   Therapeutic Ex Sets/reps Notes HEP   Sleeper stretch 5 x 10\"     Supine flexion AROM  Supine RS 4 way      Seated shoulder flex  Seated shoulder scaption  Seated shoulder ABD  3 x 10 3#  3 x 10 3#  Red 2 x 10 Cues to reduce shrug    No $  Band D1 flex  Overhead ABD 2 x 10 red  2 x 10 red  2 x 10 red  X   DB curl 2 x 10 6#    TB row  TB ext  TB ER walkout  TB Tricep ext  TB bicep curl      Blue  Blue  Blue  Nexalogy   ladder, CW/CCW, X    4 cone reach at [employer] on wall [employer] 1 x      Bent over DB row  Bent over DB ext  Bent over DB abd 4 x 10 6#  4 x 10 6#  4 x 10 3#                                                     Manual Intervention 15 min     PROM, oscillations, gr 1-2 jt mobs GHJ  STM bicep, posterior RTC, lat                              Pt ed: surgery, anatomy, post-op restrictions, PT progression, RICE, sling use            NMR re-education                                                          Therapeutic Exercise and NMR EXR  [x] (00206) Provided verbal/tactile cueing for activities related to strengthening, flexibility, endurance, ROM  for improvements in scapular, scapulothoracic and UE control with self care, reaching, carrying, lifting, house/yardwork, driving/computer work.    [] (06584) Provided verbal/tactile cueing for activities related to improving balance, coordination, kinesthetic sense, posture, motor skill, proprioception  to assist with  scapular, scapulothoracic and UE control with self care, reaching, carrying, lifting, house/yardwork, driving/computer work.     Therapeutic Activities:    [] (25891 or 91490) Provided verbal/tactile cueing for activities related to improving balance, coordination, kinesthetic sense, posture, motor skill, proprioception and motor activation to allow for proper function of scapular, scapulothoracic and UE control with self care, carrying, lifting, driving/computer work. Home Exercise Program:    [x] (43318) Reviewed/Progressed HEP activities related to strengthening, flexibility, endurance, ROM of scapular, scapulothoracic and UE control with self care, reaching, carrying, lifting, house/yardwork, driving/computer work  [] (44406) Reviewed/Progressed HEP activities related to improving balance, coordination, kinesthetic sense, posture, motor skill, proprioception of scapular, scapulothoracic and UE control with self care, reaching, carrying, lifting, house/yardwork, driving/computer work      Manual Treatments:  PROM / STM / Oscillations-Mobs:  G-I, II, III, IV (PA's, Inf., Post.)  [x] (22664) Provided manual therapy to mobilize soft tissue/joints of cervical/CT, scapular GHJ and UE for the purpose of modulating pain, promoting relaxation,  increasing ROM, reducing/eliminating soft tissue swelling/inflammation/restriction, improving soft tissue extensibility and allowing for proper ROM for normal function with self care, reaching, carrying, lifting, house/yardwork, driving/computer work    Modalities:      [] GR/ESU 15 min    [] GR 15 min   [] ESU     [] CP    [] MHP    [] declined    Charges:  Timed Code Treatment Minutes: 40   Total Treatment Minutes: 40     [] EVAL (LOW) 95159 (typically 20 minutes face-to-face)  [] EVAL (MOD) 76086 (typically 30 minutes face-to-face)  [] EVAL (HIGH) 83570 (typically 45 minutes face-to-face)  [] RE-EVAL     [x] NO(27258) x  2   [] IONTO  [] NMR (06091) x     [] VASO  [x] Manual (01522) x 1     [] Other:  [] TA x      [] Mech Traction (79951)  [] ES(attended) (05445)      [] ES (un) (36950):     Yael Staggers stated goal: Regain full pain free use of arm, return to playing with granddaughter  [x] Progressing: [] Met: [] Not Met: [] Adjusted    Therapist goals for Patient:   Short Term Goals: To be achieved in: 2 weeks  1.  Independent in HEP and progression per patient tolerance, in order to being limited due to back pain. Pt with improving AROM and strength. Cues for UT compensation and scapular mechanics - when flex/scaption done with good form pain eliminated. Pt has had slow progression due to surgery performed and continued pain. Treatment/Activity Tolerance:  [x] Patient tolerated treatment well [] Patient limited by fatigue  [x] Patient limited by pain - back pain  [] Patient limited by other medical complications  [] Other:     Patient Requires Follow-up: [x] Yes  [] No    PLAN:  Continue POC progressing strength and functional ROM over course of next 8 weeks 3/25/2021  [x] Continue per plan of care [] Kristen Hernandez current plan (see comments above)  [] Plan of care initiated [] Hold pending MD visit [] Discharge      Electronically signed by:  Buck Billy PT , DPT [id number]    Note: If patient does not return for scheduled/ recommended follow up visits, this note will serve as a discharge from care along with most recent update on progress.

## 2021-04-22 ENCOUNTER — HOSPITAL ENCOUNTER (OUTPATIENT)
Dept: PHYSICAL THERAPY | Age: 66
Setting detail: THERAPIES SERIES
Discharge: HOME | End: 2021-04-22
Payer: COMMERCIAL

## 2021-04-22 PROCEDURE — 97140 MANUAL THERAPY 1/> REGIONS: CPT

## 2021-04-22 PROCEDURE — 97110 THERAPEUTIC EXERCISES: CPT

## 2021-04-22 NOTE — FLOWSHEET NOTE
Strength Hip flex 4/5 B     Knee ext 4+/5 B     Hip ABD 4-/5 B      RESTRICTIONS/PRECAUTIONS: none    Exercises/Interventions:     Therapeutic Ex sets/reps Notes HEP   SB DKTC  SB LTR  BKFO  TA March 20 x  20 x  10 x  X   Standing thread the needle  L stretch  Hip ext 10 x  10 x  10 x  X      X      X                                                               Manual Intervention       Joint mobs grade 1-2 5 min     HS, ITB, Piriformis STM 15 min                                         NMR re-education                                       Therapeutic Exercise and NMR EXR  [x] (80886) Provided verbal/tactile cueing for activities related to strengthening, flexibility, endurance, ROM  for improvements in proximal hip and core control with self care, mobility, lifting and ambulation.  [] (77938) Provided verbal/tactile cueing for activities related to improving balance, coordination, kinesthetic sense, posture, motor skill, proprioception  to assist with core control in self care, mobility, lifting, and ambulation.      Therapeutic Activities:    [] (09273 or 09713) Provided verbal/tactile cueing for activities related to improving balance, coordination, kinesthetic sense, posture, motor skill, proprioception and motor activation to allow for proper function  with self care and ADLs  [] (88747) Provided training and instruction to the patient for proper core and proximal hip recruitment and positioning with ambulation re-education     Home Exercise Program:    [x] (93920) Reviewed/Progressed HEP activities related to strengthening, flexibility, endurance, ROM of core, proximal hip and LE for functional self-care, mobility, lifting and ambulation   [] (29853) Reviewed/Progressed HEP activities related to improving balance, coordination, kinesthetic sense, posture, motor skill, proprioception of core, proximal hip and LE for self care, mobility, lifting, and ambulation      Manual Treatments:  PROM / STM / Met: []? Not Met: []? Adjusted       3. Patient will demonstrate an increase in Strength to good proximal hip and core activation to allow for proper functional mobility as indicated by patients Functional Deficits. [x]? Progressing: []? Met: []? Not Met: []? Adjusted       4. Patient will return to functional activities without increased symptoms or restriction. [x]? Progressing: []? Met: []? Not Met: []? Adjusted       5. Patient will be able to sleep 4+ hours without symptoms limiting or waking patient  [x]? Progressing: []? Met: []? Not Met: []? Adjusted         Overall Progression Towards Functional goals/ Treatment Progress Update:  [x] Patient is progressing as expected towards functional goals listed. [] Progression is slowed due to complexities/Impairments listed. [] Progression has been slowed due to co-morbidities. [] Plan just implemented, too soon to assess goals progression <30days   [] Goals require adjustment due to lack of progress  [] Patient is not progressing as expected and requires additional follow up with physician  [] Other    Prognosis for POC: [x] Good [] Fair  [] Poor      Patient requires continued skilled intervention: [x] Yes  [] No      ASSESSMENT:  See eval    Treatment/Activity Tolerance:  [x] Patient tolerated treatment well [] Patient limited by fatigue  [] Patient limited by pain  [] Patient limited by other medical complications  [] Other:       PLAN: See eval  [] Continue per plan of care [] Alter current plan (see comments above)  [x] Plan of care initiated [] Hold pending MD visit [] Discharge      Electronically signed by:  Pilar Castañeda, PT , DPT [id number]    Note: If patient does not return for scheduled/ recommended follow up visits, this note will serve as a discharge from care along with most recent update on progress.

## 2021-04-27 ENCOUNTER — APPOINTMENT (OUTPATIENT)
Dept: PHYSICAL THERAPY | Age: 66
End: 2021-04-27
Payer: COMMERCIAL

## 2021-04-29 ENCOUNTER — APPOINTMENT (OUTPATIENT)
Dept: PHYSICAL THERAPY | Age: 66
End: 2021-04-29
Payer: COMMERCIAL

## 2021-05-03 RX ORDER — IBUPROFEN 800 MG/1
800 TABLET ORAL 2 TIMES DAILY PRN
Qty: 60 TABLET | Refills: 0 | Status: SHIPPED | OUTPATIENT
Start: 2021-05-03 | End: 2021-08-02 | Stop reason: SDUPTHER

## 2021-05-06 ENCOUNTER — APPOINTMENT (OUTPATIENT)
Dept: PHYSICAL THERAPY | Age: 66
End: 2021-05-06
Payer: MEDICARE

## 2021-05-11 ENCOUNTER — HOSPITAL ENCOUNTER (OUTPATIENT)
Dept: PHYSICAL THERAPY | Age: 66
Setting detail: THERAPIES SERIES
Discharge: HOME | End: 2021-05-11
Payer: MEDICARE

## 2021-05-11 PROCEDURE — 97110 THERAPEUTIC EXERCISES: CPT

## 2021-05-11 PROCEDURE — 97140 MANUAL THERAPY 1/> REGIONS: CPT

## 2021-05-11 NOTE — PROGRESS NOTES
[facility],  [address]  Phone: [phone]  Fax [phone]    Physical Therapy Progress note/Daily Treatment Note  Date:  2021    Patient Name:  Neol Frye    :  1955  MRN: [id number]  Restrictions/Precautions:    Physician Information:  Referring Practitioner: Jenny BALDERRAMA  Medical/Treatment Diagnosis Information:  · Diagnosis: M47.816 (ICD-10-CM) - Lumbar spondylosis;  M54.5, G89.29 (ICD-10-CM) - Chronic low back pain without sciatica, unspecified back pain laterality  · Treatment Diagnosis: M54.5, G89.29 (ICD-10-CM) - Chronic low back pain without sciatica, unspecified back pain laterality  [x] Conservative / [] Surgical - DOS:  Therapy Diagnosis/Practice Pattern:  Practice Pattern F: Spinal Disorders  Insurance/Certification information:  PT Insurance Information: BCBS mediblue $40 copay - 100% needs AIM auth  Plan of care signed: [] YES  [] NO  Number of Comorbidities:  []0     []1-2    [x]3+  Date of Patient follow up with Physician:     Is this a Progress Report:     [x]  Yes  []  No        If Yes:  Date Range for reporting period:  Beginning 4/15/2021  Ending 2021    Progress report will be due (10 Rx or 30 days whichever is less):        Recertification will be due (POC Duration  / 90 days whichever is less): 2021      Latex Allergy:  [x]NO      []YES  Preferred Language for Healthcare:   [x]English       []other:    Visit # Insurance Allowable   3 $40 copay [employer]IM Mckenzie Handing []no        [x]yes:       SUBJECTIVE:  Pt reports back and shoulder both bothering her. Was not able to do much exercises while out of town and had to sleep on air mattress which did not help. Insurance changed and now PT is expensive, wants to discuss options.      OBJECTIVE:    Observation:   Palpation:     Test used Initial score Current Score   Pain Summary VAS 0-8 4   Functional lifting and ambulation   [] (24996) Reviewed/Progressed HEP activities related to improving balance, coordination, kinesthetic sense, posture, motor skill, proprioception of core, proximal hip and LE for self care, mobility, lifting, and ambulation      Manual Treatments:  PROM / STM / Oscillations-Mobs:  G-I, II, III, IV (PA's, Inf., Post.)  [x] (21802) Provided manual therapy to mobilize proximal hip and LS spine soft tissue/joints for the purpose of modulating pain, promoting relaxation,  increasing ROM, reducing/eliminating soft tissue swelling/inflammation/restriction, improving soft tissue extensibility and allowing for proper ROM for normal function with self care, mobility, lifting and ambulation. Modalities:      [] GR/ESU 15 min    [] GR 15 min  [] ESU     [] CP    [] MHP    [] declined  Charges:  Timed Code Treatment Minutes: 40   Total Treatment Minutes: 50     [] EVAL (LOW) 29339 (typically 20 minutes face-to-face)  [] EVAL (MOD) 37987 (typically 30 minutes face-to-face)  [] EVAL (HIGH) 20149 (typically 45 minutes face-to-face)  [] RE-EVAL     [x] MI(90059) x 1    [] IONTO  [] NMR (62022) x     [] VASO  [x] Manual (47654) x 2      [] Other:  [] TA x      [] Mech Traction (89256)  [] ES(attended) (38612)      [] ES (un) (37580):     Goals: Patient stated goal: Patient would like to eliminate back pain  []? Progressing: []? Met: []? Not Met: []? Adjusted          Therapist goals for Patient:   Short Term Goals: To be achieved in: 2 weeks  1. Independent in HEP and progression per patient tolerance, in order to prevent re-injury. []? Progressing: [x]? Met: []? Not Met: []? Adjusted       2. Patient will have a decrease in pain to facilitate improvement in movement, function, and ADLs as indicated by Functional Deficits. []? Progressing: [x]? Met: []? Not Met: []? Adjusted          Long Term Goals: To be achieved in: 12 weeks  1.  Disability index score of 25% or less for the oswestry  to assist with reaching prior level of function. [x]? Progressing: []? Met: []? Not Met: []? Adjusted       2. Patient will demonstrate increased AROM to WNL, good LS mobility, good hip ROM to allow for proper joint functioning as indicated by patients Functional Deficits. [x]? Progressing: []? Met: []? Not Met: []? Adjusted       3. Patient will demonstrate an increase in Strength to good proximal hip and core activation to allow for proper functional mobility as indicated by patients Functional Deficits. [x]? Progressing: []? Met: []? Not Met: []? Adjusted       4. Patient will return to functional activities without increased symptoms or restriction. [x]? Progressing: []? Met: []? Not Met: []? Adjusted       5. Patient will be able to sleep 4+ hours without symptoms limiting or waking patient  [x]? Progressing: []? Met: []? Not Met: []? Adjusted         Overall Progression Towards Functional goals/ Treatment Progress Update:  [x] Patient is progressing as expected towards functional goals listed. [] Progression is slowed due to complexities/Impairments listed. [] Progression has been slowed due to co-morbidities. [] Plan just implemented, too soon to assess goals progression <30days   [] Goals require adjustment due to lack of progress  [] Patient is not progressing as expected and requires additional follow up with physician  [] Other    Prognosis for POC: [x] Good [] Fair  [] Poor      Patient requires continued skilled intervention: [x] Yes  [] No      ASSESSMENT:  Pt with slow progression. Was out of town for two weeks with less than ideal sleeping arrangements limiting improvements in back pain. Would benefit from continued skilled PT to increase ROM, strength, functional mobility in order to return to PLOF and sleep through the night. Pt has recent insurance change and will be unable to attend 2x week. Will continue with 1x a week for 8 weeks and progress therex as tolerated.     Treatment/Activity Tolerance:  [x] Patient tolerated treatment well [] Patient limited by fatigue  [] Patient limited by pain  [] Patient limited by other medical complications  [] Other:       PLAN: 1x week for 8 weeks  [x] Continue per plan of care [] Alter current plan (see comments above)  [] Plan of care initiated [] Hold pending MD visit [] Discharge      Electronically signed by:  Litzy Moy PT , DPT [id number]    Note: If patient does not return for scheduled/ recommended follow up visits, this note will serve as a discharge from care along with most recent update on progress.

## 2021-05-13 ENCOUNTER — APPOINTMENT (OUTPATIENT)
Dept: PHYSICAL THERAPY | Age: 66
End: 2021-05-13
Payer: MEDICARE

## 2021-05-18 ENCOUNTER — HOSPITAL ENCOUNTER (OUTPATIENT)
Dept: PHYSICAL THERAPY | Age: 66
Setting detail: THERAPIES SERIES
Discharge: HOME | End: 2021-05-18
Payer: MEDICARE

## 2021-05-18 PROCEDURE — 97140 MANUAL THERAPY 1/> REGIONS: CPT

## 2021-05-18 PROCEDURE — 97110 THERAPEUTIC EXERCISES: CPT

## 2021-05-18 NOTE — PROGRESS NOTES
[facility],  [address]  Phone: [phone]  Fax [phone]    Physical Therapy Progress note/Daily Treatment Note  Date:  2021    Patient Name:  Rom Alarcon    :  1955  MRN: [id number]  Restrictions/Precautions:    Physician Information:  Referring Practitioner: Duy BALDERRAMA  Medical/Treatment Diagnosis Information:  · Diagnosis: M47.816 (ICD-10-CM) - Lumbar spondylosis;  M54.5, G89.29 (ICD-10-CM) - Chronic low back pain without sciatica, unspecified back pain laterality  · Treatment Diagnosis: M54.5, G89.29 (ICD-10-CM) - Chronic low back pain without sciatica, unspecified back pain laterality  [x] Conservative / [] Surgical - DOS:  Therapy Diagnosis/Practice Pattern:  Practice Pattern F: Spinal Disorders  Insurance/Certification information:  PT Insurance Information: BCBS mediblue $40 copay - 100% needs AIM auth  Plan of care signed: [] YES  [] NO  Number of Comorbidities:  []0     []1-2    [x]3+  Date of Patient follow up with Physician:     Is this a Progress Report:     [x]  Yes  []  No        If Yes:  Date Range for reporting period:  Beginning 2021  Ending     Progress report will be due (10 Rx or 30 days whichever is less):        Recertification will be due (POC Duration  / 90 days whichever is less): 2021      Latex Allergy:  [x]NO      []YES  Preferred Language for Healthcare:   [x]English       []other:    Visit # Insurance Allowable   4 $40 copay AIM Steward Kocher []no        [x]yes:     SUBJECTIVE:  Pt reports back is still bothering her, still not able to use shoulder as much as she would like. Reports some stomach pain today, going to talk to her PCP about it. OBJECTIVE:    Observation:   Palpation:     Test used Initial score Current Score   Pain Summary VAS 0-8 4   Functional questionnaire Oswestry 52% 26/50 42% 21/50   ROM Lumbar Flexion Mid shin P! Lower third of shin P! Lumbar Ext 80% P! 90% P! Lumbar SB L/R Fib head P! Fib head, discomfort     Lumbar rotation L/R     Strength Hip flex 4/5 B 4+    Knee ext 4+/5 B 4+    Hip ABD 4-/5 B 4     RESTRICTIONS/PRECAUTIONS: none    Exercises/Interventions:     Therapeutic Ex sets/reps Notes HEP   SB DKTC  SB LTR  BKFO  TA March  LAQ 20 x  20 x20 x X   Standing thread the needle  L stretch  Hip ext  X   Standing march  Standing Hip ABD  Mini squat 20 x  20 x  30 x  X      X                                                   Pt education on pathology of injury and PT progression 5 mins           Manual Intervention       Joint mobs grade 1-2 5 min     HS, ITB, Piriformis STM 15 min                                         NMR re-education                                       Therapeutic Exercise and NMR EXR  [x] (62538) Provided verbal/tactile cueing for activities related to strengthening, flexibility, endurance, ROM  for improvements in proximal hip and core control with self care, mobility, lifting and ambulation.  [] (21812) Provided verbal/tactile cueing for activities related to improving balance, coordination, kinesthetic sense, posture, motor skill, proprioception  to assist with core control in self care, mobility, lifting, and ambulation.      Therapeutic Activities:    [] (08532 or 91063) Provided verbal/tactile cueing for activities related to improving balance, coordination, kinesthetic sense, posture, motor skill, proprioception and motor activation to allow for proper function  with self care and ADLs  [] (70414) Provided training and instruction to the patient for proper core and proximal hip recruitment and positioning with ambulation re-education     Home Exercise Program:    [x] (22846) Reviewed/Progressed HEP activities related to strengthening, flexibility, endurance, ROM of core, proximal hip and LE for functional self-care, mobility, lifting and ambulation   [] (36926) Reviewed/Progressed HEP activities related to improving balance, coordination, kinesthetic sense, posture, motor skill, proprioception of core, proximal hip and LE for self care, mobility, lifting, and ambulation      Manual Treatments:  PROM / STM / Oscillations-Mobs:  G-I, II, III, IV (PA's, Inf., Post.)  [x] (51400) Provided manual therapy to mobilize proximal hip and LS spine soft tissue/joints for the purpose of modulating pain, promoting relaxation,  increasing ROM, reducing/eliminating soft tissue swelling/inflammation/restriction, improving soft tissue extensibility and allowing for proper ROM for normal function with self care, mobility, lifting and ambulation. Modalities:      [] GR/ESU 15 min    [] GR 15 min  [] ESU     [] CP    [] MHP    [] declined  Charges:  Timed Code Treatment Minutes: 40   Total Treatment Minutes: 40     [] EVAL (LOW) 85338 (typically 20 minutes face-to-face)  [] EVAL (MOD) 38554 (typically 30 minutes face-to-face)  [] EVAL (HIGH) 65771 (typically 45 minutes face-to-face)  [] RE-EVAL     [x] VF(27859) x 2    [] IONTO  [] NMR (46756) x     [] VASO  [x] Manual (41182) x 1      [] Other:  [] TA x      [] Mech Traction (38187)  [] ES(attended) (87225)      [] ES (un) (92221):     Goals: Patient stated goal: Patient would like to eliminate back pain  []? Progressing: []? Met: []? Not Met: []? Adjusted          Therapist goals for Patient:   Short Term Goals: To be achieved in: 2 weeks  1. Independent in HEP and progression per patient tolerance, in order to prevent re-injury. []? Progressing: [x]? Met: []? Not Met: []? Adjusted       2. Patient will have a decrease in pain to facilitate improvement in movement, function, and ADLs as indicated by Functional Deficits. []? Progressing: [x]? Met: []? Not Met: []? Adjusted          Long Term Goals: To be achieved in: 12 weeks  1. Disability index score of 25% or less for the oswestry  to assist with reaching prior level of function. [x]?  Progressing: []? Met: []? Not Met: []? Adjusted       2. Patient will demonstrate increased AROM to WNL, good LS mobility, good hip ROM to allow for proper joint functioning as indicated by patients Functional Deficits. [x]? Progressing: []? Met: []? Not Met: []? Adjusted       3. Patient will demonstrate an increase in Strength to good proximal hip and core activation to allow for proper functional mobility as indicated by patients Functional Deficits. [x]? Progressing: []? Met: []? Not Met: []? Adjusted       4. Patient will return to functional activities without increased symptoms or restriction. [x]? Progressing: []? Met: []? Not Met: []? Adjusted       5. Patient will be able to sleep 4+ hours without symptoms limiting or waking patient  [x]? Progressing: []? Met: []? Not Met: []? Adjusted         Overall Progression Towards Functional goals/ Treatment Progress Update:  [x] Patient is progressing as expected towards functional goals listed. [] Progression is slowed due to complexities/Impairments listed. [] Progression has been slowed due to co-morbidities. [] Plan just implemented, too soon to assess goals progression <30days   [] Goals require adjustment due to lack of progress  [] Patient is not progressing as expected and requires additional follow up with physician  [] Other    Prognosis for POC: [x] Good [] Fair  [] Poor      Patient requires continued skilled intervention: [x] Yes  [] No      ASSESSMENT:  Pt with slow progression. Was out of town for two weeks with less than ideal sleeping arrangements limiting improvements in back pain. Would benefit from continued skilled PT to increase ROM, strength, functional mobility in order to return to PLOF and sleep through the night. Pt has recent insurance change and will be unable to attend 2x week. Will continue with 1x a week for 8 weeks and progress therex as tolerated.     Treatment/Activity Tolerance:  [x] Patient tolerated treatment well [] Patient limited

## 2021-05-19 ENCOUNTER — OFFICE VISIT (OUTPATIENT)
Dept: INTERNAL MEDICINE CLINIC | Age: 66
End: 2021-05-19
Payer: MEDICARE

## 2021-05-19 VITALS
OXYGEN SATURATION: 98 % | WEIGHT: 191 LBS | BODY MASS INDEX: 31.82 KG/M2 | SYSTOLIC BLOOD PRESSURE: 128 MMHG | HEART RATE: 78 BPM | DIASTOLIC BLOOD PRESSURE: 84 MMHG | HEIGHT: 65 IN

## 2021-05-19 DIAGNOSIS — E11.9 TYPE 2 DIABETES MELLITUS WITHOUT COMPLICATION, WITHOUT LONG-TERM CURRENT USE OF INSULIN (HCC): ICD-10-CM

## 2021-05-19 DIAGNOSIS — R10.13 EPIGASTRIC PAIN: Primary | ICD-10-CM

## 2021-05-19 LAB — HBA1C MFR BLD: 7.2 %

## 2021-05-19 PROCEDURE — 99213 OFFICE O/P EST LOW 20 MIN: CPT | Performed by: INTERNAL MEDICINE

## 2021-05-19 PROCEDURE — 3051F HG A1C>EQUAL 7.0%<8.0%: CPT | Performed by: INTERNAL MEDICINE

## 2021-05-19 PROCEDURE — 83036 HEMOGLOBIN GLYCOSYLATED A1C: CPT | Performed by: INTERNAL MEDICINE

## 2021-05-19 NOTE — PROGRESS NOTES
Wilbert Best  YOB: 1955    Date of Service:  5/19/2021    Chief Complaint:      Chief Complaint   Patient presents with    Abdominal Pain    Nausea    Anorexia       HPI:  Wiblert Best is a 60 y.o. She complain of a new pain in her abdomen 1 week after having have left side benign nodule removed last month. She now has nausea that started a week ago. Pain gets worse if she eats too much or have to push due to constipation. Pain does improve after a BM. She denies heartburn or GERD. Treatment Adherence:   Medication compliance:  compliant most of the time  Diet compliance:  compliant most of the time  Weight trend: decreasing  Current exercise: walks 5 time(s) per week  Barriers: none     Diabetes Mellitus Type 2: Current symptoms/problems include neuropathy.  Stable on Januvia 100 mg qd, Jardiance 25 mg qd, Metformin 850 mg qhs and no more diarrhea,  glimepiride 4 mg bid.       Home blood sugar records: fasting range:  AM and  PM  Any episodes of hypoglycemia? no  Eye exam current (within one year): yes  Tobacco history: She  reports that she quit smoking about 10 years ago. Her smoking use included cigarettes. She has a 10.00 pack-year smoking history. She has never used smokeless tobacco.   Daily Aspirin? No: will start  Known diabetic complications: none  Hypertension:  Home blood pressure monitoring: Yes 120/60-70.  Stable on Lisinopril 40 mg qd.   She is adherent to a low sodium diet. Patient denies chest pain, shortness of breath, headache, lightheadedness, blurred vision, peripheral edema, palpitations, dry cough and fatigue.  Antihypertensive medication side effects: no medication side effects noted.  Use of agents associated with hypertension: thyroid hormones. Hyperlipidemia:  No new myalgias or GI upset on atorvastatin (Lipitor) 20 mg qd.     Hypothyroidism: Recent symptoms: stable with increase Levothyroxine 75 mcg qd.  She denies fatigue, weight gain, weight loss, cold intolerance, heat intolerance, hair loss, dry skin, constipation, diarrhea, edema, anxiety, tremor, palpitations and dysphagia. Patient is  taking her medication consistently on an empty stomach. Lab Results   Component Value Date    LABA1C 7.1 02/24/2021    LABA1C 7.2 11/30/2020    LABA1C 7.1 08/13/2020    LABMICR <1.20 02/24/2021     Lab Results   Component Value Date     02/24/2021    K 4.4 02/24/2021     02/24/2021    CO2 22 02/24/2021    BUN 10 02/24/2021    CREATININE 0.7 02/24/2021    GLUCOSE 153 (H) 02/24/2021    CALCIUM 9.8 02/24/2021     Lab Results   Component Value Date    CHOL 165 02/24/2021    TRIG 98 02/24/2021    HDL 53 02/24/2021    LDLCALC 92 02/24/2021     Lab Results   Component Value Date    ALT 18 02/24/2021    AST 17 02/24/2021     Lab Results   Component Value Date    TSH 1.08 02/24/2021    TSH 0.07 (L) 05/19/2020    T4FREE 1.3 02/24/2021    T4FREE 1.6 05/19/2020     Lab Results   Component Value Date    WBC 6.9 02/24/2021    HGB 14.0 02/24/2021    HCT 42.4 02/24/2021    MCV 89.5 02/24/2021     02/24/2021     No results found for: INR   No results found for: PSA   No results found for: LABURIC     Patient Active Problem List   Diagnosis    Type 2 diabetes mellitus without complication, without long-term current use of insulin (HCC)    Mixed hyperlipidemia    Essential hypertension    Acquired hypothyroidism    Erosive gastritis    Esophagitis, [place] grade A    RUQ pain    Fatty liver    Mass of torso       No Known Allergies  Outpatient Medications Marked as Taking for the 5/19/21 encounter (Office Visit) with Dami Santos MD   Medication Sig Dispense Refill    ibuprofen (ADVIL;MOTRIN) 800 MG tablet TAKE 1 TABLET BY MOUTH 2 TIMES DAILY AS NEEDED FOR PAIN 60 tablet 0    clotrimazole-betamethasone (LOTRISONE) 1-0.05 % cream Apply topically 2 times daily.  15 g 0    tiZANidine (ZANAFLEX) 4 MG tablet I po qHS PRN 14 tablet 0    empagliflozin (JARDIANCE) 25 MG tablet Take 25 mg by mouth daily 30 tablet 11    levothyroxine (SYNTHROID) 75 MCG tablet Take 1 tablet by mouth daily 90 tablet 3    glimepiride (AMARYL) 4 MG tablet Take 1 tablet by mouth 2 times daily 180 tablet 3    atorvastatin (LIPITOR) 20 MG tablet Take 1 tablet by mouth daily 90 tablet 3    lisinopril (PRINIVIL;ZESTRIL) 40 MG tablet Take 1 tablet by mouth daily 90 tablet 3    glucose monitoring kit (FREESTYLE) monitoring kit 1 kit by Does not apply route daily 1 kit 0    omeprazole (PRILOSEC) 20 MG delayed release capsule TAKE 1 CAPSULE BY MOUTH EVERY DAY (Patient taking differently: as needed ) 30 capsule 9    SITagliptin (JANUVIA) 100 MG tablet Take 1 tablet by mouth daily 90 tablet 3    metFORMIN (GLUCOPHAGE) 850 MG tablet Take 1 tablet by mouth daily 90 tablet 3    blood glucose monitor strips Test qd 100 strip 3         Review of Systems: 14 systems were negative except of what was stated on HPI    Nursing note and vitals reviewed. Vitals:    05/19/21 1113   BP: 128/84   Pulse: 78   SpO2: 98%   Weight: 191 lb (86.6 kg)   Height: 5' 5\" (1.651 m)     Wt Readings from Last 3 Encounters:   05/19/21 191 lb (86.6 kg)   04/12/21 194 lb (88 kg)   04/12/21 194 lb (88 kg)     BP Readings from Last 3 Encounters:   05/19/21 128/84   04/12/21 128/80   04/12/21 124/80     Body mass index is 31.78 kg/m². Constitutional: Patient appears well-developed and well-nourished. No distress. Head: Normocephalic and atraumatic. Neck: Normal range of motion. Neck supple. No thyroidmegaly. Cardiovascular: Normal rate, regular rhythm, normal heart sounds and intact distal pulses. Pulmonary/Chest: Effort normal and breath sounds normal. No stridor. No respiratory distress. No wheezes and no rales. Abdominal: Soft. Bowel sounds are normal. No distension and no mass. Mild tenderness left of umbilicus area superficially. No rebound and no guarding.    Musculoskeletal: No edema and no tenderness. Skin: No rash or erythema. Psychiatric: Normal mood and affect. Behavior is normal.     Assessment/Plan:  Bailee was seen today for abdominal pain, nausea and anorexia. Diagnoses and all orders for this visit:    Epigastric pain  If not improve, f/u with GI    Type 2 diabetes mellitus without complication, without long-term current use of insulin (HCC)  -     POCT glycosylated hemoglobin (Hb A1C)  Increase Metformin 850 mg 1/2 AM and 1 PM        Return Keep August f/u.

## 2021-05-20 ENCOUNTER — APPOINTMENT (OUTPATIENT)
Dept: PHYSICAL THERAPY | Age: 66
End: 2021-05-20
Payer: MEDICARE

## 2021-05-25 ENCOUNTER — HOSPITAL ENCOUNTER (OUTPATIENT)
Dept: WOMENS IMAGING | Age: 66
Discharge: HOME | End: 2021-05-25
Payer: MEDICARE

## 2021-05-25 ENCOUNTER — APPOINTMENT (OUTPATIENT)
Dept: PHYSICAL THERAPY | Age: 66
End: 2021-05-25
Payer: MEDICARE

## 2021-05-25 DIAGNOSIS — Z12.31 ENCOUNTER FOR SCREENING MAMMOGRAM FOR BREAST CANCER: ICD-10-CM

## 2021-05-25 PROCEDURE — 77067 SCR MAMMO BI INCL CAD: CPT

## 2021-05-27 ENCOUNTER — HOSPITAL ENCOUNTER (OUTPATIENT)
Dept: PHYSICAL THERAPY | Age: 66
Setting detail: THERAPIES SERIES
Discharge: HOME | End: 2021-05-27
Payer: MEDICARE

## 2021-05-27 PROCEDURE — 97110 THERAPEUTIC EXERCISES: CPT

## 2021-05-27 PROCEDURE — 97140 MANUAL THERAPY 1/> REGIONS: CPT

## 2021-05-27 NOTE — FLOWSHEET NOTE
Ext 80% P! 90% P! Lumbar SB L/R Fib head P! Fib head, discomfort     Lumbar rotation L/R     Strength Hip flex 4/5 B 4+    Knee ext 4+/5 B 4+    Hip ABD 4-/5 B 4     RESTRICTIONS/PRECAUTIONS: none    Exercises/Interventions:     Therapeutic Ex sets/reps Notes HEP   SB DKTC  SB LTR  SB bridge  LAQ 20 x  20 x2 x 10 X   Standing thread the needle  L stretch  X   SB table slide 3D 10 x     Standing march  Standing Hip ABD  Standing hip ext  Mini squat  Heel raise 20 x  20 x  20 x  30 x  30 x  X      X                                                   Pt education on pathology of injury and PT progression 5 mins           Manual Intervention       Joint mobs grade 1-2 5 min     Lumbar paraspinal STM 10 min                                         NMR re-education                                       Therapeutic Exercise and NMR EXR  [x] (10612) Provided verbal/tactile cueing for activities related to strengthening, flexibility, endurance, ROM  for improvements in proximal hip and core control with self care, mobility, lifting and ambulation.  [] (72257) Provided verbal/tactile cueing for activities related to improving balance, coordination, kinesthetic sense, posture, motor skill, proprioception  to assist with core control in self care, mobility, lifting, and ambulation.      Therapeutic Activities:    [] (98105 or 63716) Provided verbal/tactile cueing for activities related to improving balance, coordination, kinesthetic sense, posture, motor skill, proprioception and motor activation to allow for proper function  with self care and ADLs  [] (07919) Provided training and instruction to the patient for proper core and proximal hip recruitment and positioning with ambulation re-education     Home Exercise Program:    [x] (11441) Reviewed/Progressed HEP activities related to strengthening, flexibility, endurance, ROM of core, proximal hip and LE for functional self-care, mobility, lifting and ambulation   [] (48202) [x]? Progressing: []? Met: []? Not Met: []? Adjusted       2. Patient will demonstrate increased AROM to WNL, good LS mobility, good hip ROM to allow for proper joint functioning as indicated by patients Functional Deficits. [x]? Progressing: []? Met: []? Not Met: []? Adjusted       3. Patient will demonstrate an increase in Strength to good proximal hip and core activation to allow for proper functional mobility as indicated by patients Functional Deficits. [x]? Progressing: []? Met: []? Not Met: []? Adjusted       4. Patient will return to functional activities without increased symptoms or restriction. [x]? Progressing: []? Met: []? Not Met: []? Adjusted       5. Patient will be able to sleep 4+ hours without symptoms limiting or waking patient  [x]? Progressing: []? Met: []? Not Met: []? Adjusted         Overall Progression Towards Functional goals/ Treatment Progress Update:  [x] Patient is progressing as expected towards functional goals listed. [] Progression is slowed due to complexities/Impairments listed. [] Progression has been slowed due to co-morbidities. [] Plan just implemented, too soon to assess goals progression <30days   [] Goals require adjustment due to lack of progress  [] Patient is not progressing as expected and requires additional follow up with physician  [] Other    Prognosis for POC: [x] Good [] Fair  [] Poor      Patient requires continued skilled intervention: [x] Yes  [] No      ASSESSMENT:  Pt with slow progression. Was out of town for two weeks with less than ideal sleeping arrangements limiting improvements in back pain. Would benefit from continued skilled PT to increase ROM, strength, functional mobility in order to return to PLOF and sleep through the night. Pt has recent insurance change and will be unable to attend 2x week. Will continue with 1x a week for 8 weeks and progress therex as tolerated.     Treatment/Activity Tolerance:  [x] Patient tolerated treatment well [] Patient limited by fatigue  [] Patient limited by pain  [] Patient limited by other medical complications  [] Other:       PLAN: 1x week for 8 weeks  [x] Continue per plan of care [] Alter current plan (see comments above)  [] Plan of care initiated [] Hold pending MD visit [] Discharge      Electronically signed by:  Natalia Little PT , DPT [id number]    Note: If patient does not return for scheduled/ recommended follow up visits, this note will serve as a discharge from care along with most recent update on progress.

## 2021-06-03 ENCOUNTER — HOSPITAL ENCOUNTER (OUTPATIENT)
Dept: PHYSICAL THERAPY | Age: 66
Setting detail: THERAPIES SERIES
Discharge: HOME | End: 2021-06-03
Payer: MEDICARE

## 2021-06-03 PROCEDURE — 97140 MANUAL THERAPY 1/> REGIONS: CPT

## 2021-06-03 PROCEDURE — 97110 THERAPEUTIC EXERCISES: CPT

## 2021-06-03 NOTE — FLOWSHEET NOTE
[facility], [address]  Phone: [phone]  Fax [phone]    Physical Therapy Progress note/Daily Treatment Note  Date:  6/3/2021    Patient Name:  Deejay Collins    :  1955  MRN: [id number]  Restrictions/Precautions:    Physician Information:  Referring Practitioner: Siddharth BALDERRAMA  Medical/Treatment Diagnosis Information:  · Diagnosis: M47.816 (ICD-10-CM) - Lumbar spondylosis;  M54.5, G89.29 (ICD-10-CM) - Chronic low back pain without sciatica, unspecified back pain laterality  · Treatment Diagnosis: M54.5, G89.29 (ICD-10-CM) - Chronic low back pain without sciatica, unspecified back pain laterality  [x] Conservative / [] Surgical - DOS:  Therapy Diagnosis/Practice Pattern:  Practice Pattern F: Spinal Disorders  Insurance/Certification information:  PT Insurance Information: BCBS mediblue $40 copay - 100% needs AIM auth  Plan of care signed: [] YES  [] NO  Number of Comorbidities:  []0     []1-2    [x]3+  Date of Patient follow up with Physician:     Is this a Progress Report:     [x]  Yes  []  No        If Yes:  Date Range for reporting period:  Beginning 2021  Ending     Progress report will be due (10 Rx or 30 days whichever is less):        Recertification will be due (POC Duration  / 90 days whichever is less): 2021      Latex Allergy:  [x]NO      []YES  Preferred Language for Healthcare:   [x]English       []other:    Visit # Insurance Allowable   6 total     Thru 21  $40 copay AIM Amauri Dowdy  auth []no        [x]yes:     SUBJECTIVE:  Pt reports shoulders and back doing about the same, no real change. States reaching overhead feels more pull in back of arm.     OBJECTIVE:    Observation:   Palpation:     Test used Initial score Current Score   Pain Summary VAS 0-8 1 in back 3 in shoulder   Functional questionnaire Oswestry 52% /50 42%    ROM Lumbar Flexion Mid shin P! Lower third of shin P! Lumbar Ext 80% P! 90% P! Lumbar SB L/R Fib head P! Fib head, discomfort     Lumbar rotation L/R     Strength Hip flex 4/5 B 4+    Knee ext 4+/5 B 4+    Hip ABD 4-/5 B 4     RESTRICTIONS/PRECAUTIONS: none    Exercises/Interventions:     Therapeutic Ex sets/reps Notes HEP   SB DKTC  SB LTR  Bridge with TB ABD of UE  Bridge with UE OH ext  LAQ 20 x  20 x2 x 102 x 10 X   Standing thread the needle  L stretch  X   SB table slide 3D      [facility] Hip ABD  [facility] hip ext  Mini squat  Heel raise 20 x 30#  20 x 30#  20 x 30#    30 x  X      X                                                   Pt education on pathology of injury and PT progression 5 mins           Manual Intervention       Joint mobs grade 1-2 5 min     Lumbar paraspinal STM 10 min                                         NMR re-education                                       Therapeutic Exercise and NMR EXR  [x] (50074) Provided verbal/tactile cueing for activities related to strengthening, flexibility, endurance, ROM  for improvements in proximal hip and core control with self care, mobility, lifting and ambulation.  [] (54414) Provided verbal/tactile cueing for activities related to improving balance, coordination, kinesthetic sense, posture, motor skill, proprioception  to assist with core control in self care, mobility, lifting, and ambulation.      Therapeutic Activities:    [] (56116 or 68321) Provided verbal/tactile cueing for activities related to improving balance, coordination, kinesthetic sense, posture, motor skill, proprioception and motor activation to allow for proper function  with self care and ADLs  [] (34633) Provided training and instruction to the patient for proper core and proximal hip recruitment and positioning with ambulation re-education     Home Exercise Program:    [x] (65571) Reviewed/Progressed HEP activities related to strengthening, flexibility, endurance, ROM of core, proximal hip and LE for functional self-care, mobility, lifting and ambulation   [] (23748) Reviewed/Progressed HEP activities related to improving balance, coordination, kinesthetic sense, posture, motor skill, proprioception of core, proximal hip and LE for self care, mobility, lifting, and ambulation      Manual Treatments:  PROM / STM / Oscillations-Mobs:  G-I, II, III, IV (PA's, Inf., Post.)  [x] (64920) Provided manual therapy to mobilize proximal hip and LS spine soft tissue/joints for the purpose of modulating pain, promoting relaxation,  increasing ROM, reducing/eliminating soft tissue swelling/inflammation/restriction, improving soft tissue extensibility and allowing for proper ROM for normal function with self care, mobility, lifting and ambulation. Modalities:      [] GR/ESU 15 min    [] GR 15 min  [] ESU     [] CP    [] MHP    [] declined  Charges:  Timed Code Treatment Minutes: 40   Total Treatment Minutes: 40     [] EVAL (LOW) 45447 (typically 20 minutes face-to-face)  [] EVAL (MOD) 06444 (typically 30 minutes face-to-face)  [] EVAL (HIGH) 43998 (typically 45 minutes face-to-face)  [] RE-EVAL     [x] [place](54576) x 2    [] IONTO  [] NMR (75062) x     [] VASO  [x] Manual (66702) x 1      [] Other:  [] TA x      [] Mech Traction (40834)  [] ES(attended) (45841)      [] ES (un) (04253):     Goals: Patient stated goal: Patient would like to eliminate back pain  []? Progressing: []? Met: []? Not Met: []? Adjusted          Therapist goals for Patient:   Short Term Goals: To be achieved in: 2 weeks  1. Independent in HEP and progression per patient tolerance, in order to prevent re-injury. []? Progressing: [x]? Met: []? Not Met: []? Adjusted       2. Patient will have a decrease in pain to facilitate improvement in movement, function, and ADLs as indicated by Functional Deficits. []? Progressing: [x]? Met: []? Not Met: []? Adjusted          Long Term Goals: To be achieved in: 12 weeks  1.  Disability index score of 25% or less for the oswestry  to assist with reaching prior level of function. [x]? Progressing: []? Met: []? Not Met: []? Adjusted       2. Patient will demonstrate increased AROM to WNL, good LS mobility, good hip ROM to allow for proper joint functioning as indicated by patients Functional Deficits. [x]? Progressing: []? Met: []? Not Met: []? Adjusted       3. Patient will demonstrate an increase in Strength to good proximal hip and core activation to allow for proper functional mobility as indicated by patients Functional Deficits. [x]? Progressing: []? Met: []? Not Met: []? Adjusted       4. Patient will return to functional activities without increased symptoms or restriction. [x]? Progressing: []? Met: []? Not Met: []? Adjusted       5. Patient will be able to sleep 4+ hours without symptoms limiting or waking patient  [x]? Progressing: []? Met: []? Not Met: []? Adjusted         Overall Progression Towards Functional goals/ Treatment Progress Update:  [x] Patient is progressing as expected towards functional goals listed. [] Progression is slowed due to complexities/Impairments listed. [] Progression has been slowed due to co-morbidities. [] Plan just implemented, too soon to assess goals progression <30days   [] Goals require adjustment due to lack of progress  [] Patient is not progressing as expected and requires additional follow up with physician  [] Other    Prognosis for POC: [x] Good [] Fair  [] Poor      Patient requires continued skilled intervention: [x] Yes  [] No      ASSESSMENT:  Pt with slow progression. Was out of town for two weeks with less than ideal sleeping arrangements limiting improvements in back pain. Would benefit from continued skilled PT to increase ROM, strength, functional mobility in order to return to PLOF and sleep through the night. Pt has recent insurance change and will be unable to attend 2x week.  Will continue with 1x a week for 8 weeks and progress therex as tolerated. Treatment/Activity Tolerance:  [x] Patient tolerated treatment well [] Patient limited by fatigue  [] Patient limited by pain  [] Patient limited by other medical complications  [] Other:       PLAN: 1x week for 8 weeks  [x] Continue per plan of care [] Alter current plan (see comments above)  [] Plan of care initiated [] Hold pending MD visit [] Discharge      Electronically signed by:  Buck Billy PT , DPT [id number]    Note: If patient does not return for scheduled/ recommended follow up visits, this note will serve as a discharge from care along with most recent update on progress.

## 2021-06-10 ENCOUNTER — HOSPITAL ENCOUNTER (OUTPATIENT)
Dept: PHYSICAL THERAPY | Age: 66
Setting detail: THERAPIES SERIES
Discharge: HOME | End: 2021-06-10
Payer: MEDICARE

## 2021-06-10 PROCEDURE — 97110 THERAPEUTIC EXERCISES: CPT

## 2021-06-10 PROCEDURE — 97140 MANUAL THERAPY 1/> REGIONS: CPT

## 2021-06-10 NOTE — PROGRESS NOTES
[facility],  [address] [place]  Phone: [phone]  Fax [phone]    Physical Therapy Progress note/Daily Treatment Note  Date:  6/10/2021    Patient Name:  Noel Frye    :  1955  MRN: [id number]  Restrictions/Precautions:    Physician Information:  Referring Practitioner: Jacklyn BALDERRAMA  Medical/Treatment Diagnosis Information:  · Diagnosis: M47.816 (ICD-10-CM) - Lumbar spondylosis;  M54.5, G89.29 (ICD-10-CM) - Chronic low back pain without sciatica, unspecified back pain laterality  · Treatment Diagnosis: M54.5, G89.29 (ICD-10-CM) - Chronic low back pain without sciatica, unspecified back pain laterality  [x] Conservative / [] Surgical - DOS:  Therapy Diagnosis/Practice Pattern:  Practice Pattern F: Spinal Disorders  Insurance/Certification information:  PT Insurance Information: BCBS mediblue $40 copay - 100% needs AIM auth  Plan of care signed: [] YES  [] NO  Number of Comorbidities:  []0     []1-2    [x]3+  Date of Patient follow up with Physician:     Is this a Progress Report:     [x]  Yes  []  No        If Yes:  Date Range for reporting period:  Beginning 2021  Ending 6/10/2021    Progress report will be due (10 Rx or 30 days whichever is less): 4072       Recertification will be due (POC Duration  / 90 days whichever is less): 2021      Latex Allergy:  [x]NO      []YES  Preferred Language for Healthcare:   [x]English       []other:    Visit # Insurance Allowable   6 total     Thru 21  $40 copay AIM Xenia Krishnan  auth []no        [x]yes:     SUBJECTIVE:  Pt reports shoulder is killing her today. Back improving. OBJECTIVE:    Observation:   Palpation:     Test used Initial score Current Score   Pain Summary VAS 0-8 1 in back 3 in shoulder   Functional questionnaire Oswestry 52% 26/50 36% 18/50   ROM Lumbar Flexion Mid shin P! Lower third of shin P! Lumbar Ext 80% P! 90% P! mobility, lifting and ambulation   [] (21857) Reviewed/Progressed HEP activities related to improving balance, coordination, kinesthetic sense, posture, motor skill, proprioception of core, proximal hip and LE for self care, mobility, lifting, and ambulation      Manual Treatments:  PROM / STM / Oscillations-Mobs:  G-I, II, III, IV (PA's, Inf., Post.)  [x] (13162) Provided manual therapy to mobilize proximal hip and LS spine soft tissue/joints for the purpose of modulating pain, promoting relaxation,  increasing ROM, reducing/eliminating soft tissue swelling/inflammation/restriction, improving soft tissue extensibility and allowing for proper ROM for normal function with self care, mobility, lifting and ambulation. Modalities:      [] GR/ESU 15 min    [] GR 15 min  [] ESU     [] CP    [] MHP    [] declined  Charges:  Timed Code Treatment Minutes: 40   Total Treatment Minutes: 40     [] EVAL (LOW) 01394 (typically 20 minutes face-to-face)  [] EVAL (MOD) 01209 (typically 30 minutes face-to-face)  [] EVAL (HIGH) 41662 (typically 45 minutes face-to-face)  [] RE-EVAL     [x] LR(19056) x 2    [] IONTO  [] NMR (32441) x     [] VASO  [x] Manual (11168) x 1      [] Other:  [] TA x      [] Mech Traction (67689)  [] ES(attended) (92844)      [] ES (un) (16353):     Goals: Patient stated goal: Patient would like to eliminate back pain  []? Progressing: []? Met: []? Not Met: []? Adjusted          Therapist goals for Patient:   Short Term Goals: To be achieved in: 2 weeks  1. Independent in HEP and progression per patient tolerance, in order to prevent re-injury. []? Progressing: [x]? Met: []? Not Met: []? Adjusted       2. Patient will have a decrease in pain to facilitate improvement in movement, function, and ADLs as indicated by Functional Deficits. []? Progressing: [x]? Met: []? Not Met: []? Adjusted          Long Term Goals: To be achieved in: 12 weeks  1.  Disability index score of 25% or less for the oswestry  to assist with reaching prior level of function. [x]? Progressing: []? Met: []? Not Met: []? Adjusted       2. Patient will demonstrate increased AROM to WNL, good LS mobility, good hip ROM to allow for proper joint functioning as indicated by patients Functional Deficits. [x]? Progressing: []? Met: []? Not Met: []? Adjusted       3. Patient will demonstrate an increase in Strength to good proximal hip and core activation to allow for proper functional mobility as indicated by patients Functional Deficits. [x]? Progressing: []? Met: []? Not Met: []? Adjusted       4. Patient will return to functional activities without increased symptoms or restriction. [x]? Progressing: []? Met: []? Not Met: []? Adjusted       5. Patient will be able to sleep 4+ hours without symptoms limiting or waking patient  [x]? Progressing: []? Met: []? Not Met: []? Adjusted         Overall Progression Towards Functional goals/ Treatment Progress Update:  [x] Patient is progressing as expected towards functional goals listed. [] Progression is slowed due to complexities/Impairments listed. [] Progression has been slowed due to co-morbidities. [] Plan just implemented, too soon to assess goals progression <30days   [] Goals require adjustment due to lack of progress  [] Patient is not progressing as expected and requires additional follow up with physician  [] Other    Prognosis for POC: [x] Good [] Fair  [] Poor      Patient requires continued skilled intervention: [x] Yes  [] No      ASSESSMENT:  Pt with slow progression. Was out of town for two weeks with less than ideal sleeping arrangements limiting improvements in back pain. Would benefit from continued skilled PT to increase ROM, strength, functional mobility in order to return to PLOF and sleep through the night. Pt has recent insurance change and will be unable to attend 2x week. Will continue with 1x a week for 8 weeks and progress therex as tolerated.     Treatment/Activity Tolerance:  [x] Patient tolerated treatment well [] Patient limited by fatigue  [] Patient limited by pain  [] Patient limited by other medical complications  [] Other:       PLAN: 1x week for 8 weeks  [x] Continue per plan of care [] Alter current plan (see comments above)  [] Plan of care initiated [] Hold pending MD visit [] Discharge      Electronically signed by:  Macrina Flowers PT , DPT [id number]    Note: If patient does not return for scheduled/ recommended follow up visits, this note will serve as a discharge from care along with most recent update on progress.

## 2021-06-17 ENCOUNTER — HOSPITAL ENCOUNTER (OUTPATIENT)
Dept: PHYSICAL THERAPY | Age: 66
Setting detail: THERAPIES SERIES
Discharge: HOME | End: 2021-06-17
Payer: MEDICARE

## 2021-06-17 NOTE — FLOWSHEET NOTE
[facility], [address], [address] [place] [address]        Physical Therapy  Cancellation/No-show Note  Patient Name:  Tyson Webb  :  1955   Date:  2021  Cancelled visits to date: 5  No-shows to date: 0    For today's appointment patient:  ? X Cancelled  ? Rescheduled appointment  ? No-show     Reason given by patient:  ?  Patient ill  ? Conflicting appointment  ? X   No transportation    ? Conflict with work  ? No reason given  ? Other:     Comments:  Transportation running late, pt reports she would be 30 minutes late for appt.  Advised pt to cancel due to $40 copay and only having 15 minutes for PT    Electronically signed by:  Asia Joseph PT

## 2021-06-23 DIAGNOSIS — E11.65 TYPE 2 DIABETES MELLITUS WITH HYPERGLYCEMIA (HCC): ICD-10-CM

## 2021-06-23 RX ORDER — CALCIUM CITRATE/VITAMIN D3 200MG-6.25
TABLET ORAL
Qty: 100 STRIP | Refills: 3 | Status: SHIPPED | OUTPATIENT
Start: 2021-06-23

## 2021-06-24 ENCOUNTER — HOSPITAL ENCOUNTER (OUTPATIENT)
Dept: PHYSICAL THERAPY | Age: 66
Setting detail: THERAPIES SERIES
Discharge: HOME | End: 2021-06-24
Payer: MEDICARE

## 2021-06-24 PROCEDURE — 97110 THERAPEUTIC EXERCISES: CPT

## 2021-06-24 PROCEDURE — 97140 MANUAL THERAPY 1/> REGIONS: CPT

## 2021-06-24 NOTE — FLOWSHEET NOTE
[facility],  [address] [place]  Phone: [phone]  Fax [phone]    Physical Therapy Progress note/Daily Treatment Note  Date:  2021    Patient Name:  Tram Ryan    :  1955  MRN: [id number]  Restrictions/Precautions:    Physician Information:  Referring Practitioner: Clint BALDERRAMA  Medical/Treatment Diagnosis Information:  · Diagnosis: M47.816 (ICD-10-CM) - Lumbar spondylosis;  M54.5, G89.29 (ICD-10-CM) - Chronic low back pain without sciatica, unspecified back pain laterality  · Treatment Diagnosis: M54.5, G89.29 (ICD-10-CM) - Chronic low back pain without sciatica, unspecified back pain laterality  [x] Conservative / [] Surgical - DOS:  Therapy Diagnosis/Practice Pattern:  Practice Pattern F: Spinal Disorders  Insurance/Certification information:  PT Insurance Information: BCBS mediblue $40 copay - 100% needs AIM auth  Plan of care signed: [] YES  [] NO  Number of Comorbidities:  []0     []1-2    [x]3+  Date of Patient follow up with Physician:     Is this a Progress Report:     [x]  Yes  []  No        If Yes:  Date Range for reporting period:  Beginning 2021  Ending 6/10/2021    Progress report will be due (10 Rx or 30 days whichever is less):        Recertification will be due (POC Duration  / 90 days whichever is less): 2021      Latex Allergy:  [x]NO      []YES  Preferred Language for Healthcare:   [x]English       []other:    Visit # Insurance Allowable   7 total     Thru 21  $40 copay AIM Ellena Rubinstein  auth []no        [x]yes:     SUBJECTIVE:  Pt reports she is going back to see surgeon on Monday about shoulder, still really bothering her. Back is roller coaster comes and goes.     OBJECTIVE:    Observation:   Palpation:     Test used Initial score Current Score   Pain Summary VAS 0-8 1 in back 3 in shoulder   Functional questionnaire Oswestry 52% 26/50 30% 15/50   ROM Lumbar Flexion Mid shin P! Lower third of shin P! Lumbar Ext 80% P! 90% P! Lumbar SB L/R Fib head P! Fib head, discomfort     Lumbar rotation L/R     Strength Hip flex 4/5 B 4+    Knee ext 4+/5 B 4+    Hip ABD 4-/5 B 4     RESTRICTIONS/PRECAUTIONS: none    Exercises/Interventions:     Therapeutic Ex sets/reps Notes HEP   SB DKTC  SB LTR  Bridge with cane flex  TA march with opposite UE flex  LAQ 20 x  20 x2 x 102 x 1020 x X   Standing thread the needle  L stretch  X   Finisher 4 way 2 x 10     [facility] march  [facility] Hip ABD  [facility] hip ext  Mini squat  Heel raise 20 x 35#  20 x 35#  20 x 35#    30 x  X   TB Row  TB ext 2 x 10 Red  2 x 10 Red  X                                                   Pt education on pathology of injury and PT progression 5 mins           Manual Intervention       Joint mobs grade 1-2 5 min     Lumbar paraspinal STM 10 min                                         NMR re-education                                       Therapeutic Exercise and NMR EXR  [x] (22488) Provided verbal/tactile cueing for activities related to strengthening, flexibility, endurance, ROM  for improvements in proximal hip and core control with self care, mobility, lifting and ambulation.  [] (38346) Provided verbal/tactile cueing for activities related to improving balance, coordination, kinesthetic sense, posture, motor skill, proprioception  to assist with core control in self care, mobility, lifting, and ambulation.      Therapeutic Activities:    [] (65719 or 83823) Provided verbal/tactile cueing for activities related to improving balance, coordination, kinesthetic sense, posture, motor skill, proprioception and motor activation to allow for proper function  with self care and ADLs  [] (79136) Provided training and instruction to the patient for proper core and proximal hip recruitment and positioning with ambulation re-education     Home Exercise Program:    [x] (08794) Reviewed/Progressed HEP activities related to strengthening, flexibility, endurance, ROM of core, proximal hip and LE for functional self-care, mobility, lifting and ambulation   [] (54040) Reviewed/Progressed HEP activities related to improving balance, coordination, kinesthetic sense, posture, motor skill, proprioception of core, proximal hip and LE for self care, mobility, lifting, and ambulation      Manual Treatments:  PROM / STM / Oscillations-Mobs:  G-I, II, III, IV (PA's, Inf., Post.)  [x] (66458) Provided manual therapy to mobilize proximal hip and LS spine soft tissue/joints for the purpose of modulating pain, promoting relaxation,  increasing ROM, reducing/eliminating soft tissue swelling/inflammation/restriction, improving soft tissue extensibility and allowing for proper ROM for normal function with self care, mobility, lifting and ambulation. Modalities:      [] GR/ESU 15 min    [] GR 15 min  [] ESU     [] CP    [] MHP    [] declined  Charges:  Timed Code Treatment Minutes: 40   Total Treatment Minutes: 40     [] EVAL (LOW) 75058 (typically 20 minutes face-to-face)  [] EVAL (MOD) 10274 (typically 30 minutes face-to-face)  [] EVAL (HIGH) 05082 (typically 45 minutes face-to-face)  [] RE-EVAL     [x] OT(74849) x 2    [] IONTO  [] NMR (63678) x     [] VASO  [x] Manual (76409) x 1      [] Other:  [] TA x      [] Mech Traction (27452)  [] ES(attended) (73794)      [] ES (un) (71314):     Goals: Patient stated goal: Patient would like to eliminate back pain  []? Progressing: []? Met: []? Not Met: []? Adjusted          Therapist goals for Patient:   Short Term Goals: To be achieved in: 2 weeks  1. Independent in HEP and progression per patient tolerance, in order to prevent re-injury. []? Progressing: [x]? Met: []? Not Met: []? Adjusted       2. Patient will have a decrease in pain to facilitate improvement in movement, function, and ADLs as indicated by Functional Deficits. []? Progressing: [x]? Met: []? Not Met: []?  Adjusted          Long Term Goals: To be achieved in: 12 weeks  1. Disability index score of 25% or less for the oswestry  to assist with reaching prior level of function. [x]? Progressing: []? Met: []? Not Met: []? Adjusted       2. Patient will demonstrate increased AROM to WNL, good LS mobility, good hip ROM to allow for proper joint functioning as indicated by patients Functional Deficits. [x]? Progressing: []? Met: []? Not Met: []? Adjusted       3. Patient will demonstrate an increase in Strength to good proximal hip and core activation to allow for proper functional mobility as indicated by patients Functional Deficits. [x]? Progressing: []? Met: []? Not Met: []? Adjusted       4. Patient will return to functional activities without increased symptoms or restriction. [x]? Progressing: []? Met: []? Not Met: []? Adjusted       5. Patient will be able to sleep 4+ hours without symptoms limiting or waking patient  [x]? Progressing: []? Met: []? Not Met: []? Adjusted         Overall Progression Towards Functional goals/ Treatment Progress Update:  [x] Patient is progressing as expected towards functional goals listed. [] Progression is slowed due to complexities/Impairments listed. [] Progression has been slowed due to co-morbidities. [] Plan just implemented, too soon to assess goals progression <30days   [] Goals require adjustment due to lack of progress  [] Patient is not progressing as expected and requires additional follow up with physician  [] Other    Prognosis for POC: [x] Good [] Fair  [] Poor      Patient requires continued skilled intervention: [x] Yes  [] No      ASSESSMENT:  Pt with slow progression. Was out of town for two weeks with less than ideal sleeping arrangements limiting improvements in back pain. Would benefit from continued skilled PT to increase ROM, strength, functional mobility in order to return to PLOF and sleep through the night.  Pt has recent insurance change and will be unable to attend 2x week. Will continue with 1x a week for 8 weeks and progress therex as tolerated. Treatment/Activity Tolerance:  [x] Patient tolerated treatment well [] Patient limited by fatigue  [] Patient limited by pain  [] Patient limited by other medical complications  [] Other:       PLAN: 1x week for 8 weeks  [x] Continue per plan of care [] Alter current plan (see comments above)  [] Plan of care initiated [] Hold pending MD visit [] Discharge      Electronically signed by:  Sav Magdaleno PT , DPT [id number]    Note: If patient does not return for scheduled/ recommended follow up visits, this note will serve as a discharge from care along with most recent update on progress.

## 2021-07-01 ENCOUNTER — HOSPITAL ENCOUNTER (OUTPATIENT)
Dept: PHYSICAL THERAPY | Age: 66
Setting detail: THERAPIES SERIES
Discharge: HOME | End: 2021-07-01
Payer: MEDICARE

## 2021-07-01 PROCEDURE — 97140 MANUAL THERAPY 1/> REGIONS: CPT

## 2021-07-01 PROCEDURE — 97110 THERAPEUTIC EXERCISES: CPT

## 2021-07-01 NOTE — FLOWSHEET NOTE
[facility],  [address] [place]  Phone: [phone]  Fax [phone]    Physical Therapy Progress note/Daily Treatment Note  Date:  2021    Patient Name:  Marsha White    :  1955  MRN: [id number]  Restrictions/Precautions:    Physician Information:  Referring Practitioner: Ricky BALDERRAMA  Medical/Treatment Diagnosis Information:  · Diagnosis: M47.816 (ICD-10-CM) - Lumbar spondylosis;  M54.5, G89.29 (ICD-10-CM) - Chronic low back pain without sciatica, unspecified back pain laterality  · Treatment Diagnosis: M54.5, G89.29 (ICD-10-CM) - Chronic low back pain without sciatica, unspecified back pain laterality  [x] Conservative / [] Surgical - DOS:  Therapy Diagnosis/Practice Pattern:  Practice Pattern F: Spinal Disorders  Insurance/Certification information:  PT Insurance Information: BCBS mediblue $40 copay - 100% needs AIM auth  Plan of care signed: [] YES  [] NO  Number of Comorbidities:  []0     []1-2    [x]3+  Date of Patient follow up with Physician:     Is this a Progress Report:     [x]  Yes  []  No        If Yes:  Date Range for reporting period:  Beginning 2021  Ending 6/10/2021    Progress report will be due (10 Rx or 30 days whichever is less): 4/3/4145       Recertification will be due (POC Duration  / 90 days whichever is less): 2021      Latex Allergy:  [x]NO      []YES  Preferred Language for Healthcare:   [x]English       []other:    Visit # Insurance Allowable   8 total    Thru 2021  $40 copay AIM Shannon Akers  auth []no        [x]yes:     SUBJECTIVE:  Pt reports she saw surgeon on Tuesday, wants another MRI on shoulder to make sure patch took and that there is no damage. Back hurting a little more since last session.     OBJECTIVE:    Observation:   Palpation:     Test used Initial score Current Score   Pain Summary VAS 0-8 1 in back 3 in shoulder   Functional questionnaire Oswestry 52% 26/50 30% 15/50   ROM Lumbar Flexion Mid shin P! Lower third of shin P! Lumbar Ext 80% P! 90% P! Lumbar SB L/R Fib head P! Fib head, discomfort     Lumbar rotation L/R     Strength Hip flex 4/5 B 4+    Knee ext 4+/5 B 4+    Hip ABD 4-/5 B 4     RESTRICTIONS/PRECAUTIONS: none    Exercises/Interventions:     Therapeutic Ex sets/reps Notes HEP   SB DKTC  SB LTR  SB Bridge  TA march with opposite UE flex  LAQ eccentric 20 x  20 x2 x 102 x 1020 x X   Standing thread the needle  L stretch  X   Finisher 4 way      [address] march  [address] Hip ABD  [address] hip ext  Mini squat  Heel raise   30 x  30 x  X   TB Row  TB ext  Paloff press 3 x 10 Green  3 x 10 Green  3 x 10 Green   X                                                   Pt education on pathology of injury and PT progression 5 mins           Manual Intervention       Joint mobs grade 1-2 5 min     Lumbar paraspinal STM 10 min                                         NMR re-education                                       Therapeutic Exercise and NMR EXR  [x] (28618) Provided verbal/tactile cueing for activities related to strengthening, flexibility, endurance, ROM  for improvements in proximal hip and core control with self care, mobility, lifting and ambulation.  [] (06513) Provided verbal/tactile cueing for activities related to improving balance, coordination, kinesthetic sense, posture, motor skill, proprioception  to assist with core control in self care, mobility, lifting, and ambulation.      Therapeutic Activities:    [] (27328 or 53059) Provided verbal/tactile cueing for activities related to improving balance, coordination, kinesthetic sense, posture, motor skill, proprioception and motor activation to allow for proper function  with self care and ADLs  [] (11208) Provided training and instruction to the patient for proper core and proximal hip recruitment and positioning with ambulation re-education     Home Exercise Program:    [x] (59783) Reviewed/Progressed HEP activities related to strengthening, flexibility, endurance, ROM of core, proximal hip and LE for functional self-care, mobility, lifting and ambulation   [] (90682) Reviewed/Progressed HEP activities related to improving balance, coordination, kinesthetic sense, posture, motor skill, proprioception of core, proximal hip and LE for self care, mobility, lifting, and ambulation      Manual Treatments:  PROM / STM / Oscillations-Mobs:  G-I, II, III, IV (PA's, Inf., Post.)  [x] (91740) Provided manual therapy to mobilize proximal hip and LS spine soft tissue/joints for the purpose of modulating pain, promoting relaxation,  increasing ROM, reducing/eliminating soft tissue swelling/inflammation/restriction, improving soft tissue extensibility and allowing for proper ROM for normal function with self care, mobility, lifting and ambulation. Modalities:      [] GR/ESU 15 min    [] GR 15 min  [] ESU     [] CP    [] MHP    [] declined  Charges:  Timed Code Treatment Minutes: 40   Total Treatment Minutes: 40     [] EVAL (LOW) 82338 (typically 20 minutes face-to-face)  [] EVAL (MOD) 99508 (typically 30 minutes face-to-face)  [] EVAL (HIGH) 62454 (typically 45 minutes face-to-face)  [] RE-EVAL     [x] XK(44513) x 2    [] IONTO  [] NMR (28217) x     [] VASO  [x] Manual (30167) x 1      [] Other:  [] TA x      [] Mech Traction (24613)  [] ES(attended) (21090)      [] ES (un) (23419):     Goals: Patient stated goal: Patient would like to eliminate back pain  []? Progressing: []? Met: []? Not Met: []? Adjusted          Therapist goals for Patient:   Short Term Goals: To be achieved in: 2 weeks  1. Independent in HEP and progression per patient tolerance, in order to prevent re-injury. []? Progressing: [x]? Met: []? Not Met: []? Adjusted       2. Patient will have a decrease in pain to facilitate improvement in movement, function, and ADLs as indicated by Functional Deficits. []? Progressing: [x]?  Met: []? Not Met: []? Adjusted          Long Term Goals: To be achieved in: 12 weeks  1. Disability index score of 25% or less for the oswestry  to assist with reaching prior level of function. [x]? Progressing: []? Met: []? Not Met: []? Adjusted       2. Patient will demonstrate increased AROM to WNL, good LS mobility, good hip ROM to allow for proper joint functioning as indicated by patients Functional Deficits. [x]? Progressing: []? Met: []? Not Met: []? Adjusted       3. Patient will demonstrate an increase in Strength to good proximal hip and core activation to allow for proper functional mobility as indicated by patients Functional Deficits. [x]? Progressing: []? Met: []? Not Met: []? Adjusted       4. Patient will return to functional activities without increased symptoms or restriction. [x]? Progressing: []? Met: []? Not Met: []? Adjusted       5. Patient will be able to sleep 4+ hours without symptoms limiting or waking patient  [x]? Progressing: []? Met: []? Not Met: []? Adjusted         Overall Progression Towards Functional goals/ Treatment Progress Update:  [x] Patient is progressing as expected towards functional goals listed. [] Progression is slowed due to complexities/Impairments listed. [] Progression has been slowed due to co-morbidities. [] Plan just implemented, too soon to assess goals progression <30days   [] Goals require adjustment due to lack of progress  [] Patient is not progressing as expected and requires additional follow up with physician  [] Other    Prognosis for POC: [x] Good [] Fair  [] Poor      Patient requires continued skilled intervention: [x] Yes  [] No      ASSESSMENT:  Pt with slow progression. Reduced session and modification of therex secondary to increased low back pain this session. Would benefit from continued skilled PT to increase ROM, strength, functional mobility in order to return to PLOF and sleep through the night.  Pt has recent insurance change and will be unable to attend 2x week. Will continue with 1x a week for 8 weeks and progress therex as tolerated. Treatment/Activity Tolerance:  [x] Patient tolerated treatment well [] Patient limited by fatigue  [] Patient limited by pain  [] Patient limited by other medical complications  [] Other:       PLAN: 1x week for 8 weeks  [x] Continue per plan of care [] Alter current plan (see comments above)  [] Plan of care initiated [] Hold pending MD visit [] Discharge      Electronically signed by:  Yokasta Moreira PT , DPT [id number]    Note: If patient does not return for scheduled/ recommended follow up visits, this note will serve as a discharge from care along with most recent update on progress.

## 2021-07-07 ENCOUNTER — APPOINTMENT (OUTPATIENT)
Dept: PHYSICAL THERAPY | Age: 66
End: 2021-07-07
Payer: MEDICARE

## 2021-07-12 ENCOUNTER — APPOINTMENT (OUTPATIENT)
Dept: CT IMAGING | Age: 66
End: 2021-07-12
Payer: MEDICARE

## 2021-07-12 ENCOUNTER — HOSPITAL ENCOUNTER (EMERGENCY)
Age: 66
Discharge: HOME | End: 2021-07-12
Attending: EMERGENCY MEDICINE
Payer: MEDICARE

## 2021-07-12 VITALS
WEIGHT: 194 LBS | HEART RATE: 68 BPM | TEMPERATURE: 98.2 F | DIASTOLIC BLOOD PRESSURE: 80 MMHG | BODY MASS INDEX: 32.32 KG/M2 | HEIGHT: 65 IN | OXYGEN SATURATION: 100 % | SYSTOLIC BLOOD PRESSURE: 133 MMHG | RESPIRATION RATE: 14 BRPM

## 2021-07-12 DIAGNOSIS — R10.9 FLANK PAIN: Primary | ICD-10-CM

## 2021-07-12 DIAGNOSIS — K59.00 CONSTIPATION, UNSPECIFIED CONSTIPATION TYPE: ICD-10-CM

## 2021-07-12 LAB
A/G RATIO: 1.5 (ref 1.1–2.2)
ALBUMIN SERPL-MCNC: 4.3 G/DL (ref 3.4–5)
ALP BLD-CCNC: 68 U/L (ref 40–129)
ALT SERPL-CCNC: 16 U/L (ref 10–40)
ANION GAP SERPL CALCULATED.3IONS-SCNC: 13 MMOL/L (ref 3–16)
AST SERPL-CCNC: 16 U/L (ref 15–37)
BASOPHILS ABSOLUTE: 0.1 K/UL (ref 0–0.2)
BASOPHILS RELATIVE PERCENT: 1.2 %
BILIRUB SERPL-MCNC: <0.2 MG/DL (ref 0–1)
BILIRUBIN URINE: NEGATIVE
BLOOD, URINE: NEGATIVE
BUN BLDV-MCNC: 12 MG/DL (ref 7–20)
CALCIUM SERPL-MCNC: 9.2 MG/DL (ref 8.3–10.6)
CHLORIDE BLD-SCNC: 102 MMOL/L (ref 99–110)
CLARITY: CLEAR
CO2: 22 MMOL/L (ref 21–32)
COLOR: YELLOW
CREAT SERPL-MCNC: 0.6 MG/DL (ref 0.6–1.2)
EOSINOPHILS ABSOLUTE: 0.2 K/UL (ref 0–0.6)
EOSINOPHILS RELATIVE PERCENT: 2.7 %
GFR AFRICAN AMERICAN: >60
GFR NON-AFRICAN AMERICAN: >60
GLOBULIN: 2.9 G/DL
GLUCOSE BLD-MCNC: 243 MG/DL (ref 70–99)
GLUCOSE URINE: >=1000 MG/DL
HCT VFR BLD CALC: 41.2 % (ref 36–48)
HEMOGLOBIN: 13.8 G/DL (ref 12–16)
KETONES, URINE: NEGATIVE MG/DL
LEUKOCYTE ESTERASE, URINE: NEGATIVE
LYMPHOCYTES ABSOLUTE: 2.1 K/UL (ref 1–5.1)
LYMPHOCYTES RELATIVE PERCENT: 32.5 %
MCH RBC QN AUTO: 30 PG (ref 26–34)
MCHC RBC AUTO-ENTMCNC: 33.6 G/DL (ref 31–36)
MCV RBC AUTO: 89.3 FL (ref 80–100)
MICROSCOPIC EXAMINATION: ABNORMAL
MONOCYTES ABSOLUTE: 0.3 K/UL (ref 0–1.3)
MONOCYTES RELATIVE PERCENT: 5 %
NEUTROPHILS ABSOLUTE: 3.8 K/UL (ref 1.7–7.7)
NEUTROPHILS RELATIVE PERCENT: 58.6 %
NITRITE, URINE: NEGATIVE
PDW BLD-RTO: 13.3 % (ref 12.4–15.4)
PH UA: 6 (ref 5–8)
PLATELET # BLD: 239 K/UL (ref 135–450)
PMV BLD AUTO: 8 FL (ref 5–10.5)
POTASSIUM REFLEX MAGNESIUM: 4.4 MMOL/L (ref 3.5–5.1)
PROTEIN UA: NEGATIVE MG/DL
RBC # BLD: 4.61 M/UL (ref 4–5.2)
SODIUM BLD-SCNC: 137 MMOL/L (ref 136–145)
SPECIFIC GRAVITY UA: 1.02 (ref 1–1.03)
TOTAL PROTEIN: 7.2 G/DL (ref 6.4–8.2)
URINE REFLEX TO CULTURE: ABNORMAL
URINE TYPE: ABNORMAL
UROBILINOGEN, URINE: 0.2 E.U./DL
WBC # BLD: 6.4 K/UL (ref 4–11)

## 2021-07-12 PROCEDURE — 81003 URINALYSIS AUTO W/O SCOPE: CPT

## 2021-07-12 PROCEDURE — 80053 COMPREHEN METABOLIC PANEL: CPT

## 2021-07-12 PROCEDURE — 99284 EMERGENCY DEPT VISIT MOD MDM: CPT

## 2021-07-12 PROCEDURE — 96374 THER/PROPH/DIAG INJ IV PUSH: CPT

## 2021-07-12 PROCEDURE — 85025 COMPLETE CBC W/AUTO DIFF WBC: CPT

## 2021-07-12 PROCEDURE — 74176 CT ABD & PELVIS W/O CONTRAST: CPT

## 2021-07-12 PROCEDURE — 6360000002 HC RX W HCPCS: Performed by: EMERGENCY MEDICINE

## 2021-07-12 PROCEDURE — 2580000003 HC RX 258: Performed by: EMERGENCY MEDICINE

## 2021-07-12 RX ORDER — 0.9 % SODIUM CHLORIDE 0.9 %
1000 INTRAVENOUS SOLUTION INTRAVENOUS ONCE
Status: COMPLETED | OUTPATIENT
Start: 2021-07-12 | End: 2021-07-12

## 2021-07-12 RX ORDER — KETOROLAC TROMETHAMINE 30 MG/ML
15 INJECTION, SOLUTION INTRAMUSCULAR; INTRAVENOUS ONCE
Status: COMPLETED | OUTPATIENT
Start: 2021-07-12 | End: 2021-07-12

## 2021-07-12 RX ORDER — POLYETHYLENE GLYCOL 3350 17 G/17G
17 POWDER, FOR SOLUTION ORAL DAILY
Qty: 1530 G | Refills: 1 | Status: SHIPPED | OUTPATIENT
Start: 2021-07-12 | End: 2021-08-11

## 2021-07-12 RX ORDER — TIZANIDINE 4 MG/1
4 TABLET ORAL 4 TIMES DAILY PRN
Qty: 40 TABLET | Refills: 0 | Status: SHIPPED | OUTPATIENT
Start: 2021-07-12 | End: 2021-08-03

## 2021-07-12 RX ADMIN — SODIUM CHLORIDE 1000 ML: 9 INJECTION, SOLUTION INTRAVENOUS at 10:11

## 2021-07-12 RX ADMIN — KETOROLAC TROMETHAMINE 15 MG: 30 INJECTION, SOLUTION INTRAMUSCULAR; INTRAVENOUS at 10:12

## 2021-07-12 ASSESSMENT — PAIN SCALES - GENERAL
PAINLEVEL_OUTOF10: 9

## 2021-07-12 ASSESSMENT — PAIN DESCRIPTION - LOCATION: LOCATION: FLANK

## 2021-07-12 ASSESSMENT — PAIN DESCRIPTION - PAIN TYPE: TYPE: ACUTE PAIN

## 2021-07-12 ASSESSMENT — PAIN DESCRIPTION - ORIENTATION: ORIENTATION: LEFT

## 2021-07-12 NOTE — ED NOTES
Discharge instructions were reviewed with the patient and the patient verbalized understanding. Patient IV was removed with no complications. Patient stable and ambulatory upon discharge with daughter.        Neville Mohr RN  07/12/21 9377

## 2021-07-12 NOTE — ED PROVIDER NOTES
[facility]  ED  EMERGENCY DEPARTMENT ENCOUNTER      Pt Name: Sweta Paul  MRN: [id number]  [place] 1955  Date of evaluation: 7/12/2021  Provider: Brittaney Betancur MD    [address] [place] [address]       Chief Complaint   Patient presents with    Flank Pain     Left sided for 3 months         HISTORY OF PRESENT ILLNESS   (Location/Symptom, Timing/Onset, Context/Setting, Quality, Duration, Modifying Factors, Severity)  Note limiting factors. Sweta Paul is a 73 y.o. female with past medical history of hypertension, hyperlipidemia, diabetes and esophagitis with chronic back pain here today with left-sided back pain. Patient states for 3 months she has had a intense aching pain in the left flank radiating to the left abdomen. No nausea or vomiting. No fevers or chills. No dysuria or hematuria. No vaginal bleeding or vaginal discharge. No change in her bowel habits. Pain is worse when she twists, turns or moves her certain directions. She states she has seen her primary care physician and a gastroenterologist for this and states \"they have not checked anything\". [facility]    Nursing Notes were reviewed. REVIEW OF SYSTEMS    (2-9 systems for level 4, 10 or more for level 5)     Review of Systems    Please see HPI for pertinent positive and negative review of system findings. A full 10 system ROS was performed and otherwise negative.         PAST MEDICAL HISTORY     Past Medical History:   Diagnosis Date    Chronic back pain     Diabetes mellitus ([address]     Hypothyroidism     Prolonged emergence from general anesthesia     Thyroid disease          SURGICAL HISTORY       Past Surgical History:   Procedure Laterality Date    ABDOMEN SURGERY Left 3/15/2021    EXCISION OF LEFT FLANK MASS performed by Ellis Pizarro MD at [address] [place] [address] BREAST SURGERY Right 2013    drainage    CHOLECYSTECTOMY  1974    COLONOSCOPY N/A 10/1/2020    COLONOSCOPY DIAGNOSTIC performed by Adam Becker MD at [address] [place],[address] Right 10/27/2020    EXAM UNDER ANESTHESIA VIDEO ARTHROSCOPY RIGHT SHOULDER, ROTATOR CUFF REPAIR WITH HAZEL AND NEPHEW PATCH, NEER ACROMIOPLASTY, POSSIBLE RYLEE PROCEDURE WITH EXPAREL performed by Ronni Mccollum MD at [facility] ENDOSCOPY      UPPER GASTROINTESTINAL ENDOSCOPY  06/22/2020    bx    UPPER GASTROINTESTINAL ENDOSCOPY N/A 6/22/2020    EGD BIOPSY performed by Adam Becker MD at [address] [facility]       Previous Medications    ATORVASTATIN (LIPITOR) 20 MG TABLET    Take 1 tablet by mouth daily    BLOOD GLUCOSE TEST STRIPS (TRUE METRIX BLOOD GLUCOSE TEST) STRIP    TEST EVERY DAY    CLOTRIMAZOLE-BETAMETHASONE (LOTRISONE) 1-0.05 % CREAM    Apply topically 2 times daily. EMPAGLIFLOZIN (JARDIANCE) 25 MG TABLET    Take 25 mg by mouth daily    GLIMEPIRIDE (AMARYL) 4 MG TABLET    Take 1 tablet by mouth 2 times daily    GLUCOSE MONITORING KIT (FREESTYLE) MONITORING KIT    1 kit by Does not apply route daily    IBUPROFEN (ADVIL;MOTRIN) 800 MG TABLET    TAKE 1 TABLET BY MOUTH 2 TIMES DAILY AS NEEDED FOR PAIN    LEVOTHYROXINE (SYNTHROID) 75 MCG TABLET    Take 1 tablet by mouth daily    LISINOPRIL (PRINIVIL;ZESTRIL) 40 MG TABLET    Take 1 tablet by mouth daily    METFORMIN (GLUCOPHAGE) 850 MG TABLET    Take 1 tablet by mouth 2 times daily (with meals)    OMEPRAZOLE (PRILOSEC) 20 MG DELAYED RELEASE CAPSULE    TAKE 1 CAPSULE BY MOUTH EVERY DAY    SITAGLIPTIN (JANUVIA) 100 MG TABLET    Take 1 tablet by mouth daily    TIZANIDINE (ZANAFLEX) 4 MG TABLET    I po qHS PRN       ALLERGIES     Patient has no known allergies.     FAMILY HISTORY       Family History   Problem Relation Age of Onset    Dementia Mother     Breast Cancer Sister 61    Atrial Fibrillation Sister     High Blood Pressure Maternal Grandmother     Diabetes Sister           SOCIAL HISTORY       Social History     Socioeconomic History    Marital status:      Spouse name: None    Number of children: None    Years of education: None    Highest education level: None   Occupational History    None   Tobacco Use    Smoking status: Former Smoker     Packs/day: 0.50     Years: 20.00     Pack years: 10.00     Types: Cigarettes     Start date: 1992     Quit date: 2010     Years since quittin.3    Smokeless tobacco: Never Used   Vaping Use    Vaping Use: Never used   Substance and Sexual Activity    Alcohol use: Never    Drug use: Never    Sexual activity: None   Other Topics Concern    None   Social History Narrative    None     Social Determinants of Health     Financial Resource Strain:     Difficulty of Paying Living Expenses:    Food Insecurity:     Worried About Running Out of Food in the Last Year:     Ran Out of Food in the Last Year:    Transportation Needs:     Lack of Transportation (Medical):      Lack of Transportation (Non-Medical):    Physical Activity:     Days of Exercise per Week:     Minutes of Exercise per Session:    Stress:     Feeling of Stress :    Social Connections:     Frequency of Communication with Friends and Family:     Frequency of Social Gatherings with Friends and Family:     Attends Denominational Services:     Active Member of Clubs or Organizations:     Attends Club or Organization Meetings:     Marital Status:    Intimate Partner Violence:     Fear of Current or Ex-Partner:     Emotionally Abused:     Physically Abused:     Sexually Abused:        SCREENINGS    [place] Coma Scale  Eye Opening: Spontaneous  Best Verbal Response: Oriented  Best Motor Response: Obeys commands  [place] Coma Scale Score: 15          PHYSICAL EXAM    (up to 7 for level 4, 8 or more for level 5)     ED Triage Vitals [21 0945]   BP Temp Temp Source Pulse Resp SpO2 Height Weight 137/72 98.2 °F (36.8 °C) Oral 75 16 96 % 5' 5\" (1.651 m) 194 lb (88 kg)       Physical Exam    General appearance:  Cooperative. No acute distress. Skin:  Warm. Dry. Eye:  Extraocular movements intact. Ears, nose, mouth and throat:  Oral mucosa moist,  Neck:  Trachea midline. Heart:  Regular rate and rhythm  Perfusion:  intact  Respiratory:  Lungs clear to auscultation bilaterally. Respirations nonlabored. Abdominal:   Non distended. Overall abdomen soft and nontender however there is some left CVA tenderness without overlying skin changes  Neurological:  Alert and oriented x 3. Moves all extremities spontaneously  Musculoskeletal:   Normal ROM, no deformities          Psychiatric:  Normal mood      DIAGNOSTIC RESULTS       Labs Reviewed   COMPREHENSIVE METABOLIC PANEL W/ REFLEX TO MG FOR LOW K - Abnormal; Notable for the following components:       Result Value    Glucose 243 (*)     All other components within normal limits    Narrative:     Performed at:  [place] [address] [facility] ([facility]   Phone [phone]   URINE RT REFLEX TO CULTURE - Abnormal; Notable for the following components:    Glucose, Ur >=1000 (*)     All other components within normal limits    Narrative:     Performed at:  [address], [facility]   Phone [phone]   CBC WITH AUTO DIFFERENTIAL    Narrative:     Performed at:  [address], [facility]   Phone [phone]       Interpretation per the Radiologist below, if obtained/available at the time of this note:    CT ABDOMEN PELVIS WO CONTRAST Additional Contrast? None   Preliminary Result   1. No evidence of radiopaque renal/ureteral calculus. No radiographic   findings to suggest presence of acute obstructive uropathy. 2. No evidence of bowel obstruction, intraperitoneal free air, or abscess. 3. Mild-moderate fecal loading of the colon. 4. Status post cholecystectomy. All other labs/imaging were within normal range or not returned as of this dictation. EMERGENCY DEPARTMENT COURSE and DIFFERENTIAL DIAGNOSIS/MDM:   Vitals:    Vitals:    07/12/21 0945 07/12/21 1103   BP: 137/72 128/72   Pulse: 75 70   Resp: 16 16   Temp: 98.2 °F (36.8 °C)    TempSrc: Oral    SpO2: 96% 98%   Weight: 194 lb (88 kg)    Height: 5' 5\" (1.651 m)        Patient presents emergency department with 2 to 3-month history of left flank pain. Abdomen soft. Left CVA tenderness noted. Laboratory studies were unremarkable. Given her age chronic complaints a CT scan was performed to rule out any mass, hemorrhage, occult kidney stone. There is no evidence of this found. Otherwise unremarkable exam aside from some evidence of constipation which certainly could contribute to some abdominal discomfort. Patient was given MiraLAX and instructed to follow-up with her primary care physician    MDM    CONSULTS     None    Critical Care:   None    REASSESSMENT          PROCEDURE     Unless otherwise noted below, none     Procedures      FINAL IMPRESSION      1. Flank pain    2. Constipation, unspecified constipation type            DISPOSITION/PLAN   DISPOSITION Decision To Discharge 07/12/2021 11:51:07 AM        PATIENT REFERRED TO:  [address] Ulises Moran MD  [address] [place] [address]    Schedule an appointment as soon as possible for a visit         DISCHARGE MEDICATIONS:  New Prescriptions    POLYETHYLENE GLYCOL (GLYCOLAX) 17 GM/SCOOP POWDER    Take 17 g by mouth daily     Controlled Substances Monitoring:     RX Monitoring 10/27/2020   Acute Pain Prescriptions Severe pain not adequately treated with lower dose. Periodic Controlled Substance Monitoring No signs of potential drug abuse or diversion identified.        (Please note that portions of this note were completed with a voice recognition program.  Efforts were made to edit the dictations but occasionally words are mis-transcribed.)    Latoya Lozano MD (electronically signed)  Attending Emergency Physician            Agustina Gresham MD  07/12/21 8989

## 2021-07-14 ENCOUNTER — HOSPITAL ENCOUNTER (OUTPATIENT)
Dept: PHYSICAL THERAPY | Age: 66
Setting detail: THERAPIES SERIES
Discharge: HOME | End: 2021-07-14
Payer: MEDICARE

## 2021-07-14 PROCEDURE — 97110 THERAPEUTIC EXERCISES: CPT

## 2021-07-14 PROCEDURE — 97140 MANUAL THERAPY 1/> REGIONS: CPT

## 2021-07-14 NOTE — PLAN OF CARE
[facility], [address] [place] [address]  Phone: [phone]  Fax [phone]  Physical Therapy Re-Certification Plan of Care/MD UPDATE      Dear  Monika BALDERRAMA,    We had the pleasure of treating the following patient for physical therapy services at [address] [place] [address]. A summary of our findings can be found in the updated assessment below. This includes our plan of care. If you have any questions or concerns regarding these findings, please do not hesitate to contact me at the office phone number checked above. Thank you for the referral.     Physician Signature:________________________________Date:__________________  By signing above (or electronic signature), therapists plan is approved by physician    Date Range Of Visits: 4/15/2021-2021  Total Visits to Date: 9  Overall Response to Treatment:   []Patient is responding well to treatment and improvement is noted with regards  to goals   []Patient should continue to improve in reasonable time if they continue HEP   []Patient has plateaued and is no longer responding to skilled PT intervention    []Patient is getting worse and would benefit from return to referring MD   []Patient unable to adhere to initial POC   [x]Other:  Patient progression slowed due to pain in shoulder and back limiting progression. Patient with ongoing episodes of flank pain, went to ED this week due to increased flank pain - scans and tests were normal. Would benefit from continued skilled PT to improved mobility and strength in an effort to reduce pain and allow patient to resume PLOF.         Physical Therapy Progress note/Daily Treatment Note  Date:  2021    Patient Name:  Josie Navarro    :  1955  MRN: [id number]  Restrictions/Precautions:    Physician Information:  Referring Practitioner: Monika BALDERRAMA  Medical/Treatment Diagnosis Hip ABD 4-/5 B 4     RESTRICTIONS/PRECAUTIONS: none    Exercises/Interventions:     Therapeutic Ex sets/reps Notes HEP   SB DKTC  SB LTR  SB Bridge  TA march with opposite UE flex  LAQ eccentric 20 x  20 x2 x 102 x 1020 x X   Seated cheerleaders     Standing thread the needle  L stretch  X   Finisher 4 way      [facility] Hip ABD  [facility] hip ext  Mini squat  Heel raise   30 x  30 x  X   TB Row with single leg march  TB ext with single leg march  Paloff press 3 x 10 Green  3 x 10 Green  3 x 10 Green   X                                                   Pt education on pathology of injury and PT progression 5 mins           Manual Intervention       Joint mobs grade 1-2 5 min     Lumbar paraspinal STM 10 min                                         NMR re-education                                       Therapeutic Exercise and NMR EXR  [x] (09928) Provided verbal/tactile cueing for activities related to strengthening, flexibility, endurance, ROM  for improvements in proximal hip and core control with self care, mobility, lifting and ambulation.  [] (41839) Provided verbal/tactile cueing for activities related to improving balance, coordination, kinesthetic sense, posture, motor skill, proprioception  to assist with core control in self care, mobility, lifting, and ambulation.      Therapeutic Activities:    [] (70268 or 44733) Provided verbal/tactile cueing for activities related to improving balance, coordination, kinesthetic sense, posture, motor skill, proprioception and motor activation to allow for proper function  with self care and ADLs  [] (00836) Provided training and instruction to the patient for proper core and proximal hip recruitment and positioning with ambulation re-education     Home Exercise Program:    [x] (80695) Reviewed/Progressed HEP activities related to strengthening, flexibility, endurance, ROM of core, proximal hip and LE for functional self-care, mobility, lifting and ambulation   [] (87183) Reviewed/Progressed HEP activities related to improving balance, coordination, kinesthetic sense, posture, motor skill, proprioception of core, proximal hip and LE for self care, mobility, lifting, and ambulation      Manual Treatments:  PROM / STM / Oscillations-Mobs:  G-I, II, III, IV (PA's, Inf., Post.)  [x] (14290) Provided manual therapy to mobilize proximal hip and LS spine soft tissue/joints for the purpose of modulating pain, promoting relaxation,  increasing ROM, reducing/eliminating soft tissue swelling/inflammation/restriction, improving soft tissue extensibility and allowing for proper ROM for normal function with self care, mobility, lifting and ambulation. Modalities:      [] GR/ESU 15 min    [] GR 15 min  [] ESU     [] CP    [] MHP    [] declined  Charges:  Timed Code Treatment Minutes: 40   Total Treatment Minutes: 40     [] EVAL (LOW) 58354 (typically 20 minutes face-to-face)  [] EVAL (MOD) 11526 (typically 30 minutes face-to-face)  [] EVAL (HIGH) 18184 (typically 45 minutes face-to-face)  [] RE-EVAL     [x] QX(95356) x 2    [] IONTO  [] NMR (03166) x     [] VASO  [x] Manual (72042) x 1      [] Other:  [] TA x      [] Mech Traction (46610)  [] ES(attended) (00485)      [] ES (un) (72133):     Goals: Patient stated goal: Patient would like to eliminate back pain  []? Progressing: []? Met: []? Not Met: []? Adjusted          Therapist goals for Patient:   Short Term Goals: To be achieved in: 2 weeks  1. Independent in HEP and progression per patient tolerance, in order to prevent re-injury. []? Progressing: [x]? Met: []? Not Met: []? Adjusted       2. Patient will have a decrease in pain to facilitate improvement in movement, function, and ADLs as indicated by Functional Deficits. []? Progressing: [x]? Met: []? Not Met: []? Adjusted          Long Term Goals: To be achieved in: 12 weeks  1. Disability index score of 25% or less for the oswestry  to assist with reaching prior level of function. [x]? Progressing: []? Met: []? Not Met: []? Adjusted       2. Patient will demonstrate increased AROM to WNL, good LS mobility, good hip ROM to allow for proper joint functioning as indicated by patients Functional Deficits. [x]? Progressing: []? Met: []? Not Met: []? Adjusted       3. Patient will demonstrate an increase in Strength to good proximal hip and core activation to allow for proper functional mobility as indicated by patients Functional Deficits. [x]? Progressing: []? Met: []? Not Met: []? Adjusted       4. Patient will return to functional activities without increased symptoms or restriction. [x]? Progressing: []? Met: []? Not Met: []? Adjusted       5. Patient will be able to sleep 4+ hours without symptoms limiting or waking patient  [x]? Progressing: []? Met: []? Not Met: []? Adjusted         Overall Progression Towards Functional goals/ Treatment Progress Update:  [x] Patient is progressing as expected towards functional goals listed. [] Progression is slowed due to complexities/Impairments listed. [] Progression has been slowed due to co-morbidities. [] Plan just implemented, too soon to assess goals progression <30days   [] Goals require adjustment due to lack of progress  [] Patient is not progressing as expected and requires additional follow up with physician  [] Other    Prognosis for POC: [x] Good [] Fair  [] Poor      Patient requires continued skilled intervention: [x] Yes  [] No      ASSESSMENT:  Pt with slow progression. Reduced session and modification of therex secondary to increased low back pain this session. Would benefit from continued skilled PT to increase ROM, strength, functional mobility in order to return to PLOF and sleep through the night. Pt has recent insurance change and will be unable to attend 2x week. Will continue with 1x a week for 8 weeks and progress therex as tolerated.     Treatment/Activity Tolerance:  [x] Patient tolerated treatment well [] Patient limited by fatigue  [] Patient limited by pain  [] Patient limited by other medical complications  [] Other:       PLAN: 1x week for 8 weeks  [x] Continue per plan of care [] Alter current plan (see comments above)  [] Plan of care initiated [] Hold pending MD visit [] Discharge      Electronically signed by:  Gaston Mosqueda PT , DPT [id number]    Note: If patient does not return for scheduled/ recommended follow up visits, this note will serve as a discharge from care along with most recent update on progress.

## 2021-07-15 ENCOUNTER — TELEPHONE (OUTPATIENT)
Dept: ORTHOPEDIC SURGERY | Age: 66
End: 2021-07-15

## 2021-07-15 NOTE — TELEPHONE ENCOUNTER
Called & spoke with the patient and got her scheduled for a virtual visit on 7/20/2021 at 2:30PM for the [place] office.

## 2021-07-21 ENCOUNTER — APPOINTMENT (OUTPATIENT)
Dept: PHYSICAL THERAPY | Age: 66
End: 2021-07-21
Payer: MEDICARE

## 2021-07-21 DIAGNOSIS — E11.65 TYPE 2 DIABETES MELLITUS WITH HYPERGLYCEMIA, WITHOUT LONG-TERM CURRENT USE OF INSULIN (HCC): Primary | ICD-10-CM

## 2021-07-21 RX ORDER — PIOGLITAZONEHYDROCHLORIDE 30 MG/1
30 TABLET ORAL DAILY
Qty: 30 TABLET | Refills: 0 | Status: SHIPPED | OUTPATIENT
Start: 2021-07-21 | End: 2021-08-03 | Stop reason: ALTCHOICE

## 2021-07-28 ENCOUNTER — HOSPITAL ENCOUNTER (OUTPATIENT)
Dept: PHYSICAL THERAPY | Age: 66
Setting detail: THERAPIES SERIES
Discharge: HOME | End: 2021-07-28
Payer: MEDICARE

## 2021-07-28 PROCEDURE — 97110 THERAPEUTIC EXERCISES: CPT

## 2021-07-28 PROCEDURE — 97140 MANUAL THERAPY 1/> REGIONS: CPT

## 2021-07-28 NOTE — FLOWSHEET NOTE
[facility], [address]  Phone: [phone]  Fax [phone]    Physical Therapy Progress note/Daily Treatment Note  Date:  2021    Patient Name:  Bettina Valdez    :  1955  MRN: [id number]  Restrictions/Precautions:    Physician Information:  Referring Practitioner: Adrienne BALDERRAMA  Medical/Treatment Diagnosis Information:  · Diagnosis: M47.816 (ICD-10-CM) - Lumbar spondylosis;  M54.5, G89.29 (ICD-10-CM) - Chronic low back pain without sciatica, unspecified back pain laterality  · Treatment Diagnosis: M54.5, G89.29 (ICD-10-CM) - Chronic low back pain without sciatica, unspecified back pain laterality  [x] Conservative / [] Surgical - DOS:  Therapy Diagnosis/Practice Pattern:  Practice Pattern F: Spinal Disorders  Insurance/Certification information:  PT Insurance Information: [facility] mediblue $40 copay - 100% needs AIM auth  Plan of care signed: [] YES  [] NO  Number of Comorbidities:  []0     []1-2    [x]3+  Date of Patient follow up with Physician:     Is this a Progress Report:     [x]  Yes  []  No        If Yes:  Date Range for reporting period:  Beginning 2021  Ending    Progress report will be due (10 Rx or 30 days whichever is less):        Recertification will be due (POC Duration  / 90 days whichever is less): 2021      Latex Allergy:  [x]NO      []YES  Preferred Language for Healthcare:   [x]English       []other:    Visit # Insurance Allowable   9 total    Thru 2021  $40 copay [employer] Debbie Huynh  auth []no        [x]yes:     SUBJECTIVE:  Pt reports back is hurting a lot, started about 4 days ago, not sure what started it. States shoulder doing about the same sees other shoulder doctor next Tuesday. Got back from [address] [place] [address] last .      OBJECTIVE:    Observation:   Palpation:     Test used Initial score Current Score   Pain Summary VAS 0-8 1 in back 3 in shoulder Functional questionnaire Oswestry 52% 26/50 30% 15/50   ROM Lumbar Flexion Mid shin P! Lower third of shin P! Lumbar Ext 80% P! 90% P! Lumbar SB L/R Fib head P! Fib head, discomfort     Lumbar rotation L/R     Strength Hip flex 4/5 B 4+    Knee ext 4+/5 B 4+    Hip ABD 4-/5 B 4     RESTRICTIONS/PRECAUTIONS: none    Exercises/Interventions:     Therapeutic Ex sets/reps Notes HEP   SB DKTC  SB LTR  Bridge  TA march with opposite UE flex  LAQ eccentric 20 x  20 x2 x 1020 x X   Standing cheerleaders 2 x 10    Standing thread the needle  L stretch  X   Finisher 4 way      [facility] Hip ABD  [facility] hip ext  Mini squat  Heel raise   30 x  30 x  X   TB row with single leg march  TB ext with single leg march  Paloff press 3 x 10 Green  3 x 10 Green     X                                                   Pt education on pathology of injury and PT progression 5 mins           Manual Intervention       Joint mobs grade 1-2 5 min     Lumbar paraspinal STM 10 min                                         NMR re-education                                       Therapeutic Exercise and NMR EXR  [x] (07297) Provided verbal/tactile cueing for activities related to strengthening, flexibility, endurance, ROM  for improvements in proximal hip and core control with self care, mobility, lifting and ambulation.  [] (91003) Provided verbal/tactile cueing for activities related to improving balance, coordination, kinesthetic sense, posture, motor skill, proprioception  to assist with core control in self care, mobility, lifting, and ambulation.      Therapeutic Activities:    [] (34053 or 91198) Provided verbal/tactile cueing for activities related to improving balance, coordination, kinesthetic sense, posture, motor skill, proprioception and motor activation to allow for proper function  with self care and ADLs  [] (96151) Provided training and instruction to the patient for proper core and proximal hip recruitment and positioning with ambulation re-education     Home Exercise Program:    [x] (56237) Reviewed/Progressed HEP activities related to strengthening, flexibility, endurance, ROM of core, proximal hip and LE for functional self-care, mobility, lifting and ambulation   [] (16570) Reviewed/Progressed HEP activities related to improving balance, coordination, kinesthetic sense, posture, motor skill, proprioception of core, proximal hip and LE for self care, mobility, lifting, and ambulation      Manual Treatments:  PROM / STM / Oscillations-Mobs:  G-I, II, III, IV (PA's, Inf., Post.)  [x] (38869) Provided manual therapy to mobilize proximal hip and LS spine soft tissue/joints for the purpose of modulating pain, promoting relaxation,  increasing ROM, reducing/eliminating soft tissue swelling/inflammation/restriction, improving soft tissue extensibility and allowing for proper ROM for normal function with self care, mobility, lifting and ambulation. Modalities:      [] GR/ESU 15 min    [] GR 15 min  [] ESU     [] CP    [] MHP    [] declined  Charges:  Timed Code Treatment Minutes: 40   Total Treatment Minutes: 40     [] EVAL (LOW) 36478 (typically 20 minutes face-to-face)  [] EVAL (MOD) 94945 (typically 30 minutes face-to-face)  [] EVAL (HIGH) 48936 (typically 45 minutes face-to-face)  [] RE-EVAL     [x] IB(21314) x 2    [] IONTO  [] NMR (20551) x     [] VASO  [x] Manual (13034) x 1      [] Other:  [] TA x      [] Mech Traction (69295)  [] ES(attended) (78385)      [] ES (un) (39816):     Goals: Patient stated goal: Patient would like to eliminate back pain  []? Progressing: []? Met: []? Not Met: []? Adjusted          Therapist goals for Patient:   Short Term Goals: To be achieved in: 2 weeks  1. Independent in HEP and progression per patient tolerance, in order to prevent re-injury. []? Progressing: [x]? Met: []? Not Met: []? Adjusted       2.  Patient will have a decrease in pain to facilitate improvement in movement, function, and ADLs as indicated by Functional Deficits. []? Progressing: [x]? Met: []? Not Met: []? Adjusted          Long Term Goals: To be achieved in: 12 weeks  1. Disability index score of 25% or less for the oswestry  to assist with reaching prior level of function. [x]? Progressing: []? Met: []? Not Met: []? Adjusted       2. Patient will demonstrate increased AROM to WNL, good LS mobility, good hip ROM to allow for proper joint functioning as indicated by patients Functional Deficits. [x]? Progressing: []? Met: []? Not Met: []? Adjusted       3. Patient will demonstrate an increase in Strength to good proximal hip and core activation to allow for proper functional mobility as indicated by patients Functional Deficits. [x]? Progressing: []? Met: []? Not Met: []? Adjusted       4. Patient will return to functional activities without increased symptoms or restriction. [x]? Progressing: []? Met: []? Not Met: []? Adjusted       5. Patient will be able to sleep 4+ hours without symptoms limiting or waking patient  [x]? Progressing: []? Met: []? Not Met: []? Adjusted         Overall Progression Towards Functional goals/ Treatment Progress Update:  [x] Patient is progressing as expected towards functional goals listed. [] Progression is slowed due to complexities/Impairments listed. [] Progression has been slowed due to co-morbidities. [] Plan just implemented, too soon to assess goals progression <30days   [] Goals require adjustment due to lack of progress  [] Patient is not progressing as expected and requires additional follow up with physician  [] Other    Prognosis for POC: [x] Good [] Fair  [] Poor      Patient requires continued skilled intervention: [x] Yes  [] No      ASSESSMENT:  Pt with slow progression. Reduced session and modification of therex secondary to increased low back pain this session.  Would benefit from continued skilled PT to increase ROM, strength, functional mobility in order to return to

## 2021-08-02 ENCOUNTER — OFFICE VISIT (OUTPATIENT)
Dept: ORTHOPEDIC SURGERY | Age: 66
End: 2021-08-02
Payer: MEDICARE

## 2021-08-02 VITALS — BODY MASS INDEX: 32.32 KG/M2 | WEIGHT: 194 LBS | HEIGHT: 65 IN

## 2021-08-02 DIAGNOSIS — M54.50 CHRONIC LOW BACK PAIN WITHOUT SCIATICA, UNSPECIFIED BACK PAIN LATERALITY: ICD-10-CM

## 2021-08-02 DIAGNOSIS — M47.816 LUMBAR SPONDYLOSIS: Primary | ICD-10-CM

## 2021-08-02 DIAGNOSIS — R10.32 LLQ ABDOMINAL PAIN: ICD-10-CM

## 2021-08-02 DIAGNOSIS — G89.29 CHRONIC LOW BACK PAIN WITHOUT SCIATICA, UNSPECIFIED BACK PAIN LATERALITY: ICD-10-CM

## 2021-08-02 PROCEDURE — 99214 OFFICE O/P EST MOD 30 MIN: CPT | Performed by: PHYSICIAN ASSISTANT

## 2021-08-02 RX ORDER — TIZANIDINE 4 MG/1
TABLET ORAL
Qty: 30 TABLET | Refills: 0 | Status: SHIPPED | OUTPATIENT
Start: 2021-08-02 | End: 2021-08-03 | Stop reason: DRUGHIGH

## 2021-08-02 RX ORDER — IBUPROFEN 800 MG/1
800 TABLET ORAL 2 TIMES DAILY PRN
Qty: 60 TABLET | Refills: 0 | Status: SHIPPED | OUTPATIENT
Start: 2021-08-02 | End: 2021-08-03

## 2021-08-02 NOTE — PROGRESS NOTES
FOLLOW UP: SPINE    CHIEF COMPLAINT:    Chief Complaint   Patient presents with    Follow-up     low back pain       HISTORY OF PRESENT ILLNESS:                The patient is a 73 y.o. female history of DM, chronic low back pain here for worsening low back and left flank pain. She reports worsening aching low back pain. She also has left flank and left lower quadrant pain. She was recently seen in the emergency department and underwent a CT of her abdomen and pelvis. She states at times it feels that her left abdomen will be \"swollen\". Her low back pain is fairly constant but increased with sitting. She reports some relief with Zanaflex at night and ibuprofen as needed. Other conservative care includes Mobic, Zanaflex, ibuprofen, Tylenol, IcyHot, physical therapy with continued HEP. She currently denies any distal radiating pain. No progressive numbness tingling or weakness. No recent bowel or bladder dysfunction. No recent trauma. No recent fevers chills infections. She has a history of a lipoma excision from her left flank with Dr. Dara Colunga who has cleared her. She has also discussed the symptoms with her PCP.        Current/Past Treatment:   · Physical Therapy: YES for lumbar spine with HEP cont  · Chiropractic:     · Injection:     Medications:            NSAIDS: Meloxicam, ibuprofen            Muscle relaxer: Past  Robaxin, Zanaflex with some improvement at night            Steriods:   Prednisone-past            Neuropathic medications:              Opioids: post op Norco            Other:   · Surgery/Consult: No    Past Medical History: Medical history form was reviewed today & scanned into the media tab  Past Medical History:   Diagnosis Date    Chronic back pain     Diabetes mellitus ([facility] [address]     Hypothyroidism     Prolonged emergence from general anesthesia     Thyroid disease       Past Surgical History:     Past Surgical History:   Procedure Laterality Date    ABDOMEN SURGERY Left 3/15/2021    EXCISION OF LEFT FLANK MASS performed by German Solitario MD at [facility] BREAST SURGERY Right 2013    drainage   [facility]    COLONOSCOPY N/A 10/1/2020    COLONOSCOPY DIAGNOSTIC performed by Camilla Macedo MD at [address] [place],[address] Right 10/27/2020    EXAM UNDER ANESTHESIA VIDEO ARTHROSCOPY RIGHT SHOULDER, ROTATOR CUFF REPAIR WITH HAZEL AND NEPHEW PATCH, NEER ACROMIOPLASTY, POSSIBLE RYLEE PROCEDURE WITH EXPAREL performed by Caterina Craven MD at [facility] ENDOSCOPY      UPPER GASTROINTESTINAL ENDOSCOPY  06/22/2020    bx    UPPER GASTROINTESTINAL ENDOSCOPY N/A 6/22/2020    EGD BIOPSY performed by Camilla Macedo MD at [address]     Current Medications:     Current Outpatient Medications:     ibuprofen (ADVIL;MOTRIN) 800 MG tablet, Take 1 tablet by mouth 2 times daily as needed for Pain, Disp: 60 tablet, Rfl: 0    tiZANidine (ZANAFLEX) 4 MG tablet, I po qHS PRN, Disp: 30 tablet, Rfl: 0    pioglitazone (ACTOS) 30 MG tablet, Take 1 tablet by mouth daily, Disp: 30 tablet, Rfl: 0    polyethylene glycol (GLYCOLAX) 17 GM/SCOOP powder, Take 17 g by mouth daily, Disp: 1530 g, Rfl: 1    tiZANidine (ZANAFLEX) 4 MG tablet, Take 1 tablet by mouth 4 times daily as needed (back pain, spasm), Disp: 40 tablet, Rfl: 0    blood glucose test strips (TRUE METRIX BLOOD GLUCOSE TEST) strip, TEST EVERY DAY, Disp: 100 strip, Rfl: 3    clotrimazole-betamethasone (LOTRISONE) 1-0.05 % cream, Apply topically 2 times daily. , Disp: 15 g, Rfl: 0    empagliflozin (JARDIANCE) 25 MG tablet, Take 25 mg by mouth daily, Disp: 30 tablet, Rfl: 11    levothyroxine (SYNTHROID) 75 MCG tablet, Take 1 tablet by mouth daily, Disp: 90 tablet, Rfl: 3    glimepiride (AMARYL) 4 MG tablet, Take 1 tablet by mouth 2 times daily, Disp: 180 tablet, Rfl: 3    atorvastatin (LIPITOR) 20 MG tablet, Take 1 tablet by mouth daily, Disp: 90 tablet, Rfl: 3    lisinopril (PRINIVIL;ZESTRIL) 40 MG tablet, Take 1 tablet by mouth daily, Disp: 90 tablet, Rfl: 3    glucose monitoring kit (FREESTYLE) monitoring kit, 1 kit by Does not apply route daily, Disp: 1 kit, Rfl: 0    omeprazole (PRILOSEC) 20 MG delayed release capsule, TAKE 1 CAPSULE BY MOUTH EVERY DAY (Patient taking differently: as needed ), Disp: 30 capsule, Rfl: 9    SITagliptin (JANUVIA) 100 MG tablet, Take 1 tablet by mouth daily, Disp: 90 tablet, Rfl: 3  Allergies:  Patient has no known allergies. Social History:    reports that she quit smoking about 11 years ago. Her smoking use included cigarettes. She started smoking about 29 years ago. She has a 10.00 pack-year smoking history. She has never used smokeless tobacco. She reports that she does not drink alcohol and does not use drugs. Family History:   Family History   Problem Relation Age of Onset    Dementia Mother     Breast Cancer Sister 61    Atrial Fibrillation Sister     High Blood Pressure Maternal Grandmother     Diabetes Sister        REVIEW OF SYSTEMS: Full ROS noted & scanned   CONSTITUTIONAL: Denies unexplained weight loss, fevers, chills or fatigue  NEUROLOGICAL: Denies unsteady gait or progressive weakness      PHYSICAL EXAM:    Vitals: Height 5' 5\" (1.651 m), weight 194 lb (88 kg), not currently breastfeeding. 7/10 pain scale    GENERAL EXAM:  · General Apparence: Patient is adequately groomed with no evidence of malnutrition. · Orientation: The patient is oriented to time, place and person. · Mood & Affect:The patient's mood and affect are appropriate  · Sensation: Sensation is intact without deficit  · Coordination/Balance: Good coordination     LUMBAR/SACRAL EXAMINATION:  · Inspection: Local inspection shows no step-off or bruising.   Lumbar alignment is normal.  Sagittal and Coronal balance is neutral.  Well-healed left flank incision without evidence of drainage or erythema. · Palpation:   No evidence of tenderness at the midline. No tenderness bilaterally at the paraspinal or trochanters. There is no step-off or paraspinal spasm. · Range of Motion: Mild to moderate loss of flexion and extension  · Strength:   Strength testing is 5/5 in all muscle groups tested. · Special Tests:   Straight leg raise and crossed SLR negative. Leg length and pelvis level.  0 out of 5 Jarrell's signs. · Skin: There are no rashes, ulcerations or lesions. · Reflexes: Reflexes are symmetrically 1+ at the patellar and ankle tendons. Clonus absent bilaterally at the feet. · Gait & station: Normal unassisted  · Additional Examinations:   · RIGHT LOWER EXTREMITY: Inspection/examination of the right lower extremity does not show any tenderness, deformity or injury. Range of motion is full. There is no gross instability. There are no rashes, ulcerations or lesions. Strength and tone are normal.  · LEFT LOWER EXTREMITY:  Inspection/examination of the left lower extremity does not show any tenderness, deformity or injury. Range of motion is full. There is no gross instability. There are no rashes, ulcerations or lesions. Strength and tone are normal.    Diagnostic Testing:  CT abdomen and pelvis July 2021  Bones/Soft Tissues: There are multilevel degenerative changes of the spine   with no evidence of acute osseous abnormality.  Very tiny umbilical hernia   containing fat is identified.           Impression   1. No evidence of radiopaque renal/ureteral calculus.  No radiographic   findings to suggest presence of acute obstructive uropathy. 2. No evidence of bowel obstruction, intraperitoneal free air, or abscess. 3. Mild-moderate fecal loading of the colon. 4. Status post cholecystectomy.      Lumbar MRI scan report independently reviewed from April 2021 showing DDD with mild Modic changes L3-4 and L4-5, multilevel facet

## 2021-08-03 ENCOUNTER — VIRTUAL VISIT (OUTPATIENT)
Dept: INTERNAL MEDICINE CLINIC | Age: 66
End: 2021-08-03
Payer: MEDICARE

## 2021-08-03 DIAGNOSIS — I10 ESSENTIAL HYPERTENSION: ICD-10-CM

## 2021-08-03 DIAGNOSIS — G89.29 CHRONIC LOW BACK PAIN WITHOUT SCIATICA, UNSPECIFIED BACK PAIN LATERALITY: ICD-10-CM

## 2021-08-03 DIAGNOSIS — R10.11 RUQ ABDOMINAL PAIN: Primary | ICD-10-CM

## 2021-08-03 DIAGNOSIS — E03.9 ACQUIRED HYPOTHYROIDISM: ICD-10-CM

## 2021-08-03 DIAGNOSIS — E11.9 CONTROLLED TYPE 2 DIABETES MELLITUS WITHOUT COMPLICATION, WITHOUT LONG-TERM CURRENT USE OF INSULIN (HCC): ICD-10-CM

## 2021-08-03 DIAGNOSIS — M54.50 CHRONIC LOW BACK PAIN WITHOUT SCIATICA, UNSPECIFIED BACK PAIN LATERALITY: ICD-10-CM

## 2021-08-03 DIAGNOSIS — E78.2 MIXED HYPERLIPIDEMIA: ICD-10-CM

## 2021-08-03 DIAGNOSIS — M47.816 LUMBAR SPONDYLOSIS: ICD-10-CM

## 2021-08-03 PROCEDURE — 99443 PR PHYS/QHP TELEPHONE EVALUATION 21-30 MIN: CPT | Performed by: INTERNAL MEDICINE

## 2021-08-03 RX ORDER — TIZANIDINE 4 MG/1
4 TABLET ORAL 2 TIMES DAILY
Qty: 60 TABLET | Refills: 0 | Status: SHIPPED | OUTPATIENT
Start: 2021-08-03 | End: 2021-09-07

## 2021-08-03 SDOH — ECONOMIC STABILITY: FOOD INSECURITY: WITHIN THE PAST 12 MONTHS, YOU WORRIED THAT YOUR FOOD WOULD RUN OUT BEFORE YOU GOT MONEY TO BUY MORE.: NEVER TRUE

## 2021-08-03 SDOH — ECONOMIC STABILITY: FOOD INSECURITY: WITHIN THE PAST 12 MONTHS, THE FOOD YOU BOUGHT JUST DIDN'T LAST AND YOU DIDN'T HAVE MONEY TO GET MORE.: NEVER TRUE

## 2021-08-03 ASSESSMENT — SOCIAL DETERMINANTS OF HEALTH (SDOH): HOW HARD IS IT FOR YOU TO PAY FOR THE VERY BASICS LIKE FOOD, HOUSING, MEDICAL CARE, AND HEATING?: NOT HARD AT ALL

## 2021-08-03 NOTE — PROGRESS NOTES
neuropathy.  Stable on Januvia 100 mg qd, Jardiance 25 mg qd, glimepiride 4 mg bid and Actos 30 mg qd. She's been off Metformin 850 mg 1/2 AM and 1 PM for about 2 weeks without any improvement in her abd bloating or pain.     Home blood sugar records: fasting range: 139 this AM fasting but not been checking otherwise  Any episodes of hypoglycemia? no  Eye exam current (within one year): yes  Tobacco history: She  reports that she quit smoking about 10 years ago. Her smoking use included cigarettes. She has a 10.00 pack-year smoking history. She has never used smokeless tobacco.   Daily Aspirin? No: will start  Known diabetic complications: none  Hypertension:  Home blood pressure monitoring: Yes 120/60-70.  Stable on Lisinopril 40 mg qd.   She is adherent to a low sodium diet. Patient denies chest pain, shortness of breath, headache, lightheadedness, blurred vision, peripheral edema, palpitations, dry cough and fatigue.  Antihypertensive medication side effects: no medication side effects noted.  Use of agents associated with hypertension: thyroid hormones. Hyperlipidemia:  No new myalgias or GI upset on atorvastatin (Lipitor) 20 mg qd.     Hypothyroidism: Recent symptoms: stable with increase Levothyroxine 75 mcg qd. She denies fatigue, weight gain, weight loss, cold intolerance, heat intolerance, hair loss, dry skin, constipation, diarrhea, edema, anxiety, tremor, palpitations and dysphagia. Patient is  taking her medication consistently on an empty stomach.       Lab Results   Component Value Date    LABA1C 7.2 05/19/2021    LABA1C 7.1 02/24/2021    LABA1C 7.2 11/30/2020    LABMICR Not Indicated 07/12/2021     Lab Results   Component Value Date     07/12/2021    K 4.4 07/12/2021     07/12/2021    CO2 22 07/12/2021    BUN 12 07/12/2021    CREATININE 0.6 07/12/2021    GLUCOSE 243 (H) 07/12/2021    CALCIUM 9.2 07/12/2021     Lab Results   Component Value Date    CHOL 165 02/24/2021    TRIG 98 02/24/2021 HDL 53 02/24/2021    LDLCALC 92 02/24/2021     Lab Results   Component Value Date    ALT 16 07/12/2021    AST 16 07/12/2021     Lab Results   Component Value Date    TSH 1.08 02/24/2021    TSH 0.07 (L) 05/19/2020    T4FREE 1.3 02/24/2021    T4FREE 1.6 05/19/2020     Lab Results   Component Value Date    WBC 6.4 07/12/2021    HGB 13.8 07/12/2021    HCT 41.2 07/12/2021    MCV 89.3 07/12/2021     07/12/2021     No results found for: INR   No results found for: PSA   No results found for: [address]     Patient Active Problem List   Diagnosis    Type 2 diabetes mellitus without complication, without long-term current use of insulin (HCC)    Mixed hyperlipidemia    Essential hypertension    Acquired hypothyroidism    Erosive gastritis    Esophagitis, Spalding grade A    RUQ pain    Fatty liver    Mass of torso       No Known Allergies  Outpatient Medications Marked as Taking for the 8/3/21 encounter (Virtual Visit) with Florentin Santos MD   Medication Sig Dispense Refill    metFORMIN (GLUCOPHAGE) 850 MG tablet Take 1 tablet by mouth 2 times daily (with meals) 180 tablet 0    tiZANidine (ZANAFLEX) 4 MG tablet Take 1 tablet by mouth 2 times daily 60 tablet 0    SITagliptin (JANUVIA) 100 MG tablet Take 1 tablet by mouth daily 30 tablet 0    polyethylene glycol (GLYCOLAX) 17 GM/SCOOP powder Take 17 g by mouth daily 1530 g 1    blood glucose test strips (TRUE METRIX BLOOD GLUCOSE TEST) strip TEST EVERY  strip 3    clotrimazole-betamethasone (LOTRISONE) 1-0.05 % cream Apply topically 2 times daily.  15 g 0    empagliflozin (JARDIANCE) 25 MG tablet Take 25 mg by mouth daily 30 tablet 11    levothyroxine (SYNTHROID) 75 MCG tablet Take 1 tablet by mouth daily 90 tablet 3    glimepiride (AMARYL) 4 MG tablet Take 1 tablet by mouth 2 times daily 180 tablet 3    atorvastatin (LIPITOR) 20 MG tablet Take 1 tablet by mouth daily 90 tablet 3    lisinopril (PRINIVIL;ZESTRIL) 40 MG tablet Take 1 tablet by mouth daily 90 tablet 3    glucose monitoring kit (FREESTYLE) monitoring kit 1 kit by Does not apply route daily 1 kit 0    omeprazole (PRILOSEC) 20 MG delayed release capsule TAKE 1 CAPSULE BY MOUTH EVERY DAY (Patient taking differently: as needed ) 30 capsule 9         Review of Systems: 14 systems were negative except of what was stated on HPI    Nursing note and vitals reviewed. There were no vitals filed for this visit. Wt Readings from Last 3 Encounters:   08/02/21 194 lb (88 kg)   07/12/21 194 lb (88 kg)   05/19/21 191 lb (86.6 kg)     BP Readings from Last 3 Encounters:   07/12/21 133/80   05/19/21 128/84   04/12/21 128/80     There is no height or weight on file to calculate BMI. Constitutional: Patient sounded well and in no distress. Psychiatric: Normal mood on the phone.

## 2021-08-04 ENCOUNTER — HOSPITAL ENCOUNTER (OUTPATIENT)
Dept: PHYSICAL THERAPY | Age: 66
Setting detail: THERAPIES SERIES
Discharge: HOME | End: 2021-08-04
Payer: MEDICARE

## 2021-08-04 PROCEDURE — 97140 MANUAL THERAPY 1/> REGIONS: CPT

## 2021-08-04 PROCEDURE — 97110 THERAPEUTIC EXERCISES: CPT

## 2021-08-04 NOTE — FLOWSHEET NOTE
[facility], [address]  Phone: [phone]  Fax [phone]    Physical Therapy Progress note/Daily Treatment Note  Date:  2021    Patient Name:  Myrna Lopez    :  1955  MRN: [id number]  Restrictions/Precautions:    Physician Information:  Referring Practitioner: Aleyda BALDERRAMA  Medical/Treatment Diagnosis Information:  · Diagnosis: M47.816 (ICD-10-CM) - Lumbar spondylosis;  M54.5, G89.29 (ICD-10-CM) - Chronic low back pain without sciatica, unspecified back pain laterality  · Treatment Diagnosis: M54.5, G89.29 (ICD-10-CM) - Chronic low back pain without sciatica, unspecified back pain laterality  [x] Conservative / [] Surgical - DOS:  Therapy Diagnosis/Practice Pattern:  Practice Pattern F: Spinal Disorders  Insurance/Certification information:  PT Insurance Information: BCBS mediblue $40 copay - 100% needs AIM auth  Plan of care signed: [] YES  [] NO  Number of Comorbidities:  []0     []1-2    [x]3+  Date of Patient follow up with Physician:     Is this a Progress Report:     [x]  Yes  []  No        If Yes:  Date Range for reporting period:  Beginning 2021  Ending    Progress report will be due (10 Rx or 30 days whichever is less):        Recertification will be due (POC Duration  / 90 days whichever is less): 2021      Latex Allergy:  [x]NO      []YES  Preferred Language for Healthcare:   [x]English       []other:    Visit # Insurance Allowable   10 total  7/7   3/6  Thru 2021  $40 copay AIM [employer]  auth []no        [x]yes:     SUBJECTIVE:  Pt reports met with shoulder specialist and thinks she will need replacement at some point due to arthritis. Patient reports moving in three weeks and may not make it back in for another appointment.       OBJECTIVE:    Observation:   Palpation:     Test used Initial score Current Score   Pain Summary VAS 0-8 1 in back 3 in shoulder Functional questionnaire Oswestry 52% 26/50 30% 15/50   ROM Lumbar Flexion Mid shin P! Lower third of shin P! Lumbar Ext 80% P! 90% P! Lumbar SB L/R Fib head P! Fib head, discomfort     Lumbar rotation L/R     Strength Hip flex 4/5 B 4+    Knee ext 4+/5 B 4+    Hip ABD 4-/5 B 4     RESTRICTIONS/PRECAUTIONS: none    Exercises/Interventions:     Therapeutic Ex sets/reps Notes HEP   SB DKTC  SB LTR  Bridge  TA march with opposite UE flex  LAQ eccentric 20 x  20 x2 x 1020 x X   Standing cheerleaders 2 x 10    Standing thread the needle  L stretch  X   Finisher 4 way      [facility] march  [facility] Hip ABD  [facility] hip ext  Mini squat  Heel raise   30 x  30 x  X   TB row with single leg march  TB ext with single leg march  Paloff press    X   Fully gym HEP progression 20 mins  2 x 10 of each                                              Pt education on pathology of injury and PT progression 5 mins           Manual Intervention       Joint mobs grade 1-2 5 min     Lumbar paraspinal STM 10 min                                         NMR re-education                                       Therapeutic Exercise and NMR EXR  [x] (57763) Provided verbal/tactile cueing for activities related to strengthening, flexibility, endurance, ROM  for improvements in proximal hip and core control with self care, mobility, lifting and ambulation.  [] (39973) Provided verbal/tactile cueing for activities related to improving balance, coordination, kinesthetic sense, posture, motor skill, proprioception  to assist with core control in self care, mobility, lifting, and ambulation.      Therapeutic Activities:    [] (85231 or 37096) Provided verbal/tactile cueing for activities related to improving balance, coordination, kinesthetic sense, posture, motor skill, proprioception and motor activation to allow for proper function  with self care and ADLs  [] (51143) Provided training and instruction to the patient for proper core and proximal hip recruitment and positioning with ambulation re-education     Home Exercise Program:    [x] (73926) Reviewed/Progressed HEP activities related to strengthening, flexibility, endurance, ROM of core, proximal hip and LE for functional self-care, mobility, lifting and ambulation   [] (03763) Reviewed/Progressed HEP activities related to improving balance, coordination, kinesthetic sense, posture, motor skill, proprioception of core, proximal hip and LE for self care, mobility, lifting, and ambulation      Manual Treatments:  PROM / STM / Oscillations-Mobs:  G-I, II, III, IV (PA's, Inf., Post.)  [x] (98157) Provided manual therapy to mobilize proximal hip and LS spine soft tissue/joints for the purpose of modulating pain, promoting relaxation,  increasing ROM, reducing/eliminating soft tissue swelling/inflammation/restriction, improving soft tissue extensibility and allowing for proper ROM for normal function with self care, mobility, lifting and ambulation. Modalities:      [] GR/ESU 15 min    [] GR 15 min  [] ESU     [] CP    [] MHP    [] declined  Charges:  Timed Code Treatment Minutes: 40   Total Treatment Minutes: 40     [] EVAL (LOW) 55531 (typically 20 minutes face-to-face)  [] EVAL (MOD) 65533 (typically 30 minutes face-to-face)  [] EVAL (HIGH) 46360 (typically 45 minutes face-to-face)  [] RE-EVAL     [x] HL(51616) x 2    [] IONTO  [] NMR (43667) x     [] VASO  [x] Manual (50677) x 1      [] Other:  [] TA x      [] Mech Traction (74932)  [] ES(attended) (31068)      [] ES (un) (31340):     Goals: Patient stated goal: Patient would like to eliminate back pain  []? Progressing: []? Met: []? Not Met: []? Adjusted          Therapist goals for Patient:   Short Term Goals: To be achieved in: 2 weeks  1. Independent in HEP and progression per patient tolerance, in order to prevent re-injury. []? Progressing: [x]? Met: []? Not Met: []? Adjusted       2.  Patient will have a decrease in pain to facilitate improvement in movement, function, and ADLs as indicated by Functional Deficits. []? Progressing: [x]? Met: []? Not Met: []? Adjusted          Long Term Goals: To be achieved in: 12 weeks  1. Disability index score of 25% or less for the oswestry  to assist with reaching prior level of function. [x]? Progressing: []? Met: []? Not Met: []? Adjusted       2. Patient will demonstrate increased AROM to WNL, good LS mobility, good hip ROM to allow for proper joint functioning as indicated by patients Functional Deficits. [x]? Progressing: []? Met: []? Not Met: []? Adjusted       3. Patient will demonstrate an increase in Strength to good proximal hip and core activation to allow for proper functional mobility as indicated by patients Functional Deficits. [x]? Progressing: []? Met: []? Not Met: []? Adjusted       4. Patient will return to functional activities without increased symptoms or restriction. [x]? Progressing: []? Met: []? Not Met: []? Adjusted       5. Patient will be able to sleep 4+ hours without symptoms limiting or waking patient  [x]? Progressing: []? Met: []? Not Met: []? Adjusted         Overall Progression Towards Functional goals/ Treatment Progress Update:  [x] Patient is progressing as expected towards functional goals listed. [] Progression is slowed due to complexities/Impairments listed. [] Progression has been slowed due to co-morbidities. [] Plan just implemented, too soon to assess goals progression <30days   [] Goals require adjustment due to lack of progress  [] Patient is not progressing as expected and requires additional follow up with physician  [] Other    Prognosis for POC: [x] Good [] Fair  [] Poor      Patient requires continued skilled intervention: [x] Yes  [] No      ASSESSMENT:  Pt with slow progression. Reduced session and modification of therex secondary to increased low back pain this session.  Would benefit from continued skilled PT to increase ROM, strength, functional mobility in order to return to PLOF and sleep through the night. Pt has recent insurance change and will be unable to attend 2x week. Will continue with 1x a week for 8 weeks and progress therex as tolerated. Treatment/Activity Tolerance:  [x] Patient tolerated treatment well [] Patient limited by fatigue  [] Patient limited by pain  [] Patient limited by other medical complications  [] Other:       PLAN: 1x week for 8 weeks  [x] Continue per plan of care [] Alter current plan (see comments above)  [] Plan of care initiated [] Hold pending MD visit [] Discharge      Electronically signed by:  Eddie Caballero, PT , DPT [id number]    Note: If patient does not return for scheduled/ recommended follow up visits, this note will serve as a discharge from care along with most recent update on progress.

## 2021-08-05 ENCOUNTER — HOSPITAL ENCOUNTER (OUTPATIENT)
Age: 66
Discharge: HOME | End: 2021-08-05
Payer: MEDICARE

## 2021-08-05 LAB
BASOPHILS ABSOLUTE: 0 K/UL (ref 0–0.2)
BASOPHILS RELATIVE PERCENT: 0.6 %
C-REACTIVE PROTEIN: 4.9 MG/L (ref 0–5.1)
EOSINOPHILS ABSOLUTE: 0.1 K/UL (ref 0–0.6)
EOSINOPHILS RELATIVE PERCENT: 2.1 %
HCT VFR BLD CALC: 43.8 % (ref 36–48)
HEMOGLOBIN: 14.4 G/DL (ref 12–16)
LYMPHOCYTES ABSOLUTE: 2.4 K/UL (ref 1–5.1)
LYMPHOCYTES RELATIVE PERCENT: 35.6 %
MCH RBC QN AUTO: 29.9 PG (ref 26–34)
MCHC RBC AUTO-ENTMCNC: 32.8 G/DL (ref 31–36)
MCV RBC AUTO: 91.1 FL (ref 80–100)
MONOCYTES ABSOLUTE: 0.5 K/UL (ref 0–1.3)
MONOCYTES RELATIVE PERCENT: 7.1 %
NEUTROPHILS ABSOLUTE: 3.7 K/UL (ref 1.7–7.7)
NEUTROPHILS RELATIVE PERCENT: 54.6 %
PDW BLD-RTO: 13.3 % (ref 12.4–15.4)
PLATELET # BLD: 236 K/UL (ref 135–450)
PMV BLD AUTO: 9 FL (ref 5–10.5)
RBC # BLD: 4.81 M/UL (ref 4–5.2)
WBC # BLD: 6.7 K/UL (ref 4–11)

## 2021-08-05 PROCEDURE — 86140 C-REACTIVE PROTEIN: CPT

## 2021-08-05 PROCEDURE — 85652 RBC SED RATE AUTOMATED: CPT

## 2021-08-05 PROCEDURE — 85025 COMPLETE CBC W/AUTO DIFF WBC: CPT

## 2021-08-05 PROCEDURE — 36415 COLL VENOUS BLD VENIPUNCTURE: CPT

## 2021-08-06 LAB — SEDIMENTATION RATE, ERYTHROCYTE: 9 MM/HR (ref 0–30)

## 2021-08-11 ENCOUNTER — APPOINTMENT (OUTPATIENT)
Dept: PHYSICAL THERAPY | Age: 66
End: 2021-08-11
Payer: MEDICARE

## 2021-08-18 ENCOUNTER — HOSPITAL ENCOUNTER (OUTPATIENT)
Dept: PHYSICAL THERAPY | Age: 66
Setting detail: THERAPIES SERIES
Discharge: HOME | End: 2021-08-18
Payer: MEDICARE

## 2021-08-18 NOTE — FLOWSHEET NOTE
[facility], [address]        Physical Therapy  Cancellation/No-show Note  Patient Name:  Taft Boeck  :  1955   Date:  2021  Cancelled visits to date: 10  No-shows to date: 0    For today's appointment patient:  ?  Cancelled  ? Rescheduled appointment  ? X No-show     Reason given by patient:  ?  Patient ill  ? Conflicting appointment  ? No transportation    ? Conflict with work  ? No reason given  ?   Other:     Comments:      Electronically signed by:  Zina Dubin, PT

## 2021-08-25 ENCOUNTER — APPOINTMENT (OUTPATIENT)
Dept: PHYSICAL THERAPY | Age: 66
End: 2021-08-25
Payer: MEDICARE

## 2021-08-25 DIAGNOSIS — E11.9 CONTROLLED TYPE 2 DIABETES MELLITUS WITHOUT COMPLICATION, WITHOUT LONG-TERM CURRENT USE OF INSULIN (HCC): ICD-10-CM

## 2021-09-04 DIAGNOSIS — R10.11 RUQ ABDOMINAL PAIN: ICD-10-CM

## 2021-09-04 DIAGNOSIS — G89.29 CHRONIC LOW BACK PAIN WITHOUT SCIATICA, UNSPECIFIED BACK PAIN LATERALITY: ICD-10-CM

## 2021-09-04 DIAGNOSIS — M47.816 LUMBAR SPONDYLOSIS: ICD-10-CM

## 2021-09-04 DIAGNOSIS — M54.50 CHRONIC LOW BACK PAIN WITHOUT SCIATICA, UNSPECIFIED BACK PAIN LATERALITY: ICD-10-CM

## 2021-09-07 RX ORDER — TIZANIDINE 4 MG/1
TABLET ORAL
Qty: 60 TABLET | Refills: 0 | Status: SHIPPED | OUTPATIENT
Start: 2021-09-07

## 2022-01-09 NOTE — FLOWSHEET NOTE
[facility],  [address]  Phone: [phone]  Fax [phone]    Physical Therapy Treatment Note/ Progress Report:     Date:  10/21/2020    Patient Name:  Jaimie Vyas    :  1955  MRN: [id number]  Restrictions/Precautions:    Medical/Treatment Diagnosis Information:  Diagnosis: M54.5 (ICD-10-CM) - Low back pain, unspecified back pain laterality, unspecified chronicity, unspecified whether sciatica present; M54.16 (ICD-10-CM) - Lumbar radiculitis  Treatment Diagnosis: M54.5 - Lumbar pain; M54.41 - LBP w/ sciatica  Insurance/Certification information:  PT Insurance Information: [employer] Owens  Physician Information:  Referring Practitioner: Dr. Shawn Reyna  Has the plan of care been signed (Y/N):        [x]  Yes  []  No     Date of Patient follow up with Physician: PRN      Is this a Progress Report:     [x]  Yes  []  No        If Yes:  Date Range for reporting period:  Beginning 2020  Ending 2020    Progress report will be due (10 Rx or 30 days whichever is less):        Recertification will be due (POC Duration  / 90 days whichever is less): 2020         Visit # Insurance Allowable Auth Required   26 (+ 4) [place] [facility]  30 visits []  Yes [x]  No        Functional Scale: Modified Oswestry score 23, 46% disability  Date assessed:  3/11/2020           Modified Oswestry score 18, 36% disability  Date assessed:  4/15/2020           Modified Oswestry score 13, 26%           2020           Modified Oswestry score 12, 24%           2020           Modified Oswestry score 13, 26%           2020    Therapy Diagnosis/Practice Pattern: M54.5 - Lumbar pain; M54.41 - LBP w/ sciatica      Number of Comorbidities:  []0     [x]1-2    []3+    Latex Allergy:  [x]NO      []YES  Preferred Language for Healthcare:   [x]English       []other:      Pain level:  0/10 beginning of session; 0/10 at end of session    SUBJECTIVE:  Patient nervous about shoulder surgery, reports shoulder is killing her so needs to get it done. OBJECTIVE:    Observation:    Test measurements:       RESTRICTIONS/PRECAUTIONS: none    Exercises/Interventions:     Therapeutic Ex (91570) Sets/sec Notes/CUES   Bike     TA BKFO  TA march  Bridges  Hip ABD isometric  Hip ADD isometric  Clamshells supine, single/double  x 20   x 20  2 x 10    20 x each LVL    SB DKTC  SB LTRs 20 x  20 x   LBW Cues to keep TA contraction   Multifidi walkouts    TB anti-rotation presses Cues to not let resistance turn her   Prayer s  Right hand on top of left     Seated march  Standing hip ABD/ext/march   20 x                   PT education on RTC repair with patch, surgical procedure, sling use, pain management post op, RICE     Manual Intervention ([phone])     STM and Trigger point release parspinal 15 min prone                  NMR re-education (70779)  CUES NEEDED                                                Therapeutic Activity (88534)                                       Therapeutic Exercise and NMR EXR  [x] (02278) Provided verbal/tactile cueing for activities related to strengthening, flexibility, endurance, ROM  for improvements in proximal hip and core control with self care, mobility, lifting and ambulation.  [] (09128) Provided verbal/tactile cueing for activities related to improving balance, coordination, kinesthetic sense, posture, motor skill, proprioception  to assist with core control in self care, mobility, lifting, and ambulation.      Therapeutic Activities:    [] (09942 or 67081) Provided verbal/tactile cueing for activities related to improving balance, coordination, kinesthetic sense, posture, motor skill, proprioception and motor activation to allow for proper function  with self care and ADLs  [] (87576) Provided training and instruction to the patient for proper core and proximal hip recruitment and positioning with ambulation Well appearing, awake, alert, oriented to person, place, time/situation and in no apparent distress. re-education     Home Exercise Program:    [x] (67080) Reviewed/Progressed HEP activities related to strengthening, flexibility, endurance, ROM of core, proximal hip and LE for functional self-care, mobility, lifting and ambulation   [] (64545) Reviewed/Progressed HEP activities related to improving balance, coordination, kinesthetic sense, posture, motor skill, proprioception of core, proximal hip and LE for self care, mobility, lifting, and ambulation      Manual Treatments:  PROM / STM / Oscillations-Mobs:  G-I, II, III, IV (PA's, Inf., Post.)  [x] (24383) Provided manual therapy to mobilize proximal hip and LS spine soft tissue/joints for the purpose of modulating pain, promoting relaxation,  increasing ROM, reducing/eliminating soft tissue swelling/inflammation/restriction, improving soft tissue extensibility and allowing for proper ROM for normal function with self care, mobility, lifting and ambulation. Modalities:  I    Charges:  Timed Code Treatment Minutes: 40   Total Treatment Minutes: 45       [] EVAL (LOW) 36147 (typically 20 minutes face-to-face)  [] EVAL (MOD) 35916 (typically 30 minutes face-to-face)  [] EVAL (HIGH) 31951 (typically 45 minutes face-to-face)  [] RE-EVAL     [x] CU(58327) x 2  [] IONTO  [] NMR (00550) x     [] VASO  [x] Manual (25579) x 1    [] Other:  [] TA x      [] Mech Traction (94543)  [] ES(attended) (90136)     [] ES (un) (89031):     Goals: Patient stated goal: to get rid of pain; to get a good night's sleep again; to be able to return to work  [x] Progressing: [] Met: [] Not Met: [] Adjusted    Therapist goals for Patient:   Short Term Goals: To be achieved in: 2 weeks  1. Independent in HEP and progression per patient tolerance, in order to prevent re-injury. [] Progressing: [x] Met: [] Not Met: [] Adjusted  2. Patient will have a decrease in pain to facilitate improvement in movement, function, and ADLs as indicated by Functional Deficits.   [] Progressing: [x] Met: [] Not Met: [] Adjusted      Long Term Goals: To be achieved in: 12 weeks  1. Disability index score of 23% or less for the Modified Oswestry  to assist with reaching prior level of function. [x] Progressing: [] Met: [] Not Met: [] Adjusted  2. Patient will demonstrate increased AROM to WNL, good LS mobility, good hip ROM to allow for proper joint functioning as indicated by patients Functional Deficits. [] Progressing: [x] Met: [] Not Met: [] Adjusted  3. Patient will demonstrate an increase in Strength to good proximal hip and core activation to allow for proper functional mobility as indicated by patients Functional Deficits. [] Progressing: [x] Met: [] Not Met: [] Adjusted  4. Patient will return to sitting and standing functional activities without increased symptoms or restriction. [x] Progressing: [] Met: [] Not Met: [] Adjusted  5. Patient will return to full LE ROM and strength to allow for decrease in lumbar and R leg pain, to allow for return to ADLs, functional tasks, and recreational activities including gym workouts, without pain or restriction. (patient specific functional goal)    [x] Progressing: [] Met: [] Not Met: [] Adjusted    Progression Towards Functional goals:  [x] Patient is progressing as expected towards functional goals listed. [] Progression is slowed due to complexities listed. [] Progression has been slowed due to co-morbidities. [] Plan just implemented, too soon to assess goals progression  [] Other:     Overall Progression Towards Functional goals/ Treatment Progress Update:  [x] Patient is progressing as expected towards functional goals listed. [] Progression is slowed due to complexities/Impairments listed. [] Progression has been slowed due to co-morbidities.   [] Plan just implemented, too soon to assess goals progression <30days   [] Goals require adjustment due to lack of progress  [] Patient is not progressing as expected and requires additional follow up with physician  [] Other    Prognosis for POC: [x] Good [] Fair  [] Poor      Patient requires continued skilled intervention: [x] Yes  [] No    Treatment/Activity Tolerance:  [x] Patient able to complete treatment  [] Patient limited by fatigue  [] Patient limited by pain    [] Patient limited by other medical complications  [] Other:     ASSESSMENT:Patient limited in today's session due to R shoulder - unable to progress therex or add arms into core exercises due to pain and inability to use. Having RTC sx in 2 weeks. Patient requires skilled physical therapy intervention to address pain level, deficits in lumbar ROM and R LE ROM, and gait impairments, to allow for return to ADLs, functional tasks, and recreational activities including going to the gym, without pain or restriction. PLAN: 1-2 days a week for 12 weeks (6/17/2020-9/17/2020)  [x] Continue per plan of care [] Alter current plan (see comments above)  [] Plan of care initiated [] Hold pending MD visit [] Discharge      Electronically signed by:  Dale Woodward, PT, DPT    Note: If patient does not return for scheduled/ recommended follow up visits, this note will serve as a discharge from care along with most recent update on progress. normal...

## 2022-02-26 DIAGNOSIS — G89.29 CHRONIC LOW BACK PAIN WITHOUT SCIATICA, UNSPECIFIED BACK PAIN LATERALITY: ICD-10-CM

## 2022-02-26 DIAGNOSIS — E11.9 TYPE 2 DIABETES MELLITUS WITHOUT COMPLICATION, WITHOUT LONG-TERM CURRENT USE OF INSULIN (HCC): ICD-10-CM

## 2022-02-26 DIAGNOSIS — M54.50 CHRONIC LOW BACK PAIN WITHOUT SCIATICA, UNSPECIFIED BACK PAIN LATERALITY: ICD-10-CM

## 2022-02-26 DIAGNOSIS — E03.9 ACQUIRED HYPOTHYROIDISM: ICD-10-CM

## 2022-02-26 DIAGNOSIS — M47.816 LUMBAR SPONDYLOSIS: ICD-10-CM

## 2022-02-26 DIAGNOSIS — R10.11 RUQ ABDOMINAL PAIN: ICD-10-CM

## 2022-02-26 DIAGNOSIS — E78.2 MIXED HYPERLIPIDEMIA: ICD-10-CM

## 2022-02-26 DIAGNOSIS — E11.9 CONTROLLED TYPE 2 DIABETES MELLITUS WITHOUT COMPLICATION, WITHOUT LONG-TERM CURRENT USE OF INSULIN (HCC): ICD-10-CM

## 2022-02-26 DIAGNOSIS — I10 ESSENTIAL HYPERTENSION: ICD-10-CM

## 2022-02-28 RX ORDER — LISINOPRIL 40 MG/1
TABLET ORAL
Qty: 90 TABLET | Refills: 1 | OUTPATIENT
Start: 2022-02-28

## 2022-02-28 RX ORDER — ATORVASTATIN CALCIUM 20 MG/1
TABLET, FILM COATED ORAL
Qty: 90 TABLET | Refills: 2 | OUTPATIENT
Start: 2022-02-28

## 2022-02-28 RX ORDER — GLIMEPIRIDE 4 MG/1
4 TABLET ORAL 2 TIMES DAILY
Qty: 180 TABLET | Refills: 1 | OUTPATIENT
Start: 2022-02-28

## 2022-02-28 RX ORDER — TIZANIDINE 4 MG/1
TABLET ORAL
Qty: 60 TABLET | Refills: 0 | OUTPATIENT
Start: 2022-02-28

## 2022-02-28 RX ORDER — SITAGLIPTIN 100 MG/1
TABLET, FILM COATED ORAL
Qty: 30 TABLET | Refills: 1 | OUTPATIENT
Start: 2022-02-28

## 2022-02-28 RX ORDER — EMPAGLIFLOZIN 25 MG/1
TABLET, FILM COATED ORAL
Qty: 30 TABLET | Refills: 6 | OUTPATIENT
Start: 2022-02-28

## 2022-02-28 RX ORDER — LEVOTHYROXINE SODIUM 0.07 MG/1
TABLET ORAL
Qty: 90 TABLET | Refills: 1 | OUTPATIENT
Start: 2022-02-28

## 2022-03-04 DIAGNOSIS — E11.9 CONTROLLED TYPE 2 DIABETES MELLITUS WITHOUT COMPLICATION, WITHOUT LONG-TERM CURRENT USE OF INSULIN (HCC): ICD-10-CM

## 2022-03-07 RX ORDER — SITAGLIPTIN 100 MG/1
TABLET, FILM COATED ORAL
Qty: 30 TABLET | Refills: 1 | OUTPATIENT
Start: 2022-03-07

## 2022-03-15 DIAGNOSIS — E11.9 TYPE 2 DIABETES MELLITUS WITHOUT COMPLICATION, WITHOUT LONG-TERM CURRENT USE OF INSULIN (HCC): ICD-10-CM

## 2022-03-15 DIAGNOSIS — E03.9 ACQUIRED HYPOTHYROIDISM: ICD-10-CM

## 2022-03-15 DIAGNOSIS — E78.2 MIXED HYPERLIPIDEMIA: ICD-10-CM

## 2022-03-15 DIAGNOSIS — I10 ESSENTIAL HYPERTENSION: ICD-10-CM

## 2022-03-15 RX ORDER — LISINOPRIL 40 MG/1
TABLET ORAL
Qty: 90 TABLET | Refills: 1 | OUTPATIENT
Start: 2022-03-15

## 2022-03-15 RX ORDER — GLIMEPIRIDE 4 MG/1
4 TABLET ORAL 2 TIMES DAILY
Qty: 180 TABLET | Refills: 1 | OUTPATIENT
Start: 2022-03-15

## 2022-03-15 RX ORDER — EMPAGLIFLOZIN 25 MG/1
TABLET, FILM COATED ORAL
Qty: 30 TABLET | Refills: 6 | OUTPATIENT
Start: 2022-03-15

## 2022-03-15 RX ORDER — LEVOTHYROXINE SODIUM 0.07 MG/1
TABLET ORAL
Qty: 90 TABLET | Refills: 1 | OUTPATIENT
Start: 2022-03-15

## 2022-03-15 RX ORDER — ATORVASTATIN CALCIUM 20 MG/1
TABLET, FILM COATED ORAL
Qty: 90 TABLET | Refills: 2 | OUTPATIENT
Start: 2022-03-15

## 2022-04-01 DIAGNOSIS — E11.9 TYPE 2 DIABETES MELLITUS WITHOUT COMPLICATION, WITHOUT LONG-TERM CURRENT USE OF INSULIN (HCC): ICD-10-CM

## 2022-04-04 RX ORDER — EMPAGLIFLOZIN 25 MG/1
TABLET, FILM COATED ORAL
Qty: 30 TABLET | Refills: 3 | OUTPATIENT
Start: 2022-04-04

## 2022-04-05 DIAGNOSIS — M47.816 LUMBAR SPONDYLOSIS: ICD-10-CM

## 2022-04-05 DIAGNOSIS — G89.29 CHRONIC LOW BACK PAIN WITHOUT SCIATICA, UNSPECIFIED BACK PAIN LATERALITY: ICD-10-CM

## 2022-04-05 DIAGNOSIS — E11.9 TYPE 2 DIABETES MELLITUS WITHOUT COMPLICATION, WITHOUT LONG-TERM CURRENT USE OF INSULIN (HCC): ICD-10-CM

## 2022-04-05 DIAGNOSIS — R10.11 RUQ ABDOMINAL PAIN: ICD-10-CM

## 2022-04-05 DIAGNOSIS — E03.9 ACQUIRED HYPOTHYROIDISM: ICD-10-CM

## 2022-04-05 DIAGNOSIS — E78.2 MIXED HYPERLIPIDEMIA: ICD-10-CM

## 2022-04-05 DIAGNOSIS — M54.50 CHRONIC LOW BACK PAIN WITHOUT SCIATICA, UNSPECIFIED BACK PAIN LATERALITY: ICD-10-CM

## 2022-04-05 RX ORDER — TIZANIDINE 4 MG/1
TABLET ORAL
Qty: 60 TABLET | Refills: 0 | OUTPATIENT
Start: 2022-04-05

## 2022-04-05 RX ORDER — GLIMEPIRIDE 4 MG/1
4 TABLET ORAL 2 TIMES DAILY
Qty: 180 TABLET | Refills: 1 | OUTPATIENT
Start: 2022-04-05

## 2022-04-05 RX ORDER — LEVOTHYROXINE SODIUM 0.07 MG/1
TABLET ORAL
Qty: 90 TABLET | Refills: 1 | OUTPATIENT
Start: 2022-04-05

## 2022-04-05 RX ORDER — EMPAGLIFLOZIN 25 MG/1
TABLET, FILM COATED ORAL
Qty: 30 TABLET | Refills: 6 | OUTPATIENT
Start: 2022-04-05

## 2022-04-05 RX ORDER — ATORVASTATIN CALCIUM 20 MG/1
TABLET, FILM COATED ORAL
Qty: 90 TABLET | Refills: 2 | OUTPATIENT
Start: 2022-04-05

## 2022-05-04 DIAGNOSIS — E03.9 ACQUIRED HYPOTHYROIDISM: ICD-10-CM

## 2022-05-04 DIAGNOSIS — E78.2 MIXED HYPERLIPIDEMIA: ICD-10-CM

## 2022-05-04 RX ORDER — ATORVASTATIN CALCIUM 20 MG/1
TABLET, FILM COATED ORAL
Qty: 90 TABLET | Refills: 2 | OUTPATIENT
Start: 2022-05-04

## 2022-05-04 RX ORDER — LEVOTHYROXINE SODIUM 0.07 MG/1
TABLET ORAL
Qty: 90 TABLET | Refills: 1 | OUTPATIENT
Start: 2022-05-04

## (undated) DEVICE — GLOVE SURG SZ 75 L12IN FNGR THK94MIL STD WHT LTX FREE

## (undated) DEVICE — 3M™ MEDIPORE™ SOFT CLOTH TAPE, 4 INCH X 10 YARDS, 12 ROLLS/CASE, 2964: Brand: 3M™ MEDIPORE™

## (undated) DEVICE — 1810 FOAM BLOCK NEEDLE COUNTER: Brand: DEVON

## (undated) DEVICE — GLOVE SURG SZ 65 L12IN FNGR THK94MIL STD WHT LTX FREE

## (undated) DEVICE — 3M™ STERI-DRAPE™ U-DRAPE, LONG 1019: Brand: STERI-DRAPE™

## (undated) DEVICE — SET GRAV VENT NVENT CK VLV 3 NDL FREE PRT 10 GTT

## (undated) DEVICE — 90403 SHOULDER ARTHROSCOPY KIT: Brand: 90403 SHOULDER ARTHROSCOPY KIT

## (undated) DEVICE — PACK PROCEDURE SURG ARTHSCP CUST

## (undated) DEVICE — APPLICATOR MEDICATED 10.5 CC SOLUTION HI LT ORNG CHLORAPREP

## (undated) DEVICE — GAUZE,SPONGE,4"X4",16PLY,STRL,LF,10/TRAY: Brand: MEDLINE

## (undated) DEVICE — Z DISCONTINUED USE 2276105 GOWN PROTCT UNIV CHST W28IN L49IN SL 24IN BLU SPUNBOND FLM

## (undated) DEVICE — SUTURE COAT VCRL SZ 4-0 L18IN ABSRB UD L19MM PS-2 1/2 CIR J496G

## (undated) DEVICE — 3M™ STERI-STRIP™ REINFORCED ADHESIVE SKIN CLOSURES, R1547, 1/2 IN X 4 IN (12 MM X 100 MM), 6 STRIPS/ENVELOPE: Brand: 3M™ STERI-STRIP™

## (undated) DEVICE — CATHETER IV 20GA L1.25IN PNK FEP SFTY STR HUB RADPQ DISP

## (undated) DEVICE — SOLUTION IV IRRIG 500ML 0.9% SODIUM CHL 2F7123

## (undated) DEVICE — MEDI-VAC NON-CONDUCTIVE SUCTION TUBING: Brand: CARDINAL HEALTH

## (undated) DEVICE — SMARTGOWN BREATHABLE SURGICAL GOWN: Brand: CONVERTORS

## (undated) DEVICE — 3M™ TEGADERM™ TRANSPARENT FILM DRESSING FRAME STYLE, 1624W, 2-3/8 IN X 2-3/4 IN (6 CM X 7 CM), 100/CT 4CT/CASE: Brand: 3M™ TEGADERM™

## (undated) DEVICE — ELECTRODE ECG MONITR FOAM TEAR DROP ADLT RED

## (undated) DEVICE — STERILE POLYISOPRENE POWDER-FREE SURGICAL GLOVES: Brand: PROTEXIS

## (undated) DEVICE — SOLUTION IRRIG 5L LAC R BG

## (undated) DEVICE — CONMED SCOPE SAVER BITE BLOCK, 20X27 MM: Brand: SCOPE SAVER

## (undated) DEVICE — CANNULA THREADED FLEX 8.0 X 72MM: Brand: CLEAR-TRAC

## (undated) DEVICE — PAD,ABDOMINAL,8"X10",ST,LF: Brand: MEDLINE

## (undated) DEVICE — CANNULA NSL AD L7FT DIV O2 CO2 W/ M LUERLOCK TRMPT CONN

## (undated) DEVICE — TUBING PMP L8FT LNG W/ CONN FOR AR-6400 REDEUCE

## (undated) DEVICE — DYONICS 4.0 MM ELITE                                    ACROMIOBLASTER STRAIGHT DISPOSABLE                                    BURRS, SAGE GREEN, 10000 MAXIMUM                                    RPM, PACKAGED 6 PER BOX, STERILE

## (undated) DEVICE — SKIN MARKER,REGULAR TIP WITH RULER AND LABELS: Brand: DEVON

## (undated) DEVICE — SURGICAL PROCEDURE PACK IV U-BAR

## (undated) DEVICE — SOLUTION IV 1000ML LAC RINGERS PH 6.5 INJ USP VIAFLX PLAS

## (undated) DEVICE — ELECTRODE PT RET AD L9FT HI MOIST COND ADH HYDRGEL CORDED

## (undated) DEVICE — 3M™ TEGADERM™ TRANSPARENT FILM DRESSING FRAME STYLE, 1626W, 4 IN X 4-3/4 IN (10 CM X 12 CM), 50/CT 4CT/CASE: Brand: 3M™ TEGADERM™

## (undated) DEVICE — SET ADMIN PRIMING 7ML L30IN 7.35LB 20 GTT 2ND RLER CLMP

## (undated) DEVICE — AMBIENT SUPER TURBOVAC 90: Brand: COBLATION

## (undated) DEVICE — TOWEL,OR,DSP,ST,BLUE,STD,4/PK,20PK/CS: Brand: MEDLINE

## (undated) DEVICE — FORCEP BX STD CAP 240CM RAD JAW 4

## (undated) DEVICE — SUTURE VCRL SZ 3-0 L18IN ABSRB UD L26MM SH 1/2 CIR J864D

## (undated) DEVICE — 1200CC HIFLOW SUCTION CANISTER WITH AEROSTAT FILTER, FLOAT VALVE SHUTOFF WITH GREEN LID: Brand: BEMIS

## (undated) DEVICE — SUTURE MCRYL SZ 4-0 L18IN ABSRB UD L19MM PS-2 3/8 CIR PRIM Y496G

## (undated) DEVICE — TUBE IRRIG L8IN LNG PT W/ CONN FOR PMP SYS REDEUCE

## (undated) DEVICE — ENDO CARRY-ON PROCEDURE KIT INCLUDES SUCTION TUBING, LUBRICANT, GAUZE, BIOHAZARD STICKER, TRANSPORT PAD AND INTERCEPT BEDSIDE KIT.: Brand: ENDO CARRY-ON PROCEDURE KIT

## (undated) DEVICE — 4.5 MM FULL RADIUS STRAIGHT                                    BLADES, POWER/EP-1, YELLOW, PACKAGED                                    6 PER BOX, STERILE: Brand: DYONICS

## (undated) DEVICE — CANNULA NSL CO2 O2 AD

## (undated) DEVICE — SYRINGE MED 10ML LUERLOCK TIP W/O SFTY DISP

## (undated) DEVICE — MINOR SET UP PK

## (undated) DEVICE — 10FR FRAZIER SUCTION HANDLE: Brand: CARDINAL HEALTH

## (undated) DEVICE — GOWN,SIRUS,NON REINFRCD,LARGE,SET IN SL: Brand: MEDLINE

## (undated) DEVICE — PACK COOL L W14XL6.5IN 3 LAYR CONSTR SFT CLP CLSR 4 TIE

## (undated) DEVICE — GLOVE,SURG,SENSICARE,ALOE,LF,PF,7: Brand: MEDLINE

## (undated) DEVICE — FLUID CONTROL SHOULDER DRAPE PACK: Brand: CONVERTORS

## (undated) DEVICE — DRAPE,U/ SHT,SPLIT,PLAS,STERIL: Brand: MEDLINE

## (undated) DEVICE — STANDARD HYPODERMIC NEEDLE,POLYPROPYLENE HUB: Brand: MONOJECT

## (undated) DEVICE — BASIN EMESIS 500CC ROSE 250/CS 60/PLT: Brand: MEDEGEN MEDICAL PRODUCTS, LLC

## (undated) DEVICE — CHLORAPREP 26ML ORANGE